# Patient Record
Sex: MALE | Race: WHITE | NOT HISPANIC OR LATINO | Employment: OTHER | URBAN - METROPOLITAN AREA
[De-identification: names, ages, dates, MRNs, and addresses within clinical notes are randomized per-mention and may not be internally consistent; named-entity substitution may affect disease eponyms.]

---

## 2017-01-03 ENCOUNTER — OUTSIDE FACILITY SERVICE (OUTPATIENT)
Dept: PAIN MEDICINE | Facility: CLINIC | Age: 77
End: 2017-01-03

## 2017-01-03 PROCEDURE — 62321 NJX INTERLAMINAR CRV/THRC: CPT | Performed by: ANESTHESIOLOGY

## 2017-01-24 ENCOUNTER — OFFICE VISIT (OUTPATIENT)
Dept: PAIN MEDICINE | Facility: CLINIC | Age: 77
End: 2017-01-24

## 2017-01-24 VITALS
RESPIRATION RATE: 16 BRPM | OXYGEN SATURATION: 95 % | SYSTOLIC BLOOD PRESSURE: 145 MMHG | BODY MASS INDEX: 31.83 KG/M2 | TEMPERATURE: 97.7 F | HEIGHT: 68 IN | WEIGHT: 210 LBS | DIASTOLIC BLOOD PRESSURE: 62 MMHG | HEART RATE: 50 BPM

## 2017-01-24 DIAGNOSIS — M51.36 DEGENERATION OF INTERVERTEBRAL DISC OF LUMBAR REGION: ICD-10-CM

## 2017-01-24 DIAGNOSIS — M51.24 THORACIC DISC HERNIATION: ICD-10-CM

## 2017-01-24 DIAGNOSIS — G89.29 OTHER CHRONIC PAIN: Primary | ICD-10-CM

## 2017-01-24 DIAGNOSIS — M96.1 POSTLAMINECTOMY SYNDROME OF LUMBAR REGION: ICD-10-CM

## 2017-01-24 DIAGNOSIS — Z79.899 ENCOUNTER FOR LONG-TERM CURRENT USE OF HIGH RISK MEDICATION: ICD-10-CM

## 2017-01-24 PROCEDURE — 99214 OFFICE O/P EST MOD 30 MIN: CPT | Performed by: NURSE PRACTITIONER

## 2017-01-24 RX ORDER — HYDROCODONE BITARTRATE AND ACETAMINOPHEN 10; 325 MG/1; MG/1
1 TABLET ORAL EVERY 4 HOURS PRN
Qty: 180 TABLET | Refills: 0 | Status: SHIPPED | OUTPATIENT
Start: 2017-01-24 | End: 2017-02-23 | Stop reason: SDUPTHER

## 2017-01-24 NOTE — PROGRESS NOTES
CHIEF COMPLAINT  Pt states received minimal relief following 1/3/17 T9-10 DOYLE #2. Pt had 2-3 months of significant relief after 1st TESI.    Subjective   Chevy Goodwin is a 76 y.o. male  who presents to the office for follow-up of procedure.  He completed a thoracic DOYLE   on  1-3-2017 performed by Dr. Brambila for management of mid back pain. Patient reports minimal relief from the procedure. Reported significant benefit after Thoracic DOYLE when completed in July/August of 2016, this was a series of two completed 2-3 weeks apart.      He continues with Hydrocodone 10/325 6/day. Reports moderate pain reduction. No adverse reactions. ADL's by self.     Back Pain   This is a chronic problem. The current episode started more than 1 year ago. The problem occurs constantly. The pain is present in the lumbar spine and thoracic spine. The quality of the pain is described as aching and burning. The pain radiates to the right foot and left foot. The pain is at a severity of 5/10. The pain is moderate. The pain is worse during the day. The symptoms are aggravated by bending and twisting (walking, any physical activity). Stiffness is present in the morning. Associated symptoms include numbness (bilateral lower legs). Pertinent negatives include no bladder incontinence, bowel incontinence, chest pain, dysuria, fever, headaches, tingling or weakness. Risk factors include lack of exercise. He has tried NSAIDs (Rest, Norco 10/325 6/day, Thoracic DOYLE) for the symptoms. The treatment provided moderate relief.      PEG Assessment   What number best describes your pain on average in the past week?6  What number best describes how, during the past week, pain has interfered with your enjoyment of life?6  What number best describes how, during the past week, pain has interfered with your general activity?  6    The following portions of the patient's history were reviewed and updated as appropriate: allergies, current medications, past  "family history, past medical history, past social history, past surgical history and problem list.    Review of Systems   Constitutional: Positive for activity change (decreased). Negative for appetite change and fever.   Respiratory: Positive for apnea. Negative for chest tightness and shortness of breath.    Cardiovascular: Negative for chest pain.   Gastrointestinal: Negative for bowel incontinence, constipation, diarrhea and nausea.   Genitourinary: Negative for bladder incontinence, difficulty urinating and dysuria.   Musculoskeletal: Positive for back pain.   Neurological: Positive for numbness (bilateral lower legs). Negative for dizziness, tingling, weakness, light-headedness and headaches.   Psychiatric/Behavioral: Positive for sleep disturbance. Negative for suicidal ideas.       Vitals:    01/24/17 1318   BP: 145/62   Pulse: 50   Resp: 16   Temp: 97.7 °F (36.5 °C)   SpO2: 95%   Weight: 210 lb (95.3 kg)   Height: 68\" (172.7 cm)   PainSc: 5  Comment: mid back pain ranges from 4-7/10   PainLoc: Back       Objective   Physical Exam   Constitutional: He is oriented to person, place, and time. He appears well-developed and well-nourished. He is cooperative.   HENT:   Head: Normocephalic and atraumatic.   Nose: Nose normal.   Eyes: Conjunctivae and lids are normal.   Neck: Trachea normal.   Cardiovascular: Normal rate, regular rhythm and normal heart sounds.    Pulmonary/Chest: Effort normal and breath sounds normal.   Musculoskeletal: Normal range of motion.        Thoracic back: He exhibits tenderness.        Lumbar back: He exhibits tenderness.   Neurological: He is alert and oriented to person, place, and time. Gait (ambulates with cane) abnormal.   Reflex Scores:       Patellar reflexes are 1+ on the right side and 1+ on the left side.  Skin: Skin is warm, dry and intact.   Psychiatric: He has a normal mood and affect. His speech is normal and behavior is normal. Judgment and thought content normal. " Cognition and memory are normal.   Nursing note and vitals reviewed.    Assessment/Plan   Chevy was seen today for back pain.    Diagnoses and all orders for this visit:    Other chronic pain    Postlaminectomy syndrome of lumbar region    Degeneration of intervertebral disc of lumbar region    Encounter for long-term current use of high risk medication    Thoracic disc herniation T9-T10  -     Case Request    Other orders  -     HYDROcodone-acetaminophen (NORCO)  MG per tablet; Take 1 tablet by mouth Every 4 (Four) Hours As Needed for severe pain (7-10).      --- Repeat T9-10 Thoracic DOYLE. No anticoagulants.   --- Refill hydrocodone.  Patient appears stable with current regimen. No adverse effects. Regarding continuation of opioids, there is no evidence of aberrant behavior or any red flags.  The patient continues with appropriate response to opioid therapy. ADL's remain intact by self.   --- The urine drug screen confirmation from 6-7-2016 has been reviewed and the result is appropriate based on patient history and MARIOLA report  --- Follow-up 1 month          MARIOLA REPORT  As part of the patient's treatment plan, I am prescribing controlled substances. The patient has been made aware of appropriate use of such medications, including potential risk of somnolence, limited ability to drive and/or work safely, and the potential for dependence or overdose. It has also bee made clear that these medications are for use by this patient only, without concomitant use of alcohol or other substances unless prescribed.     Patient has completed prescribing agreement detailing terms of continued prescribing of controlled substances, including monitoring MARIOLA reports, urine drug screening, and pill counts if necessary. The patient is aware that inappropriate use will results in cessation of prescribing such medications.    MARIOLA report has been reviewed and scanned into the patient's chart.    Date of last MARIOLA :  1- (last fill not reported as patient had filled at [facility] in [facility])    History and physical exam exhibit continued safe and appropriate use of controlled substances.     EMR Dragon/Transcription disclaimer:   Much of this encounter note is an electronic transcription/translation of spoken language to printed text. The electronic translation of spoken language may permit erroneous, or at times, nonsensical words or phrases to be inadvertently transcribed; Although I have reviewed the note for such errors, some may still exist.

## 2017-01-24 NOTE — MR AVS SNAPSHOT
Chevy Goodwin   1/24/2017 1:00 PM   Office Visit    Dept Phone:  [phone]   Encounter #:  [id number]    Provider:  DANY Vo   Department:  [facility] PAIN MANAGEMENT                Your Full Care Plan              Where to Get Your Medications      You can get these medications from any pharmacy     Bring a paper prescription for each of these medications     HYDROcodone-acetaminophen  MG per tablet            Your Updated Medication List          This list is accurate as of: 1/24/17  1:45 PM.  Always use your most recent med list.                * ALCORTIN A 1-2-1 % gel       * ALCORTIN A 1-2-1 % gel       allopurinol 300 MG tablet   Commonly known as:  ZYLOPRIM       amLODIPine 10 MG tablet   Commonly known as:  NORVASC       aspirin 81 MG tablet       azelastine 0.1 % nasal spray   Commonly known as:  ASTELIN       calcium carbonate-cholecalciferol 500-400 MG-UNIT tablet tablet       carvedilol 6.25 MG tablet   Commonly known as:  COREG       celecoxib 200 MG capsule   Commonly known as:  CeleBREX       clotrimazole 1 % external solution   Commonly known as:  LOTRIMIN       [facility] GLUCOSAMINE-CHONDROITIN tablet       desonide 0.05 % cream   Commonly known as:  DESOWEN       diflorasone 0.05 % cream   Commonly known as:  PSORCON       docusate sodium 100 MG capsule   Commonly known as:  COLACE       esomeprazole 40 MG capsule   Commonly known as:  nexIUM       hydrochlorothiazide 25 MG tablet   Commonly known as:  HYDRODIURIL       HYDROcodone-acetaminophen  MG per tablet   Commonly known as:  NORCO   Take 1 tablet by mouth Every 4 (Four) Hours As Needed for severe pain (7-10).       insulin glargine 100 UNIT/ML injection   Commonly known as:  LANTUS       isosorbide mononitrate 30 MG 24 hr tablet   Commonly known as:  IMDUR       losartan 100 MG tablet   Commonly known as:  COZAAR       melatonin 5 MG tablet tablet       METFORMIN HCL PO       mupirocin 2 % ointment   Commonly known as:  BACTROBAN       nitroglycerin 0.4 MG SL tablet   Commonly known as:  NITROSTAT       pravastatin 40 MG tablet   Commonly known as:  PRAVACHOL       tobramycin 0.3 % solution ophthalmic solution       vitamin B-12 100 MCG tablet   Commonly known as:  CYANOCOBALAMIN       Vitamin D3 2000 UNITS capsule       * Notice:  This list has 2 medication(s) that are the same as other medications prescribed for you. Read the directions carefully, and ask your doctor or other care provider to review them with you.            We Performed the Following     Case Request       You Were Diagnosed With        Codes Comments    Other chronic pain    -  Primary ICD-10-CM: G89.29  ICD-9-CM: 338.29     Postlaminectomy syndrome of lumbar region     ICD-10-CM: M96.1  ICD-9-CM: 722.83     Degeneration of intervertebral disc of lumbar region     ICD-10-CM: M51.36  ICD-9-CM: 722.52     Encounter for long-term current use of high risk medication     ICD-10-CM: Z79.899  ICD-9-CM: V58.69     Thoracic disc herniation     ICD-10-CM: M51.24  ICD-9-CM: 722.11       Instructions     None    Patient Instructions History      Upcoming Appointments     Visit Type Date Time Department    OFFICE VISIT 1/24/2017  1:00 PM [facility] PAIN [facility]    FOLLOW UP 3/14/2017  1:00 PM [facility] INFECT DISE[facility]    FOLLOW UP 5/18/2017 10:00 AM  [facility]EP [facility]     5/18/2017 10:00 AM [facility] SLEEP [facility]      [employer] Signup     Our records indicate that you have an active [employer] account.    You can view your After Visit Summary by going to [employer] and logging in with your [employer] username and password.  If you don't have a [employer] username and password but a parent or guardian has access to your record, the parent or guardian should login with their own [employer] username and password and access your record to view the After Visit Summary.    If you have questions, you can email  [email or web address] or call [phone] to talk to our MyChart staff.  Remember, [employer] is NOT to be used for urgent needs.  For medical emergencies, dial 911.               Other Info from Your Visit           Your Appointments     Mar 14, 2017  1:00 PM EDT   Follow Up with Remi Acevedo MD   [facility] (--)    [address] [address] [phone]           Arrive 15 minutes prior to appointment.            May 18, 2017 10:00 AM EDT   Follow Up with Mulugeta Odonnell MD, [facility]EP [facility] PROVIDER SCHEDULE   [facility])    [address] [place] [address] [phone]           Arrive 15 minutes prior to appointment.              Allergies     Dye Fdc Red [Red Dye]  Hives    Iodine  Hives    Morphine And Related  Mental Status Change    Oxycodone  GI Intolerance    Adhesive Tape  Rash      Reason for Visit     Back Pain           Vital Signs     Blood Pressure Pulse Temperature Respirations Height Weight    145/62 50 97.7 °F (36.5 °C) 16 68\" (172.7 cm) 210 lb (95.3 kg)    Oxygen Saturation Body Mass Index Smoking Status             95% 31.93 kg/m2 Current Every Day Smoker         Problems and Diagnoses Noted     Chronic pain    Degeneration of lumbar or lumbosacral intervertebral disc    Encounter for long-term current use of high risk medication    Postlaminectomy syndrome of lumbar region    Herniated thoracic intervertebral disc        "

## 2017-02-07 ENCOUNTER — OUTSIDE FACILITY SERVICE (OUTPATIENT)
Dept: PAIN MEDICINE | Facility: CLINIC | Age: 77
End: 2017-02-07

## 2017-02-07 PROCEDURE — 62321 NJX INTERLAMINAR CRV/THRC: CPT | Performed by: ANESTHESIOLOGY

## 2017-02-23 ENCOUNTER — OFFICE VISIT (OUTPATIENT)
Dept: PAIN MEDICINE | Facility: CLINIC | Age: 77
End: 2017-02-23

## 2017-02-23 VITALS
SYSTOLIC BLOOD PRESSURE: 160 MMHG | OXYGEN SATURATION: 96 % | DIASTOLIC BLOOD PRESSURE: 64 MMHG | TEMPERATURE: 98 F | BODY MASS INDEX: 32.1 KG/M2 | HEART RATE: 57 BPM | HEIGHT: 68 IN | RESPIRATION RATE: 16 BRPM | WEIGHT: 211.8 LBS

## 2017-02-23 DIAGNOSIS — M51.36 DEGENERATION OF INTERVERTEBRAL DISC OF LUMBAR REGION: ICD-10-CM

## 2017-02-23 DIAGNOSIS — M96.1 POSTLAMINECTOMY SYNDROME OF LUMBAR REGION: ICD-10-CM

## 2017-02-23 DIAGNOSIS — G89.29 OTHER CHRONIC PAIN: Primary | ICD-10-CM

## 2017-02-23 DIAGNOSIS — Z79.899 ENCOUNTER FOR LONG-TERM CURRENT USE OF HIGH RISK MEDICATION: ICD-10-CM

## 2017-02-23 DIAGNOSIS — M51.24 THORACIC DISC HERNIATION: ICD-10-CM

## 2017-02-23 PROCEDURE — 99213 OFFICE O/P EST LOW 20 MIN: CPT | Performed by: NURSE PRACTITIONER

## 2017-02-23 RX ORDER — DIPHENOXYLATE HYDROCHLORIDE AND ATROPINE SULFATE 2.5; .025 MG/1; MG/1
TABLET ORAL
Refills: 0 | COMMUNITY
Start: 2016-12-19 | End: 2018-12-27

## 2017-02-23 RX ORDER — HYDROCODONE BITARTRATE AND ACETAMINOPHEN 10; 325 MG/1; MG/1
1 TABLET ORAL EVERY 4 HOURS PRN
Qty: 180 TABLET | Refills: 0 | Status: SHIPPED | OUTPATIENT
Start: 2017-02-23 | End: 2017-03-22 | Stop reason: SDUPTHER

## 2017-02-23 RX ORDER — ONDANSETRON 4 MG/1
TABLET, ORALLY DISINTEGRATING ORAL
Refills: 0 | COMMUNITY
Start: 2016-12-19 | End: 2017-03-22

## 2017-02-23 RX ORDER — CICLOPIROX 1 G/100ML
SHAMPOO TOPICAL
Refills: 0 | COMMUNITY
Start: 2017-01-31 | End: 2018-12-27

## 2017-02-23 NOTE — PROGRESS NOTES
CHIEF COMPLAINT    Pt had Thoracic DOYLE on 2/07/17. He reports 30% relief for one week. Then it went back to what it was.     Subjective   Chevy Goodwin is a 76 y.o. male  who presents to the office for follow-up of procedure.  He completed a thoracic DOYLE   on  2-7-2017 performed by Dr. Brambila for management of mid back pain. Patient reports 30% relief from the procedure X 7 days.     He has completed 4 Thoracic DOYLE's since 9-.  The first two provided some significant benefit for 2-3 months, however unfortunately the most recent to injections provided no ongoing therapeutic benefit.      He has previously been considered for SCS therapy and was sent to Dr. Sheppard for evaluation.  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     He continues with use of hydrocodone 10/325 which he takes at least 6/day and sometimes admits to taking more.  He reports moderate benefit with this regimen.  Per previous notes from Dr. Brambila, no chronic opioid escalations.      He was evaluated by by Dr. Morgan most recently about 3 years ago, he reports that there was nothing surgical recommended at that time.  His pain has basically been unchanged since that time.      Back Pain   This is a chronic (history of lumbar fusion with Dr. Yuan at [facility], was done in 2005) problem. The current episode started more than 1 year ago. The problem occurs constantly. The pain is present in the lumbar spine and thoracic spine. The quality of the pain is described as aching and burning. The pain radiates to the right foot and left foot. The pain is at a severity of 5/10. The pain is moderate. The pain is worse during the day. The symptoms are aggravated by bending and twisting  "(walking, any physical activity). Stiffness is present in the morning. Associated symptoms include numbness (bilateral lower legs). Pertinent negatives include no bladder incontinence, bowel incontinence, chest pain, dysuria, fever, headaches, tingling or weakness. Risk factors include lack of exercise. He has tried NSAIDs (Rest, Norco 10/325 6/day, Thoracic DOYLE) for the symptoms. The treatment provided moderate relief.      PEG Assessment   What number best describes your pain on average in the past week?6  What number best describes how, during the past week, pain has interfered with your enjoyment of life?6  What number best describes how, during the past week, pain has interfered with your general activity?  7    The following portions of the patient's history were reviewed and updated as appropriate: allergies, current medications, past family history, past medical history, past social history, past surgical history and problem list.    Review of Systems   Constitutional: Positive for activity change (decreased). Negative for appetite change and fever.   Respiratory: Positive for apnea. Negative for chest tightness and shortness of breath.    Cardiovascular: Negative for chest pain.   Gastrointestinal: Negative for bowel incontinence, constipation, diarrhea and nausea.   Genitourinary: Negative for bladder incontinence, difficulty urinating and dysuria.   Musculoskeletal: Positive for back pain.   Neurological: Positive for numbness (bilateral lower legs). Negative for dizziness, tingling, weakness, light-headedness and headaches.   Psychiatric/Behavioral: Positive for sleep disturbance. Negative for agitation, confusion and suicidal ideas. The patient is not nervous/anxious.        Vitals:    02/23/17 1252   BP: 160/64   Pulse: 57   Resp: 16   Temp: 98 °F (36.7 °C)   SpO2: 96%   Weight: 211 lb 12.8 oz (96.1 kg)   Height: 68\" (172.7 cm)   PainSc:   5   PainLoc: Back       Objective   Physical Exam "   Constitutional: He is oriented to person, place, and time. He appears well-developed and well-nourished. He is cooperative.   HENT:   Head: Normocephalic and atraumatic.   Nose: Nose normal.   Eyes: Conjunctivae and lids are normal.   Neck: Trachea normal.   Cardiovascular: Normal rate and regular rhythm.    Pulmonary/Chest: Effort normal.   Musculoskeletal: Normal range of motion.        Thoracic back: He exhibits tenderness.        Lumbar back: He exhibits tenderness.   Neurological: He is alert and oriented to person, place, and time. Gait (ambulates with cane) abnormal.   Reflex Scores:       Patellar reflexes are 1+ on the right side and 1+ on the left side.  Skin: Skin is warm, dry and intact.   Psychiatric: He has a normal mood and affect. His speech is normal and behavior is normal. Judgment and thought content normal. Cognition and memory are normal.   Nursing note and vitals reviewed.      Assessment/Plan   Chevy was seen today for back pain.    Diagnoses and all orders for this visit:    Other chronic pain    Postlaminectomy syndrome of lumbar region    Degeneration of intervertebral disc of lumbar region    Thoracic disc herniation T9-T10    Encounter for long-term current use of high risk medication    Other orders  -     HYDROcodone-acetaminophen (NORCO)  MG per tablet; Take 1 tablet by mouth Every 4 (Four) Hours As Needed for severe pain (7-10).      --- Refill hydrocodone. Patient appears stable with current regimen. No adverse effects. Regarding continuation of opioids, there is no evidence of aberrant behavior or any red flags. The patient continues with appropriate response to opioid therapy. ADL's remain intact by self.   --- The urine drug screen confirmation from 6-7-2016 has been reviewed and the result is appropriate based on patient history and MARIOLA report  --- Follow-up 1 month          MARIOLA REPORT    As part of the patient's treatment plan, I am prescribing controlled  substances. The patient has been made aware of appropriate use of such medications, including potential risk of somnolence, limited ability to drive and/or work safely, and the potential for dependence or overdose. It has also bee made clear that these medications are for use by this patient only, without concomitant use of alcohol or other substances unless prescribed.     Patient has completed prescribing agreement detailing terms of continued prescribing of controlled substances, including monitoring MARIOLA reports, urine drug screening, and pill counts if necessary. The patient is aware that inappropriate use will results in cessation of prescribing such medications.    MARIOLA report has been reviewed and scanned into the patient's chart.    Date of last MARIOLA : 2- (He brought in bottle filled 1- which was filled at the [facility] and was not reported to MARIOLA)    History and physical exam exhibit continued safe and appropriate use of controlled substances.     EMR Dragon/Transcription disclaimer:   Much of this encounter note is an electronic transcription/translation of spoken language to printed text. The electronic translation of spoken language may permit erroneous, or at times, nonsensical words or phrases to be inadvertently transcribed; Although I have reviewed the note for such errors, some may still exist.

## 2017-03-22 ENCOUNTER — OFFICE VISIT (OUTPATIENT)
Dept: PAIN MEDICINE | Facility: CLINIC | Age: 77
End: 2017-03-22

## 2017-03-22 VITALS
TEMPERATURE: 98.7 F | HEART RATE: 63 BPM | SYSTOLIC BLOOD PRESSURE: 148 MMHG | HEIGHT: 68 IN | OXYGEN SATURATION: 96 % | RESPIRATION RATE: 18 BRPM | WEIGHT: 210 LBS | DIASTOLIC BLOOD PRESSURE: 64 MMHG | BODY MASS INDEX: 31.83 KG/M2

## 2017-03-22 DIAGNOSIS — Z79.891 LONG TERM (CURRENT) USE OF OPIATE ANALGESIC: ICD-10-CM

## 2017-03-22 DIAGNOSIS — M51.24 THORACIC DISC HERNIATION: ICD-10-CM

## 2017-03-22 DIAGNOSIS — M25.561 PAIN IN LATERAL PORTION OF RIGHT KNEE: ICD-10-CM

## 2017-03-22 DIAGNOSIS — G89.4 CHRONIC PAIN SYNDROME: Primary | ICD-10-CM

## 2017-03-22 DIAGNOSIS — M96.1 POSTLAMINECTOMY SYNDROME OF LUMBAR REGION: ICD-10-CM

## 2017-03-22 PROCEDURE — 99213 OFFICE O/P EST LOW 20 MIN: CPT | Performed by: ANESTHESIOLOGY

## 2017-03-22 RX ORDER — BACLOFEN 10 MG/1
TABLET ORAL
Refills: 0 | COMMUNITY
Start: 2017-03-02 | End: 2017-03-29

## 2017-03-22 RX ORDER — HYDROCODONE BITARTRATE AND ACETAMINOPHEN 10; 325 MG/1; MG/1
1 TABLET ORAL EVERY 4 HOURS PRN
Qty: 180 TABLET | Refills: 0 | Status: SHIPPED | OUTPATIENT
Start: 2017-03-22 | End: 2017-04-28 | Stop reason: SDUPTHER

## 2017-03-22 NOTE — PROGRESS NOTES
CHIEF COMPLAINT  Follow-up for back pain. Mr. Goodwin states that his back pain is unchanged from his last appt.    Subjective   Chevy Goodwin is a 76 y.o. male  who presents to the office for follow-up.He has a history of lumbar postlam syndrome, thoracic disc disease and herniation, and recently he has been flaring up some right knee pain..      Back Pain   This is a chronic (history of lumbar fusion with Dr. Yuan at [facility], was done in 2005) problem. The current episode started more than 1 year ago. The problem occurs constantly. The problem is unchanged. The pain is present in the lumbar spine and thoracic spine. The quality of the pain is described as aching and burning. The pain radiates to the right foot and left foot. The pain is moderate. The pain is worse during the day. The symptoms are aggravated by bending and twisting (walking, any physical activity). Stiffness is present in the morning. Associated symptoms include numbness (bilateral lower legs). Pertinent negatives include no bladder incontinence, bowel incontinence, chest pain, dysuria, fever, headaches, tingling or weakness. Risk factors include lack of exercise. He has tried NSAIDs (Rest, Norco 10/325 6/day, Thoracic DOYLE) for the symptoms. The treatment provided moderate relief.        PEG Assessment   What number best describes your pain on average in the past week?7  What number best describes how, during the past week, pain has interfered with your enjoyment of life?6  What number best describes how, during the past week, pain has interfered with your general activity?  6      The following portions of the patient's history were reviewed and updated as appropriate: allergies, current medications, past family history, past medical history, past social history, past surgical history and problem list.    Review of Systems   Constitutional: Positive for fatigue. Negative for fever.   HENT: Positive for hearing loss.    Eyes: Negative  "for visual disturbance.   Respiratory: Negative for cough, shortness of breath and wheezing.    Cardiovascular: Negative.  Negative for chest pain.   Gastrointestinal: Positive for constipation. Negative for bowel incontinence and diarrhea.   Genitourinary: Negative for bladder incontinence, difficulty urinating and dysuria.   Musculoskeletal: Positive for arthralgias (right knee ) and back pain.   Neurological: Positive for numbness (bilateral lower legs). Negative for tingling, weakness and headaches.   Psychiatric/Behavioral: Positive for sleep disturbance. Negative for suicidal ideas. The patient is not nervous/anxious.                    Vitals:    03/22/17 1023   BP: 148/64   Pulse: 63   Resp: 18   Temp: 98.7 °F (37.1 °C)   SpO2: 96%   Weight: 210 lb (95.3 kg)   Height: 68\" (172.7 cm)   PainSc:   6   PainLoc: Back         Objective   Physical Exam   Constitutional: He is oriented to person, place, and time. He appears well-developed and well-nourished. He is cooperative.   HENT:   Head: Normocephalic and atraumatic.   Right Ear: External ear normal.   Left Ear: External ear normal.   Nose: Nose normal.   Eyes: Conjunctivae, EOM and lids are normal. Pupils are equal, round, and reactive to light.   Neck: Trachea normal. Neck supple.   Pulmonary/Chest: Effort normal.   Neurological: He is alert and oriented to person, place, and time. Gait (ambulates with cane) abnormal.   Reflex Scores:       Patellar reflexes are 1+ on the right side and 1+ on the left side.  Skin: Skin is intact.   Psychiatric: He has a normal mood and affect. His speech is normal and behavior is normal. Cognition and memory are normal.   Nursing note and vitals reviewed.          Assessment/Plan   Chevy was seen today for back pain.    Diagnoses and all orders for this visit:    Chronic pain syndrome    Long term (current) use of opiate analgesic    Postlaminectomy syndrome of lumbar region    Thoracic disc herniation T9-T10    Pain in " lateral portion of right knee    Other orders  -     HYDROcodone-acetaminophen (NORCO)  MG per tablet; Take 1 tablet by mouth Every 4 (Four) Hours As Needed for Severe Pain (7-10).        --- Follow-up about 5-6 weeks (first week of May)  -- continue celebrex               MARIOLA REPORT    As part of the patient's treatment plan, I am prescribing controlled substances. The patient has been made aware of appropriate use of such medications, including potential risk of somnolence, limited ability to drive and/or work safely, and the potential for dependence or overdose. It has also bee made clear that these medications are for use by this patient only, without concomitant use of alcohol or other substances unless prescribed.     Patient has completed prescribing agreement detailing terms of continued prescribing of controlled substances, including monitoring MARIOLA reports, urine drug screening, and pill counts if necessary. The patient is aware that inappropriate use will results in cessation of prescribing such medications.    MARIOLA report has been reviewed and scanned into the patient's chart.    Date of last MARIOLA : 3-19-17    History and physical exam exhibit continued safe and appropriate use of controlled substances.      EMR Dragon/Transcription disclaimer:   Much of this encounter note is an electronic transcription/translation of spoken language to printed text. The electronic translation of spoken language may permit erroneous, or at times, nonsensical words or phrases to be inadvertently transcribed; Although I have reviewed the note for such errors, some may still exist.

## 2017-03-29 ENCOUNTER — OFFICE VISIT (OUTPATIENT)
Dept: INFECTIOUS DISEASES | Facility: CLINIC | Age: 77
End: 2017-03-29

## 2017-03-29 VITALS
HEIGHT: 68 IN | TEMPERATURE: 97.6 F | WEIGHT: 214 LBS | BODY MASS INDEX: 32.43 KG/M2 | DIASTOLIC BLOOD PRESSURE: 73 MMHG | SYSTOLIC BLOOD PRESSURE: 150 MMHG | HEART RATE: 48 BPM

## 2017-03-29 DIAGNOSIS — M00.022 STAPHYLOCOCCAL ARTHRITIS OF LEFT ELBOW (HCC): Primary | ICD-10-CM

## 2017-03-29 DIAGNOSIS — Z22.322 MRSA COLONIZATION: ICD-10-CM

## 2017-03-29 PROCEDURE — 99213 OFFICE O/P EST LOW 20 MIN: CPT | Performed by: INTERNAL MEDICINE

## 2017-03-29 NOTE — PROGRESS NOTES
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    I last saw him in November 2016 for left septic MRSA elbow arthritis.  He had recently completed a course of vancomycin and had history of prior MRSA septicemia, right elbow olecranon bursitis and left elbow bursitis.  Given multiple prior invasive MRSA infections, we went ahead and attempted MRSA decolonization.  He completed 2 rounds of this in November 2016 and January 2017.  Since that time, he has not had any problems with MRSA infection.  His left elbow continues to improve and is doing well.  He has not had any fever, chills, night sweats.  He denies any cutaneous abscesses or skin infections.  He is otherwise been feeling largely pretty well.      Review of Systems: Irregular heartbeat, lower from edema, back pain, psoriasis.  All other reviewed and negative except as per HPI    Blood pressure 150/73, pulse (!) 48, temperature 97.6 °F (36.4 °C), height 68\" (172.7 cm), weight 214 lb (97.1 kg).  GENERAL: Awake and alert, in no acute distress.   SKIN: Warm and dry without cutaneous eruptions .  Left elbow looks okay.  PSYCHIATRIC: Appropriate mood, affect, insight, and judgment.     Assessment and Plan  Staphylococcal arthritis of left elbow, results  MRSA colonization, status post decolonization.    Infectious issues appear quiescent.  Discussed with him signs and symptoms of recrudescent infection.  In the event of recurrent MRSA abscess, I discussed with him using warm compresses to try to bring his head.  I also wrote him a prescription for topical Bactroban which he may apply topically to help prevent infection from spreading.  I have not made a follow-up appointment but asked that he keep me informed of any new infectious issues.    "

## 2017-04-28 ENCOUNTER — OFFICE VISIT (OUTPATIENT)
Dept: PAIN MEDICINE | Facility: CLINIC | Age: 77
End: 2017-04-28

## 2017-04-28 VITALS
WEIGHT: 214.4 LBS | HEART RATE: 58 BPM | HEIGHT: 68 IN | BODY MASS INDEX: 32.49 KG/M2 | RESPIRATION RATE: 16 BRPM | OXYGEN SATURATION: 94 % | DIASTOLIC BLOOD PRESSURE: 75 MMHG | SYSTOLIC BLOOD PRESSURE: 162 MMHG | TEMPERATURE: 98.5 F

## 2017-04-28 DIAGNOSIS — M96.1 POSTLAMINECTOMY SYNDROME OF LUMBAR REGION: ICD-10-CM

## 2017-04-28 DIAGNOSIS — G89.4 CHRONIC PAIN SYNDROME: Primary | ICD-10-CM

## 2017-04-28 DIAGNOSIS — Z79.899 ENCOUNTER FOR LONG-TERM CURRENT USE OF HIGH RISK MEDICATION: ICD-10-CM

## 2017-04-28 DIAGNOSIS — M51.36 DEGENERATION OF INTERVERTEBRAL DISC OF LUMBAR REGION: ICD-10-CM

## 2017-04-28 LAB
POC AMPHETAMINES: NEGATIVE
POC BARBITURATES: NEGATIVE
POC BENZODIAZEPHINES: NEGATIVE
POC COCAINE: NEGATIVE
POC METHADONE: NEGATIVE
POC METHAMPHETAMINE SCREEN URINE: NEGATIVE
POC OPIATES: POSITIVE
POC OXYCODONE: NEGATIVE
POC PHENCYCLIDINE: NEGATIVE
POC PROPOXYPHENE: NEGATIVE
POC THC: NEGATIVE
POC TRICYCLIC ANTIDEPRESSANTS: NEGATIVE

## 2017-04-28 PROCEDURE — 80305 DRUG TEST PRSMV DIR OPT OBS: CPT | Performed by: NURSE PRACTITIONER

## 2017-04-28 PROCEDURE — 99213 OFFICE O/P EST LOW 20 MIN: CPT | Performed by: NURSE PRACTITIONER

## 2017-04-28 RX ORDER — HYDROCODONE BITARTRATE AND ACETAMINOPHEN 10; 325 MG/1; MG/1
1 TABLET ORAL EVERY 4 HOURS PRN
Qty: 180 TABLET | Refills: 0 | Status: SHIPPED | OUTPATIENT
Start: 2017-04-28 | End: 2017-06-02 | Stop reason: SDUPTHER

## 2017-04-28 NOTE — PROGRESS NOTES
CHIEF COMPLAINT    Since pt's last visit on 3-22-17, states back pain is unchanged. It is a little worse today, he states because of increased activity. States that his prostate cancer has returned after 10-11 years and he will be undergoing radiation therapy soon.     Subjective   Chevy Goodwin is a 76 y.o. male  who presents to the office for follow-up.He has a history of chronic back pain.  Pain unchanged.      He continues with Hydrocodone 10/325 6/day, Celebrex. He reports moderate benefit from this regimen. Denies adverse reactions.      Reports that his PSA jumped up, his prostate cancer has returned. He will begin radiation soon.      Back Pain   This is a chronic (history of lumbar fusion with Dr. Yuan at [facility], was done in 2005) problem. The current episode started more than 1 year ago. The problem occurs constantly. The problem is unchanged. The pain is present in the lumbar spine and thoracic spine. The quality of the pain is described as aching and burning. The pain radiates to the right foot and left foot. The pain is at a severity of 6/10. The pain is moderate. The pain is worse during the day. The symptoms are aggravated by bending and twisting (walking, any physical activity). Stiffness is present in the morning. Associated symptoms include numbness (bilateral lower legs). Pertinent negatives include no bladder incontinence, bowel incontinence, chest pain, dysuria, fever, headaches, tingling or weakness. Risk factors include lack of exercise. He has tried NSAIDs (Rest, Norco 10/325 6/day, Thoracic DOYLE) for the symptoms. The treatment provided moderate relief.      PEG Assessment   What number best describes your pain on average in the past week?6  What number best describes how, during the past week, pain has interfered with your enjoyment of life?6  What number best describes how, during the past week, pain has interfered with your general activity?  7    The following portions of the  "patient's history were reviewed and updated as appropriate: allergies, current medications, past family history, past medical history, past social history, past surgical history and problem list.    Review of Systems   Constitutional: Positive for activity change and fatigue. Negative for chills and fever.   HENT: Positive for hearing loss (pt wears hearing aids).    Eyes: Negative for visual disturbance.   Respiratory: Positive for apnea (pt states he has sleep apnea and wears a cpap). Negative for cough, shortness of breath and wheezing.    Cardiovascular: Negative.  Negative for chest pain.   Gastrointestinal: Positive for constipation (pt states he is taking OTC colace and controlling it). Negative for bowel incontinence and diarrhea.   Genitourinary: Negative for bladder incontinence, difficulty urinating and dysuria.   Musculoskeletal: Positive for arthralgias (right knee ) and back pain.   Neurological: Positive for numbness (bilateral lower legs). Negative for dizziness, tingling, weakness, light-headedness and headaches.   Psychiatric/Behavioral: Positive for sleep disturbance. Negative for agitation, confusion, hallucinations and suicidal ideas. The patient is not nervous/anxious.      Vitals:    04/28/17 1320   BP: 162/75   Pulse: 58   Resp: 16   Temp: 98.5 °F (36.9 °C)   SpO2: 94%   Weight: 214 lb 6.4 oz (97.3 kg)   Height: 68\" (172.7 cm)   PainSc:   7   PainLoc: Back     Objective   Physical Exam   Constitutional: He is oriented to person, place, and time. He appears well-developed and well-nourished. He is cooperative.   HENT:   Head: Normocephalic and atraumatic.   Right Ear: External ear normal.   Left Ear: External ear normal.   Nose: Nose normal.   Eyes: Conjunctivae and lids are normal. Pupils are equal, round, and reactive to light.   Neck: Trachea normal. Neck supple.   Cardiovascular: Normal rate.    Pulmonary/Chest: Effort normal. No respiratory distress.   Musculoskeletal:        Thoracic " back: He exhibits tenderness.        Lumbar back: He exhibits tenderness.   Neurological: He is alert and oriented to person, place, and time. Gait (ambulates with cane) abnormal.   Skin: Skin is intact.   Psychiatric: He has a normal mood and affect. His speech is normal and behavior is normal. Cognition and memory are normal.   Nursing note and vitals reviewed.      Assessment/Plan   Chevy was seen today for back pain.    Diagnoses and all orders for this visit:    Chronic pain syndrome  -     Urine Drug Screen Confirmation  -     POC Urine Drug Screen, Triage    Postlaminectomy syndrome of lumbar region  -     Urine Drug Screen Confirmation  -     POC Urine Drug Screen, Triage    Degeneration of intervertebral disc of lumbar region  -     Urine Drug Screen Confirmation  -     POC Urine Drug Screen, Triage    Encounter for long-term current use of high risk medication  -     Urine Drug Screen Confirmation  -     POC Urine Drug Screen, Triage    Other orders  -     HYDROcodone-acetaminophen (NORCO)  MG per tablet; Take 1 tablet by mouth Every 4 (Four) Hours As Needed for Severe Pain (7-10).      --- Refill Hydrocodone.  Patient appears stable with current regimen. No adverse effects. Regarding continuation of opioids, there is no evidence of aberrant behavior or any red flags.  The patient continues with appropriate response to opioid therapy. ADL's remain intact by self.   --- Routine UDS in office today as part of monitoring requirements for controlled substances.  The specimen was viewed and the immunoassay result reviewed and is +OPI.  This specimen will be sent to [facility] for confirmation.     --- Follow-up 1 month          MARIOLA REPORT  As part of the patient's treatment plan, I am prescribing controlled substances. The patient has been made aware of appropriate use of such medications, including potential risk of somnolence, limited ability to drive and/or work safely, and the potential  for dependence or overdose. It has also bee made clear that these medications are for use by this patient only, without concomitant use of alcohol or other substances unless prescribed.     Patient has completed prescribing agreement detailing terms of continued prescribing of controlled substances, including monitoring MARIOLA reports, urine drug screening, and pill counts if necessary. The patient is aware that inappropriate use will results in cessation of prescribing such medications.    MARIOLA report has been reviewed and scanned into the patient's chart.    Date of last MARIOLA : 4- ([facility], not reported to [facility] but bottle brought in today and checked by myself)    History and physical exam exhibit continued safe and appropriate use of controlled substances.    EMR Dragon/Transcription disclaimer:   Much of this encounter note is an electronic transcription/translation of spoken language to printed text. The electronic translation of spoken language may permit erroneous, or at times, nonsensical words or phrases to be inadvertently transcribed; Although I have reviewed the note for such errors, some may still exist.

## 2017-05-18 ENCOUNTER — HOSPITAL ENCOUNTER (OUTPATIENT)
Dept: SLEEP MEDICINE | Facility: HOSPITAL | Age: 77
Discharge: HOME | End: 2017-05-18
Attending: INTERNAL MEDICINE | Admitting: INTERNAL MEDICINE

## 2017-05-18 PROCEDURE — 99213 OFFICE O/P EST LOW 20 MIN: CPT | Performed by: INTERNAL MEDICINE

## 2017-05-18 PROCEDURE — G0463 HOSPITAL OUTPT CLINIC VISIT: HCPCS

## 2017-05-21 PROBLEM — G47.33 OSA TREATED WITH BIPAP: Status: ACTIVE | Noted: 2017-05-21

## 2017-06-02 ENCOUNTER — OFFICE VISIT (OUTPATIENT)
Dept: PAIN MEDICINE | Facility: CLINIC | Age: 77
End: 2017-06-02

## 2017-06-02 VITALS
SYSTOLIC BLOOD PRESSURE: 162 MMHG | HEIGHT: 68 IN | WEIGHT: 214.2 LBS | HEART RATE: 52 BPM | OXYGEN SATURATION: 94 % | DIASTOLIC BLOOD PRESSURE: 82 MMHG | RESPIRATION RATE: 18 BRPM | TEMPERATURE: 98.6 F | BODY MASS INDEX: 32.46 KG/M2

## 2017-06-02 DIAGNOSIS — M96.1 POSTLAMINECTOMY SYNDROME OF LUMBAR REGION: ICD-10-CM

## 2017-06-02 DIAGNOSIS — Z79.899 ENCOUNTER FOR LONG-TERM CURRENT USE OF HIGH RISK MEDICATION: ICD-10-CM

## 2017-06-02 DIAGNOSIS — G89.4 CHRONIC PAIN SYNDROME: Primary | ICD-10-CM

## 2017-06-02 DIAGNOSIS — M51.36 DEGENERATION OF INTERVERTEBRAL DISC OF LUMBAR REGION: ICD-10-CM

## 2017-06-02 DIAGNOSIS — M51.24 THORACIC DISC HERNIATION: ICD-10-CM

## 2017-06-02 PROCEDURE — 99213 OFFICE O/P EST LOW 20 MIN: CPT | Performed by: NURSE PRACTITIONER

## 2017-06-02 PROCEDURE — 96372 THER/PROPH/DIAG INJ SC/IM: CPT | Performed by: NURSE PRACTITIONER

## 2017-06-02 RX ORDER — KETOROLAC TROMETHAMINE 15 MG/ML
15 INJECTION, SOLUTION INTRAMUSCULAR; INTRAVENOUS ONCE
Status: COMPLETED | OUTPATIENT
Start: 2017-06-02 | End: 2017-06-02

## 2017-06-02 RX ORDER — HYDROCODONE BITARTRATE AND ACETAMINOPHEN 10; 325 MG/1; MG/1
1 TABLET ORAL EVERY 4 HOURS PRN
Qty: 180 TABLET | Refills: 0 | Status: SHIPPED | OUTPATIENT
Start: 2017-06-02 | End: 2017-06-28 | Stop reason: SDUPTHER

## 2017-06-02 RX ADMIN — KETOROLAC TROMETHAMINE 15 MG: 15 INJECTION, SOLUTION INTRAMUSCULAR; INTRAVENOUS at 09:58

## 2017-06-02 NOTE — PROGRESS NOTES
CHIEF COMPLAINT  Follow-up for back pain. Mr. Goodwin states that his back pain has increased in the last week.    Subjective   Chevy Goodwin is a 76 y.o. male  who presents to the office for follow-up.He has a history of chronic low back pain.  Back pain worse today, attributes it to mulching the yard and laying brick, getting better.      He continues with Hydrocodone 10/325 6/day, Celebrex. He reports moderate benefit from this regimen. Denies adverse reactions.     His prostate cancer has returned.  Awaiting PET scan before starting and treatment/radiation.  He also has history of non Hodgkin lymphoma, he follows with oncologist every 6 months.       Back Pain   This is a chronic (history of lumbar fusion with Dr. Yuan at [facility], was done in 2005) problem. The current episode started more than 1 year ago. The problem occurs constantly. The problem is unchanged. The pain is present in the lumbar spine and thoracic spine. The quality of the pain is described as aching and burning. The pain radiates to the right foot and left foot. The pain is at a severity of 8/10. The pain is moderate. The pain is worse during the day. The symptoms are aggravated by bending and twisting (walking, any physical activity). Stiffness is present in the morning. Associated symptoms include numbness. Pertinent negatives include no bladder incontinence, bowel incontinence, chest pain, dysuria, fever, headaches, tingling or weakness. Risk factors include lack of exercise. He has tried NSAIDs (Rest, Norco 10/325 6/day, Thoracic DOYLE) for the symptoms. The treatment provided moderate relief.      PEG Assessment   What number best describes your pain on average in the past week?7  What number best describes how, during the past week, pain has interfered with your enjoyment of life?8  What number best describes how, during the past week, pain has interfered with your general activity?  7    The following portions of the patient's  "history were reviewed and updated as appropriate: allergies, current medications, past family history, past medical history, past social history, past surgical history and problem list.    Review of Systems   Constitutional: Positive for fatigue. Negative for fever.   HENT: Negative for congestion.    Eyes: Negative for visual disturbance.   Respiratory: Negative for cough, shortness of breath and wheezing.    Cardiovascular: Negative.  Negative for chest pain.   Gastrointestinal: Negative for bowel incontinence, constipation and diarrhea.   Genitourinary: Negative for bladder incontinence, difficulty urinating and dysuria.   Musculoskeletal: Positive for back pain and joint swelling (bilateral ankles).   Neurological: Positive for numbness. Negative for tingling, weakness and headaches.   Psychiatric/Behavioral: Positive for sleep disturbance. Negative for suicidal ideas. The patient is not nervous/anxious.        Vitals:    06/02/17 0847   BP: 162/82   Pulse: 52   Resp: 18   Temp: 98.6 °F (37 °C)   SpO2: 94%   Weight: 214 lb 3.2 oz (97.2 kg)   Height: 68\" (172.7 cm)   PainSc:   8   PainLoc: Back     Objective   Physical Exam   Constitutional: He is oriented to person, place, and time. He appears well-developed and well-nourished. He is cooperative.   HENT:   Head: Normocephalic and atraumatic.   Right Ear: External ear normal.   Left Ear: External ear normal.   Nose: Nose normal.   Eyes: Conjunctivae and lids are normal. Pupils are equal, round, and reactive to light.   Neck: Trachea normal. Neck supple.   Cardiovascular: Normal rate.    Pulmonary/Chest: Effort normal. No respiratory distress.   Musculoskeletal:        Thoracic back: He exhibits tenderness and pain.        Lumbar back: He exhibits tenderness and pain.   Neurological: He is alert and oriented to person, place, and time. Gait (ambulates with cane) abnormal.   Skin: Skin is intact.   Psychiatric: He has a normal mood and affect. His speech is normal " and behavior is normal. Cognition and memory are normal.   Nursing note and vitals reviewed.    Assessment/Plan   Chevy was seen today for back pain.    Diagnoses and all orders for this visit:    Chronic pain syndrome  -     ketorolac (TORADOL) injection 15 mg; Inject 15 mg into the shoulder, thigh, or buttocks 1 (One) Time.    Postlaminectomy syndrome of lumbar region    Degeneration of intervertebral disc of lumbar region    Thoracic disc herniation T9-T10    Encounter for long-term current use of high risk medication    Other orders  -     HYDROcodone-acetaminophen (NORCO)  MG per tablet; Take 1 tablet by mouth Every 4 (Four) Hours As Needed for Severe Pain (7-10).      --- Refill Hydrocodone.  Patient appears stable with current regimen. No adverse effects. Regarding continuation of opioids, there is no evidence of aberrant behavior or any red flags.  The patient continues with appropriate response to opioid therapy. ADL's remain intact by self.   --- The urine drug screen confirmation from 4-28-17 has been reviewed and the result is appropriate based on patient history and MARIOLA report  --- Follow-up 1 month          MARIOLA REPORT  As part of the patient's treatment plan, I am prescribing controlled substances. The patient has been made aware of appropriate use of such medications, including potential risk of somnolence, limited ability to drive and/or work safely, and the potential for dependence or overdose. It has also bee made clear that these medications are for use by this patient only, without concomitant use of alcohol or other substances unless prescribed.     Patient has completed prescribing agreement detailing terms of continued prescribing of controlled substances, including monitoring MARIOLA reports, urine drug screening, and pill counts if necessary. The patient is aware that inappropriate use will results in cessation of prescribing such medications.    MARIOLA report has been reviewed and  scanned into the patient's chart.    Date of last MARIOLA : 6-1-2017    History and physical exam exhibit continued safe and appropriate use of controlled substances.    EMR Dragon/Transcription disclaimer:   Much of this encounter note is an electronic transcription/translation of spoken language to printed text. The electronic translation of spoken language may permit erroneous, or at times, nonsensical words or phrases to be inadvertently transcribed; Although I have reviewed the note for such errors, some may still exist.

## 2017-06-02 NOTE — PATIENT INSTRUCTIONS
Given TOradol IM today.  Hold Oral NSAID X 24 hours (discussed with patient, states understanding)

## 2017-06-28 ENCOUNTER — OFFICE VISIT (OUTPATIENT)
Dept: PAIN MEDICINE | Facility: CLINIC | Age: 77
End: 2017-06-28

## 2017-06-28 VITALS
TEMPERATURE: 98.4 F | OXYGEN SATURATION: 97 % | SYSTOLIC BLOOD PRESSURE: 162 MMHG | WEIGHT: 210 LBS | DIASTOLIC BLOOD PRESSURE: 68 MMHG | HEART RATE: 55 BPM | HEIGHT: 68 IN | BODY MASS INDEX: 31.83 KG/M2 | RESPIRATION RATE: 16 BRPM

## 2017-06-28 DIAGNOSIS — G03.9 ADHESIVE ARACHNOIDITIS: ICD-10-CM

## 2017-06-28 DIAGNOSIS — M96.1 POSTLAMINECTOMY SYNDROME OF LUMBAR REGION: ICD-10-CM

## 2017-06-28 DIAGNOSIS — M51.36 DEGENERATION OF INTERVERTEBRAL DISC OF LUMBAR REGION: ICD-10-CM

## 2017-06-28 DIAGNOSIS — M51.24 THORACIC DISC HERNIATION: ICD-10-CM

## 2017-06-28 DIAGNOSIS — G89.29 OTHER CHRONIC PAIN: Primary | ICD-10-CM

## 2017-06-28 DIAGNOSIS — Z79.899 ENCOUNTER FOR LONG-TERM CURRENT USE OF HIGH RISK MEDICATION: ICD-10-CM

## 2017-06-28 PROCEDURE — 99213 OFFICE O/P EST LOW 20 MIN: CPT | Performed by: ANESTHESIOLOGY

## 2017-06-28 RX ORDER — HYDROCODONE BITARTRATE AND ACETAMINOPHEN 10; 325 MG/1; MG/1
1 TABLET ORAL EVERY 4 HOURS PRN
Qty: 180 TABLET | Refills: 0 | Status: SHIPPED | OUTPATIENT
Start: 2017-06-28 | End: 2017-07-27 | Stop reason: SDUPTHER

## 2017-07-27 ENCOUNTER — OFFICE VISIT (OUTPATIENT)
Dept: PAIN MEDICINE | Facility: CLINIC | Age: 77
End: 2017-07-27

## 2017-07-27 VITALS
WEIGHT: 210 LBS | BODY MASS INDEX: 31.83 KG/M2 | DIASTOLIC BLOOD PRESSURE: 68 MMHG | HEART RATE: 52 BPM | TEMPERATURE: 98.2 F | RESPIRATION RATE: 16 BRPM | OXYGEN SATURATION: 96 % | SYSTOLIC BLOOD PRESSURE: 157 MMHG | HEIGHT: 68 IN

## 2017-07-27 DIAGNOSIS — M96.1 POSTLAMINECTOMY SYNDROME OF LUMBAR REGION: ICD-10-CM

## 2017-07-27 DIAGNOSIS — G89.29 OTHER CHRONIC PAIN: Primary | ICD-10-CM

## 2017-07-27 DIAGNOSIS — M51.36 DEGENERATION OF INTERVERTEBRAL DISC OF LUMBAR REGION: ICD-10-CM

## 2017-07-27 DIAGNOSIS — Z79.899 ENCOUNTER FOR LONG-TERM CURRENT USE OF HIGH RISK MEDICATION: ICD-10-CM

## 2017-07-27 PROCEDURE — 99213 OFFICE O/P EST LOW 20 MIN: CPT | Performed by: NURSE PRACTITIONER

## 2017-07-27 RX ORDER — HYDROCODONE BITARTRATE AND ACETAMINOPHEN 10; 325 MG/1; MG/1
1 TABLET ORAL EVERY 4 HOURS PRN
Qty: 180 TABLET | Refills: 0 | Status: SHIPPED | OUTPATIENT
Start: 2017-07-27 | End: 2017-08-25 | Stop reason: SDUPTHER

## 2017-07-27 NOTE — PROGRESS NOTES
CHIEF COMPLAINT  Since last visit on 6-28, pt states back pain has decreased a little. He has been going to PT and walking 1/2 mile every night. He states his prostate cancer is going in the right direction so he has decided not to get radiation yet.     Subjective   Chevy Goodwin is a 76 y.o. male  who presents to the office for follow-up.He has a history of chronic low back pain.    He continues with Hydrocodone 10/325 6/day, Celebrex. He reports moderate benefit from this regimen. Denies adverse reactions.     He is reporting improvement in back pain since starting PT last month as well as increasing his exercise.  He has started at [facility].  His goal is to walk a mile/day by December when he days a trip to [facility].      Back Pain   This is a chronic (history of lumbar fusion with Dr. Yuan at [facility], was done in 2005) problem. The current episode started more than 1 year ago. The problem occurs constantly. The problem has been gradually improving since onset. The pain is present in the lumbar spine and thoracic spine. The quality of the pain is described as aching and burning. The pain radiates to the right foot and left foot. The pain is at a severity of 5/10. The pain is moderate. The pain is worse during the day. The symptoms are aggravated by bending and twisting (walking, any physical activity). Stiffness is present in the morning. Associated symptoms include numbness (bilat. shins). Pertinent negatives include no bladder incontinence, bowel incontinence, chest pain, dysuria, fever, headaches, tingling or weakness. Risk factors include lack of exercise. He has tried NSAIDs (previous TESI.  Use of Norco) for the symptoms. The treatment provided moderate relief.     PEG Assessment   What number best describes your pain on average in the past week?6  What number best describes how, during the past week, pain has interfered with your enjoyment of life?6  What number best describes how, during the past  "week, pain has interfered with your general activity?  6    The following portions of the patient's history were reviewed and updated as appropriate: allergies, current medications, past family history, past medical history, past social history, past surgical history and problem list.    Review of Systems   Constitutional: Positive for fatigue. Negative for activity change, appetite change, chills and fever.   HENT: Negative for congestion.    Eyes: Negative for visual disturbance.   Respiratory: Negative for cough, shortness of breath and wheezing.    Cardiovascular: Negative.  Negative for chest pain.   Gastrointestinal: Negative for bowel incontinence, constipation, diarrhea and nausea.   Genitourinary: Negative for bladder incontinence, difficulty urinating and dysuria.   Musculoskeletal: Positive for back pain and joint swelling (bilateral ankles).   Neurological: Positive for numbness (bilat. shins). Negative for dizziness, tingling, weakness, light-headedness and headaches.   Psychiatric/Behavioral: Positive for sleep disturbance. Negative for agitation, confusion, hallucinations and suicidal ideas. The patient is not nervous/anxious.      Vitals:    07/27/17 1337   BP: 157/68   Pulse: 52   Resp: 16   Temp: 98.2 °F (36.8 °C)   SpO2: 96%   Weight: 210 lb (95.3 kg)   Height: 68\" (172.7 cm)   PainSc:   6   PainLoc: Back     Objective   Physical Exam   Constitutional: He is oriented to person, place, and time. He appears well-developed and well-nourished. He is cooperative.   HENT:   Head: Normocephalic and atraumatic.   Nose: Nose normal.   Eyes: Conjunctivae and lids are normal.   Neck: Trachea normal. Neck supple.   Cardiovascular: Normal rate.    Pulmonary/Chest: Effort normal. No respiratory distress.   Musculoskeletal:        Thoracic back: He exhibits tenderness and pain.        Lumbar back: He exhibits tenderness and pain.   Neurological: He is alert and oriented to person, place, and time. Gait " (ambulates with cane) abnormal.   Reflex Scores:       Patellar reflexes are 2+ on the right side and 2+ on the left side.  Skin: Skin is intact.   Psychiatric: He has a normal mood and affect. His speech is normal and behavior is normal. Cognition and memory are normal.   Nursing note and vitals reviewed.    Assessment/Plan   Chevy was seen today for back pain.    Diagnoses and all orders for this visit:    Other chronic pain    Postlaminectomy syndrome of lumbar region    Degeneration of intervertebral disc of lumbar region    Encounter for long-term current use of high risk medication    Other orders  -     HYDROcodone-acetaminophen (NORCO)  MG per tablet; Take 1 tablet by mouth Every 4 (Four) Hours As Needed for Severe Pain  (7-10).      --- Refill Hydrocodone.  Patient appears stable with current regimen. No adverse effects. Regarding continuation of opioids, there is no evidence of aberrant behavior or any red flags.  The patient continues with appropriate response to opioid therapy. ADL's remain intact by self.   --- The urine drug screen confirmation from 4-28-17 has been reviewed and the result is appropriate based on patient history and MARIOLA report  --- Follow-up 1 month        MARIOLA REPORT  As part of the patient's treatment plan, I am prescribing controlled substances. The patient has been made aware of appropriate use of such medications, including potential risk of somnolence, limited ability to drive and/or work safely, and the potential for dependence or overdose. It has also bee made clear that these medications are for use by this patient only, without concomitant use of alcohol or other substances unless prescribed.     Patient has completed prescribing agreement detailing terms of continued prescribing of controlled substances, including monitoring MARIOLA reports, urine drug screening, and pill counts if necessary. The patient is aware that inappropriate use will results in cessation of  prescribing such medications.    MARIOLA report has been reviewed and scanned into the patient's chart.    Date of last MARIOLA : 7/26/2017 (Last refill at [place], not [facility], every third prescription is filled here for cost purposes)    History and physical exam exhibit continued safe and appropriate use of controlled substances.    EMR Dragon/Transcription disclaimer:   Much of this encounter note is an electronic transcription/translation of spoken language to printed text. The electronic translation of spoken language may permit erroneous, or at times, nonsensical words or phrases to be inadvertently transcribed; Although I have reviewed the note for such errors, some may still exist.

## 2017-08-25 ENCOUNTER — OFFICE VISIT (OUTPATIENT)
Dept: PAIN MEDICINE | Facility: CLINIC | Age: 77
End: 2017-08-25

## 2017-08-25 VITALS
RESPIRATION RATE: 16 BRPM | BODY MASS INDEX: 31.67 KG/M2 | OXYGEN SATURATION: 96 % | WEIGHT: 209 LBS | HEART RATE: 50 BPM | SYSTOLIC BLOOD PRESSURE: 151 MMHG | HEIGHT: 68 IN | DIASTOLIC BLOOD PRESSURE: 58 MMHG | TEMPERATURE: 98.4 F

## 2017-08-25 DIAGNOSIS — M51.36 DEGENERATION OF INTERVERTEBRAL DISC OF LUMBAR REGION: ICD-10-CM

## 2017-08-25 DIAGNOSIS — G89.29 OTHER CHRONIC PAIN: Primary | ICD-10-CM

## 2017-08-25 DIAGNOSIS — Z79.899 ENCOUNTER FOR LONG-TERM CURRENT USE OF HIGH RISK MEDICATION: ICD-10-CM

## 2017-08-25 DIAGNOSIS — M96.1 POSTLAMINECTOMY SYNDROME OF LUMBAR REGION: ICD-10-CM

## 2017-08-25 PROCEDURE — 96372 THER/PROPH/DIAG INJ SC/IM: CPT | Performed by: NURSE PRACTITIONER

## 2017-08-25 PROCEDURE — 99213 OFFICE O/P EST LOW 20 MIN: CPT | Performed by: NURSE PRACTITIONER

## 2017-08-25 RX ORDER — HYDROCODONE BITARTRATE AND ACETAMINOPHEN 10; 325 MG/1; MG/1
1 TABLET ORAL EVERY 4 HOURS PRN
Qty: 180 TABLET | Refills: 0 | Status: SHIPPED | OUTPATIENT
Start: 2017-08-25 | End: 2017-09-29 | Stop reason: SDUPTHER

## 2017-08-25 NOTE — PROGRESS NOTES
CHIEF COMPLAINT  Pt states LBP has increased today due to working on lawn mower 8/24/17.  Otherwise, LBP extending to hips pain is basically unchanged.    Subjective   Chevy Goodwin is a 77 y.o. male  who presents to the office for follow-up.He has a history of chronic low back pain.  Overall pain unchanged and stable with current regimen, a little flared since his lawnmower broke yesterday and he had to lift and repair it.      He continues with Hydrocodone 10/325 6/day, Celebrex. He reports moderate benefit from this regimen. Denies adverse reactions. ADL's by self.      Back Pain   This is a chronic (history of lumbar fusion with Dr. Yuan at [facility], was done in 2005) problem. The current episode started more than 1 year ago. The problem occurs constantly. The problem has been waxing and waning since onset. The pain is present in the lumbar spine and thoracic spine. The quality of the pain is described as aching and burning. The pain radiates to the right foot and left foot. The pain is at a severity of 7/10. The pain is moderate. The pain is worse during the day. The symptoms are aggravated by bending and twisting (walking, any physical activity). Stiffness is present in the morning. Associated symptoms include numbness (bilat. shins). Pertinent negatives include no bladder incontinence, bowel incontinence, chest pain, dysuria, fever, headaches, tingling or weakness. Risk factors include lack of exercise. He has tried NSAIDs (previous TESI.  Use of Norco) for the symptoms. The treatment provided moderate relief.      PEG Assessment   What number best describes your pain on average in the past week?6  What number best describes how, during the past week, pain has interfered with your enjoyment of life?6  What number best describes how, during the past week, pain has interfered with your general activity?  6    The following portions of the patient's history were reviewed and updated as appropriate:  "allergies, current medications, past family history, past medical history, past social history, past surgical history and problem list.    Review of Systems   Constitutional: Positive for fatigue. Negative for activity change, appetite change (continues with PT), chills and fever.   HENT: Negative for congestion.    Eyes: Negative for visual disturbance.   Respiratory: Negative for cough, shortness of breath and wheezing.    Cardiovascular: Negative.  Negative for chest pain.   Gastrointestinal: Negative for bowel incontinence, constipation, diarrhea and nausea.   Genitourinary: Negative for bladder incontinence, difficulty urinating and dysuria.   Musculoskeletal: Positive for back pain and joint swelling (bilateral ankles).   Neurological: Positive for numbness (bilat. shins). Negative for dizziness, tingling, weakness, light-headedness and headaches.   Psychiatric/Behavioral: Positive for sleep disturbance. Negative for agitation, confusion, hallucinations and suicidal ideas. The patient is not nervous/anxious.        Vitals:    08/25/17 1330   BP: 151/58   Pulse: 50   Resp: 16   Temp: 98.4 °F (36.9 °C)   SpO2: 96%   Weight: 209 lb (94.8 kg)   Height: 68\" (172.7 cm)   PainSc: 7  Comment: LBP bilaterally ranges from 4-9/10   PainLoc: Back     Objective   Physical Exam   Constitutional: He is oriented to person, place, and time. He appears well-developed and well-nourished. He is cooperative.   HENT:   Head: Normocephalic and atraumatic.   Nose: Nose normal.   Eyes: Conjunctivae and lids are normal.   Neck: Trachea normal. Neck supple.   Cardiovascular: Normal rate.    Pulmonary/Chest: Effort normal. No respiratory distress.   Musculoskeletal:        Thoracic back: He exhibits tenderness and pain.        Lumbar back: He exhibits tenderness and pain.   Neurological: He is alert and oriented to person, place, and time. Gait (ambulates with cane) abnormal.   Reflex Scores:       Patellar reflexes are 2+ on the right " side and 2+ on the left side.  Skin: Skin is intact.   Psychiatric: He has a normal mood and affect. His speech is normal and behavior is normal. Cognition and memory are normal.   Nursing note and vitals reviewed.      Assessment/Plan   Chevy was seen today for back pain.    Diagnoses and all orders for this visit:    Other chronic pain  -     ketorolac (TORADOL) injection 15 mg; Inject 0.5 mL into the shoulder, thigh, or buttocks 1 (One) Time.    Postlaminectomy syndrome of lumbar region    Degeneration of intervertebral disc of lumbar region    Encounter for long-term current use of high risk medication    Other orders  -     HYDROcodone-acetaminophen (NORCO)  MG per tablet; Take 1 tablet by mouth Every 4 (Four) Hours As Needed for Severe Pain . DNF before 9/2/2017      --- Refill Hydrocodone.  Patient appears stable with current regimen. No adverse effects. Regarding continuation of opioids, there is no evidence of aberrant behavior or any red flags.  The patient continues with appropriate response to opioid therapy. ADL's remain intact by self.   --- The urine drug screen confirmation from 4-28-17 has been reviewed and the result is appropriate based on patient history and MARIOLA report  --- Follow-up 1 month        MARIOLA REPORT  As part of the patient's treatment plan, I am prescribing controlled substances. The patient has been made aware of appropriate use of such medications, including potential risk of somnolence, limited ability to drive and/or work safely, and the potential for dependence or overdose. It has also bee made clear that these medications are for use by this patient only, without concomitant use of alcohol or other substances unless prescribed.     Patient has completed prescribing agreement detailing terms of continued prescribing of controlled substances, including monitoring MARIOLA reports, urine drug screening, and pill counts if necessary. The patient is aware that inappropriate  use will results in cessation of prescribing such medications.    MARIOLA report has been reviewed and scanned into the patient's chart.    Date of last MARIOLA : 8/24/2017    History and physical exam exhibit continued safe and appropriate use of controlled substances.    EMR Dragon/Transcription disclaimer:   Much of this encounter note is an electronic transcription/translation of spoken language to printed text. The electronic translation of spoken language may permit erroneous, or at times, nonsensical words or phrases to be inadvertently transcribed; Although I have reviewed the note for such errors, some may still exist.

## 2017-09-29 ENCOUNTER — OFFICE VISIT (OUTPATIENT)
Dept: PAIN MEDICINE | Facility: CLINIC | Age: 77
End: 2017-09-29

## 2017-09-29 VITALS
OXYGEN SATURATION: 95 % | RESPIRATION RATE: 16 BRPM | BODY MASS INDEX: 31.83 KG/M2 | HEART RATE: 50 BPM | DIASTOLIC BLOOD PRESSURE: 62 MMHG | TEMPERATURE: 97.2 F | SYSTOLIC BLOOD PRESSURE: 153 MMHG | WEIGHT: 210 LBS | HEIGHT: 68 IN

## 2017-09-29 DIAGNOSIS — G89.29 OTHER CHRONIC PAIN: Primary | ICD-10-CM

## 2017-09-29 DIAGNOSIS — M54.5 LOW BACK PAIN, UNSPECIFIED BACK PAIN LATERALITY, UNSPECIFIED CHRONICITY, WITH SCIATICA PRESENCE UNSPECIFIED: ICD-10-CM

## 2017-09-29 DIAGNOSIS — Z79.899 ENCOUNTER FOR LONG-TERM CURRENT USE OF HIGH RISK MEDICATION: ICD-10-CM

## 2017-09-29 DIAGNOSIS — M96.1 POSTLAMINECTOMY SYNDROME OF LUMBAR REGION: ICD-10-CM

## 2017-09-29 PROCEDURE — 99213 OFFICE O/P EST LOW 20 MIN: CPT | Performed by: NURSE PRACTITIONER

## 2017-09-29 RX ORDER — HYDROCODONE BITARTRATE AND ACETAMINOPHEN 10; 325 MG/1; MG/1
1 TABLET ORAL EVERY 4 HOURS PRN
Qty: 180 TABLET | Refills: 0 | Status: SHIPPED | OUTPATIENT
Start: 2017-09-29 | End: 2017-10-31 | Stop reason: SDUPTHER

## 2017-09-29 NOTE — PROGRESS NOTES
CHIEF COMPLAINT  Pt states is walking .5 mile every other night -LBP has moderately increased since 8/25/17 office visit.  Pt is also going to [facility] 2x weekly    Subjective   Chevy Goodwin is a 77 y.o. male  who presents to the office for follow-up.He has a history of chronic low back pain.    Pain overall unchanged.  He continues with Hydrocodone 10/325 6/day, Celebrex. He reports moderate benefit from this regimen. Denies adverse reactions. ADL's by self.  He continues with a regular walking regimen and is also going to physical therapy. Just returned from vacation in [place].  His next tip is in December to [facility], he plans to continue with walking regimen so that he can walk longer distances for this trip.      Back Pain   This is a chronic (history of lumbar fusion with Dr. Yuan at [facility], was done in 2005) problem. The current episode started more than 1 year ago. The problem occurs constantly. The problem has been waxing and waning since onset. The pain is present in the lumbar spine and thoracic spine. The quality of the pain is described as aching and burning. The pain radiates to the right foot and left foot. The pain is at a severity of 6/10. The pain is moderate. The pain is worse during the day. The symptoms are aggravated by bending and twisting (walking, any physical activity). Stiffness is present in the morning. Associated symptoms include numbness (bilat. shins). Pertinent negatives include no bladder incontinence, bowel incontinence, chest pain, dysuria, fever, headaches, tingling or weakness. Risk factors include lack of exercise. He has tried NSAIDs (previous TESI.  Use of Norco) for the symptoms. The treatment provided moderate relief.      PEG Assessment   What number best describes your pain on average in the past week?6  What number best describes how, during the past week, pain has interfered with your enjoyment of life?7  What number best describes how, during the past week, pain  "has interfered with your general activity?  7    The following portions of the patient's history were reviewed and updated as appropriate: allergies, current medications, past family history, past medical history, past social history, past surgical history and problem list.    Review of Systems   Constitutional: Positive for activity change (walks,attends PT) and fatigue. Negative for appetite change (continues with PT), chills and fever.   HENT: Negative for congestion.    Eyes: Negative for visual disturbance.   Respiratory: Positive for apnea. Negative for cough, shortness of breath and wheezing.    Cardiovascular: Negative.  Negative for chest pain.   Gastrointestinal: Negative for bowel incontinence, constipation, diarrhea and nausea.   Genitourinary: Negative for bladder incontinence, difficulty urinating and dysuria.   Musculoskeletal: Positive for back pain and joint swelling (bilateral ankles).   Neurological: Positive for numbness (bilat. shins). Negative for dizziness, tingling, weakness, light-headedness and headaches.   Psychiatric/Behavioral: Positive for sleep disturbance. Negative for agitation, confusion, hallucinations and suicidal ideas. The patient is not nervous/anxious.      Vitals:    09/29/17 1259   BP: 153/62   Pulse: 50   Resp: 16   Temp: 97.2 °F (36.2 °C)   SpO2: 95%   Weight: 210 lb (95.3 kg)   Height: 68\" (172.7 cm)   PainSc: 6  Comment: LBP bilaterally ranges from 4-8/10   PainLoc: Back     Objective   Physical Exam   Constitutional: He is oriented to person, place, and time. He appears well-developed and well-nourished. He is cooperative.   HENT:   Head: Normocephalic and atraumatic.   Nose: Nose normal.   Eyes: Conjunctivae and lids are normal.   Neck: Trachea normal. Neck supple.   Cardiovascular: Normal rate.    Pulmonary/Chest: Effort normal. No respiratory distress.   Musculoskeletal:        Thoracic back: He exhibits tenderness and pain.        Lumbar back: He exhibits tenderness " and pain.   Neurological: He is alert and oriented to person, place, and time. Gait (ambulates with cane) abnormal.   Reflex Scores:       Patellar reflexes are 2+ on the right side and 2+ on the left side.  Skin: Skin is intact.   Psychiatric: He has a normal mood and affect. His speech is normal and behavior is normal. Cognition and memory are normal.   Nursing note and vitals reviewed.    Assessment/Plan   Chevy was seen today for back pain.    Diagnoses and all orders for this visit:    Other chronic pain    Postlaminectomy syndrome of lumbar region    Low back pain, unspecified back pain laterality, unspecified chronicity, with sciatica presence unspecified    Encounter for long-term current use of high risk medication    Other orders  -     HYDROcodone-acetaminophen (NORCO)  MG per tablet; Take 1 tablet by mouth Every 4 (Four) Hours As Needed for Severe Pain .      --- Refill Hydrocodone.  Patient appears stable with current regimen. No adverse effects. Regarding continuation of opioids, there is no evidence of aberrant behavior or any red flags.  The patient continues with appropriate response to opioid therapy. ADL's remain intact by self.   --- The urine drug screen confirmation from 4-28-17 has been reviewed and the result is appropriate based on patient history and MARIOLA report  --- Follow-up 1 month         MARIOLA REPORT  As part of the patient's treatment plan, I am prescribing controlled substances. The patient has been made aware of appropriate use of such medications, including potential risk of somnolence, limited ability to drive and/or work safely, and the potential for dependence or overdose. It has also bee made clear that these medications are for use by this patient only, without concomitant use of alcohol or other substances unless prescribed.     Patient has completed prescribing agreement detailing terms of continued prescribing of controlled substances, including monitoring MARIOLA  reports, urine drug screening, and pill counts if necessary. The patient is aware that inappropriate use will results in cessation of prescribing such medications.    MARIOLA report has been reviewed and scanned into the patient's chart.    Date of last MARIOLA : 9/28/2017    History and physical exam exhibit continued safe and appropriate use of controlled substances.    EMR Dragon/Transcription disclaimer:   Much of this encounter note is an electronic transcription/translation of spoken language to printed text. The electronic translation of spoken language may permit erroneous, or at times, nonsensical words or phrases to be inadvertently transcribed; Although I have reviewed the note for such errors, some may still exist.

## 2017-10-31 ENCOUNTER — OFFICE VISIT (OUTPATIENT)
Dept: PAIN MEDICINE | Facility: CLINIC | Age: 77
End: 2017-10-31

## 2017-10-31 VITALS
DIASTOLIC BLOOD PRESSURE: 61 MMHG | WEIGHT: 209.6 LBS | HEIGHT: 68 IN | BODY MASS INDEX: 31.77 KG/M2 | RESPIRATION RATE: 16 BRPM | OXYGEN SATURATION: 96 % | HEART RATE: 45 BPM | SYSTOLIC BLOOD PRESSURE: 117 MMHG | TEMPERATURE: 98.2 F

## 2017-10-31 DIAGNOSIS — Z79.899 ENCOUNTER FOR LONG-TERM CURRENT USE OF HIGH RISK MEDICATION: ICD-10-CM

## 2017-10-31 DIAGNOSIS — M96.1 POSTLAMINECTOMY SYNDROME OF LUMBAR REGION: ICD-10-CM

## 2017-10-31 DIAGNOSIS — M51.24 THORACIC DISC HERNIATION: ICD-10-CM

## 2017-10-31 DIAGNOSIS — M51.36 DEGENERATION OF INTERVERTEBRAL DISC OF LUMBAR REGION: ICD-10-CM

## 2017-10-31 DIAGNOSIS — G89.29 OTHER CHRONIC PAIN: Primary | ICD-10-CM

## 2017-10-31 PROCEDURE — 99213 OFFICE O/P EST LOW 20 MIN: CPT | Performed by: NURSE PRACTITIONER

## 2017-10-31 RX ORDER — HYDROCODONE BITARTRATE AND ACETAMINOPHEN 10; 325 MG/1; MG/1
1 TABLET ORAL EVERY 4 HOURS PRN
Qty: 180 TABLET | Refills: 0 | Status: SHIPPED | OUTPATIENT
Start: 2017-10-31 | End: 2017-11-30 | Stop reason: SDUPTHER

## 2017-10-31 RX ORDER — INSULIN GLARGINE 100 [IU]/ML
40 INJECTION, SOLUTION SUBCUTANEOUS EVERY MORNING
COMMUNITY
Start: 2017-10-02 | End: 2019-08-09 | Stop reason: HOSPADM

## 2017-10-31 RX ORDER — CETIRIZINE HYDROCHLORIDE 10 MG/1
10 TABLET ORAL DAILY
COMMUNITY
Start: 2017-10-02 | End: 2019-12-23

## 2017-10-31 RX ORDER — PANTOPRAZOLE SODIUM 40 MG/1
TABLET, DELAYED RELEASE ORAL
COMMUNITY
Start: 2017-10-06 | End: 2018-12-27

## 2017-10-31 NOTE — PROGRESS NOTES
CHIEF COMPLAINT  F/U back pain last visit 9-29-17. States pain is unchanged.     Subjective   Chevy Goodwin is a 77 y.o. male  who presents to the office for follow-up.He has a history of chronic back pain.    Overall pain stable with current regimen.  He continues with Hydrocodone 10/325 6/day, Celebrex. He reports moderate benefit from this regimen. Denies adverse reactions. ADL's by self.  He continues with a regular walking regimen and is also going to physical therapy.     Working on walking program to prepare him for trip to [place] this December.  He is up to almost a mile now.      Back Pain   This is a chronic (history of lumbar fusion with Dr. Yuan at [facility], was done in 2005) problem. The current episode started more than 1 year ago. The problem occurs constantly. The problem has been waxing and waning since onset. The pain is present in the lumbar spine and thoracic spine. The quality of the pain is described as aching and burning. The pain radiates to the right foot and left foot. The pain is at a severity of 6/10. The pain is moderate. The pain is worse during the day. The symptoms are aggravated by bending and twisting (walking, any physical activity). Stiffness is present in the morning. Associated symptoms include numbness (bilat. shins). Pertinent negatives include no bladder incontinence, bowel incontinence, chest pain, dysuria, fever, headaches, tingling or weakness. Risk factors include lack of exercise. He has tried NSAIDs (previous TESI.  Use of Norco) for the symptoms. The treatment provided moderate relief.      PEG Assessment   What number best describes your pain on average in the past week?7  What number best describes how, during the past week, pain has interfered with your enjoyment of life?6  What number best describes how, during the past week, pain has interfered with your general activity?  6    The following portions of the patient's history were reviewed and updated as  "appropriate: allergies, current medications, past family history, past medical history, past social history, past surgical history and problem list.    Review of Systems   Constitutional: Positive for activity change (walks,attends PT) and fatigue. Negative for appetite change (continues with PT), chills and fever.   HENT: Negative for congestion.    Eyes: Negative for visual disturbance.   Respiratory: Positive for apnea. Negative for cough, shortness of breath and wheezing.    Cardiovascular: Negative.  Negative for chest pain.   Gastrointestinal: Negative for bowel incontinence, constipation, diarrhea and nausea.   Genitourinary: Negative for bladder incontinence, difficulty urinating and dysuria.   Musculoskeletal: Positive for back pain and joint swelling (bilateral ankles).   Neurological: Positive for numbness (bilat. shins). Negative for dizziness, tingling, weakness, light-headedness and headaches.   Psychiatric/Behavioral: Positive for sleep disturbance. Negative for agitation, confusion, hallucinations and suicidal ideas. The patient is not nervous/anxious.        Vitals:    10/31/17 1300   BP: 117/61   Pulse: (!) 45   Resp: 16   Temp: 98.2 °F (36.8 °C)   SpO2: 96%   Weight: 209 lb 9.6 oz (95.1 kg)   Height: 68\" (172.7 cm)   PainSc:   6   PainLoc: Back     *He reports HR in the 40's is not unusual.  Denies any symptoms of dizziness, lightheadedness, chest pain.  BP looks great.      Objective   Physical Exam   Constitutional: He is oriented to person, place, and time. He appears well-developed and well-nourished. He is cooperative.   HENT:   Head: Normocephalic and atraumatic.   Nose: Nose normal.   Eyes: Conjunctivae and lids are normal.   Neck: Trachea normal. Neck supple.   Cardiovascular: Normal rate.    Pulmonary/Chest: Effort normal. No respiratory distress.   Musculoskeletal:        Thoracic back: He exhibits tenderness and pain.        Lumbar back: He exhibits tenderness and pain.   Neurological: " He is alert and oriented to person, place, and time. Gait (ambulates with cane) abnormal.   Reflex Scores:       Patellar reflexes are 2+ on the right side and 2+ on the left side.  Skin: Skin is intact.   Psychiatric: He has a normal mood and affect. His speech is normal and behavior is normal. Cognition and memory are normal.   Nursing note and vitals reviewed.    Assessment/Plan   Chevy was seen today for back pain.    Diagnoses and all orders for this visit:    Other chronic pain    Postlaminectomy syndrome of lumbar region    Degeneration of intervertebral disc of lumbar region    Thoracic disc herniation T9-T10    Encounter for long-term current use of high risk medication      --- Refill Hydrocodone.  Patient appears stable with current regimen. No adverse effects. Regarding continuation of opioids, there is no evidence of aberrant behavior or any red flags.  The patient continues with appropriate response to opioid therapy. ADL's remain intact by self.   --- The urine drug screen confirmation from 4-28-17 has been reviewed and the result is appropriate based on patient history and MARIOLA report  --- Follow-up 1 month          MARIOLA REPORT    As part of the patient's treatment plan, I am prescribing controlled substances. The patient has been made aware of appropriate use of such medications, including potential risk of somnolence, limited ability to drive and/or work safely, and the potential for dependence or overdose. It has also bee made clear that these medications are for use by this patient only, without concomitant use of alcohol or other substances unless prescribed.     Patient has completed prescribing agreement detailing terms of continued prescribing of controlled substances, including monitoring MARIOLA reports, urine drug screening, and pill counts if necessary. The patient is aware that inappropriate use will results in cessation of prescribing such medications.    MARIOLA report has been  reviewed and scanned into the patient's chart.    Date of last MARIOLA : 10/30/2017 (last refill at [facility], refills every third month at [facility])    History and physical exam exhibit continued safe and appropriate use of controlled substances.    EMR Dragon/Transcription disclaimer:   Much of this encounter note is an electronic transcription/translation of spoken language to printed text. The electronic translation of spoken language may permit erroneous, or at times, nonsensical words or phrases to be inadvertently transcribed; Although I have reviewed the note for such errors, some may still exist.

## 2017-11-15 ENCOUNTER — HOSPITAL ENCOUNTER (OUTPATIENT)
Facility: HOSPITAL | Age: 77
Setting detail: SURGERY ADMIT
End: 2017-11-15
Attending: ORTHOPAEDIC SURGERY | Admitting: ORTHOPAEDIC SURGERY

## 2017-11-30 ENCOUNTER — OFFICE VISIT (OUTPATIENT)
Dept: PAIN MEDICINE | Facility: CLINIC | Age: 77
End: 2017-11-30

## 2017-11-30 VITALS
BODY MASS INDEX: 31.43 KG/M2 | SYSTOLIC BLOOD PRESSURE: 168 MMHG | TEMPERATURE: 98.3 F | WEIGHT: 207.4 LBS | OXYGEN SATURATION: 97 % | RESPIRATION RATE: 16 BRPM | HEART RATE: 94 BPM | DIASTOLIC BLOOD PRESSURE: 80 MMHG | HEIGHT: 68 IN

## 2017-11-30 DIAGNOSIS — M51.36 DEGENERATION OF INTERVERTEBRAL DISC OF LUMBAR REGION: ICD-10-CM

## 2017-11-30 DIAGNOSIS — M51.24 THORACIC DISC HERNIATION: ICD-10-CM

## 2017-11-30 DIAGNOSIS — M54.5 LOW BACK PAIN, UNSPECIFIED BACK PAIN LATERALITY, UNSPECIFIED CHRONICITY, WITH SCIATICA PRESENCE UNSPECIFIED: ICD-10-CM

## 2017-11-30 DIAGNOSIS — M96.1 POSTLAMINECTOMY SYNDROME OF LUMBAR REGION: ICD-10-CM

## 2017-11-30 DIAGNOSIS — G89.29 OTHER CHRONIC PAIN: Primary | ICD-10-CM

## 2017-11-30 PROCEDURE — 99213 OFFICE O/P EST LOW 20 MIN: CPT | Performed by: NURSE PRACTITIONER

## 2017-11-30 RX ORDER — UREA 200 MG/G
GEL TOPICAL
Refills: 0 | COMMUNITY
Start: 2017-11-09 | End: 2018-12-27

## 2017-11-30 RX ORDER — HYDROCODONE BITARTRATE AND ACETAMINOPHEN 10; 325 MG/1; MG/1
1 TABLET ORAL EVERY 4 HOURS PRN
Qty: 180 TABLET | Refills: 0 | Status: SHIPPED | OUTPATIENT
Start: 2017-11-30 | End: 2018-01-08 | Stop reason: SDUPTHER

## 2017-11-30 NOTE — PROGRESS NOTES
"CHIEF COMPLAINT  F/U back pain. Pt states it has been worse. He is going to get his right shoulder replaced in January.    Subjective   Chevy Goodwin is a 77 y.o. male  who presents to the office for follow-up.He has a history of chronic back pain, which he reports as worse since last office visit. Also is having increased right shoulder pain. Is planning on having a right shoulder replacement (reverse) with Dr. Gross in mid January. Anticipates one night hospital stay. No plans for rehab hospital. He is noticing his neck pain is worse as well. Has a history of \"getting a disc removed in my neck.\"     Complains of pain in his mid and low back and right shoulder. Today his pain is 7/10VAS. Describes the pain as continuous and worse. His primary complaint remains his back pain. Has been going to PT. Continues with Hydrocodone 10/325 5-6/day, Celebrex 200 mg 2/day. Denies any side effects from the regimen. The regimen helps decrease his pain by 50%. ADL's by self.    Has had thoracic DOYLE in the past with moderate to significant relief.    Is going to [place] in late December.     Back Pain   This is a chronic (history of lumbar fusion with Dr. Yuan at [facility], was done in 2005) problem. The current episode started more than 1 year ago. The problem occurs constantly. The problem has been waxing and waning (reports as worse since last office visit) since onset. The pain is present in the lumbar spine and thoracic spine. The quality of the pain is described as aching and burning. The pain radiates to the right foot and left foot. The pain is at a severity of 7/10. The pain is moderate. The pain is worse during the day. The symptoms are aggravated by bending and twisting (walking, any physical activity). Stiffness is present in the morning. Pertinent negatives include no bladder incontinence, bowel incontinence, chest pain, dysuria, fever, headaches, numbness, tingling or weakness. Risk factors include lack of " "exercise. He has tried NSAIDs (previous TESI.  Use of Norco) for the symptoms. The treatment provided moderate relief.      PEG Assessment   What number best describes your pain on average in the past week?6  What number best describes how, during the past week, pain has interfered with your enjoyment of life?7  What number best describes how, during the past week, pain has interfered with your general activity?  7    The following portions of the patient's history were reviewed and updated as appropriate: allergies, current medications, past family history, past medical history, past social history, past surgical history and problem list.    Review of Systems   Constitutional: Positive for activity change (walks,attends PT twice a week) and fatigue. Negative for appetite change (continues with PT), chills and fever.   HENT: Negative for congestion.    Eyes: Negative for visual disturbance.   Respiratory: Positive for apnea. Negative for cough, shortness of breath and wheezing.    Cardiovascular: Negative.  Negative for chest pain.   Gastrointestinal: Negative for bowel incontinence, constipation, diarrhea and nausea.   Genitourinary: Negative for bladder incontinence, difficulty urinating and dysuria.   Musculoskeletal: Positive for back pain and joint swelling (bilateral ankles).   Neurological: Negative for dizziness, tingling, weakness, light-headedness, numbness and headaches.   Psychiatric/Behavioral: Positive for sleep disturbance. Negative for agitation, confusion, hallucinations and suicidal ideas. The patient is not nervous/anxious.        Vitals:    11/30/17 1507   BP: 168/80   Pulse: 94   Resp: 16   Temp: 98.3 °F (36.8 °C)   SpO2: 97%   Weight: 207 lb 6.4 oz (94.1 kg)   Height: 68\" (172.7 cm)   PainSc:   7   PainLoc: Back     Objective   Physical Exam   Constitutional: He is oriented to person, place, and time. He appears well-developed and well-nourished. He is cooperative.   HENT:   Head: Normocephalic " and atraumatic.   Nose: Nose normal.   Eyes: Conjunctivae and lids are normal.   Neck: Trachea normal. Neck supple.   Cardiovascular: Normal rate.    Pulmonary/Chest: Effort normal. No respiratory distress.   Musculoskeletal:        Thoracic back: He exhibits tenderness and pain.        Lumbar back: He exhibits tenderness and pain.   Guarding of right shoulder   Neurological: He is alert and oriented to person, place, and time. Gait (ambulates with cane) abnormal.   Reflex Scores:       Patellar reflexes are 2+ on the right side and 2+ on the left side.  Skin: Skin is intact.   Psychiatric: He has a normal mood and affect. His speech is normal and behavior is normal. Cognition and memory are normal.   Nursing note and vitals reviewed.      Assessment/Plan   Chevy was seen today for back pain.    Diagnoses and all orders for this visit:    Other chronic pain    Postlaminectomy syndrome of lumbar region    Degeneration of intervertebral disc of lumbar region    Low back pain, unspecified back pain laterality, unspecified chronicity, with sciatica presence unspecified    Thoracic disc herniation T9-T10  -     Case Request    Other orders  -     HYDROcodone-acetaminophen (NORCO)  MG per tablet; Take 1 tablet by mouth Every 4 (Four) Hours As Needed for Severe Pain .      --- The urine drug screen confirmation from 4-28-17 has been reviewed and the result is appropriate based on patient history and MARIOLA report  --- Refill Hydrocodone. Patient appears stable with current regimen. No adverse effects. Regarding continuation of opioids, there is no evidence of aberrant behavior or any red flags.  The patient continues with appropriate response to opioid therapy. ADL's remain intact by self.  he does have a significant history of chronic back pain and demonstrates moderate improvement with multimodal therapy including opioid therapy, which allows him to engage in ADLs and family activities. The continuation of opioid  therapy is therefore not contraindicated. We will however respect the March 2016 CDC Guidelines and will plan to avoid overexposure to opioids and avoid dose escalation.  --- Repeat thoracic DOYLE. No blood thinners. Reviewed the procedure at length with the patient.  Included in the review was expectations, complications, risk and benefits.The procedure was described in detail and the risks, benefits and alternatives were discussed with the patient (including but not limited to: bleeding, infection, nerve damage, worsening of pain, inability to perform injection, paralysis, seizures, and death) who agreed to proceed.  Discussed the potential for sedation if warranted/wanted.  Questions were answered and in a way the patient could understand.  Patient verbalized understanding and wishes to proceed.  This intervention will be ordered.  --- Follow-up 1 month or sooner if needed.       MARIOLA REPORT    As part of the patient's treatment plan, I am prescribing controlled substances. The patient has been made aware of appropriate use of such medications, including potential risk of somnolence, limited ability to drive and/or work safely, and the potential for dependence or overdose. It has also bee made clear that these medications are for use by this patient only, without concomitant use of alcohol or other substances unless prescribed.     Patient has completed prescribing agreement detailing terms of continued prescribing of controlled substances, including monitoring MARIOLA reports, urine drug screening, and pill counts if necessary. The patient is aware that inappropriate use will results in cessation of prescribing such medications.    MARIOLA report has been reviewed and scanned into the patient's chart.    Date of last MARIOLA : 11-28-17    History and physical exam exhibit continued safe and appropriate use of controlled substances.      EMR Dragon/Transcription disclaimer:   Much of this encounter note is an  electronic transcription/translation of spoken language to printed text. The electronic translation of spoken language may permit erroneous, or at times, nonsensical words or phrases to be inadvertently transcribed; Although I have reviewed the note for such errors, some may still exist.

## 2017-12-05 ENCOUNTER — OUTSIDE FACILITY SERVICE (OUTPATIENT)
Dept: PAIN MEDICINE | Facility: CLINIC | Age: 77
End: 2017-12-05

## 2017-12-05 ENCOUNTER — DOCUMENTATION (OUTPATIENT)
Dept: PAIN MEDICINE | Facility: CLINIC | Age: 77
End: 2017-12-05

## 2017-12-05 PROCEDURE — 62321 NJX INTERLAMINAR CRV/THRC: CPT | Performed by: ANESTHESIOLOGY

## 2017-12-05 NOTE — PROGRESS NOTES
Thoracic Epidural Steroid Injection  [facility]      PREOPERATIVE DIAGNOSIS:   Thoracic disc displacement at T9-10  POSTOPERATIVE DIAGNOSIS:  Same as preop diagnosis    PROCEDURE:   Thoracic Epidural Steroid Injection, Therapeutic Translaminar Injection, with epidurogram, at  T9-10 level    PRE-PROCEDURE DISCUSSION WITH PATIENT:    Risks and complications were discussed with the patient prior to starting the procedure and informed consent was obtained.  We discussed various topics including but not limited to bleeding, infection, injury, paralysis, nerve injury, dural puncture, coma, death, worsening of clinical picture, lack of pain relief, and postprocedural soreness.    SURGEON:  Julian Brambila MD    REASON FOR PROCEDURE:    Previous dx+ & tx+ after TESI.  Pain has recurred.       SEDATION:  Patient declined administration of moderate sedation    ANESTHETIC:  Marcaine 0.25%  STEROID:   Methylprednisolone (DEPO MEDROL) 80mg/ml    DESCRIPTON OF PROCEDURE:    After obtaining informed consent, I.V. was not started in the preop area.   The patient was taken to the operating room and placed in the prone position.  EKG, blood pressure, and pulse oximeter were monitored throughout, and sedation was provided as needed by the RN under my guidance. All pressure points were well padded.  The thoracic spine area was prepped with Chloraprep and draped in a sterile fashion.  Under fluoroscopic guidance, the above mentioned interlaminar space was identified. Skin and subcutaneous tissues were anesthetized with 1% lidocaine in the middle of the space. A 20-gauge Tuohy needle was introduced through the skin and advanced to this interlaminar space and into the epidural space under fluoroscopic guidance and verified with loss-of-resistance technique to air.  After confirming the position of the needle with the fluoroscope with all the views, and after aspiration was confirmed negative for blood and CSF, 1.5 mL  of Omnipaque was injected.  After seeing appropriate epidurogram with lateral and PA views, a total of 3 cc solution was injected, consisting of 1cc of local anesthetic as above, with normal saline and injectable steroid as above.       ESTIMATED BLOOD LOSS:  <5 mL  SPECIMENS:  None    COMPLICATIONS:     No complications were noted., There was no indication of vascular uptake on live injection of contrast dye. and There was no evidence of dural puncture.      TOLERANCE & DISCHARGE CONDITION:    The patient tolerated the procedure well.  The patient was transported to the recovery area without difficulties.  The patient was discharged to home under the care of family in stable and satisfactory condition.    PLAN OF CARE:  1. The patient was given our standard instruction sheet.  2. The patient will Repeat injection in 2-4 wks PRN  3. The patient will resume all medications as per the medication reconciliation sheet.

## 2018-01-08 ENCOUNTER — OFFICE VISIT (OUTPATIENT)
Dept: PAIN MEDICINE | Facility: CLINIC | Age: 78
End: 2018-01-08

## 2018-01-08 ENCOUNTER — APPOINTMENT (OUTPATIENT)
Dept: PREADMISSION TESTING | Facility: HOSPITAL | Age: 78
End: 2018-01-08

## 2018-01-08 VITALS
WEIGHT: 207 LBS | RESPIRATION RATE: 18 BRPM | DIASTOLIC BLOOD PRESSURE: 58 MMHG | BODY MASS INDEX: 31.37 KG/M2 | TEMPERATURE: 97.3 F | HEART RATE: 67 BPM | OXYGEN SATURATION: 92 % | SYSTOLIC BLOOD PRESSURE: 147 MMHG | HEIGHT: 68 IN

## 2018-01-08 DIAGNOSIS — M96.1 POSTLAMINECTOMY SYNDROME OF LUMBAR REGION: ICD-10-CM

## 2018-01-08 DIAGNOSIS — M46.1 SACROILIITIS (HCC): ICD-10-CM

## 2018-01-08 DIAGNOSIS — G89.29 OTHER CHRONIC PAIN: Primary | ICD-10-CM

## 2018-01-08 DIAGNOSIS — Z79.899 ENCOUNTER FOR LONG-TERM CURRENT USE OF HIGH RISK MEDICATION: ICD-10-CM

## 2018-01-08 DIAGNOSIS — M51.24 THORACIC DISC HERNIATION: ICD-10-CM

## 2018-01-08 DIAGNOSIS — M53.3 SI (SACROILIAC) JOINT DYSFUNCTION: ICD-10-CM

## 2018-01-08 DIAGNOSIS — M51.36 DEGENERATION OF INTERVERTEBRAL DISC OF LUMBAR REGION: ICD-10-CM

## 2018-01-08 PROCEDURE — 99214 OFFICE O/P EST MOD 30 MIN: CPT | Performed by: NURSE PRACTITIONER

## 2018-01-08 RX ORDER — HYDROCODONE BITARTRATE AND ACETAMINOPHEN 10; 325 MG/1; MG/1
1 TABLET ORAL EVERY 4 HOURS PRN
Qty: 180 TABLET | Refills: 0 | Status: SHIPPED | OUTPATIENT
Start: 2018-01-08 | End: 2018-02-06 | Stop reason: SDUPTHER

## 2018-01-08 NOTE — PROGRESS NOTES
CHIEF COMPLAINT  Back pain is unchanged since last office visit. He states that the Epidural did not help at all    Subjective   Chevy Goodwin is a 77 y.o. male  who presents to the office for follow-up of procedure.  He completed a Thoracic Epidural   on  12/5/17 performed by Dr. Brambila for management of back pain. Patient reports no relief from the procedure.     Was going to have right shoulder replacement but this is cancelled. Has a history of MRSA. Sees Dr. Gross next week.    Complains of pain in his mid and low back. Today his pain is 6/10VAS.  He notices pain is worst in his low back.  Pain increases with laying down. Continues with Hydrocodone 10/325 5-6/day, Celebrex 200 mg 2/day. Denies any side effects from the regimen. The regimen helps decrease his pain by 50%. ADL's by self.    Back Pain   This is a chronic (history of lumbar fusion with Dr. Yuan at [facility], was done in 2005) problem. The current episode started more than 1 year ago. The problem occurs constantly. The problem has been waxing and waning (reports as unchanged since last office visit) since onset. The pain is present in the lumbar spine and thoracic spine. The quality of the pain is described as aching and burning. The pain radiates to the right foot and left foot. The pain is at a severity of 6/10. The pain is moderate. The pain is worse during the day. The symptoms are aggravated by bending and twisting (walking, any physical activity). Stiffness is present in the morning. Pertinent negatives include no bladder incontinence, bowel incontinence, chest pain, dysuria, fever, headaches, numbness, tingling or weakness. Risk factors include lack of exercise. He has tried NSAIDs (previous TESI.  Use of Norco) for the symptoms. The treatment provided moderate relief.      PEG Assessment   What number best describes your pain on average in the past week?6  What number best describes how, during the past week, pain has interfered  "with your enjoyment of life?6  What number best describes how, during the past week, pain has interfered with your general activity?  6    The following portions of the patient's history were reviewed and updated as appropriate: allergies, current medications, past family history, past medical history, past social history, past surgical history and problem list.    Review of Systems   Constitutional: Negative for chills and fever.   Respiratory: Negative for shortness of breath.    Cardiovascular: Negative for chest pain.   Gastrointestinal: Negative for bowel incontinence, constipation, diarrhea, nausea and vomiting.   Genitourinary: Negative for bladder incontinence, difficulty urinating, dysuria and enuresis.   Musculoskeletal: Positive for back pain.   Neurological: Negative for dizziness, tingling, weakness, light-headedness, numbness and headaches.   Psychiatric/Behavioral: Negative for confusion, hallucinations, self-injury, sleep disturbance and suicidal ideas. The patient is not nervous/anxious.        Vitals:    01/08/18 1330   BP: 147/58   Pulse: 67   Resp: 18   Temp: 97.3 °F (36.3 °C)   SpO2: 92%   Weight: 93.9 kg (207 lb)   Height: 172.7 cm (68\")   PainSc:   6   PainLoc: Back     Objective   Physical Exam   Constitutional: He is oriented to person, place, and time. He appears well-developed and well-nourished. He is cooperative.   HENT:   Head: Normocephalic and atraumatic.   Nose: Nose normal.   Eyes: Conjunctivae and lids are normal.   Neck: Trachea normal. Neck supple.   Cardiovascular: Normal rate, regular rhythm and normal heart sounds.    Pulmonary/Chest: Effort normal and breath sounds normal. No respiratory distress.   Musculoskeletal:        Thoracic back: He exhibits tenderness and pain.        Lumbar back: He exhibits tenderness and pain.   Guarding of right shoulder    Moderate tenderness to palpation of bilateral SI joints.    Minimal lumbar facet tenderness    Unable to perform SERVANDO due " to shoulder pain    Negative SLR bilaterally   Neurological: He is alert and oriented to person, place, and time. Gait (ambulates with cane) abnormal.   Reflex Scores:       Patellar reflexes are 2+ on the right side and 2+ on the left side.  Skin: Skin is intact.   Psychiatric: He has a normal mood and affect. His speech is normal and behavior is normal. Cognition and memory are normal.   Nursing note and vitals reviewed.      Assessment/Plan   Chevy was seen today for back pain.    Diagnoses and all orders for this visit:    Other chronic pain    Postlaminectomy syndrome of lumbar region    Degeneration of intervertebral disc of lumbar region    Thoracic disc herniation T9-T10    Encounter for long-term current use of high risk medication    SI (sacroiliac) joint dysfunction  -     Case Request    Sacroiliitis  -     Case Request    Other orders  -     HYDROcodone-acetaminophen (NORCO)  MG per tablet; Take 1 tablet by mouth Every 4 (Four) Hours As Needed for Severe Pain .      --- The urine drug screen confirmation from 4-28-17 has been reviewed and the result is appropriate based on patient history and MARIOLA report  --- Refill Hydrocodone. Patient appears stable with current regimen. No adverse effects. Regarding continuation of opioids, there is no evidence of aberrant behavior or any red flags.  The patient continues with appropriate response to opioid therapy. ADL's remain intact by self.   --- bilateral Si joint injection. No blood thinners. Reviewed the procedure at length with the patient.  Included in the review was expectations, complications, risk and benefits.The procedure was described in detail and the risks, benefits and alternatives were discussed with the patient (including but not limited to: bleeding, infection, nerve damage, worsening of pain, inability to perform injection, paralysis, seizures, and death) who agreed to proceed.  Discussed the potential for sedation if warranted/wanted.   Questions were answered and in a way the patient could understand.  Patient verbalized understanding and wishes to proceed.  This intervention will be ordered.  --- Follow-up 1 month or sooner if needed       MARIOLA REPORT    As part of the patient's treatment plan, I am prescribing controlled substances. The patient has been made aware of appropriate use of such medications, including potential risk of somnolence, limited ability to drive and/or work safely, and the potential for dependence or overdose. It has also bee made clear that these medications are for use by this patient only, without concomitant use of alcohol or other substances unless prescribed.     Patient has completed prescribing agreement detailing terms of continued prescribing of controlled substances, including monitoring MARIOLA reports, urine drug screening, and pill counts if necessary. The patient is aware that inappropriate use will results in cessation of prescribing such medications.    MARIOLA report has been reviewed and scanned into the patient's chart.    Date of last MARIOLA : 1-5-18    History and physical exam exhibit continued safe and appropriate use of controlled substances.       EMR Dragon/Transcription disclaimer:   Much of this encounter note is an electronic transcription/translation of spoken language to printed text. The electronic translation of spoken language may permit erroneous, or at times, nonsensical words or phrases to be inadvertently transcribed; Although I have reviewed the note for such errors, some may still exist.

## 2018-01-16 ENCOUNTER — TELEPHONE (OUTPATIENT)
Dept: PAIN MEDICINE | Facility: CLINIC | Age: 78
End: 2018-01-16

## 2018-01-16 NOTE — TELEPHONE ENCOUNTER
Patient called and LM stating that he is scheduled for an injection on 1/18/18 but he states that he received a Cortizone shoulder injection this morning, will this cause any problems

## 2018-01-17 NOTE — TELEPHONE ENCOUNTER
Spoke to patients wife and he was busy but she will have him call back to reschedule his procedure

## 2018-02-01 ENCOUNTER — DOCUMENTATION (OUTPATIENT)
Dept: PAIN MEDICINE | Facility: CLINIC | Age: 78
End: 2018-02-01

## 2018-02-01 ENCOUNTER — OUTSIDE FACILITY SERVICE (OUTPATIENT)
Dept: PAIN MEDICINE | Facility: CLINIC | Age: 78
End: 2018-02-01

## 2018-02-01 PROCEDURE — 27096 INJECT SACROILIAC JOINT: CPT | Performed by: ANESTHESIOLOGY

## 2018-02-01 NOTE — PROGRESS NOTES
Bilateral Sacroiliac Joint Injection  [facility]      PREOPERATIVE DIAGNOSIS:   Sacroiliac joint dysfunction, bilateral    POSTOPERATIVE DIAGNOSIS:  Sacroiliac joint dysfunction, bilateral    PROCEDURE:  Sacroiliac Joint Injection, Bilaterally, with fluoroscopic guidance    PRE-PROCEDURE DISCUSSION WITH PATIENT:    Risks and complications were discussed with the patient prior to starting the procedure and informed consent was obtained.  We discussed various topics including but not limited to bleeding, infection, injury, postprocedural site soreness, painful flareup, worsening of clinical picture, paralysis, coma, and death.     SURGEON:  Julian Brambila MD    REASON FOR PROCEDURE:    Patient has pain consistent with SI pathology on history and physical exam. Positive sacroiliac provocation maneuvers noted.   Tender to palpation over the affected SI joint.    SEDATION:  Patient declined administration of moderate sedation    ANESTHETIC AGENT:  Marcaine 0.5%  STEROID AGENT:  80mg DepoMedrol    DESCRIPTON OF PROCEDURE:  After obtaining informed consent, IV access was not obtained in the preoperative area.  The patient was transported to the operative suite and placed in the prone position with a pillow under the pelvic area. EKG, blood pressure, and pulse oximeter were monitored. The lumbosacral area was prepped with Chloraprep and draped in a sterile fashion. Under fluoroscopic guidance the inferior most portion of the right SI joint was identified. The overlying skin and subcutaneous tissue was anesthetized with 1% lidocaine. A 22-gauge spinal needle was introduced from the inferior most portion of the joint into the RIGHT SI joint under fluoroscopic guidance in the AP dimension with slight oblique rotation to the contralateral side.  Aspiration was negative.  After confirming the position of the needle with fluoroscopy, 1 mL of Omnipaque was injected and after seeing appropriate spread into the  joint a total of 2mL of 0.5% Marcaine, with approximately 40 mg of DepoMedrol, was injected very slowly.  Needle was removed intact.  A similar procedure was performed on the LEFT side.   Vital signs remained stable.  The onset of analgesia was noted.      ESTIMATED BLOOD LOSS:  minimal  SPECIMENS:  None  COMPLICATIONS:  No complications were noted. and There was no indication of vascular uptake on live injection of contrast dye.    TOLERANCE & DISCHARGE CONDITION:    The patient tolerated the procedure well.  The patient was transported to the recovery area without difficulties.  The patient was discharged to home under the care of family in stable and satisfactory condition.    PLAN OF CARE:  1. The patient was given our standard instruction sheet and will resume all medications as per the medication reconciliation sheet.  2. The patient will Return to clinic 1 wk.  3. The patient is instructed to keep a pain log hourly for 8 hours after the procedure.

## 2018-02-06 ENCOUNTER — OFFICE VISIT (OUTPATIENT)
Dept: PAIN MEDICINE | Facility: CLINIC | Age: 78
End: 2018-02-06

## 2018-02-06 VITALS
WEIGHT: 211.4 LBS | SYSTOLIC BLOOD PRESSURE: 149 MMHG | BODY MASS INDEX: 32.04 KG/M2 | OXYGEN SATURATION: 96 % | DIASTOLIC BLOOD PRESSURE: 62 MMHG | HEIGHT: 68 IN | TEMPERATURE: 98.5 F | HEART RATE: 62 BPM | RESPIRATION RATE: 16 BRPM

## 2018-02-06 DIAGNOSIS — M51.36 DEGENERATION OF INTERVERTEBRAL DISC OF LUMBAR REGION: ICD-10-CM

## 2018-02-06 DIAGNOSIS — G89.29 OTHER CHRONIC PAIN: Primary | ICD-10-CM

## 2018-02-06 DIAGNOSIS — M96.1 POSTLAMINECTOMY SYNDROME OF LUMBAR REGION: ICD-10-CM

## 2018-02-06 DIAGNOSIS — M51.24 THORACIC DISC HERNIATION: ICD-10-CM

## 2018-02-06 DIAGNOSIS — Z79.899 ENCOUNTER FOR LONG-TERM CURRENT USE OF HIGH RISK MEDICATION: ICD-10-CM

## 2018-02-06 PROCEDURE — 99213 OFFICE O/P EST LOW 20 MIN: CPT | Performed by: NURSE PRACTITIONER

## 2018-02-06 RX ORDER — HYDROCODONE BITARTRATE AND ACETAMINOPHEN 10; 325 MG/1; MG/1
1 TABLET ORAL EVERY 4 HOURS PRN
Qty: 180 TABLET | Refills: 0 | Status: SHIPPED | OUTPATIENT
Start: 2018-02-06 | End: 2018-03-05 | Stop reason: SDUPTHER

## 2018-02-06 NOTE — PROGRESS NOTES
CHIEF COMPLAINT    F/U back pain. Pt states he did not have relief from the injection. Back pain is unchanged.     Subjective   Chevy Goodwin is a 77 y.o. male  who presents to the office for follow-up of procedure.  He completed a Bilateral SI joint injection   on  2-1-18 performed by Dr. Brambila for management of back pain. Patient reports 25% relief from the procedure for 8 hours. He completed a Thoracic Epidural   on  12/5/17 performed by Dr. Brambila for management of back pain. Patient reports no relief from the procedure.     Complains of pain in his back, mid and low. Today his pain is 6/10VAS. Describes the pain as continuous and unchanged. Worse during the day-time but can interfere with sleep. Pain increases with walking, standing, bending/lifting/twisting; pain decreases with reclining position, medication. Continues with Hydrocodone 10/325 5-6/day, Celebrex 200 mg 2/day. Denies any side effects from the regimen. The regimen helps decrease his pain by 75%. ADL's by self.    Back Pain   This is a chronic (history of lumbar fusion with Dr. Yuan at [facility], was done in 2005) problem. The current episode started more than 1 year ago. The problem occurs constantly. The problem has been waxing and waning (reports as unchanged since last office visit) since onset. The pain is present in the lumbar spine and thoracic spine. The quality of the pain is described as aching and burning. The pain radiates to the right foot and left foot. The pain is at a severity of 6/10. The pain is moderate. The pain is worse during the day. The symptoms are aggravated by bending and twisting (walking, any physical activity). Stiffness is present in the morning. Pertinent negatives include no bladder incontinence, bowel incontinence, chest pain, dysuria, fever, headaches, numbness, tingling or weakness. Risk factors include lack of exercise. He has tried NSAIDs (previous TESI.  Hydrocodone) for the symptoms. The treatment  "provided moderate relief.      PEG Assessment   What number best describes your pain on average in the past week?6  What number best describes how, during the past week, pain has interfered with your enjoyment of life?6  What number best describes how, during the past week, pain has interfered with your general activity?  6    The following portions of the patient's history were reviewed and updated as appropriate: allergies, current medications, past family history, past medical history, past social history, past surgical history and problem list.    Review of Systems   Constitutional: Negative for chills and fever.   Respiratory: Negative for shortness of breath.    Cardiovascular: Negative for chest pain.   Gastrointestinal: Negative for bowel incontinence, constipation, diarrhea, nausea and vomiting.   Genitourinary: Negative for bladder incontinence, difficulty urinating, dysuria and enuresis.   Musculoskeletal: Positive for back pain.   Neurological: Negative for dizziness, tingling, weakness, light-headedness, numbness and headaches.   Psychiatric/Behavioral: Negative for agitation, confusion, hallucinations, self-injury, sleep disturbance and suicidal ideas. The patient is not nervous/anxious.        Vitals:    02/06/18 0901   BP: 149/62   Pulse: 62   Resp: 16   Temp: 98.5 °F (36.9 °C)   SpO2: 96%   Weight: 95.9 kg (211 lb 6.4 oz)   Height: 172.7 cm (67.99\")   PainSc:   6   PainLoc: Back     Objective   Physical Exam   Constitutional: He is oriented to person, place, and time. He appears well-developed and well-nourished. He is cooperative.   HENT:   Head: Normocephalic and atraumatic.   Nose: Nose normal.   Eyes: Conjunctivae and lids are normal.   Neck: Trachea normal. Neck supple.   Cardiovascular: Normal rate.    Pulmonary/Chest: Effort normal.   Musculoskeletal:        Thoracic back: He exhibits tenderness and pain.        Lumbar back: He exhibits tenderness and pain.   Minimal lumbar facet tenderness   "   Neurological: He is alert and oriented to person, place, and time. Gait (ambulates with cane) abnormal.   Reflex Scores:       Patellar reflexes are 2+ on the right side and 2+ on the left side.  Skin: Skin is intact.   Psychiatric: He has a normal mood and affect. His speech is normal and behavior is normal. Cognition and memory are normal.   Nursing note and vitals reviewed.      Assessment/Plan   Chevy was seen today for back pain.    Diagnoses and all orders for this visit:    Other chronic pain    Postlaminectomy syndrome of lumbar region    Degeneration of intervertebral disc of lumbar region    Thoracic disc herniation T9-T10    Encounter for long-term current use of high risk medication    Other orders  -     HYDROcodone-acetaminophen (NORCO)  MG per tablet; Take 1 tablet by mouth Every 4 (Four) Hours As Needed for Severe Pain .      --- The urine drug screen confirmation from 4-28-17 has been reviewed and the result is appropriate based on patient history and MARIOLA report  --- Refill Hydrocodone. Patient appears stable with current regimen. No adverse effects. Regarding continuation of opioids, there is no evidence of aberrant behavior or any red flags.  The patient continues with appropriate response to opioid therapy. ADL's remain intact by self.   --- Follow-up 1 month or sooner if needed.       MARIOLA REPORT    As part of the patient's treatment plan, I am prescribing controlled substances. The patient has been made aware of appropriate use of such medications, including potential risk of somnolence, limited ability to drive and/or work safely, and the potential for dependence or overdose. It has also bee made clear that these medications are for use by this patient only, without concomitant use of alcohol or other substances unless prescribed.     Patient has completed prescribing agreement detailing terms of continued prescribing of controlled substances, including monitoring MARIOLA reports,  urine drug screening, and pill counts if necessary. The patient is aware that inappropriate use will results in cessation of prescribing such medications.    MARIOLA report has been reviewed and scanned into the patient's chart.    Date of last MARIOLA : 2-5-18    History and physical exam exhibit continued safe and appropriate use of controlled substances.       EMR Dragon/Transcription disclaimer:   Much of this encounter note is an electronic transcription/translation of spoken language to printed text. The electronic translation of spoken language may permit erroneous, or at times, nonsensical words or phrases to be inadvertently transcribed; Although I have reviewed the note for such errors, some may still exist.

## 2018-02-21 ENCOUNTER — OFFICE VISIT (OUTPATIENT)
Dept: SLEEP MEDICINE | Facility: HOSPITAL | Age: 78
End: 2018-02-21
Attending: INTERNAL MEDICINE

## 2018-02-21 DIAGNOSIS — G47.14 HYPERSOMNIA DUE TO MEDICAL CONDITION: ICD-10-CM

## 2018-02-21 DIAGNOSIS — G47.33 OSA TREATED WITH BIPAP: Primary | ICD-10-CM

## 2018-02-21 PROCEDURE — G0463 HOSPITAL OUTPT CLINIC VISIT: HCPCS

## 2018-02-21 PROCEDURE — 99213 OFFICE O/P EST LOW 20 MIN: CPT | Performed by: INTERNAL MEDICINE

## 2018-02-21 NOTE — PROGRESS NOTES
[facility] Note       Patient Care Team:  Anthony Gutierrez MD as PCP - Internal Medicine  DANY Vo as PCP - Claims Attributed  Bubba Hernandez MD as Consulting Physician (Cardiology)  Girma Olsen MD as Consulting Physician (Urology)  Nickolas Olsen MD as Consulting Physician (Dermatology)  Mulugeta Odonnell MD as Consulting Physician (Sleep Medicine)    Chief Complaint:  JULIA     Interval History:   The patient was last seen by me in May 2017.  He states he is stable and unchanged.  He goes to bed 11 PM awakens at 10 AM.  He will wake up once or twice for the bathroom.  Tyler Sleepiness Scale is abnormal at 16.    Review of Systems:  Recorded on the Sleep Questionnaire.  Unremarkable except for nasal congestion and CHANDLER.    Social History:  He will have 4 cups of coffee a day.  Social History     Social History   • Marital status:      Spouse name: N/A   • Number of children: N/A   • Years of education: N/A     Social History Main Topics   • Smoking status: Current Every Day Smoker     Years: 60.00     Types: Cigars   • Smokeless tobacco: Never Used      Comment: QUIT 1999 CIGARETTES/STILL SMOKES CIGARS   • Alcohol use Yes      Comment: VERY RARE   • Drug use: Not on file   • Sexual activity: Not on file     Other Topics Concern   • Not on file     Social History Narrative       Allergies:  Dye fdc red [red dye]; Iodine; Morphine and related; Oxycodone; and Adhesive tape     Medication Review:  His list was reviewed.      Vital Signs:  Height 68 inches and weight 205 and he is obese with a body mass index of 31-32.    Physical Exam:    Constitutional:  Well developed white male and appears in no apparent distress.  Awake & oriented times 3.  Normal mood with normal recent and remote memory and normal judgement.  Eyes:  Conjunctivae normal.  Oropharynx:  moist mucous membranes without exudate and a large tongue and class III-IV MP airway and posterior  pharyngeal region not well seen.      Results Review:  DME is [facility] and he uses a fullface mask..  Downloads are presently not available.     Addendum:  Downloads between August 31, 2017 and February 26, 2018 reviewed.  Compliances 98% and average usage 8 hours and 59 minutes and average AHI normal without significant leak.  The patient uses BiPAP 20/16.    Impression:   Obstructive sleep apnea previously adequately treated with BiPAP 20/16.  The patient has persistent complaints of hypersomnolence.  No downloads presently available.      Plan:  Good sleep hygiene measures should be maintained.  Weight loss would be beneficial in this patient who is obese by BMI.  The patient is benefiting from the treatment being provided.     The patient will continue with BiPAP.  Downloads will be reviewed.    The patient will call for any problems and will follow up in one year.      Mulugeta Odonnell MD  02/24/18  11:09 AM

## 2018-02-24 PROBLEM — G47.14 HYPERSOMNIA DUE TO MEDICAL CONDITION: Status: ACTIVE | Noted: 2018-02-24

## 2018-03-05 ENCOUNTER — OFFICE VISIT (OUTPATIENT)
Dept: PAIN MEDICINE | Facility: CLINIC | Age: 78
End: 2018-03-05

## 2018-03-05 VITALS
OXYGEN SATURATION: 93 % | SYSTOLIC BLOOD PRESSURE: 162 MMHG | RESPIRATION RATE: 16 BRPM | WEIGHT: 212 LBS | HEIGHT: 68 IN | HEART RATE: 61 BPM | BODY MASS INDEX: 32.13 KG/M2 | DIASTOLIC BLOOD PRESSURE: 71 MMHG | TEMPERATURE: 98.4 F

## 2018-03-05 DIAGNOSIS — M96.1 POSTLAMINECTOMY SYNDROME OF LUMBAR REGION: ICD-10-CM

## 2018-03-05 DIAGNOSIS — M51.24 THORACIC DISC HERNIATION: ICD-10-CM

## 2018-03-05 DIAGNOSIS — M51.36 DEGENERATION OF INTERVERTEBRAL DISC OF LUMBAR REGION: ICD-10-CM

## 2018-03-05 DIAGNOSIS — G89.29 OTHER CHRONIC PAIN: Primary | ICD-10-CM

## 2018-03-05 DIAGNOSIS — Z79.899 ENCOUNTER FOR LONG-TERM CURRENT USE OF HIGH RISK MEDICATION: ICD-10-CM

## 2018-03-05 PROCEDURE — 99213 OFFICE O/P EST LOW 20 MIN: CPT | Performed by: NURSE PRACTITIONER

## 2018-03-05 RX ORDER — HYDROCODONE BITARTRATE AND ACETAMINOPHEN 10; 325 MG/1; MG/1
1 TABLET ORAL EVERY 4 HOURS PRN
Qty: 180 TABLET | Refills: 0 | Status: SHIPPED | OUTPATIENT
Start: 2018-03-05 | End: 2018-04-11 | Stop reason: SDUPTHER

## 2018-03-05 NOTE — PROGRESS NOTES
CHIEF COMPLAINT  Overall Pt states LBP is tolerably controlled    Subjective   Chevy Goodwin is a 77 y.o. male  who presents to the office for follow-up.He has a history of chronic back pain. Reports his pain pattern as being unchanged since last office visit.    Complains of pain in his low back. Today his pain is 6/10VAS. Describes the pain as continuous and unchanged. Continues with Hydrocodone 10/325 5-6/day, Celebrex 200 mg 2/day. Denies any side effects from the regimen. He did have some constipation but it is controlled with OTC stool softener. The regimen helps decrease his pain by 75%. ADL's by self.     He also states his shoulder pain is worse, right worse than left. Has a history of torn rotator cuff. 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     He completed a Bilateral SI joint injection   on  2-1-18 performed by Dr. Brambila for management of back pain.  He completed a Thoracic Epidural   on  12/5/17 performed by Dr. Brambila for management of back pain.    Every third month, he gets the pain medication filled at his pharmacy in [place]. Generally goes to [facility].    Back Pain   This is a chronic (history of lumbar fusion with Dr. Yuan at [facility], was done in 2005) problem. The current episode started more than 1 year ago. The problem occurs constantly. The problem has been waxing and waning (reports as unchanged since last office visit) since onset. The pain is present in the lumbar spine and thoracic spine. The quality of the pain is described as aching and burning. The pain radiates to the right foot and left foot. The pain is at a severity of 6/10 (ranges from 4-8/10VAS). The pain is moderate. The pain is worse during the day. The symptoms are aggravated by bending and twisting (walking, any physical activity). Stiffness is present in the morning. Associated symptoms include numbness (bilat. lwer legs (shins)). Pertinent negatives include no bladder incontinence, bowel incontinence, chest pain,  "dysuria, fever, headaches, tingling or weakness. Risk factors include lack of exercise. He has tried NSAIDs (previous TESI.  Hydrocodone) for the symptoms. The treatment provided moderate relief.      PEG Assessment   What number best describes your pain on average in the past week?6  What number best describes how, during the past week, pain has interfered with your enjoyment of life?6  What number best describes how, during the past week, pain has interfered with your general activity?  6    The following portions of the patient's history were reviewed and updated as appropriate: allergies, current medications, past family history, past medical history, past social history, past surgical history and problem list.    Review of Systems   Constitutional: Negative for activity change, chills and fever.   Respiratory: Negative for shortness of breath.    Cardiovascular: Negative for chest pain.   Gastrointestinal: Negative for bowel incontinence, constipation, diarrhea, nausea and vomiting.   Genitourinary: Negative for bladder incontinence, difficulty urinating, dysuria and enuresis.   Musculoskeletal: Positive for back pain.   Neurological: Positive for numbness (bilat. lwer legs (shins)). Negative for dizziness, tingling, seizures, weakness, light-headedness and headaches.   Psychiatric/Behavioral: Positive for sleep disturbance. Negative for agitation, confusion, hallucinations, self-injury and suicidal ideas. The patient is not nervous/anxious.        Vitals:    03/05/18 1419   BP: 162/71   Pulse: 61   Resp: 16   Temp: 98.4 °F (36.9 °C)   SpO2: 93%   Weight: 96.2 kg (212 lb)   Height: 172.7 cm (67.99\")   PainSc: 6  Comment: LBP bilaterally ranges from 4-8/10   PainLoc: Back     Objective   Physical Exam   Constitutional: He is oriented to person, place, and time. He appears well-developed and well-nourished. He is cooperative.   HENT:   Head: Normocephalic and atraumatic.   Nose: Nose normal.   Eyes: Conjunctivae " and lids are normal.   Neck: Trachea normal. Neck supple.   Cardiovascular: Normal rate.    Pulmonary/Chest: Effort normal.   Musculoskeletal:        Right shoulder: He exhibits tenderness.        Left shoulder: He exhibits tenderness.        Thoracic back: He exhibits tenderness and pain.        Lumbar back: He exhibits tenderness and pain.   Minimal lumbar facet tenderness     Neurological: He is alert and oriented to person, place, and time. Gait (ambulates with cane) abnormal.   Reflex Scores:       Patellar reflexes are 2+ on the right side and 2+ on the left side.  Skin: Skin is intact.   Psychiatric: He has a normal mood and affect. His speech is normal and behavior is normal. Cognition and memory are normal.   Nursing note and vitals reviewed.      Assessment/Plan   Chevy was seen today for back pain.    Diagnoses and all orders for this visit:    Other chronic pain    Postlaminectomy syndrome of lumbar region    Degeneration of intervertebral disc of lumbar region    Thoracic disc herniation T9-T10    Encounter for long-term current use of high risk medication    Other orders  -     HYDROcodone-acetaminophen (NORCO)  MG per tablet; Take 1 tablet by mouth Every 4 (Four) Hours As Needed for Severe Pain  (DNF until 3-12-18).      --- The urine drug screen confirmation from 4-28-17 has been reviewed and the result is appropriate based on patient history and MARIOLA report  --- Refill Hydrocodone. Patient appears stable with current regimen. No adverse effects. Regarding continuation of opioids, there is no evidence of aberrant behavior or any red flags.  The patient continues with appropriate response to opioid therapy. ADL's remain intact by self.   --- UDS at next office visit.  --- Follow-up 5 weeks or sooner if needed.       MAIROLA REPORT    As part of the patient's treatment plan, I am prescribing controlled substances. The patient has been made aware of appropriate use of such medications, including  potential risk of somnolence, limited ability to drive and/or work safely, and the potential for dependence or overdose. It has also bee made clear that these medications are for use by this patient only, without concomitant use of alcohol or other substances unless prescribed.     Patient has completed prescribing agreement detailing terms of continued prescribing of controlled substances, including monitoring MARIOLA reports, urine drug screening, and pill counts if necessary. The patient is aware that inappropriate use will results in cessation of prescribing such medications.    MARIOLA report has been reviewed and scanned into the patient's chart.    Date of last MARIOLA : 3-1-18    History and physical exam exhibit continued safe and appropriate use of controlled substances.      EMR Dragon/Transcription disclaimer:   Much of this encounter note is an electronic transcription/translation of spoken language to printed text. The electronic translation of spoken language may permit erroneous, or at times, nonsensical words or phrases to be inadvertently transcribed; Although I have reviewed the note for such errors, some may still exist.

## 2018-04-11 ENCOUNTER — OFFICE VISIT (OUTPATIENT)
Dept: PAIN MEDICINE | Facility: CLINIC | Age: 78
End: 2018-04-11

## 2018-04-11 VITALS
BODY MASS INDEX: 33.34 KG/M2 | SYSTOLIC BLOOD PRESSURE: 130 MMHG | RESPIRATION RATE: 15 BRPM | WEIGHT: 220 LBS | DIASTOLIC BLOOD PRESSURE: 56 MMHG | HEIGHT: 68 IN | OXYGEN SATURATION: 92 % | HEART RATE: 45 BPM | TEMPERATURE: 98.2 F

## 2018-04-11 DIAGNOSIS — M96.1 POSTLAMINECTOMY SYNDROME OF LUMBAR REGION: ICD-10-CM

## 2018-04-11 DIAGNOSIS — R00.1 BRADYCARDIA: ICD-10-CM

## 2018-04-11 DIAGNOSIS — Z79.899 ENCOUNTER FOR LONG-TERM CURRENT USE OF HIGH RISK MEDICATION: ICD-10-CM

## 2018-04-11 DIAGNOSIS — R06.02 SHORTNESS OF BREATH: ICD-10-CM

## 2018-04-11 DIAGNOSIS — R42 DIZZINESS: ICD-10-CM

## 2018-04-11 DIAGNOSIS — G89.29 OTHER CHRONIC PAIN: Primary | ICD-10-CM

## 2018-04-11 LAB
POC AMPHETAMINES: NEGATIVE
POC BARBITURATES: NEGATIVE
POC BENZODIAZEPHINES: NEGATIVE
POC COCAINE: NEGATIVE
POC METHADONE: NEGATIVE
POC METHAMPHETAMINE SCREEN URINE: NEGATIVE
POC OPIATES: POSITIVE
POC OXYCODONE: POSITIVE
POC PHENCYCLIDINE: NEGATIVE
POC PROPOXYPHENE: NEGATIVE
POC THC: NEGATIVE
POC TRICYCLIC ANTIDEPRESSANTS: NEGATIVE

## 2018-04-11 PROCEDURE — 80305 DRUG TEST PRSMV DIR OPT OBS: CPT | Performed by: NURSE PRACTITIONER

## 2018-04-11 PROCEDURE — 99214 OFFICE O/P EST MOD 30 MIN: CPT | Performed by: NURSE PRACTITIONER

## 2018-04-11 RX ORDER — HYDROCODONE BITARTRATE AND ACETAMINOPHEN 10; 325 MG/1; MG/1
1 TABLET ORAL EVERY 4 HOURS PRN
Qty: 180 TABLET | Refills: 0 | Status: SHIPPED | OUTPATIENT
Start: 2018-04-11 | End: 2018-05-23 | Stop reason: SDUPTHER

## 2018-04-11 NOTE — PROGRESS NOTES
"CHIEF COMPLAINT    F/U back pain. States unchanged since last visit. States he is going to be taking a new medication that starts with a \"b\" since his heart rate is getting too low.     Subjective   Chevy Goodwin is a 77 y.o. male  who presents to the office for follow-up.He has a history of chronic back pain.    Complains of pain in his low back. Today his pain is 5/10VAS. Describes the pain as continuous and unchanged. Continues with Hydrocodone 10/325 5-6/day, Celebrex 200 mg 2/day. Denies any side effects from the regimen. He did have some constipation but it is controlled with OTC stool softener. The regimen helps decrease his pain by 75%. ADL's by self.    The patient presented to [facility] emergency department on 3/27/2018 with chest pain and arm numbness.  Acute neurological/cardiovascular incidents were ruled out.  He was found to have Escherichia coli bacteremia with the source unknown.  Since that time he has been receiving IV Invanz infusions as an outpatient.  Patient saw Dr. Hernandez with [facility] yesterday in the office and was reporting fatigue and an unexplained 15 pound weight gain.  Was found to have significant amount of peripheral edema.  Lab work was completed and revealed an elevated BNP as well as elevated creatinine and serum potassium level.  The patient was encouraged to go to the emergency department for management of the elevated potassium and CHF however the patient declined.  He contacted his internal medicine MD who will help to manage this issue as an outpatient.  He called in Bumex and lowered one of his blood pressure medications (he is not sure which one), patient has not picked up or started the medication changes yet.  If his symptoms worsen he agreed to present to the emergency department.    He is bradycardic today. Reports that it has been going on for two weeks.  Has not contacted his cardiologist.  He is dizzy and has been feeling this way for about two weeks " as well.  He reports that his heart rate was in the 30's this AM.      Back Pain   This is a chronic (history of lumbar fusion with Dr. Yuan at [facility], was done in 2005) problem. The current episode started more than 1 year ago. The problem occurs constantly. The problem has been waxing and waning (reports as unchanged since last office visit) since onset. The pain is present in the lumbar spine and thoracic spine. The quality of the pain is described as aching and burning. The pain radiates to the right foot and left foot. The pain is at a severity of 5/10. The pain is moderate. The pain is worse during the day. The symptoms are aggravated by bending and twisting (walking, any physical activity). Stiffness is present in the morning. Associated symptoms include numbness (bilat. lwer legs (shins)). Pertinent negatives include no bladder incontinence, bowel incontinence, chest pain, dysuria, fever, headaches, tingling or weakness. Risk factors include lack of exercise. He has tried NSAIDs (Hydrocodone) for the symptoms. The treatment provided moderate relief.      PEG Assessment   What number best describes your pain on average in the past week?5  What number best describes how, during the past week, pain has interfered with your enjoyment of life?6  What number best describes how, during the past week, pain has interfered with your general activity?  6    The following portions of the patient's history were reviewed and updated as appropriate: allergies, current medications, past family history, past medical history, past social history, past surgical history and problem list.    Review of Systems   Constitutional: Negative for activity change, chills and fever.   Respiratory: Negative for shortness of breath.    Cardiovascular: Negative for chest pain.   Gastrointestinal: Negative for bowel incontinence, constipation, diarrhea, nausea and vomiting.   Genitourinary: Negative for bladder incontinence,  "difficulty urinating, dysuria and enuresis.   Musculoskeletal: Positive for back pain.   Neurological: Positive for numbness (bilat. lwer legs (shins)). Negative for dizziness, tingling, seizures, weakness, light-headedness and headaches.   Psychiatric/Behavioral: Positive for sleep disturbance. Negative for agitation, confusion, hallucinations, self-injury and suicidal ideas. The patient is not nervous/anxious.      Vitals:    04/11/18 1322   BP: 130/56   Pulse: (!) 45   Resp: 15   Temp: 98.2 °F (36.8 °C)   SpO2: 92%   Weight: 99.8 kg (220 lb)   Height: 172.7 cm (67.99\")   PainSc:   5   PainLoc: Back     Objective   Physical Exam   Constitutional: He is oriented to person, place, and time. He appears well-developed and well-nourished. He appears distressed.   HENT:   Head: Normocephalic and atraumatic.   Eyes: EOM are normal. Pupils are equal, round, and reactive to light.   Cardiovascular: An irregular rhythm present. Bradycardia present.    Pulmonary/Chest: Effort normal. No respiratory distress.   Musculoskeletal:        Thoracic back: He exhibits tenderness.        Lumbar back: He exhibits tenderness.   Neurological: He is oriented to person, place, and time.   Skin: Skin is warm and dry.   Psychiatric: He has a normal mood and affect. His behavior is normal.   Nursing note and vitals reviewed.    Assessment/Plan   Chevy was seen today for back pain.    Diagnoses and all orders for this visit:    Other chronic pain  -     POC Urine Drug Screen, Triage  -     Urine Drug Screen Confirmation - Urine, Urine, Clean Catch; Future    Postlaminectomy syndrome of lumbar region    Encounter for long-term current use of high risk medication    Bradycardia    Dizziness    Shortness of breath    Other orders  -     HYDROcodone-acetaminophen (NORCO)  MG per tablet; Take 1 tablet by mouth Every 4 (Four) Hours As Needed for Severe Pain .      --- Refill hydrocodone. Patient appears stable with current regimen. No adverse " effects. Regarding continuation of opioids, there is no evidence of aberrant behavior or any red flags.  The patient continues with appropriate response to opioid therapy. ADL's remain intact by self.   --- Routine UDS in office today as part of monitoring requirements for controlled substances.  The specimen was viewed and the immunoassay result reviewed and is OPI, OXY.  This specimen [facility] confirmation.     --- Follow-up 1 month         Patient was reporting feeling dizzy, has felt this way all day. He has been bradycardic.  Initially he was refusing to go to the ED despite his symptoms and my recommendation as well as the recommendation of his infectious disease MD.  As he was walking out of the exam room to leave he became very dizzy and almost collapsed, he was helped to a chair with my assistance as well as the assistance of other members of the office staff. He was complaining of SOA and chest heaviness and reported feeling cold and clammy. 911 was immediately called. Vitals were taken (/50, HR 50, 02 95%). He was placed on 2L 02 via NC.  I remained in the room with the patient until EMS arrived (along with RN, another APRN, and two MA's).  Vitals were monitored until EMS arrived, he remained bradycardic in the mid 40's- low 50's, BP from 100-120's/50's, 02 saturation remained at 95% on 2L.  He was helped to the EMS stretcher and report was given to the EMS.  His Brother in law was present and left with him and the EMS.  His wife was also notified of the event, requested that he be sent to [facility]  As part of the patient's treatment plan, I am prescribing controlled substances. The patient has been made aware of appropriate use of such medications, including potential risk of somnolence, limited ability to drive and/or work safely, and the potential for dependence or overdose. It has also bee made clear that these medications are for  use by this patient only, without concomitant use of alcohol or other substances unless prescribed.     Patient has completed prescribing agreement detailing terms of continued prescribing of controlled substances, including monitoring MARIOLA reports, urine drug screening, and pill counts if necessary. The patient is aware that inappropriate use will results in cessation of prescribing such medications.    MARIOLA report has been reviewed and scanned into the patient's chart.    Date of last MARIOLA : 4/10/2018    History and physical exam exhibit continued safe and appropriate use of controlled substances.    EMR Dragon/Transcription disclaimer:   Much of this encounter note is an electronic transcription/translation of spoken language to printed text. The electronic translation of spoken language may permit erroneous, or at times, nonsensical words or phrases to be inadvertently transcribed; Although I have reviewed the note for such errors, some may still exist.

## 2018-04-16 ENCOUNTER — RESULTS ENCOUNTER (OUTPATIENT)
Dept: PAIN MEDICINE | Facility: CLINIC | Age: 78
End: 2018-04-16

## 2018-04-16 DIAGNOSIS — G89.29 OTHER CHRONIC PAIN: ICD-10-CM

## 2018-05-23 ENCOUNTER — OFFICE VISIT (OUTPATIENT)
Dept: PAIN MEDICINE | Facility: CLINIC | Age: 78
End: 2018-05-23

## 2018-05-23 VITALS
SYSTOLIC BLOOD PRESSURE: 148 MMHG | RESPIRATION RATE: 16 BRPM | WEIGHT: 212.6 LBS | HEIGHT: 68 IN | OXYGEN SATURATION: 95 % | DIASTOLIC BLOOD PRESSURE: 58 MMHG | BODY MASS INDEX: 32.22 KG/M2 | TEMPERATURE: 98.5 F | HEART RATE: 50 BPM

## 2018-05-23 DIAGNOSIS — G89.29 OTHER CHRONIC PAIN: Primary | ICD-10-CM

## 2018-05-23 DIAGNOSIS — M51.36 DEGENERATION OF INTERVERTEBRAL DISC OF LUMBAR REGION: ICD-10-CM

## 2018-05-23 DIAGNOSIS — M96.1 POSTLAMINECTOMY SYNDROME OF LUMBAR REGION: ICD-10-CM

## 2018-05-23 DIAGNOSIS — Z79.899 ENCOUNTER FOR LONG-TERM CURRENT USE OF HIGH RISK MEDICATION: ICD-10-CM

## 2018-05-23 PROCEDURE — 99213 OFFICE O/P EST LOW 20 MIN: CPT | Performed by: NURSE PRACTITIONER

## 2018-05-23 RX ORDER — BUMETANIDE 1 MG/1
1 TABLET ORAL
COMMUNITY
Start: 2018-04-14 | End: 2019-08-09 | Stop reason: HOSPADM

## 2018-05-23 RX ORDER — HYDROCODONE BITARTRATE AND ACETAMINOPHEN 10; 325 MG/1; MG/1
1 TABLET ORAL EVERY 4 HOURS PRN
Qty: 180 TABLET | Refills: 0 | Status: SHIPPED | OUTPATIENT
Start: 2018-05-23 | End: 2018-06-14 | Stop reason: SDUPTHER

## 2018-05-23 NOTE — PROGRESS NOTES
CHIEF COMPLAINT  F/U back pain. Pain unchanged since last visit, but states it is still bad.     Subjective   Chevy Goodwin is a 77 y.o. male  who presents to the office for follow-up.He has a history of chronic back pain.    Complains of pain in his low back. Today his pain is 6/10VAS. Describes the pain as continuous and unchanged. Continues with Hydrocodone 10/325 5-6/day, Celebrex 200 mg 2/day. Denies any side effects from the regimen. Some constipation but it is controlled with OTC stool softener. The regimen helps decrease his pain by 75%. ADL's by self.    Might need a pacemaker, trying medication first.  Cardiologist is at [facility].      Done with IV antibiotics for Escherichia coli bacteremia.      Back Pain   This is a chronic (history of lumbar fusion with Dr. Yuan at [facility], was done in 2005) problem. The current episode started more than 1 year ago. The problem occurs constantly. The problem has been waxing and waning (reports as unchanged since last office visit) since onset. The pain is present in the lumbar spine and thoracic spine. The quality of the pain is described as aching and burning. The pain radiates to the right foot and left foot. The pain is at a severity of 6/10. The pain is moderate. The pain is worse during the day. The symptoms are aggravated by bending and twisting (walking, physical activity). Stiffness is present in the morning. Associated symptoms include numbness (bilat. lwer legs (shins)). Pertinent negatives include no bladder incontinence, bowel incontinence, chest pain, dysuria, fever, headaches, tingling or weakness. Risk factors include lack of exercise. He has tried NSAIDs (Hydrocodone) for the symptoms. The treatment provided moderate relief.      PEG Assessment   What number best describes your pain on average in the past week?6  What number best describes how, during the past week, pain has interfered with your enjoyment of life?6  What number best describes  "how, during the past week, pain has interfered with your general activity?  6    The following portions of the patient's history were reviewed and updated as appropriate: allergies, current medications, past family history, past medical history, past social history, past surgical history and problem list.    Review of Systems   Constitutional: Negative for activity change, chills and fever.   Respiratory: Negative for shortness of breath.    Cardiovascular: Negative for chest pain.   Gastrointestinal: Negative for bowel incontinence, constipation, diarrhea, nausea and vomiting.   Genitourinary: Negative for bladder incontinence, difficulty urinating, dysuria and enuresis.   Musculoskeletal: Positive for back pain.   Neurological: Positive for dizziness (occasional), light-headedness (occ) and numbness (bilat. lwer legs (shins)). Negative for tingling, seizures, weakness and headaches.   Psychiatric/Behavioral: Positive for sleep disturbance. Negative for agitation, confusion, hallucinations, self-injury and suicidal ideas. The patient is not nervous/anxious.      Vitals:    05/23/18 1431   BP: 148/58   Pulse: 50   Resp: 16   Temp: 98.5 °F (36.9 °C)   SpO2: 95%   Weight: 96.4 kg (212 lb 9.6 oz)   Height: 172.7 cm (67.99\")   PainSc:   6   PainLoc: Back     Objective   Physical Exam   Constitutional: He is oriented to person, place, and time. He appears well-developed and well-nourished.   HENT:   Head: Normocephalic and atraumatic.   Eyes: EOM are normal. Pupils are equal, round, and reactive to light.   Cardiovascular: Regular rhythm.  Bradycardia present.    Pulmonary/Chest: Effort normal. No respiratory distress.   Musculoskeletal:        Thoracic back: He exhibits tenderness.        Lumbar back: He exhibits tenderness.   Neurological: He is oriented to person, place, and time. Gait (ambulating with cane) abnormal.   Reflex Scores:       Patellar reflexes are 0 on the right side and 0 on the left side.  Skin: Skin " is warm and dry.   Psychiatric: He has a normal mood and affect. His behavior is normal.   Nursing note and vitals reviewed.    Assessment/Plan   Chevy was seen today for back pain.    Diagnoses and all orders for this visit:    Other chronic pain    Postlaminectomy syndrome of lumbar region    Degeneration of intervertebral disc of lumbar region    Encounter for long-term current use of high risk medication    Other orders  -     HYDROcodone-acetaminophen (NORCO)  MG per tablet; Take 1 tablet by mouth Every 4 (Four) Hours As Needed for Severe Pain .      --- Refill Hydrocodone. Patient appears stable with current regimen. No adverse effects. Regarding continuation of opioids, there is no evidence of aberrant behavior or any red flags.  The patient continues with appropriate response to opioid therapy. ADL's remain intact by self.   --- The urine drug screen confirmation from 4/11/2018 has been reviewed and the result is appropriate based on patient history and MARIOLA report  --- Follow-up 1 month           MARIOLA REPORT  As part of the patient's treatment plan, I am prescribing controlled substances. The patient has been made aware of appropriate use of such medications, including potential risk of somnolence, limited ability to drive and/or work safely, and the potential for dependence or overdose. It has also bee made clear that these medications are for use by this patient only, without concomitant use of alcohol or other substances unless prescribed.     Patient has completed prescribing agreement detailing terms of continued prescribing of controlled substances, including monitoring MARIOLA reports, urine drug screening, and pill counts if necessary. The patient is aware that inappropriate use will results in cessation of prescribing such medications.    MARIOLA report has been reviewed and scanned into the patient's chart.    As the clinician, I personally reviewed the MARIOLA from 5/22/2018 while the  patient was in the office today.  (Medication filled in [facility] last month)    History and physical exam exhibit continued safe and appropriate use of controlled substances.    EMR Dragon/Transcription disclaimer:   Much of this encounter note is an electronic transcription/translation of spoken language to printed text. The electronic translation of spoken language may permit erroneous, or at times, nonsensical words or phrases to be inadvertently transcribed; Although I have reviewed the note for such errors, some may still exist.

## 2018-06-14 ENCOUNTER — OFFICE VISIT (OUTPATIENT)
Dept: PAIN MEDICINE | Facility: CLINIC | Age: 78
End: 2018-06-14

## 2018-06-14 VITALS
WEIGHT: 210 LBS | SYSTOLIC BLOOD PRESSURE: 155 MMHG | DIASTOLIC BLOOD PRESSURE: 59 MMHG | BODY MASS INDEX: 31.83 KG/M2 | RESPIRATION RATE: 16 BRPM | TEMPERATURE: 98 F | HEART RATE: 54 BPM | HEIGHT: 68 IN | OXYGEN SATURATION: 95 %

## 2018-06-14 DIAGNOSIS — G89.29 OTHER CHRONIC PAIN: Primary | ICD-10-CM

## 2018-06-14 DIAGNOSIS — M96.1 POSTLAMINECTOMY SYNDROME OF LUMBAR REGION: ICD-10-CM

## 2018-06-14 DIAGNOSIS — M51.36 DEGENERATION OF INTERVERTEBRAL DISC OF LUMBAR REGION: ICD-10-CM

## 2018-06-14 PROCEDURE — 99213 OFFICE O/P EST LOW 20 MIN: CPT | Performed by: NURSE PRACTITIONER

## 2018-06-14 RX ORDER — HYDROCODONE BITARTRATE AND ACETAMINOPHEN 10; 325 MG/1; MG/1
1 TABLET ORAL EVERY 4 HOURS PRN
Qty: 180 TABLET | Refills: 0 | Status: SHIPPED | OUTPATIENT
Start: 2018-06-14 | End: 2018-09-11 | Stop reason: SDUPTHER

## 2018-06-14 RX ORDER — HYDRALAZINE HYDROCHLORIDE 50 MG/1
TABLET, FILM COATED ORAL
Refills: 1 | COMMUNITY
Start: 2018-06-11 | End: 2018-12-27

## 2018-06-14 RX ORDER — CHOLECALCIFEROL (VITAMIN D3) 125 MCG
2000 TABLET ORAL
COMMUNITY
End: 2020-05-22

## 2018-06-14 NOTE — PROGRESS NOTES
CHIEF COMPLAINT  F/U back pain. Pt states no changes since last visit.     Subjective   Chevy Goodwin is a 77 y.o. male  who presents to the office for follow-up.He has a history of chronic back pain with a history of lumbar surgery. REports his pain is unchanged since last office visit.    Complains of pain in his back. Today his pain is 6/10VAS. Describes the pain as continuous and worse. Continues with Hydrocodone 10/325 5-6/day. Denies any side effects from this. The regimen helps decrease his pain by 50%. ADL's by self.    Was recently started on Bumex and hydralazine. Is feeling weak since starting this.  Reports his potassium is normal now. May still need a pacemaker but no plans for it at this time. Trying to control with medication.  Did ask about CBD oil.   Back Pain   This is a chronic (history of lumbar fusion with Dr. Yuan at [facility], was done in 2005) problem. The current episode started more than 1 year ago. The problem occurs constantly. The problem has been waxing and waning (reports as unchanged since last office visit) since onset. The pain is present in the lumbar spine and thoracic spine. The quality of the pain is described as aching and burning. The pain radiates to the right foot and left foot. The pain is at a severity of 6/10. The pain is moderate. The pain is worse during the day. The symptoms are aggravated by bending and twisting (walking, any physical activity). Stiffness is present in the morning. Associated symptoms include numbness (bilat. lwer legs (shins)). Pertinent negatives include no bladder incontinence, bowel incontinence, chest pain, dysuria, fever, headaches, tingling or weakness. Risk factors include lack of exercise. He has tried NSAIDs (previous TESI.  Hydrocodone) for the symptoms. The treatment provided moderate relief.      PEG Assessment   What number best describes your pain on average in the past week?6  What number best describes how, during the past  "week, pain has interfered with your enjoyment of life?6  What number best describes how, during the past week, pain has interfered with your general activity?  6    The following portions of the patient's history were reviewed and updated as appropriate: allergies, current medications, past family history, past medical history, past social history, past surgical history and problem list.    Review of Systems   Constitutional: Negative for activity change, chills and fever.   Respiratory: Negative for shortness of breath.    Cardiovascular: Negative for chest pain.   Gastrointestinal: Positive for nausea (pt states having an upset stomach at times). Negative for bowel incontinence, constipation, diarrhea and vomiting.   Genitourinary: Negative for bladder incontinence, difficulty urinating, dysuria and enuresis.   Musculoskeletal: Positive for back pain and myalgias (pt states he is getting a lot of leg cramps).   Neurological: Positive for dizziness (occasional), light-headedness (occ) and numbness (bilat. lwer legs (shins)). Negative for tingling, seizures, weakness and headaches.   Psychiatric/Behavioral: Positive for sleep disturbance. Negative for agitation, confusion, hallucinations, self-injury and suicidal ideas. The patient is not nervous/anxious.        Vitals:    06/14/18 1354   BP: 155/59   Pulse: 54   Resp: 16   Temp: 98 °F (36.7 °C)   SpO2: 95%   Weight: 95.3 kg (210 lb)   Height: 172.7 cm (67.99\")   PainSc:   6   PainLoc: Back     Objective   Physical Exam   Constitutional: He is oriented to person, place, and time. He appears well-developed and well-nourished. He is cooperative.   HENT:   Head: Normocephalic and atraumatic.   Nose: Nose normal.   Eyes: Conjunctivae and lids are normal.   Neck: Trachea normal. Neck supple.   Cardiovascular: Normal rate.    Pulmonary/Chest: Effort normal.   Musculoskeletal:        Thoracic back: He exhibits tenderness and pain.        Lumbar back: He exhibits tenderness " and pain.   Minimal lumbar facet tenderness     Neurological: He is alert and oriented to person, place, and time. Gait (ambulates with cane) abnormal.   Reflex Scores:       Patellar reflexes are 0 on the right side and 0 on the left side.  Skin: Skin is intact.   Psychiatric: He has a normal mood and affect. His speech is normal and behavior is normal. Cognition and memory are normal.   Nursing note and vitals reviewed.      Assessment/Plan   Chevy was seen today for back pain.    Diagnoses and all orders for this visit:    Other chronic pain    Postlaminectomy syndrome of lumbar region    Degeneration of intervertebral disc of lumbar region    Other orders  -     HYDROcodone-acetaminophen (NORCO)  MG per tablet; Take 1 tablet by mouth Every 4 (Four) Hours As Needed for Severe Pain  (DNF until 6-21-18).      --- The urine drug screen confirmation from 4-11-18 has been reviewed and the result is APPROPRIATE based on patient history and MARIOLA report  --- Refill Hydrocodone DNF until 6-21-18. Patient appears stable with current regimen. No adverse effects. Regarding continuation of opioids, there is no evidence of aberrant behavior or any red flags.  The patient continues with appropriate response to opioid therapy. ADL's remain intact by self.   --- Follow-up 1 month or sooner if needed.       MARIOLA REPORT    As part of the patient's treatment plan, I am prescribing controlled substances. The patient has been made aware of appropriate use of such medications, including potential risk of somnolence, limited ability to drive and/or work safely, and the potential for dependence or overdose. It has also bee made clear that these medications are for use by this patient only, without concomitant use of alcohol or other substances unless prescribed.     Patient has completed prescribing agreement detailing terms of continued prescribing of controlled substances, including monitoring MARIOLA reports, urine drug  screening, and pill counts if necessary. The patient is aware that inappropriate use will results in cessation of prescribing such medications.    MARIOLA report has been reviewed and scanned into the patient's chart.    As the clinician, I personally reviewed the MARIOLA from 6-13-18 while the patient was in the office today.    History and physical exam exhibit continued safe and appropriate use of controlled substances.      EMR Dragon/Transcription disclaimer:   Much of this encounter note is an electronic transcription/translation of spoken language to printed text. The electronic translation of spoken language may permit erroneous, or at times, nonsensical words or phrases to be inadvertently transcribed; Although I have reviewed the note for such errors, some may still exist.

## 2018-09-11 ENCOUNTER — OFFICE VISIT (OUTPATIENT)
Dept: PAIN MEDICINE | Facility: CLINIC | Age: 78
End: 2018-09-11

## 2018-09-11 VITALS
DIASTOLIC BLOOD PRESSURE: 65 MMHG | BODY MASS INDEX: 31.83 KG/M2 | HEART RATE: 56 BPM | WEIGHT: 210 LBS | OXYGEN SATURATION: 94 % | RESPIRATION RATE: 18 BRPM | HEIGHT: 68 IN | TEMPERATURE: 98.4 F | SYSTOLIC BLOOD PRESSURE: 134 MMHG

## 2018-09-11 DIAGNOSIS — M51.36 DEGENERATION OF INTERVERTEBRAL DISC OF LUMBAR REGION: ICD-10-CM

## 2018-09-11 DIAGNOSIS — Z79.899 ENCOUNTER FOR LONG-TERM CURRENT USE OF HIGH RISK MEDICATION: ICD-10-CM

## 2018-09-11 DIAGNOSIS — M96.1 POSTLAMINECTOMY SYNDROME OF LUMBAR REGION: ICD-10-CM

## 2018-09-11 DIAGNOSIS — G89.29 OTHER CHRONIC PAIN: Primary | ICD-10-CM

## 2018-09-11 PROCEDURE — 99213 OFFICE O/P EST LOW 20 MIN: CPT | Performed by: NURSE PRACTITIONER

## 2018-09-11 RX ORDER — HYDROCODONE BITARTRATE AND ACETAMINOPHEN 10; 325 MG/1; MG/1
1 TABLET ORAL EVERY 4 HOURS PRN
Qty: 180 TABLET | Refills: 0 | Status: SHIPPED | OUTPATIENT
Start: 2018-09-11 | End: 2018-10-09 | Stop reason: SDUPTHER

## 2018-09-11 NOTE — PROGRESS NOTES
"CHIEF COMPLAINT  Back and right shoulder pain has increased since last visit.    Subjective   Chevy Goodwin is a 78 y.o. male  who presents to the office for follow-up.He has a history of chronic back pain. Reports this has increased since last office visit. Also reports increased right shoulder pain since last office visit.    Complains of pain in his low back and right shoulder. Today his pain is 6/10VAS. Describes the pain as continuous and worse. Continues with Hydrocodone 10/325 5-6/day. Denies any side effects from this. The regimen helps decrease his pain by 50%. ADL's by self.    Also complains of right shoulder pain. States he has a \"torn labrum and something else.\" Cannot remember name of orthopedist, with [facility]. Had CT scan. \"he wanted to do a shoulder replacement.\" His PCP refused that due to co-morbidities.  His PCP is Dr. Gutierrez. His pain in his shoulder is becoming worse and interfering with his life and sleep.    Reports he last got his prescription filled approximately 5 weeks ago at [facility].     Back Pain   This is a chronic (history of lumbar fusion with Dr. Yuan at [facility], was done in 2005) problem. The current episode started more than 1 year ago. The problem occurs constantly. The problem has been waxing and waning (worse since last office visit) since onset. The pain is present in the lumbar spine and thoracic spine. The quality of the pain is described as aching and burning. The pain radiates to the right foot and left foot. The pain is at a severity of 6/10. The pain is moderate. The pain is worse during the day. The symptoms are aggravated by bending and twisting (walking, any physical activity). Stiffness is present in the morning. Associated symptoms include numbness. Pertinent negatives include no bladder incontinence, bowel incontinence, chest pain, dysuria, fever, headaches, tingling or weakness. Risk factors include lack of exercise. He has tried " "NSAIDs (previous TESI.  Hydrocodone) for the symptoms. The treatment provided moderate relief.      PEG Assessment   What number best describes your pain on average in the past week?6  What number best describes how, during the past week, pain has interfered with your enjoyment of life?6  What number best describes how, during the past week, pain has interfered with your general activity?  6    The following portions of the patient's history were reviewed and updated as appropriate: allergies, current medications, past family history, past medical history, past social history, past surgical history and problem list.    Review of Systems   Constitutional: Negative for chills and fever.   Respiratory: Negative for shortness of breath.    Cardiovascular: Negative for chest pain.   Gastrointestinal: Negative for bowel incontinence, constipation, diarrhea, nausea and vomiting.   Genitourinary: Negative for bladder incontinence, difficulty urinating, dysuria and enuresis.   Musculoskeletal: Positive for back pain.   Neurological: Positive for numbness. Negative for dizziness, tingling, weakness, light-headedness and headaches.   Psychiatric/Behavioral: Positive for sleep disturbance. Negative for confusion, hallucinations, self-injury and suicidal ideas. The patient is not nervous/anxious.        Vitals:    09/11/18 1332   BP: 134/65   Pulse: 56   Resp: 18   Temp: 98.4 °F (36.9 °C)   SpO2: 94%   Weight: 95.3 kg (210 lb)   Height: 172.7 cm (67.99\")   PainSc:   6   PainLoc: Back     Objective   Physical Exam   Constitutional: He is oriented to person, place, and time. He appears well-developed and well-nourished. He is cooperative.   HENT:   Head: Normocephalic and atraumatic.   Nose: Nose normal.   Eyes: Conjunctivae and lids are normal.   Neck: Trachea normal. Neck supple.   Cardiovascular: Normal rate.    Pulmonary/Chest: Effort normal.   Musculoskeletal:        Thoracic back: He exhibits tenderness and pain.        " Lumbar back: He exhibits tenderness and pain.   Minimal lumbar facet tenderness     Neurological: He is alert and oriented to person, place, and time. Gait (ambulates with cane) abnormal.   Reflex Scores:       Patellar reflexes are 0 on the right side and 0 on the left side.  Skin: Skin is intact.   Psychiatric: He has a normal mood and affect. His speech is normal and behavior is normal. Cognition and memory are normal.   Nursing note and vitals reviewed.      Assessment/Plan   Chevy was seen today for back pain.    Diagnoses and all orders for this visit:    Other chronic pain    Postlaminectomy syndrome of lumbar region    Degeneration of intervertebral disc of lumbar region    Encounter for long-term current use of high risk medication      --- The urine drug screen confirmation from 4-11-18 has been reviewed and the result is APPROPRIATE based on patient history and MARIOLA report  --- Refill Hydrocodone. Patient appears stable with current regimen. No adverse effects. Regarding continuation of opioids, there is no evidence of aberrant behavior or any red flags.  The patient continues with appropriate response to opioid therapy. ADL's remain intact by self.   --- Declines repeat TESI  --- Follow-up 1 month or sooner if needed.       MARIOLA REPORT    As part of the patient's treatment plan, I am prescribing controlled substances. The patient has been made aware of appropriate use of such medications, including potential risk of somnolence, limited ability to drive and/or work safely, and the potential for dependence or overdose. It has also bee made clear that these medications are for use by this patient only, without concomitant use of alcohol or other substances unless prescribed.     Patient has completed prescribing agreement detailing terms of continued prescribing of controlled substances, including monitoring MARIOLA reports, urine drug screening, and pill counts if necessary. The patient is aware that  inappropriate use will results in cessation of prescribing such medications.    MARIOLA report has been reviewed and scanned into the patient's chart.    As the clinician, I personally reviewed the MARIOLA from 9-10-18 while the patient was in the office today.    History and physical exam exhibit continued safe and appropriate use of controlled substances.      EMR Dragon/Transcription disclaimer:   Much of this encounter note is an electronic transcription/translation of spoken language to printed text. The electronic translation of spoken language may permit erroneous, or at times, nonsensical words or phrases to be inadvertently transcribed; Although I have reviewed the note for such errors, some may still exist.

## 2018-10-09 ENCOUNTER — OFFICE VISIT (OUTPATIENT)
Dept: PAIN MEDICINE | Facility: CLINIC | Age: 78
End: 2018-10-09

## 2018-10-09 VITALS
HEART RATE: 61 BPM | RESPIRATION RATE: 15 BRPM | BODY MASS INDEX: 31.67 KG/M2 | HEIGHT: 68 IN | SYSTOLIC BLOOD PRESSURE: 131 MMHG | WEIGHT: 209 LBS | TEMPERATURE: 98.6 F | OXYGEN SATURATION: 96 % | DIASTOLIC BLOOD PRESSURE: 63 MMHG

## 2018-10-09 DIAGNOSIS — M51.24 THORACIC DISC HERNIATION: ICD-10-CM

## 2018-10-09 DIAGNOSIS — M51.36 DEGENERATION OF INTERVERTEBRAL DISC OF LUMBAR REGION: ICD-10-CM

## 2018-10-09 DIAGNOSIS — M96.1 POSTLAMINECTOMY SYNDROME OF LUMBAR REGION: ICD-10-CM

## 2018-10-09 DIAGNOSIS — Z79.891 LONG TERM (CURRENT) USE OF OPIATE ANALGESIC: ICD-10-CM

## 2018-10-09 DIAGNOSIS — G89.29 OTHER CHRONIC PAIN: Primary | ICD-10-CM

## 2018-10-09 PROCEDURE — 99214 OFFICE O/P EST MOD 30 MIN: CPT | Performed by: NURSE PRACTITIONER

## 2018-10-09 RX ORDER — HYDROCODONE BITARTRATE AND ACETAMINOPHEN 10; 325 MG/1; MG/1
1 TABLET ORAL EVERY 4 HOURS PRN
Qty: 180 TABLET | Refills: 0 | Status: SHIPPED | OUTPATIENT
Start: 2018-10-09 | End: 2018-11-14 | Stop reason: SDUPTHER

## 2018-10-09 NOTE — PROGRESS NOTES
CHIEF COMPLAINT  F/U back and right shoulder pain. States shoulder pain has worsened.     Subjective   Chevy Goodwin is a 78 y.o. male  who presents to the office for follow-up.He has a history of chronic back pain, as well as right shoulder pain. Wants to have right shoulder replaced. Waiting on surgical clearance. Is under the care of Dr. Gross. His PCP is Dr. Hurley.    Complains of pain in his low back and right shoulder. Today his pain is 7/10VAS(shoulder). Describes his pain as continuous. His back pain is unchanged and his shoulder pain is worse. Pain can interfere with his sleep. Continues with Hydrocodone 10/325 5-6/day. Denies any side effects from this. Was previously having constipation, but tried OTC stool softener with positive results. The regimen helps decrease his pain by 40-50%. ADL's by self.     Goes to [facility] every third month to get medication. His last prescription was filled at [facility] on 7-12-18.  Back Pain   This is a chronic (history of lumbar fusion with Dr. Yuan at [facility], was done in 2005) problem. The current episode started more than 1 year ago. The problem occurs constantly. The problem has been waxing and waning (unchanged since last office visit) since onset. The pain is present in the lumbar spine and thoracic spine. The quality of the pain is described as aching and burning. The pain radiates to the right foot and left foot. The pain is moderate. The pain is worse during the day. The symptoms are aggravated by bending and twisting (walking, any physical activity). Stiffness is present in the morning. Associated symptoms include numbness. Pertinent negatives include no bladder incontinence, bowel incontinence, chest pain, dysuria, fever, headaches, tingling or weakness. Risk factors include lack of exercise. He has tried NSAIDs (previous TESI.  Hydrocodone) for the symptoms. The treatment provided moderate relief.   Pain   This is a chronic problem. The  "current episode started more than 1 year ago. The problem occurs constantly. The problem has been gradually worsening (RIGHT SHOULDer--- CONTINUES TO WORSEN.). Associated symptoms include arthralgias and numbness. Pertinent negatives include no chest pain, chills, fever, headaches, nausea, vomiting or weakness.      PEG Assessment   What number best describes your pain on average in the past week?6-7  What number best describes how, during the past week, pain has interfered with your enjoyment of life?6  What number best describes how, during the past week, pain has interfered with your general activity?  6    The following portions of the patient's history were reviewed and updated as appropriate: allergies, current medications, past family history, past medical history, past social history, past surgical history and problem list.    Review of Systems   Constitutional: Negative for chills and fever.   Respiratory: Negative for shortness of breath.    Cardiovascular: Negative for chest pain.   Gastrointestinal: Negative for bowel incontinence, constipation, diarrhea, nausea and vomiting.   Genitourinary: Negative for bladder incontinence, difficulty urinating, dysuria and enuresis.   Musculoskeletal: Positive for arthralgias and back pain.   Neurological: Positive for numbness. Negative for dizziness, tingling, weakness, light-headedness and headaches.   Psychiatric/Behavioral: Positive for sleep disturbance. Negative for confusion, hallucinations, self-injury and suicidal ideas. The patient is not nervous/anxious.        Vitals:    10/09/18 1359 10/09/18 1401 10/09/18 1405   BP: 152/64  131/63   Pulse: 61     Resp: 15     Temp: 98.6 °F (37 °C)     SpO2: 96%     Weight: 94.8 kg (209 lb)     Height: 172.7 cm (67.99\")     PainSc:   7   7    PainLoc: Back Shoulder      Objective   Physical Exam   Constitutional: He is oriented to person, place, and time. He appears well-developed and well-nourished. He is cooperative. "   HENT:   Head: Normocephalic and atraumatic.   Nose: Nose normal.   Eyes: Conjunctivae and lids are normal.   Neck: Trachea normal. Neck supple.   Cardiovascular: Normal rate.    Pulmonary/Chest: Effort normal.   Musculoskeletal:        Thoracic back: He exhibits tenderness and pain.        Lumbar back: He exhibits tenderness and pain.   Minimal lumbar facet tenderness     Neurological: He is alert and oriented to person, place, and time. Gait (ambulates with cane) abnormal.   Reflex Scores:       Patellar reflexes are 0 on the right side and 0 on the left side.  Skin: Skin is intact.   Psychiatric: He has a normal mood and affect. His speech is normal and behavior is normal. Cognition and memory are normal.   Nursing note and vitals reviewed.      Assessment/Plan   Chevy was seen today for back pain and shoulder pain.    Diagnoses and all orders for this visit:    Other chronic pain    Degeneration of intervertebral disc of lumbar region    Thoracic disc herniation T9-T10    Postlaminectomy syndrome of lumbar region    Long term (current) use of opiate analgesic    Other orders  -     HYDROcodone-acetaminophen (NORCO)  MG per tablet; Take 1 tablet by mouth Every 4 (Four) Hours As Needed for Severe Pain .      --- The urine drug screen confirmation from 4-11-18 has been reviewed and the result is APPROPRIATE based on patient history and MARIOLA report  --- Refill Hydrocodone. Patient appears stable with current regimen. No adverse effects. Regarding continuation of opioids, there is no evidence of aberrant behavior or any red flags.  The patient continues with appropriate response to opioid therapy. ADL's remain intact by self.  Anticipate this refill from [place]  --- Continue with other specialists as scheduled.  Can receive post-operative pain medication from surgeon. Make sure not to take with medication from this clinic.   --- Follow-up 1 month or sooner if needed.       MARIOLA REPORT    As part of the  patient's treatment plan, I am prescribing controlled substances. The patient has been made aware of appropriate use of such medications, including potential risk of somnolence, limited ability to drive and/or work safely, and the potential for dependence or overdose. It has also bee made clear that these medications are for use by this patient only, without concomitant use of alcohol or other substances unless prescribed.     Patient has completed prescribing agreement detailing terms of continued prescribing of controlled substances, including monitoring MARIOLA reports, urine drug screening, and pill counts if necessary. The patient is aware that inappropriate use will results in cessation of prescribing such medications.    MARIOLA report has been reviewed and scanned into the patient's chart.    As the clinician, I personally reviewed the MARILOA from 10-8-18 while the patient was in the office today.    History and physical exam exhibit continued safe and appropriate use of controlled substances.      EMR Dragon/Transcription disclaimer:   Much of this encounter note is an electronic transcription/translation of spoken language to printed text. The electronic translation of spoken language may permit erroneous, or at times, nonsensical words or phrases to be inadvertently transcribed; Although I have reviewed the note for such errors, some may still exist.

## 2018-11-14 ENCOUNTER — OFFICE VISIT (OUTPATIENT)
Dept: PAIN MEDICINE | Facility: CLINIC | Age: 78
End: 2018-11-14

## 2018-11-14 VITALS
WEIGHT: 211 LBS | RESPIRATION RATE: 18 BRPM | HEART RATE: 80 BPM | DIASTOLIC BLOOD PRESSURE: 72 MMHG | BODY MASS INDEX: 31.98 KG/M2 | TEMPERATURE: 97.1 F | SYSTOLIC BLOOD PRESSURE: 169 MMHG | HEIGHT: 68 IN | OXYGEN SATURATION: 95 %

## 2018-11-14 DIAGNOSIS — M25.511 CHRONIC RIGHT SHOULDER PAIN: ICD-10-CM

## 2018-11-14 DIAGNOSIS — G89.29 OTHER CHRONIC PAIN: Primary | ICD-10-CM

## 2018-11-14 DIAGNOSIS — M51.24 THORACIC DISC HERNIATION: ICD-10-CM

## 2018-11-14 DIAGNOSIS — G89.29 CHRONIC RIGHT SHOULDER PAIN: ICD-10-CM

## 2018-11-14 DIAGNOSIS — M96.1 POSTLAMINECTOMY SYNDROME OF LUMBAR REGION: ICD-10-CM

## 2018-11-14 DIAGNOSIS — Z79.899 ENCOUNTER FOR LONG-TERM CURRENT USE OF HIGH RISK MEDICATION: ICD-10-CM

## 2018-11-14 PROCEDURE — 99213 OFFICE O/P EST LOW 20 MIN: CPT | Performed by: NURSE PRACTITIONER

## 2018-11-14 RX ORDER — HYDROCODONE BITARTRATE AND ACETAMINOPHEN 10; 325 MG/1; MG/1
1 TABLET ORAL EVERY 4 HOURS PRN
Qty: 180 TABLET | Refills: 0 | Status: SHIPPED | OUTPATIENT
Start: 2018-11-14 | End: 2018-12-13 | Stop reason: SDUPTHER

## 2018-11-14 NOTE — PROGRESS NOTES
CHIEF COMPLAINT  F/U back and right shoulder pain. States shoulder pain has worsened.     Subjective   Chevy Goodwin is a 78 y.o. male  who presents to the office for follow-up.He has a history of chronic back pain, which is unchanged. Also has chronic right shoulder pain, but this is worse since last office visit.    Complains of pain in his low back and right shoulder. Today his pain is 7/10VAS. Describes the pain as continuous and worsening(shoulder). Continues with Hydrocodone 10/325 5-6/day. Denies any side effects from the regimen. Takes OTC stool softener with positive results, which helps with previous constipation. The regimen helps decrease his pain by 50%. ADL's by self.    Is scheduled for right shoulder arthroplasty with Dr. Gross on 1-10-19. Is awaiting final cardiac clearance.  His PCP is Dr. Vince Gutierrez.  Last month, he went to [facility]. This month, he will be going to his old [facility] which is now [facility] on [facility].    Goes to [facility] every third month to get medication. His last prescription was filled at [facility] on 7-12-18.  Back Pain   This is a chronic (history of lumbar fusion with Dr. Yuan at [facility], was done in 2005) problem. The current episode started more than 1 year ago. The problem occurs constantly. The problem has been waxing and waning (unchanged since last office visit) since onset. The pain is present in the lumbar spine and thoracic spine. The quality of the pain is described as aching and burning. The pain radiates to the right foot and left foot. The pain is at a severity of 7/10. The pain is moderate. The pain is worse during the day. The symptoms are aggravated by bending and twisting (walking, any physical activity). Stiffness is present in the morning. Associated symptoms include weakness. Pertinent negatives include no bladder incontinence, bowel incontinence, chest pain, dysuria, fever, headaches, numbness or tingling. Risk factors  "include lack of exercise. He has tried NSAIDs (previous TESI.  Hydrocodone) for the symptoms. The treatment provided moderate relief.   Pain   This is a chronic problem. The current episode started more than 1 year ago. The problem occurs constantly. The problem has been gradually worsening (RIGHT SHOULDER--- CONTINUES TO WORSEN.). Associated symptoms include arthralgias and weakness. Pertinent negatives include no chest pain, chills, fever, headaches, nausea, numbness or vomiting.      PEG Assessment   What number best describes your pain on average in the past week?6  What number best describes how, during the past week, pain has interfered with your enjoyment of life?7  What number best describes how, during the past week, pain has interfered with your general activity?  6    The following portions of the patient's history were reviewed and updated as appropriate: allergies, current medications, past family history, past medical history, past social history, past surgical history and problem list.    Review of Systems   Constitutional: Negative for chills and fever.   Respiratory: Negative for shortness of breath.    Cardiovascular: Negative for chest pain.   Gastrointestinal: Negative for bowel incontinence, constipation, diarrhea, nausea and vomiting.   Genitourinary: Negative for bladder incontinence, difficulty urinating, dysuria and enuresis.   Musculoskeletal: Positive for arthralgias and back pain.   Neurological: Positive for weakness. Negative for dizziness, tingling, light-headedness, numbness and headaches.   Psychiatric/Behavioral: Positive for sleep disturbance. Negative for confusion, hallucinations, self-injury and suicidal ideas. The patient is not nervous/anxious.        Vitals:    11/14/18 1406   BP: 169/72   BP Location: Left arm   Patient Position: Sitting   Cuff Size: Adult   Pulse: 80   Resp: 18   Temp: 97.1 °F (36.2 °C)   SpO2: 95%   Weight: 95.7 kg (211 lb)   Height: 172.7 cm (67.99\") "   PainSc:   7   PainLoc: Back     Objective   Physical Exam   Constitutional: He is oriented to person, place, and time. He appears well-developed and well-nourished. He is cooperative.   HENT:   Head: Normocephalic and atraumatic.   Nose: Nose normal.   Eyes: Conjunctivae and lids are normal.   Neck: Trachea normal. Neck supple.   Cardiovascular: Normal rate.   Pulmonary/Chest: Effort normal.   Musculoskeletal:        Left shoulder: He exhibits decreased range of motion and tenderness.        Thoracic back: He exhibits tenderness and pain.        Lumbar back: He exhibits tenderness and pain.   Guarding of RUE  Minimal lumbar facet tenderness     Neurological: He is alert and oriented to person, place, and time. Gait (ambulates with cane) abnormal.   Reflex Scores:       Patellar reflexes are 0 on the right side and 0 on the left side.  Skin: Skin is intact.   Psychiatric: He has a normal mood and affect. His speech is normal and behavior is normal. Cognition and memory are normal.   Nursing note and vitals reviewed.      Assessment/Plan   Chevy was seen today for back pain and shoulder pain.    Diagnoses and all orders for this visit:    Other chronic pain    Thoracic disc herniation T9-T10    Postlaminectomy syndrome of lumbar region    Encounter for long-term current use of high risk medication    Chronic right shoulder pain    Other orders  -     HYDROcodone-acetaminophen (NORCO)  MG per tablet; Take 1 tablet by mouth Every 4 (Four) Hours As Needed for Severe Pain .      --- The urine drug screen confirmation from 4-11-18 has been reviewed and the result is APPROPRIATE based on patient history and MARIOLA report  --- Refill hydrocodone. Patient appears stable with current regimen. No adverse effects. Regarding continuation of opioids, there is no evidence of aberrant behavior or any red flags.  The patient continues with appropriate response to opioid therapy. ADL's remain intact by self.   --- Follow-up 1  month or sooner if needed.     MARIOLA REPORT    As part of the patient's treatment plan, I am prescribing controlled substances. The patient has been made aware of appropriate use of such medications, including potential risk of somnolence, limited ability to drive and/or work safely, and the potential for dependence or overdose. It has also bee made clear that these medications are for use by this patient only, without concomitant use of alcohol or other substances unless prescribed.     Patient has completed prescribing agreement detailing terms of continued prescribing of controlled substances, including monitoring MARIOLA reports, urine drug screening, and pill counts if necessary. The patient is aware that inappropriate use will results in cessation of prescribing such medications.    MARIOLA report has been reviewed and scanned into the patient's chart.    As the clinician, I personally reviewed the MARIOLA from 11-13-18 while the patient was in the office today.    History and physical exam exhibit continued safe and appropriate use of controlled substances.      EMR Dragon/Transcription disclaimer:   Much of this encounter note is an electronic transcription/translation of spoken language to printed text. The electronic translation of spoken language may permit erroneous, or at times, nonsensical words or phrases to be inadvertently transcribed; Although I have reviewed the note for such errors, some may still exist.

## 2018-12-13 ENCOUNTER — OFFICE VISIT (OUTPATIENT)
Dept: PAIN MEDICINE | Facility: CLINIC | Age: 78
End: 2018-12-13

## 2018-12-13 VITALS
HEART RATE: 50 BPM | SYSTOLIC BLOOD PRESSURE: 164 MMHG | TEMPERATURE: 98.8 F | RESPIRATION RATE: 15 BRPM | BODY MASS INDEX: 31.83 KG/M2 | OXYGEN SATURATION: 96 % | DIASTOLIC BLOOD PRESSURE: 74 MMHG | HEIGHT: 68 IN | WEIGHT: 210 LBS

## 2018-12-13 DIAGNOSIS — M51.24 THORACIC DISC HERNIATION: ICD-10-CM

## 2018-12-13 DIAGNOSIS — G89.29 OTHER CHRONIC PAIN: Primary | ICD-10-CM

## 2018-12-13 DIAGNOSIS — M96.1 POSTLAMINECTOMY SYNDROME OF LUMBAR REGION: ICD-10-CM

## 2018-12-13 DIAGNOSIS — Z79.899 ENCOUNTER FOR LONG-TERM CURRENT USE OF HIGH RISK MEDICATION: ICD-10-CM

## 2018-12-13 DIAGNOSIS — M51.36 DEGENERATION OF INTERVERTEBRAL DISC OF LUMBAR REGION: ICD-10-CM

## 2018-12-13 PROCEDURE — 99214 OFFICE O/P EST MOD 30 MIN: CPT | Performed by: NURSE PRACTITIONER

## 2018-12-13 RX ORDER — HYDROCODONE BITARTRATE AND ACETAMINOPHEN 10; 325 MG/1; MG/1
1 TABLET ORAL EVERY 4 HOURS PRN
Qty: 180 TABLET | Refills: 0 | Status: SHIPPED | OUTPATIENT
Start: 2018-12-13 | End: 2019-01-31 | Stop reason: SDUPTHER

## 2018-12-13 NOTE — PROGRESS NOTES
CHIEF COMPLAINT  F/U back and right shoulder pain. States he is having shoulder surgery on the tenth of January.    Subjective   Chevy Goodwin is a 78 y.o. male  who presents to the office for follow-up.He has a history of chronic back and shoulder pain. Reports the pattern is unchanged since last office visit.    Complains of pain in his low back and right shoulder. Today his pain is 6/10VAS. Describes his pain as continuous aching and throbbing. Pain can interfere with sleep. Continues with Hydrocodone 10/325 5-6/day. Denies any side effects from the regimen. Takes OTC stool softener with positive results, which helps with previous constipation. The regimen helps decrease his pain by 50%. ADL's by self.    PCP is Dr. Gutierrez.    Back Pain   This is a chronic (history of lumbar fusion with Dr. Yuan at [facility], was done in 2005) problem. The current episode started more than 1 year ago. The problem occurs constantly. The problem has been waxing and waning (unchanged since last office visit) since onset. The pain is present in the lumbar spine and thoracic spine. The quality of the pain is described as aching and burning. The pain radiates to the right foot and left foot. The pain is at a severity of 6/10. The pain is moderate. The pain is worse during the day. The symptoms are aggravated by bending and twisting (walking, any physical activity). Stiffness is present in the morning. Associated symptoms include weakness. Pertinent negatives include no bladder incontinence, bowel incontinence, chest pain, dysuria, fever, headaches, numbness or tingling. Risk factors include lack of exercise. He has tried NSAIDs (previous TESI.  Hydrocodone) for the symptoms. The treatment provided moderate relief.   Pain   This is a chronic problem. The current episode started more than 1 year ago. The problem occurs constantly. The problem has been gradually worsening (RIGHT SHOULDER--- CONTINUES TO WORSEN.-- shceduled for  "replacement 1-10-19). Associated symptoms include arthralgias and weakness. Pertinent negatives include no chest pain, chills, fever, headaches, nausea, numbness or vomiting.      PEG Assessment   What number best describes your pain on average in the past week?7  What number best describes how, during the past week, pain has interfered with your enjoyment of life?7  What number best describes how, during the past week, pain has interfered with your general activity?  7    The following portions of the patient's history were reviewed and updated as appropriate: allergies, current medications, past family history, past medical history, past social history, past surgical history and problem list.    Review of Systems   Constitutional: Negative for chills and fever.   Respiratory: Negative for shortness of breath.    Cardiovascular: Negative for chest pain.   Gastrointestinal: Negative for bowel incontinence, constipation, diarrhea, nausea and vomiting.   Genitourinary: Negative for bladder incontinence, difficulty urinating, dysuria and enuresis.   Musculoskeletal: Positive for arthralgias and back pain.   Neurological: Positive for weakness. Negative for dizziness, tingling, light-headedness, numbness and headaches.   Psychiatric/Behavioral: Negative for confusion, hallucinations, self-injury, sleep disturbance and suicidal ideas. The patient is not nervous/anxious.        Vitals:    12/13/18 1409 12/13/18 1410   BP: 164/74    Pulse: 50    Resp: 15    Temp: 98.8 °F (37.1 °C)    SpO2: 96%    Weight: 95.3 kg (210 lb)    Height: 172.7 cm (67.99\")    PainSc:   7   6   PainLoc: Shoulder Back     Objective   Physical Exam   Constitutional: He is oriented to person, place, and time. He appears well-developed and well-nourished. He is cooperative.   HENT:   Head: Normocephalic and atraumatic.   Nose: Nose normal.   Eyes: Conjunctivae and lids are normal.   Neck: Trachea normal. Neck supple.   Cardiovascular: Normal rate. "   Pulmonary/Chest: Effort normal.   Musculoskeletal:        Left shoulder: He exhibits decreased range of motion and tenderness.        Thoracic back: He exhibits tenderness and pain.        Lumbar back: He exhibits tenderness and pain.   Guarding of RUE  Minimal lumbar facet tenderness     Neurological: He is alert and oriented to person, place, and time. Gait (ambulates with cane) abnormal.   Reflex Scores:       Patellar reflexes are 0 on the right side and 0 on the left side.  Skin: Skin is intact.   Psychiatric: He has a normal mood and affect. His speech is normal and behavior is normal. Cognition and memory are normal.   Nursing note and vitals reviewed.      Assessment/Plan   Chevy was seen today for back pain and shoulder pain.    Diagnoses and all orders for this visit:    Other chronic pain  -     Urine Drug Screen Confirmation - Urine, Clean Catch; Future    Postlaminectomy syndrome of lumbar region  -     Urine Drug Screen Confirmation - Urine, Clean Catch; Future    Thoracic disc herniation T9-T10  -     Urine Drug Screen Confirmation - Urine, Clean Catch; Future    Degeneration of intervertebral disc of lumbar region  -     Urine Drug Screen Confirmation - Urine, Clean Catch; Future    Encounter for long-term current use of high risk medication  -     Urine Drug Screen Confirmation - Urine, Clean Catch; Future    Other orders  -     HYDROcodone-acetaminophen (NORCO)  MG per tablet; Take 1 tablet by mouth Every 4 (Four) Hours As Needed for Severe Pain .      --- Routine UDS in office today as part of monitoring requirements for controlled substances. Unable to perform POC.  This specimen will be sent to [facility] for confirmation.     --- Patient will be going to pharmacy in [place] which he does every 3rd month.  --- Refill Hydrocodone. Patient appears stable with current regimen. No adverse effects. Regarding continuation of opioids, there is no evidence of aberrant behavior or any  red flags.  The patient continues with appropriate response to opioid therapy. ADL's remain intact by self.   --- can receive post-operative pain medication from surgeon.  --- Follow-up 1 month or sooner if needed.     MARIOLA REPORT    As part of the patient's treatment plan, I am prescribing controlled substances. The patient has been made aware of appropriate use of such medications, including potential risk of somnolence, limited ability to drive and/or work safely, and the potential for dependence or overdose. It has also bee made clear that these medications are for use by this patient only, without concomitant use of alcohol or other substances unless prescribed.     Patient has completed prescribing agreement detailing terms of continued prescribing of controlled substances, including monitoring MARIOLA reports, urine drug screening, and pill counts if necessary. The patient is aware that inappropriate use will results in cessation of prescribing such medications.    MARIOLA report has been reviewed and scanned into the patient's chart.    As the clinician, I personally reviewed the MARIOLA from 12-12-18 while the patient was in the office today.    History and physical exam exhibit continued safe and appropriate use of controlled substances.      EMR Dragon/Transcription disclaimer:   Much of this encounter note is an electronic transcription/translation of spoken language to printed text. The electronic translation of spoken language may permit erroneous, or at times, nonsensical words or phrases to be inadvertently transcribed; Although I have reviewed the note for such errors, some may still exist.

## 2018-12-18 ENCOUNTER — RESULTS ENCOUNTER (OUTPATIENT)
Dept: PAIN MEDICINE | Facility: CLINIC | Age: 78
End: 2018-12-18

## 2018-12-18 DIAGNOSIS — M96.1 POSTLAMINECTOMY SYNDROME OF LUMBAR REGION: ICD-10-CM

## 2018-12-18 DIAGNOSIS — M51.24 THORACIC DISC HERNIATION: ICD-10-CM

## 2018-12-18 DIAGNOSIS — M51.36 DEGENERATION OF INTERVERTEBRAL DISC OF LUMBAR REGION: ICD-10-CM

## 2018-12-18 DIAGNOSIS — G89.29 OTHER CHRONIC PAIN: ICD-10-CM

## 2018-12-18 DIAGNOSIS — Z79.899 ENCOUNTER FOR LONG-TERM CURRENT USE OF HIGH RISK MEDICATION: ICD-10-CM

## 2018-12-27 ENCOUNTER — HOSPITAL ENCOUNTER (OUTPATIENT)
Dept: GENERAL RADIOLOGY | Facility: HOSPITAL | Age: 78
Discharge: HOME | End: 2018-12-27
Admitting: ORTHOPAEDIC SURGERY

## 2018-12-27 ENCOUNTER — APPOINTMENT (OUTPATIENT)
Dept: PREADMISSION TESTING | Facility: HOSPITAL | Age: 78
End: 2018-12-27

## 2018-12-27 LAB
ABO GROUP BLD: NORMAL
ANION GAP SERPL CALCULATED.3IONS-SCNC: 13.2 MMOL/L
BACTERIA UR QL AUTO: ABNORMAL /HPF
BASOPHILS # BLD AUTO: 0.03 10*3/MM3 (ref 0–0.2)
BASOPHILS NFR BLD AUTO: 0.4 % (ref 0–1.5)
BILIRUB UR QL STRIP: NEGATIVE
BLD GP AB SCN SERPL QL: NEGATIVE
BUN BLD-MCNC: 40 MG/DL (ref 8–23)
BUN/CREAT SERPL: 25.5 (ref 7–25)
CALCIUM SPEC-SCNC: 9.5 MG/DL (ref 8.6–10.5)
CHLORIDE SERPL-SCNC: 104 MMOL/L (ref 98–107)
CLARITY UR: CLEAR
CO2 SERPL-SCNC: 21.8 MMOL/L (ref 22–29)
COLOR UR: YELLOW
CREAT BLD-MCNC: 1.57 MG/DL (ref 0.76–1.27)
DEPRECATED RDW RBC AUTO: 49.5 FL (ref 37–54)
EOSINOPHIL # BLD AUTO: 0.47 10*3/MM3 (ref 0–0.7)
EOSINOPHIL NFR BLD AUTO: 6.8 % (ref 0.3–6.2)
ERYTHROCYTE [DISTWIDTH] IN BLOOD BY AUTOMATED COUNT: 13.9 % (ref 11.5–14.5)
GFR SERPL CREATININE-BSD FRML MDRD: 43 ML/MIN/1.73
GLUCOSE BLD-MCNC: 252 MG/DL (ref 65–99)
GLUCOSE UR STRIP-MCNC: NEGATIVE MG/DL
HCT VFR BLD AUTO: 32.4 % (ref 40.4–52.2)
HGB BLD-MCNC: 10.8 G/DL (ref 13.7–17.6)
HGB UR QL STRIP.AUTO: NEGATIVE
HYALINE CASTS UR QL AUTO: ABNORMAL /LPF
IMM GRANULOCYTES # BLD AUTO: 0.03 10*3/MM3 (ref 0–0.03)
IMM GRANULOCYTES NFR BLD AUTO: 0.4 % (ref 0–0.5)
KETONES UR QL STRIP: ABNORMAL
LEUKOCYTE ESTERASE UR QL STRIP.AUTO: NEGATIVE
LYMPHOCYTES # BLD AUTO: 2.32 10*3/MM3 (ref 0.9–4.8)
LYMPHOCYTES NFR BLD AUTO: 33.7 % (ref 19.6–45.3)
MCH RBC QN AUTO: 32.7 PG (ref 27–32.7)
MCHC RBC AUTO-ENTMCNC: 33.3 G/DL (ref 32.6–36.4)
MCV RBC AUTO: 98.2 FL (ref 79.8–96.2)
MONOCYTES # BLD AUTO: 0.55 10*3/MM3 (ref 0.2–1.2)
MONOCYTES NFR BLD AUTO: 8 % (ref 5–12)
NEUTROPHILS # BLD AUTO: 3.51 10*3/MM3 (ref 1.9–8.1)
NEUTROPHILS NFR BLD AUTO: 51.1 % (ref 42.7–76)
NITRITE UR QL STRIP: NEGATIVE
PH UR STRIP.AUTO: <=5 [PH] (ref 5–8)
PLATELET # BLD AUTO: 196 10*3/MM3 (ref 140–500)
PMV BLD AUTO: 10.4 FL (ref 6–12)
POTASSIUM BLD-SCNC: 4.8 MMOL/L (ref 3.5–5.2)
PROT UR QL STRIP: ABNORMAL
RBC # BLD AUTO: 3.3 10*6/MM3 (ref 4.6–6)
RBC # UR: ABNORMAL /HPF
REF LAB TEST METHOD: ABNORMAL
RH BLD: POSITIVE
SODIUM BLD-SCNC: 139 MMOL/L (ref 136–145)
SP GR UR STRIP: 1.02 (ref 1–1.03)
SQUAMOUS #/AREA URNS HPF: ABNORMAL /HPF
T&S EXPIRATION DATE: NORMAL
UROBILINOGEN UR QL STRIP: ABNORMAL
WBC NRBC COR # BLD: 6.88 10*3/MM3 (ref 4.5–10.7)
WBC UR QL AUTO: ABNORMAL /HPF

## 2018-12-27 PROCEDURE — 86901 BLOOD TYPING SEROLOGIC RH(D): CPT | Performed by: ORTHOPAEDIC SURGERY

## 2018-12-27 PROCEDURE — 73030 X-RAY EXAM OF SHOULDER: CPT

## 2018-12-27 PROCEDURE — 36415 COLL VENOUS BLD VENIPUNCTURE: CPT

## 2018-12-27 PROCEDURE — 81001 URINALYSIS AUTO W/SCOPE: CPT | Performed by: ORTHOPAEDIC SURGERY

## 2018-12-27 PROCEDURE — 85025 COMPLETE CBC W/AUTO DIFF WBC: CPT | Performed by: ORTHOPAEDIC SURGERY

## 2018-12-27 PROCEDURE — 80048 BASIC METABOLIC PNL TOTAL CA: CPT | Performed by: ORTHOPAEDIC SURGERY

## 2018-12-27 PROCEDURE — 86900 BLOOD TYPING SEROLOGIC ABO: CPT | Performed by: ORTHOPAEDIC SURGERY

## 2018-12-27 PROCEDURE — 86850 RBC ANTIBODY SCREEN: CPT | Performed by: ORTHOPAEDIC SURGERY

## 2018-12-27 RX ORDER — CARVEDILOL 6.25 MG/1
6.25 TABLET ORAL 2 TIMES DAILY WITH MEALS
COMMUNITY
End: 2019-08-09 | Stop reason: HOSPADM

## 2018-12-27 RX ORDER — CHLORHEXIDINE GLUCONATE 500 MG/1
CLOTH TOPICAL
Status: ON HOLD | COMMUNITY
End: 2019-01-10

## 2018-12-27 RX ORDER — LOSARTAN POTASSIUM 100 MG/1
100 TABLET ORAL DAILY
COMMUNITY
End: 2021-01-01

## 2018-12-27 RX ORDER — VITAMIN E 268 MG
400 CAPSULE ORAL DAILY
Status: ON HOLD | COMMUNITY
End: 2019-01-10

## 2018-12-27 NOTE — DISCHARGE INSTRUCTIONS
Take the following medications the morning of surgery with a small sip of water:    TIME OF ARRIVAL WILL BE CALLED TO YOU THE DAY BEFORE SURGERY    TIFF MARSHALL    General Instructions:  • Do not eat solid food after midnight the night before surgery.  • You may drink clear liquids day of surgery but must stop at least one hour before your hospital arrival time.  • It is beneficial for you to have a clear drink that contains carbohydrates the day of surgery.  We suggest a 12 to 20 ounce bottle of Gatorade or Powerade for non-diabetic patients or a 12 to 20 ounce bottle of G2 or Powerade Zero for diabetic patients. (Pediatric patients, are not advised to drink a 12 to 20 ounce carbohydrate drink)    Clear liquids are liquids you can see through.  Nothing red in color.     Plain water                               Sports drinks  Sodas                                   Gelatin (Jell-O)  Fruit juices without pulp such as white grape juice and apple juice  Popsicles that contain no fruit or yogurt  Tea or coffee (no cream or milk added)  Gatorade / Powerade  G2 / Powerade Zero    • Infants may have breast milk up to four hours before surgery.  • Infants drinking formula may drink formula up to six hours before surgery.   • Patients who avoid smoking, chewing tobacco and alcohol for 4 weeks prior to surgery have a reduced risk of post-operative complications.  Quit smoking as many days before surgery as you can.  • Do not smoke, use chewing tobacco or drink alcohol the day of surgery.   • If applicable bring your C-PAP/ BI-PAP machine.  • Bring any papers given to you in the doctor’s office.  • Wear clean comfortable clothes and socks.  • Do not wear contact lenses or make-up.  Bring a case for your glasses.   • Bring crutches or walker if applicable.  • Remove all piercings.  Leave jewelry and any other valuables at home.  • Hair extensions with metal clips must be removed prior to surgery.  • The  Pre-Admission Testing nurse will instruct you to bring medications if unable to obtain an accurate list in Pre-Admission Testing.        If you were given a blood bank ID arm band remember to bring it with you the day of surgery.    Preventing a Surgical Site Infection:  • For 2 to 3 days before surgery, avoid shaving with a razor because the razor can irritate skin and make it easier to develop an infection.    • Any areas of open skin can increase the risk of a post-operative wound infection by allowing bacteria to enter and travel throughout the body.  Notify your surgeon if you have any skin wounds / rashes even if it is not near the expected surgical site.  The area will need assessed to determine if surgery should be delayed until it is healed.  • The night prior to surgery sleep in a clean bed with clean clothing.  Do not allow pets to sleep with you.  • Shower on the morning of surgery using a fresh bar of anti-bacterial soap (such as Dial) and clean washcloth.  Dry with a clean towel and dress in clean clothing.  • Ask your surgeon if you will be receiving antibiotics prior to surgery.  • Make sure you, your family, and all healthcare providers clean their hands with soap and water or an alcohol based hand  before caring for you or your wound.    Day of surgery:  Upon arrival, a Pre-op nurse and Anesthesiologist will review your health history, obtain vital signs, and answer questions you may have.  The only belongings needed at this time will be your home medications and if applicable your C-PAP/BI-PAP machine.  If you are staying overnight your family can leave the rest of your belongings in the car and bring them to your room later.  A Pre-op nurse will start an IV and you may receive medication in preparation for surgery, including something to help you relax.  Your family will be able to see you in the Pre-op area.  While you are in surgery your family should notify the waiting room   if they leave the waiting room area and provide a contact phone number.    Please be aware that surgery does come with discomfort.  We want to make every effort to control your discomfort so please discuss any uncontrolled symptoms with your nurse.   Your doctor will most likely have prescribed pain medications.      If you are going home after surgery you will receive individualized written care instructions before being discharged.  A responsible adult must drive you to and from the hospital on the day of your surgery and stay with you for 24 hours.    If you are staying overnight following surgery, you will be transported to your hospital room following the recovery period.  [facility] has all private rooms.    You have received a list of surgical assistants for your reference.  If you have any questions please call Pre-Admission Testing at [phone].  Deductibles and co-payments are collected on the day of service. Please be prepared to pay the required co-pay, deductible or deposit on the day of service as defined by your plan.    2% CHLORAHEXIDINE GLUCONATE* CLOTH  Preparing or “prepping” skin before surgery can reduce the risk of infection at the surgical site. To make the process easier, [facility] has chosen disposable cloths moistened with a rinse-free, 2% Chlorhexidine Gluconate (CHG) antiseptic solution. The steps below outline the prepping process and should be carefully followed.        Use the prep cloth on the area that is circled in the diagram             Directions Night before Surgery  1) Shower using a fresh bar of anti-bacterial soap (such as Dial) and clean washcloth.  Use a clean towel to completely dry your skin.  2) Do not use any lotions, oils or creams on your skin.  3) Open the package and remove 1 cloth, wipe your skin for 30 seconds in a circular motion.  Allow to dry for 3 minutes.  4) Repeat #3 with second cloth.  5) Do not touch your eyes,  ears, or mouth with the prep cloth.  6) Allow the wet prep solution to air dry.  7) Discard the prep cloth and wash your hands with soap and water.   8) Dress in clean bed clothes and sleep on fresh clean bed sheets.   9) You may experience some temporary itching after the prep.    Directions Day of Surgery  1) Repeat steps 1,2,3,4,5,6,7, and 9.   2) Dress in clean clothes before coming to the hospital.    BACTROBAN NASAL OINTMENT  There are many germs normally in your nose. Bactroban is an ointment that will help reduce these germs. Please follow these instructions for Bactroban use:      ____The day before surgery in the morning  Date________    ____The day before surgery in the evening              Date________    ____The day of surgery in the morning    Date________    **Squirt ½ package of Bactroban Ointment onto a cotton applicator and apply to inside of 1st nostril.  Squirt the remaining Bactroban and apply to the inside of the other nostril.    PERIDEX- ORAL:  Use only if your surgeon has ordered  Use the night before and morning of surgery - Swish, gargle, and spit - do not swallow.

## 2018-12-28 VITALS
SYSTOLIC BLOOD PRESSURE: 143 MMHG | TEMPERATURE: 97.8 F | HEIGHT: 68 IN | WEIGHT: 208 LBS | RESPIRATION RATE: 18 BRPM | OXYGEN SATURATION: 98 % | HEART RATE: 61 BPM | DIASTOLIC BLOOD PRESSURE: 58 MMHG | BODY MASS INDEX: 31.52 KG/M2

## 2019-01-08 NOTE — H&P
Provider: TRAVIS FRY MD  HPI  Chief complaint right shoulder pain and weakness.  78-year-old  male with full-thickness rotator cuff tear with secondary arthropathy has been treated chronically quite some time for this and his symptoms continue to be significantly problematic and now wake him up regularly.  He needs pain medicine for both the shoulder and back.  He wants to look in the definitive care of the shoulder which he had canceled in the past.  Review of Systems:  Positive for: Decreased Motion, Chest Pain, Joint Pain and Skin Problems.    Patient denies: Abdominal Pain, Bleeding, Convulsions/Seizure, Depression, Difficulty Swallowing, Easy Bruisability, Emotional Disturbances, Eyes or Vision Problems, Fecal Incontinence, Fever/Chills, Headaches, Increased Thirst, Increased Hunger, Insomnia, Nausea/Vomiting, Night Sweats, Poor Balance, Persistent Cough, Rash, Shortness of Breath, Shortness of Breath While Lying down, Urinary Retention and Weakness.  Allergies:  * ivp dye (critical)  * adhesive tape (critical)  * oxycodine (critical)  * metal or dye (critical)  Medications:  * tobramycin   * vitamin b12, calcium + d3, d3, e   * vitamin   zyrtec allergy tablet (cetirizine hcl tabs)   tobramycin solution (tobramycin soln)   norvasc tablet (amlodipine besylate tabs)   nitroglycerin solution (nitroglycerin soln)   nexium packet (esomeprazole magnesium pack)   mupirocin ointment (mupirocin oint)   locoid solution (hydrocortisone butyrate soln)   lantus solution (insulin glargine soln)   isosorbide dinitrate tablet (isosorbide dinitrate tabs)   hydrocodone-ibuprofen tablet (hydrocodone-ibuprofen tabs)   hydrochlorothiazide tablet (hydrochlorothiazide tabs)   glucosamine hcl tablet (glucosamine hcl tabs)   fortesta gel (testosterone gel)   cozaar tablet (losartan potassium tabs)   coreg tablet (carvedilol tabs)   centrum silver oral tablet (multiple vitamins-minerals)   celebrex capsule (celecoxib caps)    bumetanide tablet (bumetanide tabs)   azelastine hcl solution (azelastine hcl soln)   aspirin 81 tablet chewable (aspirin chew)   allopurinol tablet (allopurinol tabs)   Patient History of:  CANCER - SKIN  CHEST PAIN  CANCER - BONE  GOUT  GI DISORDER  KIDNEY STONES  MRSA  SLEEP APNEA/CPAP/BIPAP  PROSTATE  HIGH CHOLESTEROL  BLOOD CLOTS/EMBOLISM - NEGATIVE  HYPERTENSION  DIABETES - TYPE 2  CANCER - PROSTATE  Surgical History:  Heart Stent-[CPT-88327]   bilateral Shoulder-   right Carpal Tunnel Release-[CPT-87972]   left Hand-   left Elbow-   PROSTATE RADIATION-   Gallbladder/Cholecystectomy-[CPT-85981]   Face Lift-[CPT-12773]   Colon Resection-[CPT-54111]   Spine-   Known Family History of:  cancer-sibling  Social History:  Social history taken on 10/29/2018 states WAYNE BROWN is a  78 year old male.  He currently uses tobacco products.  CIGARS 20 A MONTH  He has not fallen in the last 12 months.      Past medical, social, family histories and ROS reviewed today with the patient and changes documented in the chart (10/29/2018).  Referring Dr. BROOK ANGEL MD  PCP Dr. STANLEY ALDANA, [facility]    Physical Exam  Height:  65 in.    Weight:  210 lbs.     BMI:  35.07  Pulse:  621  Blood pressure:  122 / 62 mm Hg  Mental/HEENT/Cardio/Skin  The patient's general appearance is well nourished, well hydrated, no acute distress.  Orientation is alert and oriented x 3.  The patient's mood is normal.  Pulmonary exam shows normal air exchange, no labored breathing, or shortness of breath.  A skin exam shows normal temperature and color in the area of examination.  A lymphatic exam reveals no adenopathy in the area of examination.      Right Shoulder      Recheck  Right shoulder painful passive range of motion.  Decreased strength with empty can external rotation.    Imaging/Diagnostic Studies  Previous x-rays right shoulder from April 2018 shows stenosis and sclerosis in the greater tuberosity and decreased  acromiohumeral distance.  MRI from October 2017 shows full-thickness re-tear rotator cuff with retraction.  Impression  Right chronic full thickness rotator cuff tear  Right rotator cuff arthropathy    Plan  We talked today for less than 3 minutes about a smoking cessation program.  This discussion included the risk and increase complications that occur from continuation of smoking.  We discussed natural history and findings.  I don't think revision repair will be successful.  I recommend right reverse total shoulder arthroplasty.    We discussed the benefits of surgical intervention, as well as alternative treatments.  Potential surgical risks and complications include but are not limited to DVT, infection, neurovascular injury, fracture, implant wear, failure, possible need for revision surgery, loss of motion, dislocation, limb length changes.  Sufficient opportunity was given to discuss the condition and treatment plan with the doctor, and all questions were answered for the patient.  Nonsurgical measures such as injections, medications, or physical therapy may not offer significant relief to this patient.

## 2019-01-10 ENCOUNTER — ANESTHESIA (OUTPATIENT)
Dept: PERIOP | Facility: HOSPITAL | Age: 79
End: 2019-01-10

## 2019-01-10 ENCOUNTER — HOSPITAL ENCOUNTER (INPATIENT)
Facility: HOSPITAL | Age: 79
LOS: 1 days | Discharge: HOME | End: 2019-01-11
Attending: ORTHOPAEDIC SURGERY | Admitting: ORTHOPAEDIC SURGERY

## 2019-01-10 ENCOUNTER — APPOINTMENT (OUTPATIENT)
Dept: GENERAL RADIOLOGY | Facility: HOSPITAL | Age: 79
End: 2019-01-10
Attending: ORTHOPAEDIC SURGERY

## 2019-01-10 ENCOUNTER — ANESTHESIA EVENT (OUTPATIENT)
Dept: PERIOP | Facility: HOSPITAL | Age: 79
End: 2019-01-10

## 2019-01-10 PROBLEM — Z96.611 STATUS POST REVERSE TOTAL ARTHROPLASTY OF RIGHT SHOULDER: Status: ACTIVE | Noted: 2019-01-10

## 2019-01-10 LAB
GLUCOSE BLDC GLUCOMTR-MCNC: 148 MG/DL (ref 70–130)
GLUCOSE BLDC GLUCOMTR-MCNC: 160 MG/DL (ref 70–130)
GLUCOSE BLDC GLUCOMTR-MCNC: 191 MG/DL (ref 70–130)
GLUCOSE BLDC GLUCOMTR-MCNC: 91 MG/DL (ref 70–130)

## 2019-01-10 PROCEDURE — 25010000002 FENTANYL CITRATE (PF) 100 MCG/2ML SOLUTION: Performed by: ANESTHESIOLOGY

## 2019-01-10 PROCEDURE — 25010000002 VANCOMYCIN 10 G RECONSTITUTED SOLUTION: Performed by: ORTHOPAEDIC SURGERY

## 2019-01-10 PROCEDURE — 25010000002 MIDAZOLAM PER 1 MG: Performed by: ANESTHESIOLOGY

## 2019-01-10 PROCEDURE — 63710000001 INSULIN LISPRO (HUMAN) PER 5 UNITS: Performed by: ORTHOPAEDIC SURGERY

## 2019-01-10 PROCEDURE — 73020 X-RAY EXAM OF SHOULDER: CPT

## 2019-01-10 PROCEDURE — 25010000002 PROPOFOL 10 MG/ML EMULSION: Performed by: NURSE ANESTHETIST, CERTIFIED REGISTERED

## 2019-01-10 PROCEDURE — 25010000002 ONDANSETRON PER 1 MG: Performed by: NURSE ANESTHETIST, CERTIFIED REGISTERED

## 2019-01-10 PROCEDURE — 25010000002 ROPIVACAINE PER 1 MG: Performed by: ANESTHESIOLOGY

## 2019-01-10 PROCEDURE — C1776 JOINT DEVICE (IMPLANTABLE): HCPCS | Performed by: ORTHOPAEDIC SURGERY

## 2019-01-10 PROCEDURE — 82962 GLUCOSE BLOOD TEST: CPT

## 2019-01-10 PROCEDURE — 25010000002 KETOROLAC TROMETHAMINE PER 15 MG: Performed by: ORTHOPAEDIC SURGERY

## 2019-01-10 PROCEDURE — 0RRJ00Z REPLACEMENT OF RIGHT SHOULDER JOINT WITH REVERSE BALL AND SOCKET SYNTHETIC SUBSTITUTE, OPEN APPROACH: ICD-10-PCS | Performed by: ORTHOPAEDIC SURGERY

## 2019-01-10 PROCEDURE — 25010000002 HYDROMORPHONE PER 4 MG: Performed by: ORTHOPAEDIC SURGERY

## 2019-01-10 DEVICE — TRY HUM EQUINOXE ADPT REV SHLDR PLS0: Type: IMPLANTABLE DEVICE | Status: FUNCTIONAL

## 2019-01-10 DEVICE — LINER HUM EQUINOXE REVSHLDR 38PLS2.5: Type: IMPLANTABLE DEVICE | Status: FUNCTIONAL

## 2019-01-10 DEVICE — SCRW COMPR EQUINOXE LK 4.5X18MM: Type: IMPLANTABLE DEVICE | Status: FUNCTIONAL

## 2019-01-10 DEVICE — GLENOSPHERE SHLDR/REV EQUINOXE 38MM: Type: IMPLANTABLE DEVICE | Status: FUNCTIONAL

## 2019-01-10 DEVICE — SCRW LK EQUINOXE GLENOSPHERE REV/SHLDR: Type: IMPLANTABLE DEVICE | Status: FUNCTIONAL

## 2019-01-10 DEVICE — TOTL SHLDR AUG PLT ARTHROPLASTY UPCHRG: Type: IMPLANTABLE DEVICE | Status: FUNCTIONAL

## 2019-01-10 DEVICE — SCRW COMPR EQUINOXE LK 4.5X34MM: Type: IMPLANTABLE DEVICE | Status: FUNCTIONAL

## 2019-01-10 DEVICE — SCRW EQUINOXE TORQ DEFINE REV SHLDR KT: Type: IMPLANTABLE DEVICE | Status: FUNCTIONAL

## 2019-01-10 DEVICE — STEM HUM PRI EQUINOXE PRESSFIT 10MM SHT: Type: IMPLANTABLE DEVICE | Status: FUNCTIONAL

## 2019-01-10 DEVICE — PLT/JOINT GLEN EQUINOXE STD/POST: Type: IMPLANTABLE DEVICE | Status: FUNCTIONAL

## 2019-01-10 DEVICE — SCRW COMPR EQUINOXE LK 4.5X22MM: Type: IMPLANTABLE DEVICE | Status: FUNCTIONAL

## 2019-01-10 DEVICE — IMPLANTABLE DEVICE: Type: IMPLANTABLE DEVICE | Status: FUNCTIONAL

## 2019-01-10 RX ORDER — HYDROMORPHONE HYDROCHLORIDE 1 MG/ML
0.5 INJECTION, SOLUTION INTRAMUSCULAR; INTRAVENOUS; SUBCUTANEOUS
Status: DISCONTINUED | OUTPATIENT
Start: 2019-01-10 | End: 2019-01-11 | Stop reason: HOSPADM

## 2019-01-10 RX ORDER — SODIUM CHLORIDE 0.9 % (FLUSH) 0.9 %
3-10 SYRINGE (ML) INJECTION AS NEEDED
Status: DISCONTINUED | OUTPATIENT
Start: 2019-01-10 | End: 2019-01-10 | Stop reason: HOSPADM

## 2019-01-10 RX ORDER — NICOTINE POLACRILEX 4 MG
15 LOZENGE BUCCAL
Status: DISCONTINUED | OUTPATIENT
Start: 2019-01-10 | End: 2019-01-11 | Stop reason: HOSPADM

## 2019-01-10 RX ORDER — ROCURONIUM BROMIDE 10 MG/ML
INJECTION, SOLUTION INTRAVENOUS AS NEEDED
Status: DISCONTINUED | OUTPATIENT
Start: 2019-01-10 | End: 2019-01-10 | Stop reason: SURG

## 2019-01-10 RX ORDER — MIDAZOLAM HYDROCHLORIDE 1 MG/ML
2 INJECTION INTRAMUSCULAR; INTRAVENOUS
Status: DISCONTINUED | OUTPATIENT
Start: 2019-01-10 | End: 2019-01-10 | Stop reason: HOSPADM

## 2019-01-10 RX ORDER — BISACODYL 10 MG
10 SUPPOSITORY, RECTAL RECTAL DAILY PRN
Status: DISCONTINUED | OUTPATIENT
Start: 2019-01-10 | End: 2019-01-11 | Stop reason: HOSPADM

## 2019-01-10 RX ORDER — MAGNESIUM HYDROXIDE 1200 MG/15ML
LIQUID ORAL AS NEEDED
Status: DISCONTINUED | OUTPATIENT
Start: 2019-01-10 | End: 2019-01-10 | Stop reason: HOSPADM

## 2019-01-10 RX ORDER — BUMETANIDE 1 MG/1
1 TABLET ORAL
Status: DISCONTINUED | OUTPATIENT
Start: 2019-01-10 | End: 2019-01-11 | Stop reason: HOSPADM

## 2019-01-10 RX ORDER — ONDANSETRON 2 MG/ML
4 INJECTION INTRAMUSCULAR; INTRAVENOUS ONCE AS NEEDED
Status: DISCONTINUED | OUTPATIENT
Start: 2019-01-10 | End: 2019-01-10 | Stop reason: HOSPADM

## 2019-01-10 RX ORDER — NALOXONE HCL 0.4 MG/ML
0.4 VIAL (ML) INJECTION
Status: DISCONTINUED | OUTPATIENT
Start: 2019-01-10 | End: 2019-01-11 | Stop reason: HOSPADM

## 2019-01-10 RX ORDER — LOSARTAN POTASSIUM 100 MG/1
100 TABLET ORAL DAILY
Status: DISCONTINUED | OUTPATIENT
Start: 2019-01-10 | End: 2019-01-11 | Stop reason: HOSPADM

## 2019-01-10 RX ORDER — FLUMAZENIL 0.1 MG/ML
0.2 INJECTION INTRAVENOUS AS NEEDED
Status: DISCONTINUED | OUTPATIENT
Start: 2019-01-10 | End: 2019-01-10 | Stop reason: HOSPADM

## 2019-01-10 RX ORDER — SODIUM CHLORIDE, SODIUM LACTATE, POTASSIUM CHLORIDE, CALCIUM CHLORIDE 600; 310; 30; 20 MG/100ML; MG/100ML; MG/100ML; MG/100ML
9 INJECTION, SOLUTION INTRAVENOUS CONTINUOUS PRN
Status: DISCONTINUED | OUTPATIENT
Start: 2019-01-10 | End: 2019-01-10 | Stop reason: HOSPADM

## 2019-01-10 RX ORDER — PANTOPRAZOLE SODIUM 40 MG/1
40 TABLET, DELAYED RELEASE ORAL EVERY MORNING
Status: DISCONTINUED | OUTPATIENT
Start: 2019-01-11 | End: 2019-01-11 | Stop reason: HOSPADM

## 2019-01-10 RX ORDER — FENTANYL CITRATE 50 UG/ML
25 INJECTION, SOLUTION INTRAMUSCULAR; INTRAVENOUS
Status: DISCONTINUED | OUTPATIENT
Start: 2019-01-10 | End: 2019-01-10 | Stop reason: HOSPADM

## 2019-01-10 RX ORDER — EPHEDRINE SULFATE 50 MG/ML
INJECTION, SOLUTION INTRAVENOUS AS NEEDED
Status: DISCONTINUED | OUTPATIENT
Start: 2019-01-10 | End: 2019-01-10 | Stop reason: SURG

## 2019-01-10 RX ORDER — FENTANYL CITRATE 50 UG/ML
50 INJECTION, SOLUTION INTRAMUSCULAR; INTRAVENOUS
Status: DISCONTINUED | OUTPATIENT
Start: 2019-01-10 | End: 2019-01-10 | Stop reason: HOSPADM

## 2019-01-10 RX ORDER — HYDROMORPHONE HYDROCHLORIDE 1 MG/ML
0.5 INJECTION, SOLUTION INTRAMUSCULAR; INTRAVENOUS; SUBCUTANEOUS
Status: DISCONTINUED | OUTPATIENT
Start: 2019-01-10 | End: 2019-01-10 | Stop reason: HOSPADM

## 2019-01-10 RX ORDER — ROPIVACAINE HYDROCHLORIDE 5 MG/ML
INJECTION, SOLUTION EPIDURAL; INFILTRATION; PERINEURAL
Status: COMPLETED | OUTPATIENT
Start: 2019-01-10 | End: 2019-01-10

## 2019-01-10 RX ORDER — HYDRALAZINE HYDROCHLORIDE 20 MG/ML
5 INJECTION INTRAMUSCULAR; INTRAVENOUS
Status: DISCONTINUED | OUTPATIENT
Start: 2019-01-10 | End: 2019-01-10 | Stop reason: HOSPADM

## 2019-01-10 RX ORDER — SODIUM CHLORIDE 0.9 % (FLUSH) 0.9 %
3 SYRINGE (ML) INJECTION EVERY 12 HOURS SCHEDULED
Status: DISCONTINUED | OUTPATIENT
Start: 2019-01-10 | End: 2019-01-10 | Stop reason: HOSPADM

## 2019-01-10 RX ORDER — ISOSORBIDE MONONITRATE 30 MG/1
30 TABLET, EXTENDED RELEASE ORAL EVERY EVENING
Status: DISCONTINUED | OUTPATIENT
Start: 2019-01-10 | End: 2019-01-11 | Stop reason: HOSPADM

## 2019-01-10 RX ORDER — HYDROMORPHONE HYDROCHLORIDE 1 MG/ML
0.25 INJECTION, SOLUTION INTRAMUSCULAR; INTRAVENOUS; SUBCUTANEOUS
Status: DISCONTINUED | OUTPATIENT
Start: 2019-01-10 | End: 2019-01-10 | Stop reason: HOSPADM

## 2019-01-10 RX ORDER — ONDANSETRON 4 MG/1
4 TABLET, ORALLY DISINTEGRATING ORAL EVERY 6 HOURS PRN
Status: DISCONTINUED | OUTPATIENT
Start: 2019-01-10 | End: 2019-01-11 | Stop reason: HOSPADM

## 2019-01-10 RX ORDER — DEXTROSE MONOHYDRATE 25 G/50ML
25 INJECTION, SOLUTION INTRAVENOUS
Status: DISCONTINUED | OUTPATIENT
Start: 2019-01-10 | End: 2019-01-11 | Stop reason: HOSPADM

## 2019-01-10 RX ORDER — AMLODIPINE BESYLATE 10 MG/1
10 TABLET ORAL EVERY EVENING
Status: DISCONTINUED | OUTPATIENT
Start: 2019-01-10 | End: 2019-01-11 | Stop reason: HOSPADM

## 2019-01-10 RX ORDER — LABETALOL HYDROCHLORIDE 5 MG/ML
5 INJECTION, SOLUTION INTRAVENOUS
Status: DISCONTINUED | OUTPATIENT
Start: 2019-01-10 | End: 2019-01-10 | Stop reason: HOSPADM

## 2019-01-10 RX ORDER — OXYCODONE AND ACETAMINOPHEN 7.5; 325 MG/1; MG/1
1 TABLET ORAL EVERY 4 HOURS PRN
Status: DISCONTINUED | OUTPATIENT
Start: 2019-01-10 | End: 2019-01-11 | Stop reason: HOSPADM

## 2019-01-10 RX ORDER — EPHEDRINE SULFATE 50 MG/ML
5 INJECTION, SOLUTION INTRAVENOUS ONCE AS NEEDED
Status: DISCONTINUED | OUTPATIENT
Start: 2019-01-10 | End: 2019-01-10 | Stop reason: HOSPADM

## 2019-01-10 RX ORDER — ONDANSETRON 2 MG/ML
INJECTION INTRAMUSCULAR; INTRAVENOUS AS NEEDED
Status: DISCONTINUED | OUTPATIENT
Start: 2019-01-10 | End: 2019-01-10 | Stop reason: SURG

## 2019-01-10 RX ORDER — MORPHINE SULFATE 2 MG/ML
4 INJECTION, SOLUTION INTRAMUSCULAR; INTRAVENOUS
Status: DISCONTINUED | OUTPATIENT
Start: 2019-01-10 | End: 2019-01-10

## 2019-01-10 RX ORDER — NALOXONE HCL 0.4 MG/ML
0.4 VIAL (ML) INJECTION AS NEEDED
Status: DISCONTINUED | OUTPATIENT
Start: 2019-01-10 | End: 2019-01-10 | Stop reason: HOSPADM

## 2019-01-10 RX ORDER — SODIUM CHLORIDE 450 MG/100ML
75 INJECTION, SOLUTION INTRAVENOUS CONTINUOUS
Status: DISCONTINUED | OUTPATIENT
Start: 2019-01-10 | End: 2019-01-11 | Stop reason: HOSPADM

## 2019-01-10 RX ORDER — PROMETHAZINE HYDROCHLORIDE 25 MG/1
25 SUPPOSITORY RECTAL ONCE AS NEEDED
Status: DISCONTINUED | OUTPATIENT
Start: 2019-01-10 | End: 2019-01-10 | Stop reason: HOSPADM

## 2019-01-10 RX ORDER — PROMETHAZINE HYDROCHLORIDE 25 MG/1
25 TABLET ORAL ONCE AS NEEDED
Status: DISCONTINUED | OUTPATIENT
Start: 2019-01-10 | End: 2019-01-10 | Stop reason: HOSPADM

## 2019-01-10 RX ORDER — CARVEDILOL 6.25 MG/1
6.25 TABLET ORAL 2 TIMES DAILY WITH MEALS
Status: DISCONTINUED | OUTPATIENT
Start: 2019-01-10 | End: 2019-01-11 | Stop reason: HOSPADM

## 2019-01-10 RX ORDER — MIDAZOLAM HYDROCHLORIDE 1 MG/ML
1 INJECTION INTRAMUSCULAR; INTRAVENOUS
Status: DISCONTINUED | OUTPATIENT
Start: 2019-01-10 | End: 2019-01-10 | Stop reason: HOSPADM

## 2019-01-10 RX ORDER — HYDROCHLOROTHIAZIDE 25 MG/1
25 TABLET ORAL 2 TIMES WEEKLY
Status: DISCONTINUED | OUTPATIENT
Start: 2019-01-10 | End: 2019-01-11 | Stop reason: HOSPADM

## 2019-01-10 RX ORDER — ONDANSETRON 4 MG/1
4 TABLET, FILM COATED ORAL EVERY 6 HOURS PRN
Status: DISCONTINUED | OUTPATIENT
Start: 2019-01-10 | End: 2019-01-11 | Stop reason: HOSPADM

## 2019-01-10 RX ORDER — ALLOPURINOL 300 MG/1
300 TABLET ORAL EVERY EVENING
Status: DISCONTINUED | OUTPATIENT
Start: 2019-01-10 | End: 2019-01-11 | Stop reason: HOSPADM

## 2019-01-10 RX ORDER — KETOROLAC TROMETHAMINE 30 MG/ML
30 INJECTION, SOLUTION INTRAMUSCULAR; INTRAVENOUS ONCE
Status: COMPLETED | OUTPATIENT
Start: 2019-01-10 | End: 2019-01-10

## 2019-01-10 RX ORDER — LIDOCAINE HYDROCHLORIDE 20 MG/ML
INJECTION, SOLUTION INFILTRATION; PERINEURAL AS NEEDED
Status: DISCONTINUED | OUTPATIENT
Start: 2019-01-10 | End: 2019-01-10 | Stop reason: SURG

## 2019-01-10 RX ORDER — DIPHENHYDRAMINE HYDROCHLORIDE 50 MG/ML
6.25 INJECTION INTRAMUSCULAR; INTRAVENOUS
Status: DISCONTINUED | OUTPATIENT
Start: 2019-01-10 | End: 2019-01-10 | Stop reason: HOSPADM

## 2019-01-10 RX ORDER — SENNA AND DOCUSATE SODIUM 50; 8.6 MG/1; MG/1
2 TABLET, FILM COATED ORAL NIGHTLY
Status: DISCONTINUED | OUTPATIENT
Start: 2019-01-10 | End: 2019-01-11 | Stop reason: HOSPADM

## 2019-01-10 RX ORDER — ONDANSETRON 2 MG/ML
4 INJECTION INTRAMUSCULAR; INTRAVENOUS EVERY 6 HOURS PRN
Status: DISCONTINUED | OUTPATIENT
Start: 2019-01-10 | End: 2019-01-11 | Stop reason: HOSPADM

## 2019-01-10 RX ORDER — NITROGLYCERIN 0.4 MG/1
0.4 TABLET SUBLINGUAL
Status: DISCONTINUED | OUTPATIENT
Start: 2019-01-10 | End: 2019-01-11 | Stop reason: HOSPADM

## 2019-01-10 RX ORDER — OXYCODONE AND ACETAMINOPHEN 7.5; 325 MG/1; MG/1
2 TABLET ORAL EVERY 4 HOURS PRN
Status: DISCONTINUED | OUTPATIENT
Start: 2019-01-10 | End: 2019-01-11 | Stop reason: HOSPADM

## 2019-01-10 RX ORDER — DIAZEPAM 5 MG/1
5 TABLET ORAL EVERY 6 HOURS PRN
Status: DISCONTINUED | OUTPATIENT
Start: 2019-01-10 | End: 2019-01-11 | Stop reason: HOSPADM

## 2019-01-10 RX ORDER — MORPHINE SULFATE 2 MG/ML
6 INJECTION, SOLUTION INTRAMUSCULAR; INTRAVENOUS
Status: DISCONTINUED | OUTPATIENT
Start: 2019-01-10 | End: 2019-01-10

## 2019-01-10 RX ORDER — FAMOTIDINE 10 MG/ML
20 INJECTION, SOLUTION INTRAVENOUS ONCE
Status: COMPLETED | OUTPATIENT
Start: 2019-01-10 | End: 2019-01-10

## 2019-01-10 RX ORDER — PROMETHAZINE HYDROCHLORIDE 25 MG/ML
12.5 INJECTION, SOLUTION INTRAMUSCULAR; INTRAVENOUS ONCE AS NEEDED
Status: DISCONTINUED | OUTPATIENT
Start: 2019-01-10 | End: 2019-01-10 | Stop reason: HOSPADM

## 2019-01-10 RX ORDER — HYDROCODONE BITARTRATE AND ACETAMINOPHEN 5; 325 MG/1; MG/1
1 TABLET ORAL ONCE AS NEEDED
Status: DISCONTINUED | OUTPATIENT
Start: 2019-01-10 | End: 2019-01-10 | Stop reason: HOSPADM

## 2019-01-10 RX ORDER — PROPOFOL 10 MG/ML
VIAL (ML) INTRAVENOUS AS NEEDED
Status: DISCONTINUED | OUTPATIENT
Start: 2019-01-10 | End: 2019-01-10 | Stop reason: SURG

## 2019-01-10 RX ORDER — PROMETHAZINE HYDROCHLORIDE 25 MG/ML
6.25 INJECTION, SOLUTION INTRAMUSCULAR; INTRAVENOUS ONCE AS NEEDED
Status: DISCONTINUED | OUTPATIENT
Start: 2019-01-10 | End: 2019-01-10 | Stop reason: HOSPADM

## 2019-01-10 RX ADMIN — DIAZEPAM 5 MG: 5 TABLET ORAL at 20:41

## 2019-01-10 RX ADMIN — ROPIVACAINE HYDROCHLORIDE 30 ML: 5 INJECTION, SOLUTION EPIDURAL; INFILTRATION; PERINEURAL at 11:31

## 2019-01-10 RX ADMIN — EPHEDRINE SULFATE 10 MG: 50 INJECTION INTRAMUSCULAR; INTRAVENOUS; SUBCUTANEOUS at 13:03

## 2019-01-10 RX ADMIN — EPHEDRINE SULFATE 5 MG: 50 INJECTION INTRAMUSCULAR; INTRAVENOUS; SUBCUTANEOUS at 13:55

## 2019-01-10 RX ADMIN — FENTANYL CITRATE 50 MCG: 50 INJECTION, SOLUTION INTRAMUSCULAR; INTRAVENOUS at 11:27

## 2019-01-10 RX ADMIN — OXYCODONE HYDROCHLORIDE AND ACETAMINOPHEN 1 TABLET: 7.5; 325 TABLET ORAL at 19:10

## 2019-01-10 RX ADMIN — LIDOCAINE HYDROCHLORIDE 100 MG: 20 INJECTION, SOLUTION INFILTRATION; PERINEURAL at 12:50

## 2019-01-10 RX ADMIN — ISOSORBIDE MONONITRATE 30 MG: 30 TABLET ORAL at 19:10

## 2019-01-10 RX ADMIN — PROPOFOL 150 MG: 10 INJECTION, EMULSION INTRAVENOUS at 12:50

## 2019-01-10 RX ADMIN — ONDANSETRON 4 MG: 2 INJECTION INTRAMUSCULAR; INTRAVENOUS at 14:21

## 2019-01-10 RX ADMIN — ROCURONIUM BROMIDE 50 MG: 10 INJECTION, SOLUTION INTRAVENOUS at 12:50

## 2019-01-10 RX ADMIN — KETOROLAC TROMETHAMINE 30 MG: 30 INJECTION, SOLUTION INTRAMUSCULAR at 22:28

## 2019-01-10 RX ADMIN — EPHEDRINE SULFATE 5 MG: 50 INJECTION INTRAMUSCULAR; INTRAVENOUS; SUBCUTANEOUS at 13:33

## 2019-01-10 RX ADMIN — OXYCODONE HYDROCHLORIDE AND ACETAMINOPHEN 2 TABLET: 7.5; 325 TABLET ORAL at 23:17

## 2019-01-10 RX ADMIN — VANCOMYCIN HYDROCHLORIDE 1500 MG: 10 INJECTION, POWDER, LYOPHILIZED, FOR SOLUTION INTRAVENOUS at 12:30

## 2019-01-10 RX ADMIN — SUGAMMADEX 200 MG: 100 INJECTION, SOLUTION INTRAVENOUS at 14:21

## 2019-01-10 RX ADMIN — FAMOTIDINE 20 MG: 10 INJECTION, SOLUTION INTRAVENOUS at 11:43

## 2019-01-10 RX ADMIN — VANCOMYCIN HYDROCHLORIDE 1500 MG: 10 INJECTION, POWDER, LYOPHILIZED, FOR SOLUTION INTRAVENOUS at 23:46

## 2019-01-10 RX ADMIN — AMLODIPINE BESYLATE 10 MG: 10 TABLET ORAL at 19:10

## 2019-01-10 RX ADMIN — SENNOSIDES AND DOCUSATE SODIUM 2 TABLET: 8.6; 5 TABLET ORAL at 20:41

## 2019-01-10 RX ADMIN — SODIUM CHLORIDE, POTASSIUM CHLORIDE, SODIUM LACTATE AND CALCIUM CHLORIDE: 600; 310; 30; 20 INJECTION, SOLUTION INTRAVENOUS at 14:00

## 2019-01-10 RX ADMIN — MIDAZOLAM HYDROCHLORIDE 1 MG: 2 INJECTION, SOLUTION INTRAMUSCULAR; INTRAVENOUS at 11:33

## 2019-01-10 RX ADMIN — LOSARTAN POTASSIUM 100 MG: 100 TABLET, FILM COATED ORAL at 19:10

## 2019-01-10 RX ADMIN — CARVEDILOL 6.25 MG: 6.25 TABLET, FILM COATED ORAL at 19:10

## 2019-01-10 RX ADMIN — HYDROMORPHONE HYDROCHLORIDE 0.5 MG: 1 INJECTION, SOLUTION INTRAMUSCULAR; INTRAVENOUS; SUBCUTANEOUS at 22:28

## 2019-01-10 RX ADMIN — BUMETANIDE 1 MG: 1 TABLET ORAL at 19:10

## 2019-01-10 RX ADMIN — SODIUM CHLORIDE, POTASSIUM CHLORIDE, SODIUM LACTATE AND CALCIUM CHLORIDE 9 ML/HR: 600; 310; 30; 20 INJECTION, SOLUTION INTRAVENOUS at 10:30

## 2019-01-10 RX ADMIN — ALLOPURINOL 300 MG: 300 TABLET ORAL at 19:10

## 2019-01-10 RX ADMIN — INSULIN LISPRO 2 UNITS: 100 INJECTION, SOLUTION INTRAVENOUS; SUBCUTANEOUS at 21:43

## 2019-01-10 NOTE — DISCHARGE INSTRUCTIONS
Dr. Gross  [address] [facility] [address]  [phone]    TOTAL & REVERSE TOTAL SHOULDER REPLACEMENT  HOSPITAL DISCHARGE INSTRUCTIONS     GENERAL INFORMATION: With improved surgical techniques for total shoulder and reverse total shoulder replacement and the Hinduism of ultrasound guided long acting nerve blocks, the hospital stay for patients has decreased significantly. Patients generally go home the following morning after consultation with a physical therapist.  SPECIFIC POST-OP INSTRUCTIONS:  * FOLLOW UP APPOINTMENT: You will need to call our office [phone] and make an appointment to follow up 10-14 days after your date of surgery. We can see you back sooner if there are any problems or concerns.   * BLOOD THINNERS: All patients will be on some type of blood thinner post-op to prevent blood clot. In the hospital, we often use a blood thinning shot the first day post-op. Most patients who are not already on a blood thinner are discharged on over-the-counter aspirin 325mg daily. If you were taking a blood thinner prior to surgery, we will have you resume this on the first day post-op.   * STAPLES: Staples are taken our 10-14 days post-op. This will be done during your first post-op appointment in our office.   * ICE: Ice helps to decrease both pain and swelling. Ice should be applied to the shoulder 20-25 minutes each hour while awake.   * DRESSING CHANGES: We ask that you keep the incision clean and dry. Please use water proof bandaids to cover incision while showering. These can be purchased at your local pharmacy. They may need to be overlapped to cover entire length of incision. These can be changed daily or as needed. Do not use any ointment on the incision.   * SHOWERING: The wound edges typically come together and seal by 3-5 days post-op if there is no drainage. Again, please use waterproof bandaids to cover incision while showering. DO NOT SUBMERSE SHOULDER IN POOL,HOT TUB OR BATH UNTIL AT LEAST 3-4  WEEKS POST-OP (EVEN WITH WATERPROOF BANDAIDS).  * PAIN  MEDICINE: You will be given a prescription for oral pain medication prior to discharge from the hospital. Please let us know if you have a sensitivity to certain pain medications prior to discharge. Additional pain medication prescriptions can be written, but must be picked up at either our [place] or [place] office locations. They can NOT be called into your pharmacy.   * ORAL ANTI-INFLAMMATORIES:   You will be asked to discontinue ALL oral anti-inflammatories prior to surgery. You can resume these the first day post-op.These can be combined with the oral pain medications safely. DO NOT TAKE ADDITIONAL TYLENOL WITH THE NARCOTIC PAIN MEDICATION (it already has Tylenol in it). If you were not taking an anti-inflammatory pre-op, you can start one on the first day post-op. It will help decrease pain and swelling. Typical medications and dosages are as follows:   Advil/Motrin/Ibuprofen 200mg, 4 pills every 8 hours   Aleve/Naproxen Sodium 220mg, 2 pills every 12 hours  * SLING: We recommend you wear the sling at all times, other than when showering or doing physical therapy exercises.    * WEIGHT BEARING: We ask that you be non-weight bearing on the operative shoulder. Do not use the operative arm to lift/push/pull greater than 5lbs.   * PHYSICAL THERAPY: A physical therapist will see you in the hospital your first day post-op. They will teach you some very gentle range of motion exercises to do at home for the first 10-14 days. After we see you in the office during your first post-op visit, we will give you a prescription to start formal physical therapy. This can be done downstairs at [facility] or at a location of your choice. Physical therapy is typically 2-3 times a week for 4-6 weeks, depending on the individual and their progress.   * DRIVING A CAR: Medically/legally we can not recommend you drive a car while in the sling or while on pain medication (roughly  "10-14 days).   * RETURNING TO DAILY AND RECREATIONAL ACTIVITIES: For the most part patients can progress to daily activities as tolerated (keeping in mind the restrictions listed above). Initially, we do not want you to \"overdo\" it in an attempt to minimize post-op pain and swelling. Once the swelling is controlled, you can progress with activities as tolerated. Pain and swelling should be your guide to increasing your activity level.   * RETURN TO WORK: The return to work date depends on many factors and is very dependent on the individual. You would most likely be able to return to a sedentary job after your first post-op visit (10-14 days after surgery). A more physical job would obviously require a longer recovery time before return to work.  "

## 2019-01-10 NOTE — ANESTHESIA POSTPROCEDURE EVALUATION
Patient: Chevy Goodwin    Procedure Summary     Date:  01/10/19 Room / Location:   [facility]  [facility]    Anesthesia Start:  1248 Anesthesia Stop:  1440    Procedure:  RT TOTAL SHOULDER REVERSE ARTHROPLASTY (Right Shoulder) Diagnosis:      Surgeon:  Kirk Gross MD Provider:  Miguel Scales MD    Anesthesia Type:  general with block ASA Status:  3          Anesthesia Type: general with block  Last vitals  BP   147/81 (01/10/19 1545)   Temp   36.1 °C (97 °F) (01/10/19 1545)   Pulse   60 (01/10/19 1545)   Resp   16 (01/10/19 1545)     SpO2   96 % (01/10/19 1545)     Post Anesthesia Care and Evaluation    Patient location during evaluation: bedside  Patient participation: complete - patient participated  Level of consciousness: awake and alert  Pain management: adequate  Airway patency: patent  Anesthetic complications: No anesthetic complications    Cardiovascular status: acceptable  Respiratory status: acceptable  Hydration status: acceptable    Comments: /81   Pulse 60   Temp 36.1 °C (97 °F) (Temporal)   Resp 16   SpO2 96%

## 2019-01-10 NOTE — ANESTHESIA PROCEDURE NOTES
ANESTHESIA INTUBATION  Urgency: elective    Airway not difficult    General Information and Staff    Patient location during procedure: OR  Anesthesiologist: Miguel Scales MD  CRNA: Brielle Christensen CRNA    Indications and Patient Condition  Indications for airway management: airway protection    Preoxygenated: yes  MILS not maintained throughout  Mask difficulty assessment: 1 - vent by mask    Final Airway Details  Final airway type: endotracheal airway      Successful airway: ETT  Cuffed: yes   Successful intubation technique: direct laryngoscopy  Facilitating devices/methods: intubating stylet  Endotracheal tube insertion site: oral  Blade: Ponce  Blade size: 2  ETT size (mm): 7.5  Cormack-Lehane Classification: grade I - full view of glottis  Placement verified by: chest auscultation and capnometry   Cuff volume (mL): 10  Measured from: lips  Number of attempts at approach: 1    Additional Comments  Ett placed easily, appears atraumatic, dentition intact

## 2019-01-10 NOTE — OP NOTE
TOTAL SHOULDER REVERSE ARTHROPLASTY  Procedure Note    Chevy Goodwin  1/10/2019    Pre-op Diagnosis:    Right Rotator cuff arthropathy    Post-op Diagnosis   Right Rotator cuff arthropathy    Procedure:   Right Reverse total shoulder arthroplasty    Surgeon: Kirk Gross M.D.    Surgical assistant: Jordyn Elmore CSA      Anesthesia: General with Block  Anesthesiologist: Miguel Scales MD  CRNA: Brielle Christensen CRNA    Staff:   Zulayulator: Ruben Kinney RN  Scrub Person: Selena Ann  Assistant: Jordyn Elmore    Indication for Asst.  A surgical assistant, Jordyn Elmore CSA , was utilized as a medical necessity and was present through the entire procedure allowing safe completion of the procedure as well as reducing overall operative time and morbidity for the patient.    IV antibiotic: Vancomycin    Estimated Blood Loss: 250 mL    Specimens: * No orders in the log *    Complications: None    Implants: [employer] reverse total shoulder system     Implant specifics: 10 mm preserve stem, +0 humeral tray, +2.5 humeral liner 38 mm, 38 mm baseplate with 4 compression screws and locking caps, 38 mm glenosphere      Procedure: The patient was taken to the operative suite.  After adequate anesthesia was established the upper extremity was prepped and draped in the usual fashion.  Ioban was utilized covering the skin over the shoulder and axilla.  An anterior incision was made starting lateral to the coracoid towards the axillary crease through skin and subcutaneous tissues.  The deltopectoral interval was entered.  The cephalic vein was preserved.  The conjoined tendon was reflected medially.  The subscapularis was divided and tagged.  Arthropathic changes were noted in the glenohumeral joint.  The rotator cuff was in poor condition with tearing noted.  The capsule was released off the humeral neck and the posterior soft tissue attachments were protected. An external cutting guide was utilized in the  head was cut at a 20° retroverted angle.  Excess osteophytes were excised.  Sutures from prior rotator cuff repair that were exposed were removed including one anchor.  This was in the superior lateral aspect of the humerus.  Appropriate reaming and broaching was carried out.  The appropriate size stem was chosen.  The proximal humerus was irrigated with antibiotic solution and the stem was press-fit into appropriate position.  I then visualized the glenoid and retractors were placed.  The capsule was reflected off the deep surface of the subscapularis.  The capsule was also released off the humeral neck and off the inferior glenoid protecting the axillary nerve throughout.  This allowed for visualization of the glenoid.  A central guidepin was drilled and reaming was carried out attempting to preserve as much bone as possible.  The guide was replaced and a central cage hole was drilled.  The baseplate was then compressed onto the glenoid and held with compression screws and locking caps.  Good firm fixation was noted.  An appropriate size glenosphere was chosen and fixed to the baseplate with a compression screw.  I then trialed the humeral tray and polyethylene.  I liked the length of the +2.5 as well as the stability involving the polyethylene.  I was unable to reduce a constrained +2.5.  Once satisfied with range of motion and stability the tray chosen was placed and held with a torque limiting screw.  The polyethylene was then inserted and compressed and the shoulder was reduced.  Subdeltoid scar tissue was excised area  Good range of motion was noted.  Good stability was noted.  Vigorous irrigation and suctioning was performed. The deltopectoral interval was closed with 0 Vicryl.  The subcutaneous tissues were closed with 2-0 Vicryl.  The skin was closed with staples the patient had a sterile compression dressing applied and was awoken and taken to the postanesthetic recovery unit in good condition.    Krik  KATHARINA Gross MD     Date: 1/10/2019  Time: 2:40 PM

## 2019-01-10 NOTE — ANESTHESIA PREPROCEDURE EVALUATION
Anesthesia Evaluation     history of anesthetic complications: prolonged sedation               Airway   Mallampati: II  Neck ROM: full  no difficulty expected  Dental - normal exam     Pulmonary - normal exam   (+) a smoker Current Abstained day of surgery, sleep apnea on CPAP,   (-) COPD, asthma    PE comment: nonlabored  Cardiovascular - normal exam    Rhythm: regular  Rate: normal    (+) hypertension well controlled, past MI (last in 2005, total of 7 episodes of MI or ACS of some severity) , CAD, hyperlipidemia,   (-) valvular problems/murmurs, dysrhythmias, angina      Neuro/Psych- negative ROS  (-) seizures, TIA, CVA  GI/Hepatic/Renal/Endo    (+)  GERD well controlled,  renal disease stones, diabetes mellitus type 2 well controlled using insulin,   (-) liver disease, hypothyroidism, hyperthyroidism    Musculoskeletal     (+) arthralgias, back pain,   Abdominal    Substance History      OB/GYN          Other   (+) arthritis   history of cancer (Non-Hodgkin's Lymphoma; Prostate Cancer; & Skin cancer)      Other Comment: Waldenstrom Macroglobulinemia                  Anesthesia Plan    ASA 3     general with block   (ISB for post-op pain)  intravenous induction   Anesthetic plan, all risks, benefits, and alternatives have been provided, discussed and informed consent has been obtained with: patient.

## 2019-01-10 NOTE — PLAN OF CARE
Problem: Patient Care Overview  Goal: Plan of Care Review  Outcome: Ongoing (interventions implemented as appropriate)   01/10/19 2703   Coping/Psychosocial   Plan of Care Reviewed With patient   Plan of Care Review   Progress improving   OTHER   Outcome Summary S/P RRTS. VSS. Pt denies pain, r/t block. Bulky dressing c/d/i. Voiding per urinal. Still needs to get up to chair. Discussed BP med and monitoring r/t HTN. Plans to d/c home with spouse.        Problem: Fall Risk (Adult)  Goal: Identify Related Risk Factors and Signs and Symptoms  Outcome: Outcome(s) achieved Date Met: 01/10/19    Goal: Absence of Fall  Outcome: Ongoing (interventions implemented as appropriate)      Problem: Shoulder Arthroplasty (Adult)  Goal: Signs and Symptoms of Listed Potential Problems Will be Absent, Minimized or Managed (Shoulder Arthroplasty)  Outcome: Ongoing (interventions implemented as appropriate)    Goal: Anesthesia/Sedation Recovery  Outcome: Ongoing (interventions implemented as appropriate)

## 2019-01-11 VITALS
DIASTOLIC BLOOD PRESSURE: 45 MMHG | RESPIRATION RATE: 16 BRPM | TEMPERATURE: 98.7 F | OXYGEN SATURATION: 91 % | WEIGHT: 207.89 LBS | HEART RATE: 68 BPM | BODY MASS INDEX: 31.51 KG/M2 | HEIGHT: 68 IN | SYSTOLIC BLOOD PRESSURE: 99 MMHG

## 2019-01-11 LAB
ANION GAP SERPL CALCULATED.3IONS-SCNC: 12.4 MMOL/L
BUN BLD-MCNC: 29 MG/DL (ref 8–23)
BUN/CREAT SERPL: 22.3 (ref 7–25)
CALCIUM SPEC-SCNC: 9 MG/DL (ref 8.6–10.5)
CHLORIDE SERPL-SCNC: 105 MMOL/L (ref 98–107)
CO2 SERPL-SCNC: 23.6 MMOL/L (ref 22–29)
CREAT BLD-MCNC: 1.3 MG/DL (ref 0.76–1.27)
GFR SERPL CREATININE-BSD FRML MDRD: 54 ML/MIN/1.73
GLUCOSE BLD-MCNC: 122 MG/DL (ref 65–99)
GLUCOSE BLDC GLUCOMTR-MCNC: 134 MG/DL (ref 70–130)
GLUCOSE BLDC GLUCOMTR-MCNC: 201 MG/DL (ref 70–130)
HCT VFR BLD AUTO: 30.6 % (ref 40.4–52.2)
HGB BLD-MCNC: 9.8 G/DL (ref 13.7–17.6)
POTASSIUM BLD-SCNC: 4.4 MMOL/L (ref 3.5–5.2)
SODIUM BLD-SCNC: 141 MMOL/L (ref 136–145)

## 2019-01-11 PROCEDURE — 82962 GLUCOSE BLOOD TEST: CPT

## 2019-01-11 PROCEDURE — 80048 BASIC METABOLIC PNL TOTAL CA: CPT | Performed by: ORTHOPAEDIC SURGERY

## 2019-01-11 PROCEDURE — 85014 HEMATOCRIT: CPT | Performed by: ORTHOPAEDIC SURGERY

## 2019-01-11 PROCEDURE — 25010000002 ENOXAPARIN PER 10 MG: Performed by: ORTHOPAEDIC SURGERY

## 2019-01-11 PROCEDURE — 36415 COLL VENOUS BLD VENIPUNCTURE: CPT | Performed by: ORTHOPAEDIC SURGERY

## 2019-01-11 PROCEDURE — 25010000002 VANCOMYCIN 10 G RECONSTITUTED SOLUTION: Performed by: ORTHOPAEDIC SURGERY

## 2019-01-11 PROCEDURE — 85018 HEMOGLOBIN: CPT | Performed by: ORTHOPAEDIC SURGERY

## 2019-01-11 RX ORDER — OXYCODONE AND ACETAMINOPHEN 7.5; 325 MG/1; MG/1
1 TABLET ORAL EVERY 4 HOURS PRN
Qty: 40 TABLET | Refills: 0 | Status: SHIPPED | OUTPATIENT
Start: 2019-01-11 | End: 2019-01-31

## 2019-01-11 RX ADMIN — OXYCODONE HYDROCHLORIDE AND ACETAMINOPHEN 2 TABLET: 7.5; 325 TABLET ORAL at 06:44

## 2019-01-11 RX ADMIN — INSULIN LISPRO 0 UNITS: 100 INJECTION, SOLUTION INTRAVENOUS; SUBCUTANEOUS at 12:04

## 2019-01-11 RX ADMIN — OXYCODONE HYDROCHLORIDE AND ACETAMINOPHEN 2 TABLET: 7.5; 325 TABLET ORAL at 02:48

## 2019-01-11 RX ADMIN — PANTOPRAZOLE SODIUM 40 MG: 40 TABLET, DELAYED RELEASE ORAL at 06:44

## 2019-01-11 RX ADMIN — ENOXAPARIN SODIUM 30 MG: 30 INJECTION SUBCUTANEOUS at 08:25

## 2019-01-11 RX ADMIN — OXYCODONE HYDROCHLORIDE AND ACETAMINOPHEN 2 TABLET: 7.5; 325 TABLET ORAL at 10:40

## 2019-01-11 RX ADMIN — CARVEDILOL 6.25 MG: 6.25 TABLET, FILM COATED ORAL at 08:25

## 2019-01-11 NOTE — DISCHARGE SUMMARY
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)   • Hypertension   • Acute post-operative pain   • Long term (current) use of opiate analgesic   • Encounter for long-term current use of high risk medication   • Pain in lateral portion of right knee   • JULIA treated with BiPAP 20/16   • Hypersomnia due to medical condition   • Chronic right shoulder pain   • Status post reverse total arthroplasty of right shoulder     [unfilled]    Allergies:   Allergies   Allergen Reactions   • Contrast Dye Hives   • Dye Fdc Red [Red Dye] Hives   • Iodine Hives   • Oxycodone Nausea And Vomiting   • Adhesive Tape Rash       Current Medications:     Discharge Medications      New Medications      Instructions Start Date   oxyCODONE-acetaminophen 7.5-325 MG per tablet  Commonly known as:  PERCOCET   1 tablet, Oral, Every 4 Hours PRN         Continue These Medications      Instructions Start Date   allopurinol 300 MG tablet  Commonly known as:  ZYLOPRIM   300 mg, Oral, Every Evening      amLODIPine 10 MG tablet  Commonly known as:  NORVASC   10 mg, Oral, Every Evening      aspirin 81 MG tablet   81 mg, Oral, Daily      azelastine 0.1 % nasal spray  Commonly known as:  ASTELIN   2 sprays, Nasal, 2 Times Daily PRN      bumetanide 1 MG tablet  Commonly known as:  BUMEX   1 mg, Oral, Daily (Monday-Friday)      calcium carbonate-cholecalciferol 500-400 MG-UNIT tablet  tablet   1 tablet, Oral, Daily      carvedilol 6.25 MG tablet  Commonly known as:  COREG   6.25 mg, Oral, 2 Times Daily With Meals      celecoxib 200 MG capsule  Commonly known as:  CeleBREX   200 mg, Oral, Daily, OK TO TAKE PER MD      cetirizine 10 MG tablet  Commonly known as:  zyrTEC   10 mg, Oral, Daily      [employer] GLUCOSAMINE-CHONDROITIN tablet   1 tablet, Oral, Every Morning      docusate sodium 100 MG capsule  Commonly known as:  COLACE   100 mg, Oral, Daily      esomeprazole 40 MG capsule  Commonly known as:  nexIUM   40 mg, Oral, 2 Times Daily      hydrochlorothiazide 25 MG tablet  Commonly known as:  HYDRODIURIL   25 mg, Oral, 2 Times Weekly, Saturday and Sunday      HYDROcodone-acetaminophen  MG per tablet  Commonly known as:  NORCO   1 tablet, Oral, Every 4 Hours PRN      isosorbide mononitrate 30 MG 24 hr tablet  Commonly known as:  IMDUR   30 mg, Every Evening      LANTUS SOLOSTAR 100 UNIT/ML injection pen  Generic drug:  Insulin Glargine   24 Units, Subcutaneous, Nightly      LANTUS SOLOSTAR 100 UNIT/ML injection pen  Generic drug:  Insulin Glargine   36 Units, Subcutaneous, Every Morning      losartan 100 MG tablet  Commonly known as:  COZAAR   100 mg, Oral, Daily      melatonin 5 MG tablet tablet   10 mg, Oral, Nightly      nitroglycerin 0.4 MG SL tablet  Commonly known as:  NITROSTAT   0.4 mg, Oral, Every 5 Minutes PRN      vitamin B-12 100 MCG tablet  Commonly known as:  CYANOCOBALAMIN   150 mcg, Oral, Daily      Vitamin D2 2000 units tablet   2,000 Units, Oral      Vitamin D3 2000 units capsule   2,000 Units, Oral, Daily                 Past Medical History:   Diagnosis Date   • Cancer (CMS/HCC)     PROSTATE AND SKIN   • Cataract    • Coronary artery disease    • Diabetes (CMS/HCC)     TYPE 2   • GERD (gastroesophageal reflux disease)    • Gout    • Heart attack (CMS/HCC)     X 7   • High cholesterol    • Hypersomnia    • Hypertension    • Kidney stones    • Low back pain    • MRSA  (methicillin resistant staph aureus) culture positive     BILATERAL ELBOWS, 2015   • Right shoulder pain    • Sleep apnea     C pap   • Waldenstrom macroglobulinemia (CMS/HCC)     2014        Past Surgical History:   Procedure Laterality Date   • ANGIOPLASTY      X 7   • BACK SURGERY      X4   • CARDIAC CATHETERIZATION     • CARPAL TUNNEL RELEASE Right    • CATARACT EXTRACTION WITH INTRAOCULAR LENS IMPLANT     • COLON RESECTION     • COLONOSCOPY     • ESOPHAGOSCOPY / EGD     • EXTRACORPOREAL SHOCK WAVE LITHOTRIPSY (ESWL)     • FINGER SURGERY Left    • GALLBLADDER SURGERY     • HERNIA REPAIR     • INCISION AND DRAINAGE ABSCESS      MULTIPLE   • LAMINECTOMY     • [place] REMOVAL OF ELBOW BURSA Left 9/15/2016    Procedure: LT ELBOW I&D W/ BONE CURRETTAGE;  Surgeon: Kirk Gross MD;  Location: [facility] OR [facility];  Service: Orthopedics   • PROSTATE SURGERY     • ROTATOR CUFF REPAIR Right    • SKIN CANCER EXCISION      MULTIPLE   • TYMPANOPLASTY Right         Social History     Occupational History   • Not on file   Tobacco Use   • Smoking status: Current Every Day Smoker     Years: 60.00     Types: Cigars   • Smokeless tobacco: Never Used   • Tobacco comment: QUIT 1999 CIGARETTES/STILL SMOKES CIGARS   Substance and Sexual Activity   • Alcohol use: Yes     Comment: VERY RARE   • Drug use: Not on file   • Sexual activity: Not on file      Social History     Social History Narrative   • Not on file      History reviewed. No pertinent family history.      Physical Exam: 78 y.o. male  General Appearance:    Alert, cooperative, in no acute distress                      Vitals:    01/10/19 1910 01/10/19 2324 01/11/19 0259 01/11/19 0731   BP: 148/67 124/69 114/58 105/55   BP Location: Left arm Left arm Left arm Left arm   Patient Position: Lying Lying Lying Lying   Pulse: 69 83 62 59   Resp: 16 16 16 16   Temp: 99.6 °F (37.6 °C) 98.9 °F (37.2 °C) 98 °F (36.7 °C) 98.7 °F (37.1 °C)   TempSrc: Oral Oral Oral Oral   SpO2: 95% 93% 93% 91%    Weight:       Height:            Head:    Normocephalic, without obvious abnormality, atraumatic   Eyes:            Lids and lashes normal, conjunctivae and sclerae normal, no   icterus, no pallor, corneas clear, PERRLA   Ears:    Ears appear intact with no abnormalities noted   Throat:   No oral lesions, no thrush, oral mucosa moist   Neck:   No adenopathy, supple, trachea midline, no thyromegaly, no    carotid bruit, no JVD   Back:     No kyphosis present, no scoliosis present, no skin lesions,       erythema or scars, no tenderness to percussion or                   palpation,   range of motion normal   Lungs:     Clear to auscultation,respirations regular, even and                   unlabored    Heart:    Regular rhythm and normal rate, normal S1 and S2, no            murmur, no gallop, no rub, no click   Chest Wall:    No abnormalities observed   Abdomen:     Normal bowel sounds, no masses, no organomegaly, soft        non-tender, non-distended, no guarding, no rebound                 tenderness   Rectal:     Deferred   Extremities:   Incision intact without signs or symptoms of infection.               Neurovascular status remains intact to operative extremity.      Moves all extremities well, no edema, no cyanosis, no              redness   Pulses:   Pulses palpable and equal bilaterally   Skin:   No bleeding, bruising or rash   Lymph nodes:   No palpable adenopathy   Neurologic:   Cranial nerves 2 - 12 grossly intact, sensation intact, DTR        present and equal bilaterally           Hospital Course:  78 y.o. male admitted to [facility] to services of Kirk Gross MD with rotator cuff arthropathy right shoulder on 1/10/2019 and underwent right reverse total shoulder arthroplasty   Antibiotic and VTE prophylaxis were per SCIP protocols. Post-operatively the patient transferred to the post-operative floor where the patient underwent mobilization therapy that included active as well as passive ROM  exercises. Opioids were titrated to achieve appropriate pain management to allow for participation in mobilization exercises. Vital signs are now stable. The incision is intact without signs or symptoms of infection. Operative extremity neurovascular status remains intact.   Appropriate education re: incision care, activity levels, medications, and follow up visits was completed and all questions were answered. The patient is now deemed stable for discharge to Home.      DIAGNOSTIC TESTS:   [unfilled]      [unfilled]    Discharge and Follow up Instructions: Discharged home with follow-up in 7-10 days.  Heart outpatient physical therapy.        Date: [unfilled]  Kirk Gross MD      Time:

## 2019-01-11 NOTE — PLAN OF CARE
Problem: Patient Care Overview  Goal: Plan of Care Review  Outcome: Ongoing (interventions implemented as appropriate)   01/11/19 0266   Coping/Psychosocial   Plan of Care Reviewed With patient   Plan of Care Review   Progress improving   OTHER   Outcome Summary VSS. Pain controlled with PO pain med. Dressing changed, c/d/i. Up to chair. Ambulates with assist. Discussed BP med and monitoring r/t HTN. Discharging home with spouse.        Problem: Fall Risk (Adult)  Goal: Absence of Fall  Outcome: Ongoing (interventions implemented as appropriate)      Problem: Shoulder Arthroplasty (Adult)  Goal: Signs and Symptoms of Listed Potential Problems Will be Absent, Minimized or Managed (Shoulder Arthroplasty)  Outcome: Ongoing (interventions implemented as appropriate)    Goal: Anesthesia/Sedation Recovery  Outcome: Ongoing (interventions implemented as appropriate)

## 2019-01-11 NOTE — PLAN OF CARE
Problem: Patient Care Overview  Goal: Plan of Care Review  Outcome: Ongoing (interventions implemented as appropriate)   01/11/19 0122   Coping/Psychosocial   Plan of Care Reviewed With patient   Plan of Care Review   Progress improving   OTHER   Outcome Summary patient resting comfortably at this time, increased pain at beginning of shift, medications changed, pain more controlled not, educated on b/p monitoring     Goal: Individualization and Mutuality  Outcome: Ongoing (interventions implemented as appropriate)    Goal: Discharge Needs Assessment  Outcome: Ongoing (interventions implemented as appropriate)    Goal: Interprofessional Rounds/Family Conf  Outcome: Ongoing (interventions implemented as appropriate)      Problem: Fall Risk (Adult)  Goal: Absence of Fall  Outcome: Ongoing (interventions implemented as appropriate)      Problem: Shoulder Arthroplasty (Adult)  Goal: Signs and Symptoms of Listed Potential Problems Will be Absent, Minimized or Managed (Shoulder Arthroplasty)  Outcome: Ongoing (interventions implemented as appropriate)    Goal: Anesthesia/Sedation Recovery  Outcome: Ongoing (interventions implemented as appropriate)

## 2019-01-11 NOTE — PROGRESS NOTES
Orthopedic Progress Note    Subjective     Post-Operative Day: 1  Systemic or Specific Complaints: No Complaints.  Block is worn off.  Pain adequately controlled with oral pain medicine.    Objective     Vital signs in last 24 hours:  Temp:  [97 °F (36.1 °C)-99.6 °F (37.6 °C)] 98.7 °F (37.1 °C)  Heart Rate:  [50-83] 59  Resp:  [14-16] 16  BP: (105-163)/(55-94) 105/55    General: alert, appears stated age and cooperative   Neurovascular: Radial nerve: Intact, Ulnar nerve: Intact and Median nerve: Intact  Radial pulse: 2+   Wound: Wound is appropriately dressed and the dressing appears clean and dry.     Range of Motion: Not tested     Data Review  CBC:  Results from last 7 days   Lab Units  01/11/19   0252   HEMOGLOBIN g/dL  9.8*   HEMATOCRIT %  30.6*       Assessment/Plan     IMPRESSION: Doing well postoperative day 1 right reverse total shoulder arthroplasty    Pain Relief: some relief    PLAN: Discharge home.  Follow-up 7-10 days.  Physical therapy as ordered.    Activity:      LOS: 1 day     Kirk Gross MD    Date: 1/11/2019  Time: 7:58 AM

## 2019-01-12 ENCOUNTER — READMISSION MANAGEMENT (OUTPATIENT)
Dept: CALL CENTER | Facility: HOSPITAL | Age: 79
End: 2019-01-12

## 2019-01-12 NOTE — OUTREACH NOTE
Prep Survey      Responses   Facility patient discharged from?  [place]   Is patient eligible?  Yes   Discharge diagnosis  Pain of metastatic malignancy  rotator cuff arthropathy right shoulder on 1/10/2019 and underwent right reverse total shoulder arthroplasty   Does the patient have one of the following disease processes/diagnoses(primary or secondary)?  General Surgery   Does the patient have Home health ordered?  No   Is there a DME ordered?  No   Prep survey completed?  Yes          Mary Laguerre RN

## 2019-01-14 ENCOUNTER — READMISSION MANAGEMENT (OUTPATIENT)
Dept: CALL CENTER | Facility: HOSPITAL | Age: 79
End: 2019-01-14

## 2019-01-14 NOTE — OUTREACH NOTE
General Surgery Week 1 Survey      Responses   Facility patient discharged from?  [place]   Does the patient have one of the following disease processes/diagnoses(primary or secondary)?  General Surgery   Is there a successful TCM telephone encounter documented?  No   Week 1 attempt successful?  Yes   Call start time  1611   Call end time  1619   Discharge diagnosis  Pain of metastatic malignancy  rotator cuff arthropathy right shoulder on 1/10/2019 and underwent right reverse total shoulder arthroplasty   Is patient permission given to speak with other caregiver?  Yes   Person spoke with today (if not patient) and relationship  Cornelia Ojeda reviewed with patient/caregiver?  Yes   Is the patient having any side effects they believe may be caused by any medication additions or changes?  No   Does the patient have all medications related to this admission filled (includes all antibiotics, pain medications, etc.)  Yes   Is the patient taking all medications as directed (includes completed medication regime)?  Yes   Does the patient have a follow up appointment scheduled with their surgeon?  Yes   Has the patient kept scheduled appointments due by today?  N/A   Has home health visited the patient within 72 hours of discharge?  N/A   Psychosocial issues?  No   Did the patient receive a copy of their discharge instructions?  Yes   Nursing interventions  Reviewed instructions with patient   What is the patient's perception of their health status since discharge?  Improving   Nursing interventions  Nurse provided patient education   Is the patient /caregiver able to teach back basic post-op care?  Lifting as instructed by MD in discharge instructions, Keep incision areas clean,dry and protected, Take showers only when approved by MD-sponge bathleandro until then, Drive as instructed by MD in discharge instructions, Practice 'cough and deep breath', Continue use of incentive spirometry at least 1 week post discharge   Is the  patient/caregiver able to teach back signs and symptoms of incisional infection?  Increased redness, swelling or pain at the incisonal site, Increased drainage or bleeding, Incisional warmth, Pus or odor from incision, Fever   Is the patient/caregiver able to teach back steps to recovery at home?  Set small, achievable goals for return to baseline health, Rest and rebuild strength, gradually increase activity, Practice good oral hygiene, Eat a well-balance diet, Make a list of questions for surgeon's appointment   Is the patient/caregiver able to teach back the hierarchy of who to call/visit for symptoms/problems? PCP, Specialist, Home health nurse, Urgent Care, ED, 911  Yes   Additional teach back comments  Encouraged more ambulation and exercises, eating well.   Week 1 call completed?  Yes          Mary Laguerre RN

## 2019-01-22 ENCOUNTER — READMISSION MANAGEMENT (OUTPATIENT)
Dept: CALL CENTER | Facility: HOSPITAL | Age: 79
End: 2019-01-22

## 2019-01-22 NOTE — OUTREACH NOTE
General Surgery Week 2 Survey      Responses   Facility patient discharged from?  [place]   Does the patient have one of the following disease processes/diagnoses(primary or secondary)?  General Surgery   Week 2 attempt successful?  Yes   Call start time  1108   Call end time  1112   Discharge diagnosis  Pain of metastatic malignancy  rotator cuff arthropathy right shoulder on 1/10/2019 and underwent right reverse total shoulder arthroplasty   Is patient permission given to speak with other caregiver?  Yes   Person spoke with today (if not patient) and relationship  Cornelia/ his wife   Meds reviewed with patient/caregiver?  Yes   Is the patient having any side effects they believe may be caused by any medication additions or changes?  No   Does the patient have all medications related to this admission filled (includes all antibiotics, pain medications, etc.)  Yes   Is the patient taking all medications as directed (includes completed medication regime)?  Yes   Does the patient have a follow up appointment scheduled with their surgeon?  Yes   Has the patient kept scheduled appointments due by today?  N/A   Comments  Has followup today with Dr Calle   Has home health visited the patient within 72 hours of discharge?  N/A   Psychosocial issues?  No   Did the patient receive a copy of their discharge instructions?  Yes   Nursing interventions  Reviewed instructions with patient   What is the patient's perception of their health status since discharge?  Improving   Nursing interventions  Nurse provided patient education   Is the patient /caregiver able to teach back basic post-op care?  Lifting as instructed by MD in discharge instructions, Keep incision areas clean,dry and protected, Take showers only when approved by MD-sponge bathe until then, Drive as instructed by MD in discharge instructions, Practice 'cough and deep breath', Continue use of incentive spirometry at least 1 week post discharge   Is the  patient/caregiver able to teach back signs and symptoms of incisional infection?  Increased redness, swelling or pain at the incisonal site, Increased drainage or bleeding, Incisional warmth, Pus or odor from incision, Fever   Is the patient/caregiver able to teach back steps to recovery at home?  Set small, achievable goals for return to baseline health, Rest and rebuild strength, gradually increase activity, Practice good oral hygiene, Eat a well-balance diet, Make a list of questions for surgeon's appointment   Is the patient/caregiver able to teach back the hierarchy of who to call/visit for symptoms/problems? PCP, Specialist, Home health nurse, Urgent Care, ED, 911  Yes   Additional teach back comments   Patient is doing better. Pain has improved. Wife reports no drainage from incision.    Week 2 call completed?  Yes          Juan David Fagan RN

## 2019-01-31 ENCOUNTER — OFFICE VISIT (OUTPATIENT)
Dept: PAIN MEDICINE | Facility: CLINIC | Age: 79
End: 2019-01-31

## 2019-01-31 VITALS
HEART RATE: 53 BPM | HEIGHT: 68 IN | BODY MASS INDEX: 31.83 KG/M2 | DIASTOLIC BLOOD PRESSURE: 58 MMHG | RESPIRATION RATE: 15 BRPM | OXYGEN SATURATION: 93 % | SYSTOLIC BLOOD PRESSURE: 127 MMHG | WEIGHT: 210 LBS | TEMPERATURE: 98.3 F

## 2019-01-31 DIAGNOSIS — M96.1 POSTLAMINECTOMY SYNDROME OF LUMBAR REGION: ICD-10-CM

## 2019-01-31 DIAGNOSIS — M51.24 THORACIC DISC HERNIATION: ICD-10-CM

## 2019-01-31 DIAGNOSIS — Z79.899 ENCOUNTER FOR LONG-TERM CURRENT USE OF HIGH RISK MEDICATION: ICD-10-CM

## 2019-01-31 DIAGNOSIS — G89.29 OTHER CHRONIC PAIN: Primary | ICD-10-CM

## 2019-01-31 DIAGNOSIS — M25.511 CHRONIC RIGHT SHOULDER PAIN: ICD-10-CM

## 2019-01-31 DIAGNOSIS — G89.29 CHRONIC RIGHT SHOULDER PAIN: ICD-10-CM

## 2019-01-31 PROCEDURE — 99214 OFFICE O/P EST MOD 30 MIN: CPT | Performed by: NURSE PRACTITIONER

## 2019-01-31 RX ORDER — HYDROCODONE BITARTRATE AND ACETAMINOPHEN 10; 325 MG/1; MG/1
1 TABLET ORAL EVERY 4 HOURS PRN
Qty: 180 TABLET | Refills: 0 | Status: SHIPPED | OUTPATIENT
Start: 2019-01-31 | End: 2019-02-28 | Stop reason: SDUPTHER

## 2019-01-31 NOTE — PROGRESS NOTES
CHIEF COMPLAINT  F/U back and shoulder pain. Pt had surgery on the 10th and states his shoulder and back pain have decreased since the last visit.     Subjective   Chevy Goodwin is a 78 y.o. male  who presents to the office for follow-up.He has a history of chronic back pain. ALso has a history of right shoulder pain and is s/p surgery 1-10-19. He admits his activity has been down since surgery. Is going to PT twice weekly.  Was given temporary prescription of Percocet 7.5/325 after surgery.     Complains of pain in his back and right shoulder. Today his pain is 5/10VAS.  Describes the pain as intermittent throbbing. Pain increases with activity, movement, PT; pain decreases with sitting, rest, medication.  Continues with Hydrocodone 10/3254-6/day. Denies any side effects from the regimen. The regimen helps decrease his pain by 50%. ADL's by self.   Presents with wife who helps with history.   Back Pain   This is a chronic (history of lumbar fusion with Dr. Yuan at [facility], was done in 2005) problem. The current episode started more than 1 year ago. The problem occurs constantly. The problem has been waxing and waning (improved since last office visit) since onset. The pain is present in the lumbar spine and thoracic spine. The quality of the pain is described as aching and burning. The pain radiates to the right foot and left foot. The pain is at a severity of 5/10. The pain is moderate. The pain is worse during the day. The symptoms are aggravated by bending and twisting (walking, any physical activity). Stiffness is present in the morning. Associated symptoms include numbness (fingers in right hand-states he was told this will go away) and weakness. Pertinent negatives include no bladder incontinence, bowel incontinence, chest pain, dysuria, fever, headaches or tingling. Risk factors include lack of exercise. He has tried NSAIDs (previous TESI.  Hydrocodone) for the symptoms. The treatment provided  "moderate relief.   Pain   This is a chronic problem. The current episode started more than 1 year ago. The problem occurs constantly. The problem has been gradually worsening (RIGHT SHOULDER--- improved--surgery 1-10-19). Associated symptoms include arthralgias, numbness (fingers in right hand-states he was told this will go away) and weakness. Pertinent negatives include no chest pain, chills, fever, headaches, nausea or vomiting.      PEG Assessment   What number best describes your pain on average in the past week?5  What number best describes how, during the past week, pain has interfered with your enjoyment of life?5  What number best describes how, during the past week, pain has interfered with your general activity?  5    The following portions of the patient's history were reviewed and updated as appropriate: allergies, current medications, past family history, past medical history, past social history, past surgical history and problem list.    Review of Systems   Constitutional: Negative for chills and fever.   Respiratory: Negative for shortness of breath.    Cardiovascular: Negative for chest pain.   Gastrointestinal: Negative for bowel incontinence, constipation, diarrhea, nausea and vomiting.   Genitourinary: Negative for bladder incontinence, difficulty urinating, dysuria and enuresis.   Musculoskeletal: Positive for arthralgias and back pain.   Neurological: Positive for weakness and numbness (fingers in right hand-states he was told this will go away). Negative for dizziness, tingling, light-headedness and headaches.   Psychiatric/Behavioral: Negative for agitation, confusion, hallucinations, self-injury, sleep disturbance and suicidal ideas. The patient is not nervous/anxious.        Vitals:    01/31/19 1342   BP: 127/58   Pulse: 53   Resp: 15   Temp: 98.3 °F (36.8 °C)   SpO2: 93%   Weight: 95.3 kg (210 lb)   Height: 172.7 cm (67.99\")   PainSc:   5   PainLoc: Back     Objective   Physical Exam "   Constitutional: He is oriented to person, place, and time. He appears well-developed and well-nourished. He is cooperative.   HENT:   Head: Normocephalic and atraumatic.   Nose: Nose normal.   Eyes: Conjunctivae and lids are normal.   Neck: Trachea normal. Neck supple.   Cardiovascular: Normal rate.   Pulmonary/Chest: Effort normal.   Musculoskeletal:        Left shoulder: He exhibits decreased range of motion and tenderness.        Thoracic back: He exhibits tenderness and pain.        Lumbar back: He exhibits tenderness and pain.   Sling of RUE  Minimal lumbar facet tenderness     Neurological: He is alert and oriented to person, place, and time. Gait (ambulates with cane) abnormal.   Reflex Scores:       Patellar reflexes are 0 on the right side and 0 on the left side.  Skin: Skin is intact.   Psychiatric: He has a normal mood and affect. His speech is normal and behavior is normal. Cognition and memory are normal.   Nursing note and vitals reviewed.      Assessment/Plan   Chevy was seen today for back pain.    Diagnoses and all orders for this visit:    Other chronic pain    Thoracic disc herniation T9-T10    Postlaminectomy syndrome of lumbar region    Encounter for long-term current use of high risk medication    Chronic right shoulder pain    Other orders  -     HYDROcodone-acetaminophen (NORCO)  MG per tablet; Take 1 tablet by mouth Every 4 (Four) Hours As Needed for Severe Pain . DNF until 2-3-19      --- The urine drug screen confirmation from 12-13-18 has been reviewed and the result is APPROPRIATE based on patient history and MARIOLA report  --- Refill Hydrocodone. Patient appears stable with current regimen. No adverse effects. Regarding continuation of opioids, there is no evidence of aberrant behavior or any red flags.  The patient continues with appropriate response to opioid therapy. ADL's remain intact by self.  Discussed trying to wean down a little at 90 days post-op.  --- Follow-up 1 month  or sooner if needed.       MARIOLA REPORT    As part of the patient's treatment plan, I am prescribing controlled substances. The patient has been made aware of appropriate use of such medications, including potential risk of somnolence, limited ability to drive and/or work safely, and the potential for dependence or overdose. It has also bee made clear that these medications are for use by this patient only, without concomitant use of alcohol or other substances unless prescribed.     Patient has completed prescribing agreement detailing terms of continued prescribing of controlled substances, including monitoring MARIOLA reports, urine drug screening, and pill counts if necessary. The patient is aware that inappropriate use will results in cessation of prescribing such medications.    MARIOLA report has been reviewed and scanned into the patient's chart.    As the clinician, I personally reviewed the MARIOLA from 1-28-19 while the patient was in the office today.    History and physical exam exhibit continued safe and appropriate use of controlled substances.      EMR Dragon/Transcription disclaimer:   Much of this encounter note is an electronic transcription/translation of spoken language to printed text. The electronic translation of spoken language may permit erroneous, or at times, nonsensical words or phrases to be inadvertently transcribed; Although I have reviewed the note for such errors, some may still exist.

## 2019-02-20 ENCOUNTER — OFFICE VISIT (OUTPATIENT)
Dept: SLEEP MEDICINE | Facility: HOSPITAL | Age: 79
End: 2019-02-20
Attending: INTERNAL MEDICINE

## 2019-02-20 VITALS
BODY MASS INDEX: 32.13 KG/M2 | OXYGEN SATURATION: 97 % | DIASTOLIC BLOOD PRESSURE: 58 MMHG | WEIGHT: 212 LBS | HEART RATE: 57 BPM | HEIGHT: 68 IN | SYSTOLIC BLOOD PRESSURE: 122 MMHG

## 2019-02-20 DIAGNOSIS — G47.14 HYPERSOMNIA DUE TO MEDICAL CONDITION: Primary | ICD-10-CM

## 2019-02-20 DIAGNOSIS — G47.33 OSA TREATED WITH BIPAP: ICD-10-CM

## 2019-02-20 PROCEDURE — 99213 OFFICE O/P EST LOW 20 MIN: CPT | Performed by: INTERNAL MEDICINE

## 2019-02-20 PROCEDURE — G0463 HOSPITAL OUTPT CLINIC VISIT: HCPCS

## 2019-02-20 NOTE — PROGRESS NOTES
"Follow Up [facility] Note     Chief Complaint:  JULIA     Primary Care Physician: Anthony Gutierrez MD    Interval History:   I last saw the patient one year ago.  He is stable without new complaints.  He goes to bed at 11 PM and awakens at 10 AM.  He will get up once and use the bathroom at night.  [place] Sleepiness Scale is abnormal at 14.    Patient did have a reverse right shoulder replacement 1/10/2019    Review of Systems:  Recorded on the Sleep Questionnaire.  Unremarkable except for CHANDLER    Social History:    Social History     Socioeconomic History   • Marital status:      Spouse name: Not on file   • Number of children: Not on file   • Years of education: Not on file   • Highest education level: Not on file   Tobacco Use   • Smoking status: Current Every Day Smoker     Years: 60.00     Types: Cigars   • Smokeless tobacco: Never Used   • Tobacco comment: QUIT 1999 CIGARETTES/STILL SMOKES CIGARS   Substance and Sexual Activity   • Alcohol use: Yes     Comment: VERY RARE       Allergies:  Contrast dye; Dye fdc red [red dye]; Iodine; Oxycodone; and Adhesive tape     Medication Review: His list was reviewed.      Vital Signs:    Vitals:    02/20/19 1100   BP: 122/58   Pulse: 57   SpO2: 97%   Weight: 96.2 kg (212 lb)   Height: 172.5 cm (67.9\")     Body mass index is 32.33 kg/m².    Physical Exam:    Constitutional:  Well developed 78 y.o. male that appears in no apparent distress.  Awake & oriented times 3.  Normal mood with normal recent and remote memory and normal judgement.  Eyes:  Conjunctivae normal.  Oropharynx:  moist mucous membranes without exudate and a large tongue and class III-4 MP airway     Results Review:  DME is [facility] and he uses a fullface mask.  Downloads between 11/22 through 2/19/2019 compliance 98%.  Average usage is 8 hours and 58 minutes.  Average AHI is normal without leak.  BiPAP pressure is IPAP of 20 and EPAP of 16.       Impression:   Obstructive sleep " apnea adequately treated with BiPAP 20/16 with good compliance and usage and no complaints of hypersomnolence.    Plan:  Good sleep hygiene measures should be maintained.  Weight loss would be beneficial in this patient who is obese by BMI.  The patient is benefiting from the treatment being provided.     The patient will continue BiPAP as he is doing.  The patient did complain about his full facemask and the sleep disorder staff did show him a new Air Touch F 20, medium.  A new prescription will be sent to his DME.    The patient will call for any problems and will follow up in 1 year.      Mulugeta Odonnell MD  Sleep Medicine  02/23/19  8:42 PM

## 2019-02-28 ENCOUNTER — OFFICE VISIT (OUTPATIENT)
Dept: PAIN MEDICINE | Facility: CLINIC | Age: 79
End: 2019-02-28

## 2019-02-28 VITALS
HEIGHT: 68 IN | TEMPERATURE: 98 F | DIASTOLIC BLOOD PRESSURE: 66 MMHG | OXYGEN SATURATION: 95 % | HEART RATE: 55 BPM | WEIGHT: 211 LBS | SYSTOLIC BLOOD PRESSURE: 149 MMHG | RESPIRATION RATE: 16 BRPM | BODY MASS INDEX: 31.98 KG/M2

## 2019-02-28 DIAGNOSIS — M96.1 POSTLAMINECTOMY SYNDROME OF LUMBAR REGION: ICD-10-CM

## 2019-02-28 DIAGNOSIS — M51.24 THORACIC DISC HERNIATION: ICD-10-CM

## 2019-02-28 DIAGNOSIS — M25.511 CHRONIC RIGHT SHOULDER PAIN: ICD-10-CM

## 2019-02-28 DIAGNOSIS — G89.29 CHRONIC RIGHT SHOULDER PAIN: ICD-10-CM

## 2019-02-28 DIAGNOSIS — Z79.899 ENCOUNTER FOR LONG-TERM CURRENT USE OF HIGH RISK MEDICATION: ICD-10-CM

## 2019-02-28 DIAGNOSIS — G89.29 OTHER CHRONIC PAIN: Primary | ICD-10-CM

## 2019-02-28 PROCEDURE — 99213 OFFICE O/P EST LOW 20 MIN: CPT | Performed by: NURSE PRACTITIONER

## 2019-02-28 RX ORDER — HYDROCODONE BITARTRATE AND ACETAMINOPHEN 10; 325 MG/1; MG/1
1 TABLET ORAL EVERY 4 HOURS PRN
Qty: 135 TABLET | Refills: 0 | Status: SHIPPED | OUTPATIENT
Start: 2019-02-28 | End: 2019-03-28 | Stop reason: SDUPTHER

## 2019-02-28 NOTE — PROGRESS NOTES
CHIEF COMPLAINT  Pt states his mid to lower back pain bilaterally with bilateral leg pain /numbness continues to be basically unchanged since 1/31/19 office visit.    Subjective   Chevy Goodwin is a 78 y.o. male  who presents to the office for follow-up.He has a history of chronic back and shoulder pain. Reports his pain is IMPROVED  Since last office visit.  Returned prescription from office visit 1-31-19. Has been working on decreasing his medication. Reports taking 4-5/day now.     Complains of pain in his mid back and right shoulder. His right shoulder pain is improved. Today his pain is 5/10VAS. His mid back pain is his primary complaint. Continues with Hydrocodone 10/325 456/day. Denies any side effects from the regimen. The regimen helps decrease his pain mdoeraetly. ADL's by self.   Presents with wife who helps with history.    Back Pain   This is a chronic (history of lumbar fusion with Dr. Yuan at [facility], was done in 2005) problem. The current episode started more than 1 year ago. The problem occurs constantly. The problem has been waxing and waning (unchanged since last office visit) since onset. The pain is present in the lumbar spine and thoracic spine. The quality of the pain is described as aching and burning. The pain radiates to the right foot and left foot. The pain is at a severity of 5/10 (pain ranges from 4-7/10VAS). The pain is moderate. The pain is worse during the day. The symptoms are aggravated by bending and twisting (walking, any physical activity). Stiffness is present in the morning. Associated symptoms include numbness (fingers in right hand-states he was told this will go away and lower legs bilat.) and weakness. Pertinent negatives include no bladder incontinence, bowel incontinence, chest pain, dysuria, fever, headaches or tingling. Risk factors include lack of exercise. He has tried NSAIDs (previous TESI.  Hydrocodone) for the symptoms. The treatment provided moderate  "relief.   Pain   This is a chronic problem. The current episode started more than 1 year ago. The problem occurs constantly. The problem has been gradually worsening (RIGHT SHOULDER--- improved--surgery 1-10-19). Associated symptoms include arthralgias, numbness (fingers in right hand-states he was told this will go away and lower legs bilat.) and weakness. Pertinent negatives include no chest pain, chills, fever, headaches, nausea or vomiting.      PEG Assessment   What number best describes your pain on average in the past week?5  What number best describes how, during the past week, pain has interfered with your enjoyment of life?5  What number best describes how, during the past week, pain has interfered with your general activity?  5    The following portions of the patient's history were reviewed and updated as appropriate: allergies, current medications, past family history, past medical history, past social history, past surgical history and problem list.    Review of Systems   Constitutional: Negative for activity change, chills and fever.   Respiratory: Negative for shortness of breath.    Cardiovascular: Negative for chest pain.   Gastrointestinal: Negative for bowel incontinence, constipation, diarrhea, nausea and vomiting.   Genitourinary: Negative for bladder incontinence, difficulty urinating, dysuria and enuresis.   Musculoskeletal: Positive for arthralgias and back pain.   Neurological: Positive for weakness and numbness (fingers in right hand-states he was told this will go away and lower legs bilat.). Negative for dizziness, tingling, light-headedness and headaches.   Psychiatric/Behavioral: Positive for sleep disturbance. Negative for agitation, confusion, hallucinations, self-injury and suicidal ideas. The patient is not nervous/anxious.        Vitals:    02/28/19 1542   BP: 149/66   Pulse: 55   Resp: 16   Temp: 98 °F (36.7 °C)   SpO2: 95%   Weight: 95.7 kg (211 lb)   Height: 172.5 cm (67.91\") "   PainSc: 5  Comment: LBP ranges from 4-7/10     Objective   Physical Exam   Constitutional: He is oriented to person, place, and time. He appears well-developed and well-nourished. He is cooperative.   HENT:   Head: Normocephalic and atraumatic.   Nose: Nose normal.   Eyes: Conjunctivae and lids are normal.   Neck: Trachea normal. Neck supple.   Cardiovascular: Normal rate.   Pulmonary/Chest: Effort normal.   Musculoskeletal:        Thoracic back: He exhibits tenderness and pain.        Lumbar back: He exhibits tenderness and pain.   Minimal lumbar facet tenderness     Neurological: He is alert and oriented to person, place, and time. Gait (ambulates with cane) abnormal.   Reflex Scores:       Patellar reflexes are 0 on the right side and 0 on the left side.  Skin: Skin is intact.   Psychiatric: He has a normal mood and affect. His speech is normal and behavior is normal. Cognition and memory are normal.   Nursing note and vitals reviewed.      Assessment/Plan   Chevy was seen today for back pain.    Diagnoses and all orders for this visit:    Other chronic pain    Chronic right shoulder pain    Thoracic disc herniation T9-T10    Encounter for long-term current use of high risk medication    Postlaminectomy syndrome of lumbar region    Other orders  -     HYDROcodone-acetaminophen (NORCO)  MG per tablet; Take 1 tablet by mouth Every 4 (Four) Hours As Needed for Severe Pain . NTE-5/day      --- The urine drug screen confirmation from 12-13-18 has been reviewed and the result is APPROPRIATE based on patient history and MARIOLA report  --- REfill Hydrocodone but change to #135. Patient appears stable with current regimen. No adverse effects. Regarding continuation of opioids, there is no evidence of aberrant behavior or any red flags.  The patient continues with appropriate response to opioid therapy. ADL's remain intact by self.   --- Follow-up 1 month or sooner if needed.       MARIOLA REPORT    As part of the  patient's treatment plan, I am prescribing controlled substances. The patient has been made aware of appropriate use of such medications, including potential risk of somnolence, limited ability to drive and/or work safely, and the potential for dependence or overdose. It has also bee made clear that these medications are for use by this patient only, without concomitant use of alcohol or other substances unless prescribed.     Patient has completed prescribing agreement detailing terms of continued prescribing of controlled substances, including monitoring MARIOLA reports, urine drug screening, and pill counts if necessary. The patient is aware that inappropriate use will results in cessation of prescribing such medications.    MARIOLA report has been reviewed and scanned into the patient's chart.    As the clinician, I personally reviewed the MARIOLA from 2-27-19 while the patient was in the office today.    History and physical exam exhibit continued safe and appropriate use of controlled substances.      EMR Dragon/Transcription disclaimer:   Much of this encounter note is an electronic transcription/translation of spoken language to printed text. The electronic translation of spoken language may permit erroneous, or at times, nonsensical words or phrases to be inadvertently transcribed; Although I have reviewed the note for such errors, some may still exist.

## 2019-03-11 ENCOUNTER — TELEPHONE (OUTPATIENT)
Dept: SLEEP MEDICINE | Facility: HOSPITAL | Age: 79
End: 2019-03-11

## 2019-03-11 NOTE — TELEPHONE ENCOUNTER
Patient called to report that he cannot tolerate using the mask the BANDAR Spears PSGT gave him during mask fit while in clinic.  Pt absolutely does not want to have that mask ordered from [facility] he states.  Pt wants Dr. Odonnell to send orders for his previous mask, Res Med Mirage Quattro Medium to [facility].  Mask order obtained and faxed to [facility]. jbo

## 2019-03-28 ENCOUNTER — OFFICE VISIT (OUTPATIENT)
Dept: PAIN MEDICINE | Facility: CLINIC | Age: 79
End: 2019-03-28

## 2019-03-28 VITALS
WEIGHT: 213 LBS | OXYGEN SATURATION: 94 % | TEMPERATURE: 98.4 F | DIASTOLIC BLOOD PRESSURE: 68 MMHG | SYSTOLIC BLOOD PRESSURE: 158 MMHG | HEIGHT: 68 IN | BODY MASS INDEX: 32.28 KG/M2 | RESPIRATION RATE: 18 BRPM | HEART RATE: 55 BPM

## 2019-03-28 DIAGNOSIS — M25.511 CHRONIC RIGHT SHOULDER PAIN: ICD-10-CM

## 2019-03-28 DIAGNOSIS — M51.36 DEGENERATION OF INTERVERTEBRAL DISC OF LUMBAR REGION: ICD-10-CM

## 2019-03-28 DIAGNOSIS — Z79.891 LONG TERM (CURRENT) USE OF OPIATE ANALGESIC: ICD-10-CM

## 2019-03-28 DIAGNOSIS — M51.24 THORACIC DISC HERNIATION: ICD-10-CM

## 2019-03-28 DIAGNOSIS — G89.29 CHRONIC RIGHT SHOULDER PAIN: ICD-10-CM

## 2019-03-28 DIAGNOSIS — M96.1 POSTLAMINECTOMY SYNDROME OF LUMBAR REGION: ICD-10-CM

## 2019-03-28 DIAGNOSIS — G89.29 OTHER CHRONIC PAIN: Primary | ICD-10-CM

## 2019-03-28 PROBLEM — Z79.899 ENCOUNTER FOR LONG-TERM CURRENT USE OF HIGH RISK MEDICATION: Status: RESOLVED | Noted: 2017-01-24 | Resolved: 2019-03-28

## 2019-03-28 PROCEDURE — 99213 OFFICE O/P EST LOW 20 MIN: CPT | Performed by: NURSE PRACTITIONER

## 2019-03-28 RX ORDER — LEVOTHYROXINE SODIUM 0.05 MG/1
50 TABLET ORAL DAILY
Refills: 0 | COMMUNITY
Start: 2019-03-09 | End: 2021-01-01 | Stop reason: SDUPTHER

## 2019-03-28 RX ORDER — HYDROCODONE BITARTRATE AND ACETAMINOPHEN 10; 325 MG/1; MG/1
1 TABLET ORAL EVERY 4 HOURS PRN
Qty: 135 TABLET | Refills: 0 | Status: SHIPPED | OUTPATIENT
Start: 2019-03-28 | End: 2019-04-29 | Stop reason: SDUPTHER

## 2019-03-28 NOTE — PROGRESS NOTES
CHIEF COMPLAINT  Follow-up for back pain. Mr. Goodwin states that his back pain is unchanged.    Subjective   Chevy Goodwin is a 78 y.o. male  who presents to the office for follow-up.He has a history of chronic back and right shoulder pain. Reports his pain is unchanged since last office visit.    Complains of pain in his back (mid and low) and right shoulder. Today his pain is 6/10VAS.  Describes his pain as continuous aching and throbbing. Pain increase with walking, standing; pain decreases iwth medication and rest. Continues with Hydrocodone 10/325 4-6/day. Denies any side effects from the regimen. The regimen helps decrease his pain by 60%. ADL's by self. Denies any bowel or bladder incontinence. Was able to garden yesterday.     HAd recent lab-work. Had elevated Potassium. Treated with Kayexelate. Also had slightly elevated BUN and CR. Referred to nephrologist. Awaiting appointment.     He completed a Bilateral SI joint injection   on  2-1-18 performed by Dr. Brambila for management of back pain. Patient reports 25% relief from the procedure for 8 hours. He completed a Thoracic Epidural   on  12/5/17 performed by Dr. Brambila for management of back pain. Patient reports no relief from the procedure.   Back Pain   This is a chronic (history of lumbar fusion with Dr. Yuan at [facility], was done in 2005) problem. The current episode started more than 1 year ago. The problem occurs constantly. The problem has been waxing and waning (unchanged since last office visit) since onset. The pain is present in the lumbar spine and thoracic spine. The quality of the pain is described as aching and burning. The pain radiates to the right foot and left foot. The pain is at a severity of 6/10 (pain ranges from 4-7/10VAS). The pain is moderate. The pain is worse during the day. The symptoms are aggravated by bending and twisting (walking, any physical activity). Stiffness is present in the morning. Associated symptoms  include numbness (bilateral shins). Pertinent negatives include no bladder incontinence, bowel incontinence, chest pain, dysuria, fever, headaches, tingling or weakness. Risk factors include lack of exercise. He has tried NSAIDs (previous TESI.  Hydrocodone) for the symptoms. The treatment provided moderate relief.   Pain   This is a chronic problem. The current episode started more than 1 year ago. Progression since onset: RIGHT SHOULDER--- improved--surgery 1-10-19. Associated symptoms include arthralgias and numbness (bilateral shins). Pertinent negatives include no chest pain, chills, congestion, coughing, fatigue, fever, headaches, nausea, vomiting or weakness.      PEG Assessment   What number best describes your pain on average in the past week?6  What number best describes how, during the past week, pain has interfered with your enjoyment of life?5  What number best describes how, during the past week, pain has interfered with your general activity?  6    The following portions of the patient's history were reviewed and updated as appropriate: allergies, current medications, past family history, past medical history, past social history, past surgical history and problem list.    Review of Systems   Constitutional: Negative for chills, fatigue and fever.   HENT: Negative for congestion.    Eyes: Negative for visual disturbance.   Respiratory: Negative for cough, shortness of breath and wheezing.    Cardiovascular: Negative.  Negative for chest pain.   Gastrointestinal: Negative for bowel incontinence, constipation, diarrhea, nausea and vomiting.   Genitourinary: Negative for bladder incontinence, difficulty urinating and dysuria.   Musculoskeletal: Positive for arthralgias and back pain.   Neurological: Positive for numbness (bilateral shins). Negative for tingling, weakness and headaches.   Psychiatric/Behavioral: Positive for sleep disturbance. Negative for suicidal ideas. The patient is not  "nervous/anxious.        Vitals:    03/28/19 1528   BP: 158/68   Pulse: 55   Resp: 18   Temp: 98.4 °F (36.9 °C)   SpO2: 94%   Weight: 96.6 kg (213 lb)   Height: 172.5 cm (67.91\")   PainSc:   6   PainLoc: Back     Objective   Physical Exam   Constitutional: He is oriented to person, place, and time. He appears well-developed and well-nourished. He is cooperative.   HENT:   Head: Normocephalic and atraumatic.   Nose: Nose normal.   Eyes: Conjunctivae and lids are normal.   Neck: Trachea normal. Neck supple.   Cardiovascular: Normal rate.   Pulmonary/Chest: Effort normal.   Musculoskeletal:        Right shoulder: He exhibits tenderness.        Thoracic back: He exhibits tenderness and pain.        Lumbar back: He exhibits tenderness and pain.   Minimal lumbar facet tenderness     Neurological: He is alert and oriented to person, place, and time. Gait (ambulates with cane) abnormal.   Reflex Scores:       Patellar reflexes are 0 on the right side and 0 on the left side.  Skin: Skin is intact.   Psychiatric: He has a normal mood and affect. His speech is normal and behavior is normal. Cognition and memory are normal.   Nursing note and vitals reviewed.    Assessment/Plan   Chevy was seen today for back pain.    Diagnoses and all orders for this visit:    Other chronic pain    Degeneration of intervertebral disc of lumbar region    Thoracic disc herniation T9-T10    Chronic right shoulder pain    Postlaminectomy syndrome of lumbar region    Long term (current) use of opiate analgesic    Other orders  -     HYDROcodone-acetaminophen (NORCO)  MG per tablet; Take 1 tablet by mouth Every 4 (Four) Hours As Needed for Severe Pain . NTE-5/day      --- The urine drug screen confirmation from 12-13-18 has been reviewed and the result is APPROPRIATE based on patient history and MARIOLA report  --- Refill Hydrocodone. Patient appears stable with current regimen. No adverse effects. Regarding continuation of opioids, there is no " evidence of aberrant behavior or any red flags.  The patient continues with appropriate response to opioid therapy. ADL's remain intact by self.  WILL BRING BOTTLE TO NEXT OFFICE VISIT.  --- Follow-up 1 month or sooner if needed     MARIOLA REPORT    As part of the patient's treatment plan, I am prescribing controlled substances. The patient has been made aware of appropriate use of such medications, including potential risk of somnolence, limited ability to drive and/or work safely, and the potential for dependence or overdose. It has also bee made clear that these medications are for use by this patient only, without concomitant use of alcohol or other substances unless prescribed.     Patient has completed prescribing agreement detailing terms of continued prescribing of controlled substances, including monitoring MARIOLA reports, urine drug screening, and pill counts if necessary. The patient is aware that inappropriate use will results in cessation of prescribing such medications.    MARIOLA report has been reviewed and scanned into the patient's chart.    As the clinician, I personally reviewed the MARIOLA from 3-27-19 while the patient was in the office today.    History and physical exam exhibit continued safe and appropriate use of controlled substances.      EMR Dragon/Transcription disclaimer:   Much of this encounter note is an electronic transcription/translation of spoken language to printed text. The electronic translation of spoken language may permit erroneous, or at times, nonsensical words or phrases to be inadvertently transcribed; Although I have reviewed the note for such errors, some may still exist.

## 2019-04-29 ENCOUNTER — OFFICE VISIT (OUTPATIENT)
Dept: PAIN MEDICINE | Facility: CLINIC | Age: 79
End: 2019-04-29

## 2019-04-29 VITALS
HEART RATE: 55 BPM | DIASTOLIC BLOOD PRESSURE: 69 MMHG | BODY MASS INDEX: 32.1 KG/M2 | WEIGHT: 211.8 LBS | RESPIRATION RATE: 18 BRPM | HEIGHT: 68 IN | OXYGEN SATURATION: 93 % | SYSTOLIC BLOOD PRESSURE: 156 MMHG | TEMPERATURE: 98.8 F

## 2019-04-29 DIAGNOSIS — G89.29 OTHER CHRONIC PAIN: Primary | ICD-10-CM

## 2019-04-29 DIAGNOSIS — M96.1 POSTLAMINECTOMY SYNDROME OF LUMBAR REGION: ICD-10-CM

## 2019-04-29 DIAGNOSIS — Z79.891 LONG TERM (CURRENT) USE OF OPIATE ANALGESIC: ICD-10-CM

## 2019-04-29 DIAGNOSIS — M25.511 CHRONIC RIGHT SHOULDER PAIN: ICD-10-CM

## 2019-04-29 DIAGNOSIS — M51.36 DEGENERATION OF INTERVERTEBRAL DISC OF LUMBAR REGION: ICD-10-CM

## 2019-04-29 DIAGNOSIS — M51.24 THORACIC DISC HERNIATION: ICD-10-CM

## 2019-04-29 DIAGNOSIS — G89.29 CHRONIC RIGHT SHOULDER PAIN: ICD-10-CM

## 2019-04-29 PROCEDURE — 99213 OFFICE O/P EST LOW 20 MIN: CPT | Performed by: NURSE PRACTITIONER

## 2019-04-29 RX ORDER — PANTOPRAZOLE SODIUM 40 MG/1
TABLET, DELAYED RELEASE ORAL
COMMUNITY
Start: 2019-04-04 | End: 2020-01-22 | Stop reason: SDUPTHER

## 2019-04-29 RX ORDER — PRAVASTATIN SODIUM 40 MG
TABLET ORAL
COMMUNITY
Start: 2019-04-04 | End: 2020-01-22 | Stop reason: SDUPTHER

## 2019-04-29 RX ORDER — HYDROCODONE BITARTRATE AND ACETAMINOPHEN 10; 325 MG/1; MG/1
1 TABLET ORAL EVERY 4 HOURS PRN
Qty: 150 TABLET | Refills: 0 | Status: SHIPPED | OUTPATIENT
Start: 2019-04-29 | End: 2019-05-29 | Stop reason: SDUPTHER

## 2019-05-29 ENCOUNTER — OFFICE VISIT (OUTPATIENT)
Dept: PAIN MEDICINE | Facility: CLINIC | Age: 79
End: 2019-05-29

## 2019-05-29 VITALS
SYSTOLIC BLOOD PRESSURE: 142 MMHG | OXYGEN SATURATION: 93 % | DIASTOLIC BLOOD PRESSURE: 68 MMHG | RESPIRATION RATE: 16 BRPM | HEART RATE: 69 BPM | HEIGHT: 68 IN | WEIGHT: 209.6 LBS | BODY MASS INDEX: 31.77 KG/M2 | TEMPERATURE: 98.4 F

## 2019-05-29 DIAGNOSIS — Z79.891 LONG TERM (CURRENT) USE OF OPIATE ANALGESIC: ICD-10-CM

## 2019-05-29 DIAGNOSIS — G89.29 OTHER CHRONIC PAIN: Primary | ICD-10-CM

## 2019-05-29 DIAGNOSIS — M96.1 POSTLAMINECTOMY SYNDROME, CERVICAL REGION: ICD-10-CM

## 2019-05-29 DIAGNOSIS — M51.24 THORACIC DISC HERNIATION: ICD-10-CM

## 2019-05-29 DIAGNOSIS — M96.1 POSTLAMINECTOMY SYNDROME OF LUMBAR REGION: ICD-10-CM

## 2019-05-29 PROCEDURE — 99214 OFFICE O/P EST MOD 30 MIN: CPT | Performed by: NURSE PRACTITIONER

## 2019-05-29 RX ORDER — HYDROCODONE BITARTRATE AND ACETAMINOPHEN 10; 325 MG/1; MG/1
1 TABLET ORAL EVERY 4 HOURS PRN
Qty: 150 TABLET | Refills: 0 | Status: SHIPPED | OUTPATIENT
Start: 2019-05-29 | End: 2019-06-26 | Stop reason: SDUPTHER

## 2019-05-29 NOTE — PROGRESS NOTES
"CHIEF COMPLAINT  F/U back pain- patients back pain has remained unchanged but his neck pain has worsened    Subjective   Chevy Goodwin is a 78 y.o. male  who presents to the office for follow-up.He has a history of chronic back and neck pain. Reports his back pain is unchanged. However, his neck pain is worse.    Complains of pain in his low back and neck. Today his pain is 5/10VAS. Describes the pain as continuous aching and throbbing. Continues with Hydrocodone 10/325 4-5/day(more recently averaging 5/day). Denies any side effects from the regimen. He takes daily stool softener daily to be pro-active for constipation prevention. The regimen helps decrease his pain by 50%. ADL's by self. Denies any bowel or bladder incontinence.     He has a history of cervical fusion. \"but it didn't take.\" Was performed over 20 years ago with Dr Hampton. He admits it may be worse due to gardening. \"When I get done, it will probably get better.\"    Having issues with his pharmacy at [facility] not reporting to his refill to [facility]. He did bring his bottle today. He gets his medication filled for 2 months in a row at [facility]. He then goes every third month to a retail pharmacy.      Is pending evaluation with nephrology.   He completed a Bilateral SI joint injection   on  2-1-18 performed by Dr. Brambila for management of back pain. Patient reports 25% relief from the procedure for 8 hours. He completed a Thoracic Epidural   on  12/5/17 performed by Dr. Brambila for management of back pain. Patient reports no relief from the procedure.   Back Pain   This is a chronic (history of lumbar fusion with Dr. Yuan at [facility], was done in 2005) problem. The current episode started more than 1 year ago. The problem occurs constantly. The problem has been waxing and waning (unchanged since last office visit) since onset. The pain is present in the lumbar spine and thoracic spine. The quality of the pain is described as " aching and burning. The pain radiates to the right foot and left foot. The pain is at a severity of 5/10. The pain is moderate. The pain is worse during the day. The symptoms are aggravated by bending and twisting (walking, any physical activity). Stiffness is present in the morning. Associated symptoms include numbness (bilateral shins). Pertinent negatives include no abdominal pain, bladder incontinence, bowel incontinence, chest pain, dysuria, fever, headaches, tingling or weakness. Risk factors include lack of exercise. He has tried NSAIDs (previous TESI.  Hydrocodone) for the symptoms. The treatment provided moderate relief.   Pain   This is a chronic problem. The current episode started more than 1 year ago. Progression since onset: RIGHT SHOULDER--- improved--surgery 1-10-19. Associated symptoms include arthralgias, joint swelling (bilateral ankles), neck pain and numbness (bilateral shins). Pertinent negatives include no abdominal pain, chest pain, chills, congestion, coughing, fever, headaches, nausea, vomiting or weakness.   Neck Pain    This is a chronic problem. The current episode started more than 1 year ago. The problem occurs 2 to 4 times per day. The problem has been waxing and waning. The pain is present in the midline, right side and left side. The quality of the pain is described as aching. The pain is at a severity of 6/10. The pain is moderate. The symptoms are aggravated by bending, position, stress and twisting. The pain is worse during the day. Stiffness is present in the morning and all day. Associated symptoms include numbness (bilateral shins). Pertinent negatives include no chest pain, fever, headaches, tingling or weakness. He has tried acetaminophen, bed rest, heat, home exercises, ice, neck support, oral narcotics and NSAIDs for the symptoms. The treatment provided mild relief.      PEG Assessment   What number best describes your pain on average in the past week?6  What number best  "describes how, during the past week, pain has interfered with your enjoyment of life?6  What number best describes how, during the past week, pain has interfered with your general activity?  6    The following portions of the patient's history were reviewed and updated as appropriate: allergies, current medications, past family history, past medical history, past social history, past surgical history and problem list.    Review of Systems   Constitutional: Negative for chills and fever.   HENT: Negative for congestion.    Eyes: Negative for visual disturbance.   Respiratory: Negative for cough, shortness of breath and wheezing.    Cardiovascular: Negative.  Negative for chest pain.   Gastrointestinal: Negative for abdominal pain, bowel incontinence, constipation, diarrhea, nausea and vomiting.   Genitourinary: Negative for bladder incontinence, difficulty urinating and dysuria.   Musculoskeletal: Positive for arthralgias, back pain, joint swelling (bilateral ankles) and neck pain.   Neurological: Positive for numbness (bilateral shins). Negative for dizziness, tingling, weakness, light-headedness and headaches.   Psychiatric/Behavioral: Positive for sleep disturbance. Negative for agitation and suicidal ideas. The patient is not nervous/anxious.        Vitals:    05/29/19 1129   BP: 142/68   Pulse: 69   Resp: 16   Temp: 98.4 °F (36.9 °C)   SpO2: 93%   Weight: 95.1 kg (209 lb 9.6 oz)   Height: 172.7 cm (68\")   PainSc:   5   PainLoc: Back     Objective   Physical Exam   Constitutional: He is oriented to person, place, and time. He appears well-developed and well-nourished. He is cooperative.   HENT:   Head: Normocephalic and atraumatic.   Nose: Nose normal.   Eyes: Conjunctivae and lids are normal.   Neck: Trachea normal. Neck supple. Spinous process tenderness (bilateral C3-C5 facet tenderness--mild to moderate) present.   Cardiovascular: Normal rate.   Pulmonary/Chest: Effort normal.   Musculoskeletal:        Right " shoulder: He exhibits tenderness.        Cervical back: He exhibits decreased range of motion, tenderness and bony tenderness.        Thoracic back: He exhibits tenderness and pain.        Lumbar back: He exhibits tenderness and pain.   Minimal lumbar facet tenderness     Neurological: He is alert and oriented to person, place, and time. Gait (ambulates with cane) abnormal.   Reflex Scores:       Bicep reflexes are 1+ on the right side and 1+ on the left side.       Brachioradialis reflexes are 1+ on the right side and 1+ on the left side.       Patellar reflexes are 0 on the right side and 0 on the left side.  Skin: Skin is intact.   Psychiatric: He has a normal mood and affect. His speech is normal and behavior is normal. Cognition and memory are normal.   Nursing note and vitals reviewed.    Assessment/Plan   Chevy was seen today for back pain.    Diagnoses and all orders for this visit:    Other chronic pain    Thoracic disc herniation T9-T10    Postlaminectomy syndrome of lumbar region    Long term (current) use of opiate analgesic    Postlaminectomy syndrome, cervical region    Other orders  -     HYDROcodone-acetaminophen (NORCO)  MG per tablet; Take 1 tablet by mouth Every 4 (Four) Hours As Needed for Severe Pain . NTE-5/day; DNF until 6-5-19      --- The urine drug screen confirmation from 12-23-18 has been reviewed and the result is APPROPRIATE based on patient history and MARIOLA report  --- Refill Hydrocodone. DNF until 6-5-19. Patient appears stable with current regimen. No adverse effects. Regarding continuation of opioids, there is no evidence of aberrant behavior or any red flags.  The patient continues with appropriate response to opioid therapy. ADL's remain intact by self.   --- Patient declined cervical MRI or interventions.  --- Follow-up 1 month Or sooner if needed.     MARIOLA REPORT    As part of the patient's treatment plan, I am prescribing controlled substances. The patient has been  made aware of appropriate use of such medications, including potential risk of somnolence, limited ability to drive and/or work safely, and the potential for dependence or overdose. It has also bee made clear that these medications are for use by this patient only, without concomitant use of alcohol or other substances unless prescribed.     Patient has completed prescribing agreement detailing terms of continued prescribing of controlled substances, including monitoring MARIOLA reports, urine drug screening, and pill counts if necessary. The patient is aware that inappropriate use will results in cessation of prescribing such medications.    MARIOLA report has been reviewed and scanned into the patient's chart.    As the clinician, I personally reviewed the MARIOLA from 5-28-19 while the patient was in the office today.    History and physical exam exhibit continued safe and appropriate use of controlled substances.      EMR Dragon/Transcription disclaimer:   Much of this encounter note is an electronic transcription/translation of spoken language to printed text. The electronic translation of spoken language may permit erroneous, or at times, nonsensical words or phrases to be inadvertently transcribed; Although I have reviewed the note for such errors, some may still exist.

## 2019-06-26 ENCOUNTER — OFFICE VISIT (OUTPATIENT)
Dept: PAIN MEDICINE | Facility: CLINIC | Age: 79
End: 2019-06-26

## 2019-06-26 VITALS
BODY MASS INDEX: 31.43 KG/M2 | SYSTOLIC BLOOD PRESSURE: 130 MMHG | RESPIRATION RATE: 16 BRPM | HEART RATE: 58 BPM | OXYGEN SATURATION: 95 % | DIASTOLIC BLOOD PRESSURE: 66 MMHG | HEIGHT: 68 IN | WEIGHT: 207.38 LBS | TEMPERATURE: 98.8 F

## 2019-06-26 DIAGNOSIS — M96.1 POSTLAMINECTOMY SYNDROME OF LUMBAR REGION: ICD-10-CM

## 2019-06-26 DIAGNOSIS — M96.1 POSTLAMINECTOMY SYNDROME, CERVICAL REGION: ICD-10-CM

## 2019-06-26 DIAGNOSIS — Z79.891 LONG TERM (CURRENT) USE OF OPIATE ANALGESIC: ICD-10-CM

## 2019-06-26 DIAGNOSIS — M51.24 THORACIC DISC HERNIATION: ICD-10-CM

## 2019-06-26 DIAGNOSIS — M51.36 DEGENERATION OF INTERVERTEBRAL DISC OF LUMBAR REGION: ICD-10-CM

## 2019-06-26 DIAGNOSIS — G89.29 OTHER CHRONIC PAIN: Primary | ICD-10-CM

## 2019-06-26 PROCEDURE — 99214 OFFICE O/P EST MOD 30 MIN: CPT | Performed by: NURSE PRACTITIONER

## 2019-06-26 RX ORDER — HYDROCODONE BITARTRATE AND ACETAMINOPHEN 10; 325 MG/1; MG/1
1 TABLET ORAL EVERY 4 HOURS PRN
Qty: 150 TABLET | Refills: 0 | Status: SHIPPED | OUTPATIENT
Start: 2019-06-26 | End: 2019-08-09 | Stop reason: HOSPADM

## 2019-06-26 NOTE — PROGRESS NOTES
"CHIEF COMPLAINT  Follow up lower back pain. Pain is the same as last visit.    Subjective   Chevy Goodwin is a 78 y.o. male  who presents to the office for follow-up.He has a history of chronic low back and neck pain. REports this is unchanged since last office visit. He admits \"the neck pain is getting worse.\" Does not want to proceed with interventions and/or surgery and/or imaging.     Complains of pain in his back and neck. Today his pain is 6/10VAS. Describes the pain as nearly continuous aching and throbbing. Pain increases with walking, standing, activity; pain decreases with medication, rest.  Continues with Hydrocodone 10/325 4-5/day(more recently averaging 5/day). Denies any side effects from the regimen. He takes daily stool softener daily to be pro-active for constipation prevention. The regimen helps decrease his pain by 50%. ADL's by self. Denies any bowel or bladder incontinence.     Having issues with his pharmacy at [facility] not reporting to his refill to [facility]. He did bring his bottle today. He gets his medication filled for 2 months in a row at [facility] pharmacy. He then goes every third month to a retail pharmacy.      Is pending evaluation with nephrology.   He completed a Bilateral SI joint injection   on  2-1-18 performed by Dr. Brambila for management of back pain. Patient reports 25% relief from the procedure for 8 hours. He completed a Thoracic Epidural   on  12/5/17 performed by Dr. Brambila for management of back pain. Patient reports no relief from the procedure.     Back Pain   This is a chronic (history of lumbar fusion with Dr. Yuan at [facility], was done in 2005) problem. The current episode started more than 1 year ago. The problem occurs constantly. The problem has been waxing and waning (unchanged since last office visit) since onset. The pain is present in the lumbar spine and thoracic spine. The quality of the pain is described as aching and burning. The pain radiates " to the right foot and left foot. The pain is at a severity of 6/10. The pain is moderate. The pain is worse during the day. The symptoms are aggravated by bending and twisting (walking, any physical activity). Stiffness is present in the morning. Associated symptoms include numbness (legs since surgery). Pertinent negatives include no abdominal pain, bladder incontinence, bowel incontinence, chest pain, dysuria, fever, headaches, tingling or weakness. Risk factors include lack of exercise. He has tried NSAIDs (previous TESI.  Hydrocodone) for the symptoms. The treatment provided moderate relief.   Neck Pain    This is a chronic problem. The current episode started more than 1 year ago. The problem occurs 2 to 4 times per day. The problem has been waxing and waning (unchanged since last office visit. ). The pain is present in the midline, right side and left side. The quality of the pain is described as aching. The pain is moderate. The symptoms are aggravated by bending, position, stress and twisting. The pain is worse during the day. Stiffness is present in the morning and all day. Associated symptoms include numbness (legs since surgery). Pertinent negatives include no chest pain, fever, headaches, tingling or weakness. He has tried acetaminophen, bed rest, heat, home exercises, ice, neck support, oral narcotics and NSAIDs for the symptoms. The treatment provided mild relief.      PEG Assessment   What number best describes your pain on average in the past week?6  What number best describes how, during the past week, pain has interfered with your enjoyment of life?6  What number best describes how, during the past week, pain has interfered with your general activity?  6    The following portions of the patient's history were reviewed and updated as appropriate: allergies, current medications, past family history, past medical history, past social history, past surgical history and problem list.    Review of  "Systems   Constitutional: Negative for chills, fatigue and fever.   HENT: Negative for congestion, ear pain, postnasal drip, sinus pressure, sinus pain, sneezing and sore throat.    Eyes: Negative for visual disturbance.   Respiratory: Negative for cough, chest tightness, shortness of breath and wheezing.    Cardiovascular: Negative for chest pain and leg swelling.   Gastrointestinal: Positive for constipation (on stool softner). Negative for abdominal pain, bowel incontinence, diarrhea, nausea and vomiting.   Genitourinary: Negative for bladder incontinence, difficulty urinating, dysuria, frequency, hematuria and urgency.   Musculoskeletal: Positive for arthralgias, back pain (lower back pain) and joint swelling (bilateral ankles). Negative for neck pain and neck stiffness.   Neurological: Positive for numbness (legs since surgery). Negative for dizziness, tingling, syncope, weakness, light-headedness and headaches.   Psychiatric/Behavioral: Negative for confusion, self-injury and suicidal ideas.       Vitals:    06/26/19 1250   BP: 130/66   Pulse: 58   Resp: 16   Temp: 98.8 °F (37.1 °C)   SpO2: 95%   Weight: 94.1 kg (207 lb 6 oz)   Height: 172.7 cm (67.99\")   PainSc:   6     Objective   Physical Exam   Constitutional: He is oriented to person, place, and time. He appears well-developed and well-nourished. He is cooperative.   HENT:   Head: Normocephalic and atraumatic.   Nose: Nose normal.   Eyes: Conjunctivae and lids are normal.   Neck: Trachea normal. Neck supple. Spinous process tenderness (bilateral C3-C5 facet tenderness--mild to moderate) present.   Cardiovascular: Normal rate.   Pulmonary/Chest: Effort normal.   Musculoskeletal:        Right shoulder: He exhibits tenderness. He exhibits normal range of motion.        Cervical back: He exhibits decreased range of motion, tenderness and bony tenderness.        Thoracic back: He exhibits tenderness and pain.        Lumbar back: He exhibits tenderness and " pain.   Minimal lumbar facet tenderness     Neurological: He is alert and oriented to person, place, and time. Gait (ambulates with cane) abnormal.   Reflex Scores:       Bicep reflexes are 1+ on the right side and 1+ on the left side.       Brachioradialis reflexes are 1+ on the right side and 1+ on the left side.       Patellar reflexes are 0 on the right side and 0 on the left side.  Skin: Skin is intact.   Psychiatric: He has a normal mood and affect. His speech is normal and behavior is normal. Cognition and memory are normal.   Nursing note and vitals reviewed.      Assessment/Plan   Diagnoses and all orders for this visit:    Other chronic pain    Thoracic disc herniation T9-T10    Degeneration of intervertebral disc of lumbar region    Postlaminectomy syndrome of lumbar region    Postlaminectomy syndrome, cervical region    Long term (current) use of opiate analgesic    Other orders  -     HYDROcodone-acetaminophen (NORCO)  MG per tablet; Take 1 tablet by mouth Every 4 (Four) Hours As Needed for Severe Pain . NTE-5/day; DNF until 7-5-19      --- The urine drug screen confirmation from 12-23-18 has been reviewed and the result is APPROPRIATE based on patient history and MARIOLA report  --- Refill Hydrocodone DNF until 7-5-19. Patient appears stable with current regimen. No adverse effects. Regarding continuation of opioids, there is no evidence of aberrant behavior or any red flags.  The patient continues with appropriate response to opioid therapy. ADL's remain intact by self.   --- Follow-up 4-6 weeks or sooner if needed.       MARIOLA REPORT    As part of the patient's treatment plan, I am prescribing controlled substances. The patient has been made aware of appropriate use of such medications, including potential risk of somnolence, limited ability to drive and/or work safely, and the potential for dependence or overdose. It has also bee made clear that these medications are for use by this patient  only, without concomitant use of alcohol or other substances unless prescribed.     Patient has completed prescribing agreement detailing terms of continued prescribing of controlled substances, including monitoring MARIOLA reports, urine drug screening, and pill counts if necessary. The patient is aware that inappropriate use will results in cessation of prescribing such medications.    MARIOLA report has been reviewed and scanned into the patient's chart.    As the clinician, I personally reviewed the MARIOLA from 6-25-19 while the patient was in the office today.    History and physical exam exhibit continued safe and appropriate use of controlled substances.      EMR Dragon/Transcription disclaimer:   Much of this encounter note is an electronic transcription/translation of spoken language to printed text. The electronic translation of spoken language may permit erroneous, or at times, nonsensical words or phrases to be inadvertently transcribed; Although I have reviewed the note for such errors, some may still exist.

## 2019-07-08 ENCOUNTER — HOSPITAL ENCOUNTER (OUTPATIENT)
Dept: GENERAL RADIOLOGY | Facility: HOSPITAL | Age: 79
Discharge: HOME | End: 2019-07-08
Admitting: NURSE PRACTITIONER

## 2019-07-08 ENCOUNTER — TELEPHONE (OUTPATIENT)
Dept: PAIN MEDICINE | Facility: CLINIC | Age: 79
End: 2019-07-08

## 2019-07-08 DIAGNOSIS — M54.2 NECK PAIN: Primary | ICD-10-CM

## 2019-07-08 DIAGNOSIS — M54.2 NECK PAIN: ICD-10-CM

## 2019-07-08 PROCEDURE — 72050 X-RAY EXAM NECK SPINE 4/5VWS: CPT

## 2019-07-10 RX ORDER — METHYLPREDNISOLONE 4 MG/1
TABLET ORAL
Qty: 21 TABLET | Refills: 0 | Status: SHIPPED | OUTPATIENT
Start: 2019-07-10 | End: 2019-08-09 | Stop reason: HOSPADM

## 2019-07-10 NOTE — PROGRESS NOTES
Please let patient know xray shows degenerative changes and facet changes. Also fusion is solid. Is he feeling any better?

## 2019-07-10 NOTE — PROGRESS NOTES
Called and spoke with pt, gave him results of Xr. Pt sts he is not feeling better. Pt c/o grinding sound when he turns his head, having increased neck pain, can't sleep, and feels like electric shocks running from neck down to bilat shoulders and arms. He has been taking hydrocodone for his pain but has not been helping. What would you recommend?

## 2019-07-10 NOTE — PROGRESS NOTES
Not helping? At all? We could try a medrol dosepack. Also recommend PT. This sounds like arthritis of his neck.

## 2019-07-10 NOTE — PROGRESS NOTES
Spoke with pt, he is willing to try a medrol dosepack, can you send to his [facility] pharmacy? Also he does not want to try PT he said it hasn't helped him in the past.

## 2019-08-02 ENCOUNTER — APPOINTMENT (OUTPATIENT)
Dept: CT IMAGING | Facility: HOSPITAL | Age: 79
End: 2019-08-02

## 2019-08-02 ENCOUNTER — HOSPITAL ENCOUNTER (INPATIENT)
Facility: HOSPITAL | Age: 79
LOS: 7 days | Discharge: NURSING, REHAB OR LONG-TERM CARE | End: 2019-08-09
Attending: EMERGENCY MEDICINE | Admitting: NEUROLOGICAL SURGERY

## 2019-08-02 ENCOUNTER — APPOINTMENT (OUTPATIENT)
Dept: GENERAL RADIOLOGY | Facility: HOSPITAL | Age: 79
End: 2019-08-02

## 2019-08-02 DIAGNOSIS — R53.1 WEAKNESS: ICD-10-CM

## 2019-08-02 DIAGNOSIS — R13.12 OROPHARYNGEAL DYSPHAGIA: ICD-10-CM

## 2019-08-02 DIAGNOSIS — Z74.09 IMPAIRED FUNCTIONAL MOBILITY, BALANCE, GAIT, AND ENDURANCE: ICD-10-CM

## 2019-08-02 DIAGNOSIS — G99.2 MYELOPATHY CONCURRENT WITH AND DUE TO SPINAL STENOSIS OF CERVICAL REGION (HCC): ICD-10-CM

## 2019-08-02 DIAGNOSIS — I10 ESSENTIAL HYPERTENSION: ICD-10-CM

## 2019-08-02 DIAGNOSIS — R29.6 FREQUENT FALLS: ICD-10-CM

## 2019-08-02 DIAGNOSIS — M48.02 MYELOPATHY CONCURRENT WITH AND DUE TO SPINAL STENOSIS OF CERVICAL REGION (HCC): ICD-10-CM

## 2019-08-02 DIAGNOSIS — R00.1 BRADYCARDIA: Primary | ICD-10-CM

## 2019-08-02 PROBLEM — N18.30 CKD (CHRONIC KIDNEY DISEASE) STAGE 3, GFR 30-59 ML/MIN (HCC): Status: ACTIVE | Noted: 2019-08-02

## 2019-08-02 PROBLEM — M54.2 NECK PAIN: Status: ACTIVE | Noted: 2019-08-02

## 2019-08-02 PROBLEM — C88.0 WALDENSTROM MACROGLOBULINEMIA (HCC): Status: ACTIVE | Noted: 2019-08-02
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
## 2019-08-02 PROBLEM — D64.9 ANEMIA: Status: ACTIVE | Noted: 2019-08-02

## 2019-08-02 PROBLEM — M50.30 DDD (DEGENERATIVE DISC DISEASE), CERVICAL: Status: ACTIVE | Noted: 2019-08-02

## 2019-08-02 LAB
ALBUMIN SERPL-MCNC: 3.7 G/DL (ref 3.5–5.2)
ALBUMIN/GLOB SERPL: 1 G/DL
ALP SERPL-CCNC: 152 U/L (ref 39–117)
ALT SERPL W P-5'-P-CCNC: 21 U/L (ref 1–41)
ANION GAP SERPL CALCULATED.3IONS-SCNC: 9 MMOL/L (ref 5–15)
AST SERPL-CCNC: 23 U/L (ref 1–40)
BASOPHILS # BLD AUTO: 0.02 10*3/MM3 (ref 0–0.2)
BASOPHILS NFR BLD AUTO: 0.3 % (ref 0–1.5)
BILIRUB SERPL-MCNC: 0.2 MG/DL (ref 0.2–1.2)
BUN BLD-MCNC: 32 MG/DL (ref 8–23)
BUN/CREAT SERPL: 20.5 (ref 7–25)
CALCIUM SPEC-SCNC: 9.7 MG/DL (ref 8.6–10.5)
CHLORIDE SERPL-SCNC: 106 MMOL/L (ref 98–107)
CO2 SERPL-SCNC: 25 MMOL/L (ref 22–29)
CREAT BLD-MCNC: 1.56 MG/DL (ref 0.76–1.27)
DEPRECATED RDW RBC AUTO: 54 FL (ref 37–54)
EOSINOPHIL # BLD AUTO: 0.38 10*3/MM3 (ref 0–0.4)
EOSINOPHIL NFR BLD AUTO: 6.1 % (ref 0.3–6.2)
ERYTHROCYTE [DISTWIDTH] IN BLOOD BY AUTOMATED COUNT: 14.3 % (ref 12.3–15.4)
GFR SERPL CREATININE-BSD FRML MDRD: 43 ML/MIN/1.73
GLOBULIN UR ELPH-MCNC: 3.8 GM/DL
GLUCOSE BLD-MCNC: 160 MG/DL (ref 65–99)
GLUCOSE BLDC GLUCOMTR-MCNC: 202 MG/DL (ref 70–130)
HBA1C MFR BLD: 6.39 % (ref 4.8–5.6)
HCT VFR BLD AUTO: 31.2 % (ref 37.5–51)
HGB BLD-MCNC: 10.1 G/DL (ref 13–17.7)
IMM GRANULOCYTES # BLD AUTO: 0.01 10*3/MM3 (ref 0–0.05)
IMM GRANULOCYTES NFR BLD AUTO: 0.2 % (ref 0–0.5)
LYMPHOCYTES # BLD AUTO: 1.56 10*3/MM3 (ref 0.7–3.1)
LYMPHOCYTES NFR BLD AUTO: 25.2 % (ref 19.6–45.3)
MCH RBC QN AUTO: 33.3 PG (ref 26.6–33)
MCHC RBC AUTO-ENTMCNC: 32.4 G/DL (ref 31.5–35.7)
MCV RBC AUTO: 103 FL (ref 79–97)
MONOCYTES # BLD AUTO: 0.47 10*3/MM3 (ref 0.1–0.9)
MONOCYTES NFR BLD AUTO: 7.6 % (ref 5–12)
NEUTROPHILS # BLD AUTO: 3.75 10*3/MM3 (ref 1.7–7)
NEUTROPHILS NFR BLD AUTO: 60.6 % (ref 42.7–76)
NRBC BLD AUTO-RTO: 0 /100 WBC (ref 0–0.2)
PLATELET # BLD AUTO: 172 10*3/MM3 (ref 140–450)
PMV BLD AUTO: 10.3 FL (ref 6–12)
POTASSIUM BLD-SCNC: 4.2 MMOL/L (ref 3.5–5.2)
PROT SERPL-MCNC: 7.5 G/DL (ref 6–8.5)
RBC # BLD AUTO: 3.03 10*6/MM3 (ref 4.14–5.8)
SODIUM BLD-SCNC: 140 MMOL/L (ref 136–145)
TROPONIN T SERPL-MCNC: <0.01 NG/ML (ref 0–0.03)
WBC NRBC COR # BLD: 6.19 10*3/MM3 (ref 3.4–10.8)

## 2019-08-02 PROCEDURE — 63710000001 INSULIN LISPRO (HUMAN) PER 5 UNITS: Performed by: INTERNAL MEDICINE

## 2019-08-02 PROCEDURE — 99285 EMERGENCY DEPT VISIT HI MDM: CPT

## 2019-08-02 PROCEDURE — 93010 ELECTROCARDIOGRAM REPORT: CPT | Performed by: INTERNAL MEDICINE

## 2019-08-02 PROCEDURE — 72125 CT NECK SPINE W/O DYE: CPT

## 2019-08-02 PROCEDURE — 70450 CT HEAD/BRAIN W/O DYE: CPT

## 2019-08-02 PROCEDURE — 93005 ELECTROCARDIOGRAM TRACING: CPT | Performed by: PHYSICIAN ASSISTANT

## 2019-08-02 PROCEDURE — 82962 GLUCOSE BLOOD TEST: CPT

## 2019-08-02 PROCEDURE — 63710000001 INSULIN GLARGINE PER 5 UNITS: Performed by: INTERNAL MEDICINE

## 2019-08-02 PROCEDURE — 80053 COMPREHEN METABOLIC PANEL: CPT | Performed by: PHYSICIAN ASSISTANT

## 2019-08-02 PROCEDURE — 25010000002 ONDANSETRON PER 1 MG: Performed by: EMERGENCY MEDICINE

## 2019-08-02 PROCEDURE — 25010000002 METHYLPREDNISOLONE PER 125 MG: Performed by: INTERNAL MEDICINE

## 2019-08-02 PROCEDURE — 85025 COMPLETE CBC W/AUTO DIFF WBC: CPT | Performed by: PHYSICIAN ASSISTANT

## 2019-08-02 PROCEDURE — 84484 ASSAY OF TROPONIN QUANT: CPT | Performed by: PHYSICIAN ASSISTANT

## 2019-08-02 PROCEDURE — 83036 HEMOGLOBIN GLYCOSYLATED A1C: CPT | Performed by: INTERNAL MEDICINE

## 2019-08-02 PROCEDURE — 71046 X-RAY EXAM CHEST 2 VIEWS: CPT

## 2019-08-02 PROCEDURE — 25010000002 MORPHINE PER 10 MG: Performed by: EMERGENCY MEDICINE

## 2019-08-02 RX ORDER — PANTOPRAZOLE SODIUM 40 MG/1
40 TABLET, DELAYED RELEASE ORAL
Status: DISCONTINUED | OUTPATIENT
Start: 2019-08-02 | End: 2019-08-07

## 2019-08-02 RX ORDER — CARVEDILOL 3.12 MG/1
3.12 TABLET ORAL 2 TIMES DAILY WITH MEALS
Status: DISCONTINUED | OUTPATIENT
Start: 2019-08-02 | End: 2019-08-09 | Stop reason: HOSPADM

## 2019-08-02 RX ORDER — ALLOPURINOL 300 MG/1
300 TABLET ORAL EVERY EVENING
Status: DISCONTINUED | OUTPATIENT
Start: 2019-08-02 | End: 2019-08-09 | Stop reason: HOSPADM

## 2019-08-02 RX ORDER — SODIUM CHLORIDE 0.9 % (FLUSH) 0.9 %
3 SYRINGE (ML) INJECTION EVERY 12 HOURS SCHEDULED
Status: DISCONTINUED | OUTPATIENT
Start: 2019-08-02 | End: 2019-08-09 | Stop reason: HOSPADM

## 2019-08-02 RX ORDER — LEVOTHYROXINE SODIUM 0.03 MG/1
25 TABLET ORAL
Status: DISCONTINUED | OUTPATIENT
Start: 2019-08-03 | End: 2019-08-09 | Stop reason: HOSPADM

## 2019-08-02 RX ORDER — CHOLECALCIFEROL (VITAMIN D3) 125 MCG
10 CAPSULE ORAL NIGHTLY
Status: DISCONTINUED | OUTPATIENT
Start: 2019-08-02 | End: 2019-08-09 | Stop reason: HOSPADM

## 2019-08-02 RX ORDER — ASPIRIN 81 MG/1
81 TABLET ORAL DAILY
Status: DISCONTINUED | OUTPATIENT
Start: 2019-08-02 | End: 2019-08-09 | Stop reason: HOSPADM

## 2019-08-02 RX ORDER — AMLODIPINE BESYLATE 10 MG/1
10 TABLET ORAL EVERY EVENING
Status: DISCONTINUED | OUTPATIENT
Start: 2019-08-02 | End: 2019-08-09 | Stop reason: HOSPADM

## 2019-08-02 RX ORDER — SODIUM CHLORIDE 0.9 % (FLUSH) 0.9 %
3-10 SYRINGE (ML) INJECTION AS NEEDED
Status: DISCONTINUED | OUTPATIENT
Start: 2019-08-02 | End: 2019-08-09 | Stop reason: HOSPADM

## 2019-08-02 RX ORDER — MORPHINE SULFATE 2 MG/ML
4 INJECTION, SOLUTION INTRAMUSCULAR; INTRAVENOUS ONCE
Status: COMPLETED | OUTPATIENT
Start: 2019-08-02 | End: 2019-08-02

## 2019-08-02 RX ORDER — INSULIN GLARGINE 100 [IU]/ML
20 INJECTION, SOLUTION SUBCUTANEOUS NIGHTLY
Status: DISCONTINUED | OUTPATIENT
Start: 2019-08-02 | End: 2019-08-03

## 2019-08-02 RX ORDER — AZELASTINE 1 MG/ML
2 SPRAY, METERED NASAL 2 TIMES DAILY PRN
Status: DISCONTINUED | OUTPATIENT
Start: 2019-08-02 | End: 2019-08-09 | Stop reason: HOSPADM

## 2019-08-02 RX ORDER — ONDANSETRON 2 MG/ML
4 INJECTION INTRAMUSCULAR; INTRAVENOUS ONCE
Status: COMPLETED | OUTPATIENT
Start: 2019-08-02 | End: 2019-08-02

## 2019-08-02 RX ORDER — HYDROCODONE BITARTRATE AND ACETAMINOPHEN 10; 325 MG/1; MG/1
1 TABLET ORAL EVERY 4 HOURS PRN
Status: DISCONTINUED | OUTPATIENT
Start: 2019-08-02 | End: 2019-08-09 | Stop reason: HOSPADM

## 2019-08-02 RX ORDER — NITROGLYCERIN 0.4 MG/1
0.4 TABLET SUBLINGUAL
Status: DISCONTINUED | OUTPATIENT
Start: 2019-08-02 | End: 2019-08-09 | Stop reason: HOSPADM

## 2019-08-02 RX ORDER — DEXTROSE MONOHYDRATE 25 G/50ML
25 INJECTION, SOLUTION INTRAVENOUS
Status: DISCONTINUED | OUTPATIENT
Start: 2019-08-02 | End: 2019-08-09 | Stop reason: HOSPADM

## 2019-08-02 RX ORDER — SODIUM CHLORIDE 0.9 % (FLUSH) 0.9 %
10 SYRINGE (ML) INJECTION AS NEEDED
Status: DISCONTINUED | OUTPATIENT
Start: 2019-08-02 | End: 2019-08-09 | Stop reason: HOSPADM

## 2019-08-02 RX ORDER — PRAVASTATIN SODIUM 20 MG
20 TABLET ORAL NIGHTLY
Status: DISCONTINUED | OUTPATIENT
Start: 2019-08-02 | End: 2019-08-09 | Stop reason: HOSPADM

## 2019-08-02 RX ORDER — ACETAMINOPHEN 325 MG/1
650 TABLET ORAL EVERY 4 HOURS PRN
Status: DISCONTINUED | OUTPATIENT
Start: 2019-08-02 | End: 2019-08-09 | Stop reason: HOSPADM

## 2019-08-02 RX ORDER — HYDROCODONE BITARTRATE AND ACETAMINOPHEN 10; 325 MG/1; MG/1
1 TABLET ORAL EVERY 4 HOURS PRN
Status: DISCONTINUED | OUTPATIENT
Start: 2019-08-02 | End: 2019-08-02

## 2019-08-02 RX ORDER — NICOTINE POLACRILEX 4 MG
15 LOZENGE BUCCAL
Status: DISCONTINUED | OUTPATIENT
Start: 2019-08-02 | End: 2019-08-09 | Stop reason: HOSPADM

## 2019-08-02 RX ORDER — INSULIN GLARGINE 100 [IU]/ML
40 INJECTION, SOLUTION SUBCUTANEOUS EVERY MORNING
Status: DISCONTINUED | OUTPATIENT
Start: 2019-08-03 | End: 2019-08-04

## 2019-08-02 RX ORDER — DOCUSATE SODIUM 100 MG/1
100 CAPSULE, LIQUID FILLED ORAL DAILY
Status: DISCONTINUED | OUTPATIENT
Start: 2019-08-02 | End: 2019-08-09 | Stop reason: HOSPADM

## 2019-08-02 RX ORDER — HYDROCODONE BITARTRATE AND ACETAMINOPHEN 10; 325 MG/1; MG/1
2 TABLET ORAL EVERY 4 HOURS PRN
Status: DISCONTINUED | OUTPATIENT
Start: 2019-08-02 | End: 2019-08-09 | Stop reason: HOSPADM

## 2019-08-02 RX ORDER — METHYLPREDNISOLONE SODIUM SUCCINATE 125 MG/2ML
80 INJECTION, POWDER, LYOPHILIZED, FOR SOLUTION INTRAMUSCULAR; INTRAVENOUS EVERY 8 HOURS
Status: DISCONTINUED | OUTPATIENT
Start: 2019-08-02 | End: 2019-08-04

## 2019-08-02 RX ORDER — NITROGLYCERIN 0.4 MG/1
0.4 TABLET SUBLINGUAL
Status: DISCONTINUED | OUTPATIENT
Start: 2019-08-02 | End: 2019-08-02

## 2019-08-02 RX ORDER — ISOSORBIDE MONONITRATE 30 MG/1
30 TABLET, EXTENDED RELEASE ORAL EVERY EVENING
Status: DISCONTINUED | OUTPATIENT
Start: 2019-08-02 | End: 2019-08-04

## 2019-08-02 RX ORDER — ONDANSETRON 2 MG/ML
4 INJECTION INTRAMUSCULAR; INTRAVENOUS EVERY 6 HOURS PRN
Status: DISCONTINUED | OUTPATIENT
Start: 2019-08-02 | End: 2019-08-05 | Stop reason: SDUPTHER

## 2019-08-02 RX ORDER — BACLOFEN 10 MG/1
10 TABLET ORAL 4 TIMES DAILY
COMMUNITY
End: 2020-01-22

## 2019-08-02 RX ORDER — LOSARTAN POTASSIUM 100 MG/1
100 TABLET ORAL DAILY
Status: DISCONTINUED | OUTPATIENT
Start: 2019-08-02 | End: 2019-08-09 | Stop reason: HOSPADM

## 2019-08-02 RX ORDER — BACLOFEN 10 MG/1
10 TABLET ORAL EVERY 8 HOURS SCHEDULED
Status: DISCONTINUED | OUTPATIENT
Start: 2019-08-02 | End: 2019-08-09 | Stop reason: HOSPADM

## 2019-08-02 RX ADMIN — INSULIN LISPRO 4 UNITS: 100 INJECTION, SOLUTION INTRAVENOUS; SUBCUTANEOUS at 20:31

## 2019-08-02 RX ADMIN — HYDROCODONE BITARTRATE AND ACETAMINOPHEN 2 TABLET: 10; 325 TABLET ORAL at 23:04

## 2019-08-02 RX ADMIN — DOCUSATE SODIUM 100 MG: 100 CAPSULE, LIQUID FILLED ORAL at 20:15

## 2019-08-02 RX ADMIN — HYDROCODONE BITARTRATE AND ACETAMINOPHEN 2 TABLET: 10; 325 TABLET ORAL at 18:48

## 2019-08-02 RX ADMIN — AMLODIPINE BESYLATE 10 MG: 10 TABLET ORAL at 20:15

## 2019-08-02 RX ADMIN — ASPIRIN 81 MG: 81 TABLET, COATED ORAL at 20:15

## 2019-08-02 RX ADMIN — LOSARTAN POTASSIUM 100 MG: 100 TABLET, FILM COATED ORAL at 20:15

## 2019-08-02 RX ADMIN — PANTOPRAZOLE SODIUM 40 MG: 40 TABLET, DELAYED RELEASE ORAL at 20:15

## 2019-08-02 RX ADMIN — ACETAMINOPHEN 650 MG: 325 TABLET, FILM COATED ORAL at 17:26

## 2019-08-02 RX ADMIN — INSULIN GLARGINE 20 UNITS: 100 INJECTION, SOLUTION SUBCUTANEOUS at 23:09

## 2019-08-02 RX ADMIN — ISOSORBIDE MONONITRATE 30 MG: 30 TABLET ORAL at 20:15

## 2019-08-02 RX ADMIN — METHYLPREDNISOLONE SODIUM SUCCINATE 80 MG: 125 INJECTION, POWDER, FOR SOLUTION INTRAMUSCULAR; INTRAVENOUS at 18:48

## 2019-08-02 RX ADMIN — PRAVASTATIN SODIUM 20 MG: 20 TABLET ORAL at 20:15

## 2019-08-02 RX ADMIN — CARVEDILOL 3.12 MG: 3.12 TABLET, FILM COATED ORAL at 20:14

## 2019-08-02 RX ADMIN — MORPHINE SULFATE 4 MG: 2 INJECTION, SOLUTION INTRAMUSCULAR; INTRAVENOUS at 13:28

## 2019-08-02 RX ADMIN — ONDANSETRON 4 MG: 2 INJECTION INTRAMUSCULAR; INTRAVENOUS at 13:27

## 2019-08-02 RX ADMIN — BACLOFEN 10 MG: 10 TABLET ORAL at 20:16

## 2019-08-02 RX ADMIN — SODIUM CHLORIDE, PRESERVATIVE FREE 3 ML: 5 INJECTION INTRAVENOUS at 20:16

## 2019-08-02 RX ADMIN — ALLOPURINOL 300 MG: 300 TABLET ORAL at 20:15

## 2019-08-02 RX ADMIN — Medication 10 MG: at 20:15

## 2019-08-02 NOTE — CONSULTS
Chevy Goodwin   78 y.o.  male    LOS: 0 days   Patient Care Team:  Anthony Gutierrez MD as PCP - General (Internal Medicine)  Anthony Gutierrez MD as PCP - Internal Medicine  Charisse Winters APRN as PCP - Claims Attributed  Bubba Hernandez MD as Consulting Physician (Cardiology)  Girma Olsen MD as Consulting Physician (Urology)  Nickolas Olsen MD as Consulting Physician (Dermatology)  Kirk Gross MD as Consulting Physician (Orthopedic Surgery)      Subjective     Patient Complaints: Dizziness and elevated blood pressure    History of Present Illness:   Mr. Goodwin is a 78-year-old male who follows with Dr. Hernandez in the office.  He has a past medical history of bradycardia, MI, coronary artery disease status post PCI x3 (states last heart cath was in 2005), obstructive sleep apnea with CPAP use, hypertension, hyperlipidemia, non-Hodgkin's lymphoma, prostate cancer and type 2 diabetes.     Mr. Goodwin states that he has been dizzy for a few weeks at home.  He has been weak at home. He states that [facility] they checked his blood pressure his blood pressure was elevated at 200/85.  [facility] but does not remember driving there.  He states he is also been experiencing posterior neck pain for 2 weeks.  He denies any shortness of breath, chest pain or syncope.    In the emergency department, his EKG showed sinus bradycardia with a rate of 45.  Troponin less than 0.01.  The patient is known to have bradycardia.  CT of head showed no acute skull fracture or intracranial hemorrhage.  CT of cervical spine showed no acute fracture severe cervical spondylosis with some posterior disc osteophyte complex and moderate-to-severe canal narrowing at C3-4 and C4-5.  There is mild-to-moderate if not moderate canal  narrowing at C6-7.      Review of Systems:   All systems were reviewed and negative except for:   Cardiovascular: positive for  lower extremity edema, Intermittent  Gastrointestinal: positive for blood in stool  Musculoskeletal: positive for neck pain  Neurological: positive for dizziness    Medication Review:   Prior to Admission medications    Medication Sig Start Date End Date Taking? Authorizing Provider   baclofen (LIORESAL) 10 MG tablet Take 10 mg by mouth 4 (Four) Times a Day.   Yes Lisseth Singh MD   allopurinol (ZYLOPRIM) 300 MG tablet Take 300 mg by mouth every evening. 6/10/15   Lisseth Singh MD   amLODIPine (NORVASC) 10 MG tablet Take 10 mg by mouth every evening. 3/8/13   Lisseth Singh MD   aspirin 81 MG tablet Take 81 mg by mouth Daily. 3/8/13   Lisseth Singh MD   azelastine (ASTELIN) 0.1 % nasal spray 2 sprays into the nostril(s) as directed by provider 2 (Two) Times a Day As Needed. 3/8/13   Lisseth Singh MD   bumetanide (BUMEX) 1 MG tablet Take 1 mg by mouth Daily (Monday-Friday). 4/14/18   Lisseth Singh MD   calcium carbonate-cholecalciferol 500-400 MG-UNIT tablet tablet Take 1 tablet by mouth Daily.    Lisseth Singh MD   carvedilol (COREG) 6.25 MG tablet Take 6.25 mg by mouth 2 (Two) Times a Day With Meals.    Lisseth Singh MD   celecoxib (CeleBREX) 200 MG capsule Take 200 mg by mouth Daily. OK TO TAKE PER MD    Lisseth Singh MD   cetirizine (zyrTEC) 10 MG tablet Take 10 mg by mouth Daily. 10/2/17   Lisseth Singh MD   Cholecalciferol (VITAMIN D3) 2000 UNITS capsule Take 2,000 Units by mouth Daily. 3/8/13   Lisseth Singh MD   docusate sodium (COLACE) 100 MG capsule Take 100 mg by mouth Daily.    Lisseth Singh MD   Ergocalciferol (VITAMIN D2) 2000 units tablet Take 2,000 Units by mouth.    Lisseth Singh MD   Glucosamine-Chondroit-Vit C-Mn ([employer] GLUCOSAMINE-CHONDROITIN) tablet Take 1 tablet by mouth Every Morning. 6/10/15   Lisseth Singh MD   hydrochlorothiazide (HYDRODIURIL) 25 MG  tablet Take 25 mg by mouth 2 (Two) Times a Week. Saturday and Sunday 9/28/16   Lisseth Singh MD   HYDROcodone-acetaminophen (NORCO)  MG per tablet Take 1 tablet by mouth Every 4 (Four) Hours As Needed for Severe Pain . NTE-5/day; DNF until 7-5-19 6/26/19   Charisse Winters APRN   isosorbide mononitrate (IMDUR) 30 MG 24 hr tablet 30 mg every evening. 3/8/13   Lisseth Singh MD   LANJOSE ANTONIO SOLOSTAR 100 UNIT/ML injection pen Inject 40 Units under the skin into the appropriate area as directed Every Morning. Inject 40 units in Am and 30 units at night 10/2/17   Lisseth Singh MD   levothyroxine (SYNTHROID, LEVOTHROID) 25 MCG tablet Take  by mouth Daily. 3/9/19   Lisseth Singh MD   losartan (COZAAR) 100 MG tablet Take 100 mg by mouth Daily.    Lisseth Singh MD   melatonin 5 MG tablet tablet Take 10 mg by mouth every night.    Lisseth Singh MD   metFORMIN (GLUCOPHAGE) 500 MG tablet  4/4/19   Lisseth Singh MD   methylPREDNISolone (MEDROL, [place]RA[place],) 4 MG tablet Take as directed on package instructions. 7/10/19   Charisse Winters APRN   nitroglycerin (NITROSTAT) 0.4 MG SL tablet Take 0.4 mg by mouth Every 5 (Five) Minutes As Needed. 6/9/14   Lisseth Singh MD   pantoprazole (PROTONIX) 40 MG EC tablet  4/4/19   Lisseth Singh MD   pravastatin (PRAVACHOL) 40 MG tablet  4/4/19   Lisseth Singh MD   vitamin B-12 (CYANOCOBALAMIN) 100 MCG tablet Take 150 mcg by mouth Daily. 3/8/13   Lisseth Singh MD         History reviewed. No pertinent family history.  Social History     Socioeconomic History   • Marital status:      Spouse name: Not on file   • Number of children: Not on file   • Years of education: Not on file   • Highest education level: Not on file   Tobacco Use   • Smoking status: Current Every Day Smoker     Years: 60.00     Types: Cigars   • Smokeless tobacco: Never Used   • Tobacco comment: QUIT 1999 CIGARETTES/STILL SMOKES  CIGARS   Substance and Sexual Activity   • Alcohol use: Yes     Comment: VERY RARE     Objective   Past Surgical History:   Procedure Laterality Date   • ANGIOPLASTY      X 3   • BACK SURGERY      X4   • CARDIAC CATHETERIZATION     • CARPAL TUNNEL RELEASE Right    • CATARACT EXTRACTION WITH INTRAOCULAR LENS IMPLANT     • COLON RESECTION     • COLONOSCOPY     • ESOPHAGOSCOPY / EGD     • EXTRACORPOREAL SHOCK WAVE LITHOTRIPSY (ESWL)     • FINGER SURGERY Left    • GALLBLADDER SURGERY     • HERNIA REPAIR     • INCISION AND DRAINAGE ABSCESS      MULTIPLE   • LAMINECTOMY     • NM REMOVAL OF ELBOW BURSA Left 9/15/2016    Procedure: LT ELBOW I&D W/ BONE CURRETTAGE;  Surgeon: Kirk Gross MD;  Location: [facility] OR [facility];  Service: Orthopedics   • PROSTATE SURGERY     • ROTATOR CUFF REPAIR Right    • SKIN CANCER EXCISION      MULTIPLE   • TOTAL SHOULDER ARTHROPLASTY W/ DISTAL CLAVICLE EXCISION Right 1/10/2019    Procedure: RT TOTAL SHOULDER REVERSE ARTHROPLASTY;  Surgeon: Kirk Gross MD;  Location: [facility] OR [facility];  Service: Orthopedics   • TYMPANOPLASTY Right      Past Medical History:   Diagnosis Date   • Cancer (CMS/HCC)     PROSTATE AND SKIN    Non-Hodgkin's lymphoma    • Cataract    • Coronary artery disease that is post PCI x3    • Diabetes (CMS/HCC)     TYPE 2   • GERD (gastroesophageal reflux disease)    • Gout    • Heart attack (CMS/HCC)     X 7   • High cholesterol    • Hypersomnia    • Hypertension    • Kidney stones    • Low back pain    • MRSA (methicillin resistant staph aureus) culture positive     BILATERAL ELBOWS, 2015   • Right shoulder pain    • Sleep apnea     C pap   • Waldenstrom macroglobulinemia (CMS/HCC)     2014       Vital Sign Min/Max for last 24 hours  Temp  Min: 98.3 °F (36.8 °C)  Max: 98.3 °F (36.8 °C)   BP  Min: 134/68  Max: 175/74    Pulse  Min: 42  Max: 60     Wt Readings from Last 3 Encounters:   08/02/19 93.9 kg (207 lb)   06/26/19 94.1 kg (207 lb 6 oz)   05/29/19 95.1 kg (209 lb 9.6 oz)         Physical Exam:      General Appearance:    Alert, cooperative, in no acute distress   Head:    Normocephalic, without obvious abnormality, atraumatic   Eyes:            Conjunctivae normal, no   icterus   Neck:    Very supple, trachea midline, no   carotid bruit, + JVD   Lungs:    Right lower lobe coarse,respirations regular, even and                  unlabored    Heart:    Regular rhythm and slow rate, normal S1 and S2,            *No murmur, no gallop, no rub, no click   Chest Wall:    No abnormalities observed   Abdomen:     Normal bowel sounds, soft        non-tender, non-distended,        Extremities:   No edema. Moves all extremities well, no cyanosis, no erythema       Skin:   No bleeding, bruising or rash   Neurologic:  Alert and oriented oriented x3  Psych: mood and affect normal   Musculoskeleatal: no  Gross joint deformity           Results Review:     I reviewed the patient's new clinical results.  Potassium   Date Value Ref Range Status   08/02/2019 4.2 3.5 - 5.2 mmol/L Final     Creatinine   Date Value Ref Range Status   08/02/2019 1.56 (H) 0.76 - 1.27 mg/dL Final     Troponin T   Date Value Ref Range Status   08/02/2019 <0.010 0.000 - 0.030 ng/mL Final        Echo EF Estimated  Prior LVEF normal     Assessment/Plan   1. Weakness generalized with dizziness  2. Hypertension  3. Sinus bradycardia  4. Coronary artery disease status post PCI x3, last cardiac catheterization 2005  5. JULIA treated with BiPAP 20/16  6. CKD (chronic kidney disease) stage 3, GFR 30-59 ml/min (CMS/HCC)     Plan:  Check chest x-ray, decrease Coreg to 3.125.  Orthostatic blood pressures were checked in the emergency room and were negative.  Will check 2D echocardiogram. continue to monitor heart rhythm.  MD to follow further recommendations.       Agree with Kathie 's assessment and plan above with additions below:   Patient seen and examined on 8/2/19. Plan discussed personally with hospitalist service.   78-year-old  gentleman with prior history of coronary artery disease and PCI in past, obstructive sleep apnea, hypertension, hyperlipidemia comes with weakness fatigue and decreased sensations in the upper extremity.  He is noted to be bradycardic in the ER.  But his blood pressure is elevated.  I do not think bradycardia is contributing to patient's symptoms.  He is not currently 6.25 mg twice daily of carvedilol we will decrease that to 3.25 mg.  I suspect symptoms likely secondary to cervical spine stenosis/degenerative disc disease.  He does not appear to be in heart failure.  Currently with no anginal discomfort.  Currently stable from cardiac standpoint.  We will continue to monitor.    FRANDY Pruett APRN  08/02/19  4:20 PM

## 2019-08-02 NOTE — ED TRIAGE NOTES
Pt was dizzy yest and fell.  C/o neck pain and ha today.  Went to pmd.  bp 200/120.  Was given 0.2 mg clonidine - now sbp 160s

## 2019-08-02 NOTE — ED PROVIDER NOTES
"Pt presents to the ED complaining of recent episodes of falling and neck pain per pt family that started a couple of weeks ago. Pt family states that he \"does not look normal self\" and his neck pain feels \"burning\" and radiates into L shoulder. Per family, pt went to [facility] this morning and they called EMS. Pt's family states she does not know why they called EMS. Pt affirms he is fatigued and is typically bradycardic but denies fever,vomiting, diarrhea, and rashes.    On exam, pt is   GENERAL: not distressed  HENT: nares patent  EYES: no scleral icterus  CV: regular rhythm, bradycardic  RESPIRATORY: normal effort  ABDOMEN: soft  MUSCULOSKELETAL: no deformity  NEURO:   Patient is sleepy and has poor attention. He wakes up to voice and answers questions appropriately. Language is fluent. Speech is clear. The speech is non-dysarthric.   Symmetric smile with no facial droop.  Eyes close shut strongly bilaterally.  Symmetric eyebrow raise bilaterally.  EOMI, PERRL  CN II-XII grossly normal otherwise.  5/5 strength to extremities except for 4/5 strength to LLE at hip flexion.  No pronator drift.  Intact FNF.  No meningismus.     SKIN: warm, dry    Vital signs and nursing notes reviewed.        EKG          EKG time: 1118  Rhythm/Rate: sinus rate 45  P waves and CA: normal   QRS, axis: normal   ST and T waves: normal     Interpreted Contemporaneously by me, independently viewed  unchanged compared to prior 01/28/16      I agree with midlevel plan to admit the pt for further testing due to bradycardia, generalized weakness, and recent complaints of falls.    The JORGE LUIS and I have discussed this patient's history, physical exam, and treatment plan.  I have reviewed the documentation and personally had a face to face interaction with the patient. I affirm the documentation and agree with the treatment and plan.  The attached note describes my personal findings.    Documentation assistance provided by elmer Johansen " for Dr. Billings.  Information recorded by the scribe was done at my direction and has been verified and validated by me.       Jessica Johansen  08/02/19 1240       Francois Billings II, MD  08/03/19 0664

## 2019-08-02 NOTE — H&P
"    Patient Name:  Chevy Goodwin  YOB: 1940  MRN:  [id number]  Admit Date:  8/2/2019  Patient Care Team:  Anthony Gutierrez MD as PCP - General (Internal Medicine)  Anthony Gutierrez MD as PCP - Internal Medicine  Charisse Winters APRN as PCP - Claims Attributed  Bubba Hernandez MD as Consulting Physician (Cardiology)  Girma Olsen MD as Consulting Physician (Urology)  Nickolas Olsen MD as Consulting Physician (Dermatology)  Kirk Gross MD as Consulting Physician (Orthopedic Surgery)      Subjective   History Present Illness     Chief Complaint   Patient presents with   • Dizziness   • Hypertension   • Fall     History of Present Illness   Mr. Goodwin is a 78 y.o. non-smoker with a history of CAD, JULIA on CPAP, chronic pain on opioids, DM2, CKD3, Waldenstrom macroglobulinemia and prostate cancer that presents to [facility] complaining of weakness. The patient states he has been progressively weak for the last month. In the last couple days, his weakness has worsened and he subsequently fell the last 2 days. The last fall he states he fell and hit his neck on the couch and has left-sided neck pain as a result. He describes the pain as \"firey\" with some radiation into the top of his left shoulder. The pain is worsened with movement and relieved with pain medication. He denies any dyspnea or chest pain. He denies any cough as well as fever or chills. However, he does state that his food and water \"will go down the wrong pipe\" and cause him to choke at times. He states this has also been going on for the last month.   He denies any abdominal pain, nausea or vomiting. He had 4 bowel movements yesterday, but reports this is not unusual for him. It is questionable whether there was notable blood in his stool. The pain states there was blood on toilet paper, but his wife states he had cherry tomatoes yesterday that may have appeared to be blood. He states his " last colonoscopy was approximately 8 to 9 years ago. He has a history of CAD and follows Dr. Baker for this. He states he follows up regularly and takes his medications compliantly. He does report a chronic low heart rate and states it is in the 50s on average. He does take a beta blocker at home. His heart rate in the ED was noted to be as low as the 30s. He denies any syncope, but does report getting lightheaded with position changes at times. His wife states he sleeps all the time, but attributes this to his opiate use and states it has been going on for years.   CT imaging of his head and neck were taken in the ED. CT of his neck revealed no acute fractures, but rather severe cervical spondylosis and moderate to severe canal narrowing at C3-4 and C4-5. CT of his head was negative. Because of his low heart rate and weakness, he is being admitted for further work-up.     Review of Systems   Constitutional: Positive for activity change and fatigue. Negative for appetite change, chills and fever.   HENT: Negative for congestion, ear pain, postnasal drip and sore throat.    Eyes: Negative for pain and discharge.   Respiratory: Positive for choking. Negative for cough, chest tightness and shortness of breath.    Cardiovascular: Positive for leg swelling. Negative for chest pain and palpitations.   Gastrointestinal: Positive for blood in stool. Negative for abdominal distention, abdominal pain, constipation, nausea and vomiting.   Endocrine: Negative for cold intolerance and heat intolerance.   Genitourinary: Negative for difficulty urinating and dysuria.   Musculoskeletal: Positive for arthralgias, back pain (chronic), gait problem (off balance), myalgias and neck pain.   Skin: Negative for color change and pallor.   Neurological: Positive for weakness and light-headedness. Negative for dizziness, syncope and headaches.   Psychiatric/Behavioral: Negative for confusion. The patient is not nervous/anxious.          Personal History     Past Medical History:   Diagnosis Date   • Cancer (CMS/HCC)     PROSTATE AND SKIN   • Cataract    • Coronary artery disease    • Diabetes (CMS/HCC)     TYPE 2   • GERD (gastroesophageal reflux disease)    • Gout    • Heart attack (CMS/HCC)     X 7   • High cholesterol    • Hypersomnia    • Hypertension    • Kidney stones    • Low back pain    • MRSA (methicillin resistant staph aureus) culture positive     BILATERAL ELBOWS, 2015   • Right shoulder pain    • Sleep apnea     C pap   • Waldenstrom macroglobulinemia (CMS/HCC)     2014     Past Surgical History:   Procedure Laterality Date   • ANGIOPLASTY      X 7   • BACK SURGERY      X4   • CARDIAC CATHETERIZATION     • CARPAL TUNNEL RELEASE Right    • CATARACT EXTRACTION WITH INTRAOCULAR LENS IMPLANT     • COLON RESECTION     • COLONOSCOPY     • ESOPHAGOSCOPY / EGD     • EXTRACORPOREAL SHOCK WAVE LITHOTRIPSY (ESWL)     • FINGER SURGERY Left    • GALLBLADDER SURGERY     • HERNIA REPAIR     • INCISION AND DRAINAGE ABSCESS      MULTIPLE   • LAMINECTOMY     • ND REMOVAL OF ELBOW BURSA Left 9/15/2016    Procedure: LT ELBOW I&D W/ BONE CURRETTAGE;  Surgeon: Kirk Gross MD;  Location: [facility] OR [facility];  Service: Orthopedics   • PROSTATE SURGERY     • ROTATOR CUFF REPAIR Right    • SKIN CANCER EXCISION      MULTIPLE   • TOTAL SHOULDER ARTHROPLASTY W/ DISTAL CLAVICLE EXCISION Right 1/10/2019    Procedure: RT TOTAL SHOULDER REVERSE ARTHROPLASTY;  Surgeon: Kirk Gross MD;  Location: [facility] OR [facility];  Service: Orthopedics   • TYMPANOPLASTY Right      History reviewed. No pertinent family history.  Social History     Tobacco Use   • Smoking status: Current Every Day Smoker     Years: 60.00     Types: Cigars   • Smokeless tobacco: Never Used   • Tobacco comment: QUIT 1999 CIGARETTES/STILL SMOKES CIGARS   Substance Use Topics   • Alcohol use: Yes     Comment: VERY RARE   • Drug use: Not on file       (Not in a hospital admission)  Allergies:    Allergies    Allergen Reactions   • Contrast Dye Hives   • Dye Fdc Red [Red Dye] Hives   • Oxycodone Nausea And Vomiting   • Adhesive Tape Rash       Objective    Objective     Vital Signs  Temp:  [98.3 °F (36.8 °C)] 98.3 °F (36.8 °C)  Heart Rate:  [41-60] 41  Resp:  [12-18] 12  BP: (134-175)/(59-74) 137/65  SpO2:  [91 %-97 %] 97 %  on   ;   Device (Oxygen Therapy): room air  Body mass index is 31.47 kg/m².    Physical Exam   Constitutional: He is oriented to person, place, and time. He appears well-developed and well-nourished. No distress.   HENT:   Head: Normocephalic and atraumatic.   Nose: Nose normal.   Eyes: Conjunctivae and EOM are normal. Right eye exhibits no discharge. Left eye exhibits no discharge.   Neck: Neck supple.   Limited, painful movement noted.   Cardiovascular: Regular rhythm, normal heart sounds and intact distal pulses. Bradycardia present.   Pulmonary/Chest: Effort normal. No respiratory distress. He has no wheezes. He has rales (Bilateral lower lobes posteriorly).   Abdominal: Soft. Bowel sounds are normal. He exhibits no distension. There is no tenderness.   Musculoskeletal: He exhibits edema (1+ BLE). He exhibits no tenderness (left-side neck to top left shoulder ).   Neurological: He is alert and oriented to person, place, and time.   Skin: Skin is warm and dry. No erythema.   Psychiatric: He has a normal mood and affect. His behavior is normal.   Drowsy.   Nursing note and vitals reviewed.     Results Review:  I reviewed the patient's new clinical results.  I reviewed the patient's new imaging results and agree with the interpretation.  I reviewed the patient's other test results and agree with the interpretation  I personally viewed and interpreted the patient's EKG/Telemetry data  Discussed with ED provider.    Lab Results (last 24 hours)     Procedure Component Value Units Date/Time    CBC & Differential [[id number]] Collected:  08/02/19 1122    Specimen:  Blood Updated:  08/02/19 3959     Narrative:       The following orders were created for panel order CBC & Differential.  Procedure                               Abnormality         Status                     ---------                               -----------         ------                     CBC Auto Differential[[id number]]        Abnormal            Final result                 Please view results for these tests on the individual orders.    Comprehensive Metabolic Panel [[id number]]  (Abnormal) Collected:  08/02/19 1122    Specimen:  Blood Updated:  08/02/19 1155     Glucose 160 mg/dL      BUN 32 mg/dL      Creatinine 1.56 mg/dL      Sodium 140 mmol/L      Potassium 4.2 mmol/L      Chloride 106 mmol/L      CO2 25.0 mmol/L      Calcium 9.7 mg/dL      Total Protein 7.5 g/dL      Albumin 3.70 g/dL      ALT (SGPT) 21 U/L      AST (SGOT) 23 U/L      Alkaline Phosphatase 152 U/L      Total Bilirubin 0.2 mg/dL      eGFR Non African Amer 43 mL/min/1.73      Globulin 3.8 gm/dL      A/G Ratio 1.0 g/dL      BUN/Creatinine Ratio 20.5     Anion Gap 9.0 mmol/L     Narrative:       GFR Normal >60  Chronic Kidney Disease <60  Kidney Failure <15    Troponin [[id number]]  (Normal) Collected:  08/02/19 1122    Specimen:  Blood Updated:  08/02/19 1155     Troponin T <0.010 ng/mL     Narrative:       Troponin T Reference Range:  <= 0.03 ng/mL-   Negative for AMI  >0.03 ng/mL-     Abnormal for myocardial necrosis.  Clinicians would have to utilize clinical acumen, EKG, Troponin and serial changes to determine if it is an Acute Myocardial Infarction or myocardial injury due to an underlying chronic condition.     CBC Auto Differential [[id number]]  (Abnormal) Collected:  08/02/19 1122    Specimen:  Blood Updated:  08/02/19 1135     WBC 6.19 10*3/mm3      RBC 3.03 10*6/mm3      Hemoglobin 10.1 g/dL      Hematocrit 31.2 %      .0 fL      MCH 33.3 pg      MCHC 32.4 g/dL      RDW 14.3 %      RDW-SD 54.0 fl      MPV 10.3 fL      Platelets 172 10*3/mm3      Neutrophil  % 60.6 %      Lymphocyte % 25.2 %      Monocyte % 7.6 %      Eosinophil % 6.1 %      Basophil % 0.3 %      Immature Grans % 0.2 %      Neutrophils, Absolute 3.75 10*3/mm3      Lymphocytes, Absolute 1.56 10*3/mm3      Monocytes, Absolute 0.47 10*3/mm3      Eosinophils, Absolute 0.38 10*3/mm3      Basophils, Absolute 0.02 10*3/mm3      Immature Grans, Absolute 0.01 10*3/mm3      nRBC 0.0 /100 WBC           Imaging Results (last 24 hours)     Procedure Component Value Units Date/Time    CT Head Without Contrast [[id number]] Collected:  08/02/19 1239     Updated:  08/02/19 1239    Narrative:       EMERGENCY NONCONTRAST HEAD CT AND NONCONTRAST CERVICAL SPINE CT  08/02/2019     CLINICAL HISTORY: Patient had weakness, fell, head trauma, complains of  headache and neck pain, status post fall.  Admits to some dizziness and  ataxia.     HEAD CT TECHNIQUE: Spiral CT images were obtained from the base of the  skull to the vertex without intravenous contrast. Images were  reformatted and submitted in 3 mm thick axial CT sections with brain  algorithm and 2 mm thick axial CT section with high resolution bone  algorithm and 2 mm thick sagittal and coronal reconstructions were  performed and submitted in brain algorithm.     This is correlated to a remote prior noncontrast head CT from [facility] on 11/26/2002, close to 17 years ago.     FINDINGS: There is minimal low-density in the periventricular white  matter consistent with minimal small vessel disease. The remainder of  the brain parenchyma is normal in attenuation. The ventricles are normal  in size. There is no focal mass effect. There is no midline shift. No  extra-axial fluid collections are identified. There is no evidence of  acute intracranial hemorrhage. The paranasal sinuses and the mastoid air  cells and middle ear cavities are clear.  No acute skull fracture is  identified.       Impression:       Essentially negative head CT with no acute skull  fracture  or intracranial hemorrhage identified.     CERVICAL SPINE CT TECHNIQUE: Spiral CT images were obtained from the  skull base down to the T1-2 thoracic level and images were reformatted  and submitted in 2 mm thick axial, sagittal and coronal CT sections with  soft tissue algorithm and 1 mm thick axial, sagittal and coronal CT  sections with high-resolution bone algorithm.     There are no prior cervical spine imaging studies from [facility] for comparison.     FINDINGS: There are prominent arthritic changes at the atlantodental  interval with marginal spurring off the anterior ring of the C1 and the  odontoid process. Otherwise, the C1-2 level is normal in appearance.     At C2-3 there is mild-to-moderate left facet overgrowth.  The posterior  disc margin is normal.  There is no canal or foraminal narrowing.     At C3-4 there is moderate bilateral facet overgrowth.  There is  prominent posterior spurring and bulging disc material.  There is severe  narrowing of the cervical thecal sac with some flattening of the cord at  this level.  There is at least mild-to-moderate bilateral foraminal  narrowing.     At C4-5 there is mild left and mild-to-moderate right facet overgrowth,  severe disc space narrowing and degenerative disc and endplate changes  and diffuse posterior disc osteophyte complex and there is severe canal  narrowing with flattening of the cord.  There is bilateral uncovertebral  joint hypertrophy and there is mild-to-moderate left and moderate right  bony foraminal narrowing.     At C5-6 there is severe disc space narrowing.  There is some bony fusion  across the endplates.  There is posterior spurring.  There is mild canal  narrowing.  There is mild facet overgrowth and uncovertebral joint  hypertrophy.  There is mild left and mild-to-moderate right bony  foraminal narrowing.     At C6-7 there is severe disc space narrowing, degenerative endplate  changes, diffuse posterior disc  osteophyte complex and there is at least  mild-to-moderate if not moderate canal narrowing there is only minimal  facet overgrowth, but there is prominent bilateral uncovertebral joint  hypertrophy and there is moderate bilateral bony foraminal narrowing.     At C7-T1 there is mild right and mild-to-moderate left facet overgrowth.   There is disc space narrowing, degenerative endplate changes and  posterior endplate spurring.  There is no canal or left foraminal  narrowing.  There is mild right bony foraminal narrowing.     No acute fracture is seen in the cervical spine.     IMPRESSION:  1. No acute fracture is seen in the cervical spine.  2. There is bony fusion across the endplates at C5-6, may be from prior  discectomy and fusion procedure, and correlation with clinical history  is suggested.  There is rather severe cervical spondylosis with some  posterior disc osteophyte complex and moderate-to-severe canal narrowing  at C3-4 and C4-5.  There is mild-to-moderate if not moderate canal  narrowing at C6-7.  There is multilevel bony foraminal narrowing.      Radiation dose reduction techniques were utilized, including automated  exposure control and exposure modulation based on body size.          CT Cervical Spine Without Contrast [[id number]] Collected:  08/02/19 1239     Updated:  08/02/19 1239    Narrative:       EMERGENCY NONCONTRAST HEAD CT AND NONCONTRAST CERVICAL SPINE CT  08/02/2019     CLINICAL HISTORY: Patient had weakness, fell, head trauma, complains of  headache and neck pain, status post fall.  Admits to some dizziness and  ataxia.     HEAD CT TECHNIQUE: Spiral CT images were obtained from the base of the  skull to the vertex without intravenous contrast. Images were  reformatted and submitted in 3 mm thick axial CT sections with brain  algorithm and 2 mm thick axial CT section with high resolution bone  algorithm and 2 mm thick sagittal and coronal reconstructions were  performed and submitted in  brain algorithm.     This is correlated to a remote prior noncontrast head CT from [facility] on 11/26/2002, close to 17 years ago.     FINDINGS: There is minimal low-density in the periventricular white  matter consistent with minimal small vessel disease. The remainder of  the brain parenchyma is normal in attenuation. The ventricles are normal  in size. There is no focal mass effect. There is no midline shift. No  extra-axial fluid collections are identified. There is no evidence of  acute intracranial hemorrhage. The paranasal sinuses and the mastoid air  cells and middle ear cavities are clear.  No acute skull fracture is  identified.       Impression:       Essentially negative head CT with no acute skull fracture  or intracranial hemorrhage identified.     CERVICAL SPINE CT TECHNIQUE: Spiral CT images were obtained from the  skull base down to the T1-2 thoracic level and images were reformatted  and submitted in 2 mm thick axial, sagittal and coronal CT sections with  soft tissue algorithm and 1 mm thick axial, sagittal and coronal CT  sections with high-resolution bone algorithm.     There are no prior cervical spine imaging studies from [facility] for comparison.     FINDINGS: There are prominent arthritic changes at the atlantodental  interval with marginal spurring off the anterior ring of the C1 and the  odontoid process. Otherwise, the C1-2 level is normal in appearance.     At C2-3 there is mild-to-moderate left facet overgrowth.  The posterior  disc margin is normal.  There is no canal or foraminal narrowing.     At C3-4 there is moderate bilateral facet overgrowth.  There is  prominent posterior spurring and bulging disc material.  There is severe  narrowing of the cervical thecal sac with some flattening of the cord at  this level.  There is at least mild-to-moderate bilateral foraminal  narrowing.     At C4-5 there is mild left and mild-to-moderate right facet  overgrowth,  severe disc space narrowing and degenerative disc and endplate changes  and diffuse posterior disc osteophyte complex and there is severe canal  narrowing with flattening of the cord.  There is bilateral uncovertebral  joint hypertrophy and there is mild-to-moderate left and moderate right  bony foraminal narrowing.     At C5-6 there is severe disc space narrowing.  There is some bony fusion  across the endplates.  There is posterior spurring.  There is mild canal  narrowing.  There is mild facet overgrowth and uncovertebral joint  hypertrophy.  There is mild left and mild-to-moderate right bony  foraminal narrowing.     At C6-7 there is severe disc space narrowing, degenerative endplate  changes, diffuse posterior disc osteophyte complex and there is at least  mild-to-moderate if not moderate canal narrowing there is only minimal  facet overgrowth, but there is prominent bilateral uncovertebral joint  hypertrophy and there is moderate bilateral bony foraminal narrowing.     At C7-T1 there is mild right and mild-to-moderate left facet overgrowth.   There is disc space narrowing, degenerative endplate changes and  posterior endplate spurring.  There is no canal or left foraminal  narrowing.  There is mild right bony foraminal narrowing.     No acute fracture is seen in the cervical spine.     IMPRESSION:  1. No acute fracture is seen in the cervical spine.  2. There is bony fusion across the endplates at C5-6, may be from prior  discectomy and fusion procedure, and correlation with clinical history  is suggested.  There is rather severe cervical spondylosis with some  posterior disc osteophyte complex and moderate-to-severe canal narrowing  at C3-4 and C4-5.  There is mild-to-moderate if not moderate canal  narrowing at C6-7.  There is multilevel bony foraminal narrowing.      Radiation dose reduction techniques were utilized, including automated  exposure control and exposure modulation based on body  size.                     ECG 12 Lead   Final Result   HEART RATE= 45  bpm   RR Interval= 1344  ms   MS Interval= 181  ms   P Horizontal Axis= -2  deg   P Front Axis= 59  deg   QRSD Interval= 112  ms   QT Interval= 450  ms   QRS Axis= -19  deg   T Wave Axis= 26  deg   - OTHERWISE NORMAL ECG -   Sinus bradycardia   No change from prior tracing   Electronically Signed By: Vito Malhotra ([facility]) 02-Aug-2019 14:31:26   Date and Time of Study: 2019-08-02 11:18:13           Assessment/Plan     Active Hospital Problems    Diagnosis POA   • **Weakness generalized [R53.1] Yes   • Waldenstrom macroglobulinemia (CMS/HCC) [C88.0] Unknown     2014     • CKD (chronic kidney disease) stage 3, GFR 30-59 ml/min (CMS/HCC) [N18.3] Yes   • Anemia [D64.9] Yes   • Bradycardia [R00.1] Yes   • Fall [W19.XXXA] Yes   • Neck pain [M54.2] Yes   • JULIA treated with BiPAP 20/16 [G47.33] Yes   • Long term (current) use of opiate analgesic [Z79.891] Not Applicable   • Hypertension [I10] Yes   • Diabetes (CMS/HCC) [E11.9] Yes   • Non-Hodgkin's lymphoma (CMS/HCC) [C85.90] Yes   • Chronic pain [G89.29] Yes     Weakness/bradycardia  · Cardiology consult for low heart rate.  · Given rales on exam and reports of choking on food, check chest x-ray. ST consult.  · Troponin negative.    Fall/neck pain  · CT with severe cervical spondylosis. Nothing acute. Will need outpatient follow up for this.  · Falls precautions.  · PRN pain medications.  · PT consult.    CKD3  · 1.56. Appears near baseline for patient.  · Will monitor labs.    Anemia  · Chronic. Hemoglobin 10.1. Around baseline.  · Check occult stool for blood. ? Reports of stool in blood per patient.    JULIA   · Continue home use of CPAP.    DM2  · Monitor AC and HS. SSI.      I discussed the patients findings and my recommendations with patient and family.    VTE Prophylaxis - SCDs .  Code Status - Full code. Confirmed with patient and wife.       DANY Campos  [facility]  08/02/19  4:24 PM    Addendum:   I, Kendra Parker MD, personally performed the services described in this documentation as scribed by the above named individual in my presence, and it is both accurate and complete.     I personally interviewed and examined the patient.  I discussed his care with Corinne Lin NP and with his nurse on the unit.  I agree with above in addition to the following comments:    He is falling because he gets lightheaded.  No chest pain or palpitations.  Heart rate is been low for a long time.    Neck pain is terrible and constant.  He uses Norco at home.   strength is decreased.  He had epidurals for the thoracic and lower back which did not help.  Last epidural was 6 months ago    He looks about his stated age.  Pleasant and cooperative.  Heart is bradycardic with a grade 3/6 systolic murmur heard right and left sternal borders.  Lungs are clear.  Breath sounds equal though decreased.  Extremities trace edema.  He has loss of muscle especially at the intraosseous site between thumb and index finger.  Sensation is decreased both upper extremities and worse on the left compared to the right.   strength is decreased.    Lightheadedness probably related to severity of bradycardia plus minus because of his heart murmur.  Needs an echocardiogram.  Coreg dosage has been decreased.  Await further input from cardiology.    Severe degenerative disc disease with stenosis at cervical spine.  IV steroids to see if we can relieve his pain.  We will ask neurosurgery to  see.    Anemia, probably from chronic disease but will repeat in a.m. with usual studies.  None in our system.     Kendra Parker MD   8/2/2019  7:34 PM

## 2019-08-02 NOTE — ED PROVIDER NOTES
EMERGENCY DEPARTMENT ENCOUNTER    CHIEF COMPLAINT  Chief Complaint: neck pain  History given by: patient  History limited by: Poor historian  Room Number: 28/28  PMD: Anthony Gutierrez MD      HPI:  Pt is a 78 y.o. male who presents to the ED via EMS complaining of neck pain s/p fall yesterday.  Patient is somewhat confused and slow to respond.  Pt admits to dizziness prior to the fall causing him to fall. He still c/o dizziness and feels disoriented. He was seen at the [facility] pta and sent to the ED after he informed them of the incident. While there, they also observed he was hypertensive and administered 0.2 mg of Clonidine. He admits to striking his head during the fall and his chronic back pain is worse than nml. He denies hip pain, BUE pain, BLE pain, and other complaints or injuries. Pt has been ambulatory since fall but admits he's been having increased difficulty ambulating due to feeling off balance.        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) 09/16/2016   • Hypertension 09/16/2016   • Long term (current) use of opiate analgesic 10/04/2016   • Pain in lateral portion of right knee 03/22/2017   • JULIA treated with BiPAP 20/16 05/21/2017   • Hypersomnia due to medical condition 02/24/2018   • Chronic right shoulder pain 11/14/2018   • Status post reverse total arthroplasty of right shoulder 01/10/2019   • Postlaminectomy syndrome, cervical region 05/29/2019     Resolved Ambulatory Problems     Diagnosis Date Noted   • Acute post-operative pain  10/04/2016   • Encounter for long-term current use of high risk medication 01/24/2017     Past Medical History:   Diagnosis Date   • Cancer (CMS/HCC)    • Cataract    • Coronary artery disease    • Diabetes (CMS/HCC)    • GERD (gastroesophageal reflux disease)    • Gout    • Heart attack (CMS/HCC)    • High cholesterol    • Hypersomnia    • Hypertension    • Kidney stones    • Low back pain    • MRSA (methicillin resistant staph aureus) culture positive    • Right shoulder pain    • Sleep apnea    • Waldenstrom macroglobulinemia (CMS/HCC)        PAST SURGICAL HISTORY  Past Surgical History:   Procedure Laterality Date   • ANGIOPLASTY      X 7   • BACK SURGERY      X4   • CARDIAC CATHETERIZATION     • CARPAL TUNNEL RELEASE Right    • CATARACT EXTRACTION WITH INTRAOCULAR LENS IMPLANT     • COLON RESECTION     • COLONOSCOPY     • ESOPHAGOSCOPY / EGD     • EXTRACORPOREAL SHOCK WAVE LITHOTRIPSY (ESWL)     • FINGER SURGERY Left    • GALLBLADDER SURGERY     • HERNIA REPAIR     • INCISION AND DRAINAGE ABSCESS      MULTIPLE   • LAMINECTOMY     • AZ REMOVAL OF ELBOW BURSA Left 9/15/2016    Procedure: LT ELBOW I&D W/ BONE CURRETTAGE;  Surgeon: Kirk Gross MD;  Location: [facility] OR [facility];  Service: Orthopedics   • PROSTATE SURGERY     • ROTATOR CUFF REPAIR Right    • SKIN CANCER EXCISION      MULTIPLE   • TOTAL SHOULDER ARTHROPLASTY W/ DISTAL CLAVICLE EXCISION Right 1/10/2019    Procedure: RT TOTAL SHOULDER REVERSE ARTHROPLASTY;  Surgeon: Kirk Gross MD;  Location: [facility];  Service: Orthopedics   • TYMPANOPLASTY Right        FAMILY HISTORY  History reviewed. No pertinent family history.    SOCIAL HISTORY  Social History     Socioeconomic History   • Marital status:      Spouse name: Not on file   • Number of children: Not on file   • Years of education: Not on file   • Highest education level: Not on file   Tobacco Use   • Smoking status: Current Every Day Smoker     Years: 60.00     Types: Cigars   •  Smokeless tobacco: Never Used   • Tobacco comment: QUIT 1999 CIGARETTES/STILL SMOKES CIGARS   Substance and Sexual Activity   • Alcohol use: Yes     Comment: VERY RARE       ALLERGIES  Contrast dye; Dye fdc red [red dye]; Oxycodone; and Adhesive tape    REVIEW OF SYSTEMS  Review of Systems   Constitutional: Negative for activity change, appetite change and fever.   HENT: Negative for congestion and sore throat.    Eyes: Negative.    Respiratory: Negative for cough and shortness of breath.    Cardiovascular: Negative for chest pain and leg swelling.   Gastrointestinal: Negative for abdominal pain, diarrhea and vomiting.   Endocrine: Negative.    Genitourinary: Negative for decreased urine volume and dysuria.   Musculoskeletal: Positive for back pain (acute on chronic) and neck pain.   Skin: Negative for rash and wound.   Allergic/Immunologic: Positive for immunocompromised state.   Neurological: Positive for dizziness. Negative for weakness, numbness and headaches.   Hematological: Negative.    Psychiatric/Behavioral: Negative.    All other systems reviewed and are negative.      PHYSICAL EXAM  ED Triage Vitals [08/02/19 1025]   Temp Heart Rate Resp BP SpO2   98.3 °F (36.8 °C) 50 16 161/65 96 %      Temp src Heart Rate Source Patient Position BP Location FiO2 (%)   Tympanic Monitor -- -- --       Physical Exam   Constitutional: He is oriented to person, place, and time. No distress.   Somnolent   HENT:   Head: Normocephalic and atraumatic.   Eyes: EOM are normal. Pupils are equal, round, and reactive to light.   Neck: Normal range of motion. Neck supple.   EMS c-collar in place. C-spine tenderness.   Cardiovascular: Regular rhythm and normal heart sounds. Bradycardia present.   Pulmonary/Chest: Effort normal and breath sounds normal. No respiratory distress.   Abdominal: Soft. There is no tenderness. There is no rebound and no guarding.   Musculoskeletal: Normal range of motion. He exhibits no edema.   Neurological:  He is oriented to person, place, and time. He has normal sensation and normal strength. He has a normal Straight Leg Raise Test.   No focal neurological deficits   Skin: Skin is warm and dry.   Psychiatric: He has a flat affect.   Nursing note and vitals reviewed.      LAB RESULTS  Lab Results (last 24 hours)     Procedure Component Value Units Date/Time    CBC & Differential [[id number]] Collected:  08/02/19 1122    Specimen:  Blood Updated:  08/02/19 1135    Narrative:       The following orders were created for panel order CBC & Differential.  Procedure                               Abnormality         Status                     ---------                               -----------         ------                     CBC Auto Differential[[id number]]        Abnormal            Final result                 Please view results for these tests on the individual orders.    Comprehensive Metabolic Panel [[id number]]  (Abnormal) Collected:  08/02/19 1122    Specimen:  Blood Updated:  08/02/19 1155     Glucose 160 mg/dL      BUN 32 mg/dL      Creatinine 1.56 mg/dL      Sodium 140 mmol/L      Potassium 4.2 mmol/L      Chloride 106 mmol/L      CO2 25.0 mmol/L      Calcium 9.7 mg/dL      Total Protein 7.5 g/dL      Albumin 3.70 g/dL      ALT (SGPT) 21 U/L      AST (SGOT) 23 U/L      Alkaline Phosphatase 152 U/L      Total Bilirubin 0.2 mg/dL      eGFR Non African Amer 43 mL/min/1.73      Globulin 3.8 gm/dL      A/G Ratio 1.0 g/dL      BUN/Creatinine Ratio 20.5     Anion Gap 9.0 mmol/L     Narrative:       GFR Normal >60  Chronic Kidney Disease <60  Kidney Failure <15    Troponin [[id number]]  (Normal) Collected:  08/02/19 1122    Specimen:  Blood Updated:  08/02/19 1155     Troponin T <0.010 ng/mL     Narrative:       Troponin T Reference Range:  <= 0.03 ng/mL-   Negative for AMI  >0.03 ng/mL-     Abnormal for myocardial necrosis.  Clinicians would have to utilize clinical acumen, EKG, Troponin and serial changes to determine if  it is an Acute Myocardial Infarction or myocardial injury due to an underlying chronic condition.     CBC Auto Differential [[id number]]  (Abnormal) Collected:  08/02/19 1122    Specimen:  Blood Updated:  08/02/19 1135     WBC 6.19 10*3/mm3      RBC 3.03 10*6/mm3      Hemoglobin 10.1 g/dL      Hematocrit 31.2 %      .0 fL      MCH 33.3 pg      MCHC 32.4 g/dL      RDW 14.3 %      RDW-SD 54.0 fl      MPV 10.3 fL      Platelets 172 10*3/mm3      Neutrophil % 60.6 %      Lymphocyte % 25.2 %      Monocyte % 7.6 %      Eosinophil % 6.1 %      Basophil % 0.3 %      Immature Grans % 0.2 %      Neutrophils, Absolute 3.75 10*3/mm3      Lymphocytes, Absolute 1.56 10*3/mm3      Monocytes, Absolute 0.47 10*3/mm3      Eosinophils, Absolute 0.38 10*3/mm3      Basophils, Absolute 0.02 10*3/mm3      Immature Grans, Absolute 0.01 10*3/mm3      nRBC 0.0 /100 WBC           I ordered the above labs and reviewed the results    RADIOLOGY  CT Head Without Contrast   Preliminary Result   Essentially negative head CT with no acute skull fracture   or intracranial hemorrhage identified.       CERVICAL SPINE CT TECHNIQUE: Spiral CT images were obtained from the   skull base down to the T1-2 thoracic level and images were reformatted   and submitted in 2 mm thick axial, sagittal and coronal CT sections with   soft tissue algorithm and 1 mm thick axial, sagittal and coronal CT   sections with high-resolution bone algorithm.       There are no prior cervical spine imaging studies from [facility] for comparison.       FINDINGS: There are prominent arthritic changes at the atlantodental   interval with marginal spurring off the anterior ring of the C1 and the   odontoid process. Otherwise, the C1-2 level is normal in appearance.       At C2-3 there is mild-to-moderate left facet overgrowth.  The posterior   disc margin is normal.  There is no canal or foraminal narrowing.       At C3-4 there is moderate bilateral facet  overgrowth.  There is   prominent posterior spurring and bulging disc material.  There is severe   narrowing of the cervical thecal sac with some flattening of the cord at   this level.  There is at least mild-to-moderate bilateral foraminal   narrowing.       At C4-5 there is mild left and mild-to-moderate right facet overgrowth,   severe disc space narrowing and degenerative disc and endplate changes   and diffuse posterior disc osteophyte complex and there is severe canal   narrowing with flattening of the cord.  There is bilateral uncovertebral   joint hypertrophy and there is mild-to-moderate left and moderate right   bony foraminal narrowing.       At C5-6 there is severe disc space narrowing.  There is some bony fusion   across the endplates.  There is posterior spurring.  There is mild canal   narrowing.  There is mild facet overgrowth and uncovertebral joint   hypertrophy.  There is mild left and mild-to-moderate right bony   foraminal narrowing.       At C6-7 there is severe disc space narrowing, degenerative endplate   changes, diffuse posterior disc osteophyte complex and there is at least   mild-to-moderate if not moderate canal narrowing there is only minimal   facet overgrowth, but there is prominent bilateral uncovertebral joint   hypertrophy and there is moderate bilateral bony foraminal narrowing.       At C7-T1 there is mild right and mild-to-moderate left facet overgrowth.    There is disc space narrowing, degenerative endplate changes and   posterior endplate spurring.  There is no canal or left foraminal   narrowing.  There is mild right bony foraminal narrowing.       No acute fracture is seen in the cervical spine.       IMPRESSION:   1. No acute fracture is seen in the cervical spine.   2. There is bony fusion across the endplates at C5-6, may be from prior   discectomy and fusion procedure, and correlation with clinical history   is suggested.  There is rather severe cervical spondylosis  with some   posterior disc osteophyte complex and moderate-to-severe canal narrowing   at C3-4 and C4-5.  There is mild-to-moderate if not moderate canal   narrowing at C6-7.  There is multilevel bony foraminal narrowing.        Radiation dose reduction techniques were utilized, including automated   exposure control and exposure modulation based on body size.              CT Cervical Spine Without Contrast   Preliminary Result   Essentially negative head CT with no acute skull fracture   or intracranial hemorrhage identified.       CERVICAL SPINE CT TECHNIQUE: Spiral CT images were obtained from the   skull base down to the T1-2 thoracic level and images were reformatted   and submitted in 2 mm thick axial, sagittal and coronal CT sections with   soft tissue algorithm and 1 mm thick axial, sagittal and coronal CT   sections with high-resolution bone algorithm.       There are no prior cervical spine imaging studies from [facility] for comparison.       FINDINGS: There are prominent arthritic changes at the atlantodental   interval with marginal spurring off the anterior ring of the C1 and the   odontoid process. Otherwise, the C1-2 level is normal in appearance.       At C2-3 there is mild-to-moderate left facet overgrowth.  The posterior   disc margin is normal.  There is no canal or foraminal narrowing.       At C3-4 there is moderate bilateral facet overgrowth.  There is   prominent posterior spurring and bulging disc material.  There is severe   narrowing of the cervical thecal sac with some flattening of the cord at   this level.  There is at least mild-to-moderate bilateral foraminal   narrowing.       At C4-5 there is mild left and mild-to-moderate right facet overgrowth,   severe disc space narrowing and degenerative disc and endplate changes   and diffuse posterior disc osteophyte complex and there is severe canal   narrowing with flattening of the cord.  There is bilateral uncovertebral    joint hypertrophy and there is mild-to-moderate left and moderate right   bony foraminal narrowing.       At C5-6 there is severe disc space narrowing.  There is some bony fusion   across the endplates.  There is posterior spurring.  There is mild canal   narrowing.  There is mild facet overgrowth and uncovertebral joint   hypertrophy.  There is mild left and mild-to-moderate right bony   foraminal narrowing.       At C6-7 there is severe disc space narrowing, degenerative endplate   changes, diffuse posterior disc osteophyte complex and there is at least   mild-to-moderate if not moderate canal narrowing there is only minimal   facet overgrowth, but there is prominent bilateral uncovertebral joint   hypertrophy and there is moderate bilateral bony foraminal narrowing.       At C7-T1 there is mild right and mild-to-moderate left facet overgrowth.    There is disc space narrowing, degenerative endplate changes and   posterior endplate spurring.  There is no canal or left foraminal   narrowing.  There is mild right bony foraminal narrowing.       No acute fracture is seen in the cervical spine.       IMPRESSION:   1. No acute fracture is seen in the cervical spine.   2. There is bony fusion across the endplates at C5-6, may be from prior   discectomy and fusion procedure, and correlation with clinical history   is suggested.  There is rather severe cervical spondylosis with some   posterior disc osteophyte complex and moderate-to-severe canal narrowing   at C3-4 and C4-5.  There is mild-to-moderate if not moderate canal   narrowing at C6-7.  There is multilevel bony foraminal narrowing.        Radiation dose reduction techniques were utilized, including automated   exposure control and exposure modulation based on body size.                   I ordered the above noted radiological studies. Interpreted by radiologist. Reviewed by me in PACS.       PROCEDURES  Procedures      PROGRESS AND CONSULTS      6460  BP-  154/60 HR- 56 Temp- 98.3 °F (36.8 °C) (Tympanic) O2 sat- 95%  Rechecked the patient who is in NAD and is resting comfortably. Pt's wife bedside. Denies any anticoagulant use. Pt and wife made aware that CT head and neck are negative. C-Collar removed.    1202  Checked pt's HR. It is currently 42 at rest.    1238  Discussed the pt with Dr. Billings. After performing their own physical exam of the pt, they agreed with the plan of care to admit    1320  Discussed the pt w/ NAREN Michele. She advised to speak to cardiology to see if they would admit due to pt's bradycardia.    1510  Discussed case with nurse practitioner from cardiology group.  Patient has multiple comorbidities as well as confusion, she would like patient to be admitted to medicine.    1550  Discussed case with Dr. Parker.  She will admit to the hospital.    MEDICAL DECISION MAKING  Results were reviewed/discussed with the patient and they were also made aware of online access. Pt also made aware that some labs, such as cultures, will not be resulted during ER visit and follow up with PMD is necessary.     Pt weak and bradycardic, with multiple falls.  Will admit for further work-up.        MDM  Number of Diagnoses or Management Options  Bradycardia:   Frequent falls:   Weakness:      Amount and/or Complexity of Data Reviewed  Clinical lab tests: reviewed (Creatinine 1.56)  Tests in the radiology section of CPT®: reviewed (CT head and neck-negative acute)  Decide to obtain previous medical records or to obtain history from someone other than the patient: yes  Discuss the patient with other providers: yes (Dr. Parker [facility]  Dr. Giraldo, cardiology)    Patient Progress  Patient progress: stable         DIAGNOSIS  Final diagnoses:   Bradycardia   Weakness   Frequent falls       DISPOSITION  ADMISSION    Discussed treatment plan and reason for admission with pt/family and admitting physician.  Pt/family voiced understanding of the plan for admission for  further testing/treatment as needed.       Latest Documented Vital Signs:  As of 1:22 PM  BP- 169/68 HR- (!) 42 Temp- 98.3 °F (36.8 °C) (Tympanic) O2 sat- 96%    --  Documentation assistance provided by elmer Villanueva for Jean-Pierre Hill PA-C.  Information recorded by the scribe was done at my direction and has been verified and validated by me.     Radha Villanueva  08/02/19 1403       Radha Villanueva  08/02/19 [id number]       Jean-Pierre Hill PA  08/02/19 2733

## 2019-08-03 ENCOUNTER — APPOINTMENT (OUTPATIENT)
Dept: MRI IMAGING | Facility: HOSPITAL | Age: 79
End: 2019-08-03

## 2019-08-03 ENCOUNTER — APPOINTMENT (OUTPATIENT)
Dept: CARDIOLOGY | Facility: HOSPITAL | Age: 79
End: 2019-08-03

## 2019-08-03 PROBLEM — M48.02 SPINAL STENOSIS OF CERVICAL REGION: Status: ACTIVE | Noted: 2019-08-03

## 2019-08-03 LAB
ANION GAP SERPL CALCULATED.3IONS-SCNC: 12.8 MMOL/L (ref 5–15)
ANION GAP SERPL CALCULATED.3IONS-SCNC: 14.2 MMOL/L (ref 5–15)
AV HCM GRAD VALS: 16 MMHG
AV LVOT PEAK GRADIENT: 16 MMHG
BH CV ECHO MEAS - ACS: 2.2 CM
BH CV ECHO MEAS - AO MAX PG (FULL): 6.3 MMHG
BH CV ECHO MEAS - AO MAX PG: 15.7 MMHG
BH CV ECHO MEAS - AO MEAN PG (FULL): 2 MMHG
BH CV ECHO MEAS - AO MEAN PG: 7 MMHG
BH CV ECHO MEAS - AO ROOT AREA (BSA CORRECTED): 1.6
BH CV ECHO MEAS - AO ROOT AREA: 9.6 CM^2
BH CV ECHO MEAS - AO ROOT DIAM: 3.5 CM
BH CV ECHO MEAS - AO V2 MAX: 198 CM/SEC
BH CV ECHO MEAS - AO V2 MEAN: 121 CM/SEC
BH CV ECHO MEAS - AO V2 VTI: 36.7 CM
BH CV ECHO MEAS - AVA(I,A): 2.7 CM^2
BH CV ECHO MEAS - AVA(I,D): 2.7 CM^2
BH CV ECHO MEAS - AVA(V,A): 2.4 CM^2
BH CV ECHO MEAS - AVA(V,D): 2.4 CM^2
BH CV ECHO MEAS - BSA(HAYCOCK): 2.4 M^2
BH CV ECHO MEAS - BSA: 2.2 M^2
BH CV ECHO MEAS - BZI_BMI: 37.1 KILOGRAMS/M^2
BH CV ECHO MEAS - BZI_METRIC_HEIGHT: 172.7 CM
BH CV ECHO MEAS - BZI_METRIC_WEIGHT: 110.7 KG
BH CV ECHO MEAS - EDV(CUBED): 201.2 ML
BH CV ECHO MEAS - EDV(MOD-SP2): 91 ML
BH CV ECHO MEAS - EDV(MOD-SP4): 69 ML
BH CV ECHO MEAS - EDV(TEICH): 170.5 ML
BH CV ECHO MEAS - EF(CUBED): 86.4 %
BH CV ECHO MEAS - EF(MOD-BP): 67 %
BH CV ECHO MEAS - EF(MOD-SP2): 63.7 %
BH CV ECHO MEAS - EF(MOD-SP4): 68.1 %
BH CV ECHO MEAS - EF(TEICH): 79.3 %
BH CV ECHO MEAS - ESV(CUBED): 27.3 ML
BH CV ECHO MEAS - ESV(MOD-SP2): 33 ML
BH CV ECHO MEAS - ESV(MOD-SP4): 22 ML
BH CV ECHO MEAS - ESV(TEICH): 35.3 ML
BH CV ECHO MEAS - FS: 48.6 %
BH CV ECHO MEAS - IVS/LVPW: 1
BH CV ECHO MEAS - IVSD: 1.1 CM
BH CV ECHO MEAS - LAT PEAK E' VEL: 7 CM/SEC
BH CV ECHO MEAS - LV DIASTOLIC VOL/BSA (35-75): 31 ML/M^2
BH CV ECHO MEAS - LV MASS(C)D: 256 GRAMS
BH CV ECHO MEAS - LV MASS(C)DI: 115.1 GRAMS/M^2
BH CV ECHO MEAS - LV MAX PG: 9.4 MMHG
BH CV ECHO MEAS - LV MEAN PG: 5 MMHG
BH CV ECHO MEAS - LV SYSTOLIC VOL/BSA (12-30): 9.9 ML/M^2
BH CV ECHO MEAS - LV V1 MAX: 153 CM/SEC
BH CV ECHO MEAS - LV V1 MEAN: 102 CM/SEC
BH CV ECHO MEAS - LV V1 VTI: 31.2 CM
BH CV ECHO MEAS - LVIDD: 5.9 CM
BH CV ECHO MEAS - LVIDS: 3 CM
BH CV ECHO MEAS - LVLD AP2: 7.7 CM
BH CV ECHO MEAS - LVLD AP4: 7.4 CM
BH CV ECHO MEAS - LVLS AP2: 6.3 CM
BH CV ECHO MEAS - LVLS AP4: 6.7 CM
BH CV ECHO MEAS - LVOT AREA (M): 3.1 CM^2
BH CV ECHO MEAS - LVOT AREA: 3.1 CM^2
BH CV ECHO MEAS - LVOT DIAM: 2 CM
BH CV ECHO MEAS - LVPWD: 1.1 CM
BH CV ECHO MEAS - MED PEAK E' VEL: 6 CM/SEC
BH CV ECHO MEAS - MV A DUR: 0.13 SEC
BH CV ECHO MEAS - MV A MAX VEL: 157 CM/SEC
BH CV ECHO MEAS - MV DEC SLOPE: 545.5 CM/SEC^2
BH CV ECHO MEAS - MV DEC TIME: 0.18 SEC
BH CV ECHO MEAS - MV E MAX VEL: 71.7 CM/SEC
BH CV ECHO MEAS - MV E/A: 0.46
BH CV ECHO MEAS - MV MAX PG: 12.3 MMHG
BH CV ECHO MEAS - MV MEAN PG: 4 MMHG
BH CV ECHO MEAS - MV P1/2T MAX VEL: 94.5 CM/SEC
BH CV ECHO MEAS - MV P1/2T: 50.7 MSEC
BH CV ECHO MEAS - MV V2 MAX: 175 CM/SEC
BH CV ECHO MEAS - MV V2 MEAN: 87.9 CM/SEC
BH CV ECHO MEAS - MV V2 VTI: 30.3 CM
BH CV ECHO MEAS - MVA P1/2T LCG: 2.3 CM^2
BH CV ECHO MEAS - MVA(P1/2T): 4.3 CM^2
BH CV ECHO MEAS - MVA(VTI): 3.2 CM^2
BH CV ECHO MEAS - PA ACC TIME: 0.12 SEC
BH CV ECHO MEAS - PA MAX PG (FULL): 3.8 MMHG
BH CV ECHO MEAS - PA MAX PG: 7.5 MMHG
BH CV ECHO MEAS - PA PR(ACCEL): 23.7 MMHG
BH CV ECHO MEAS - PA V2 MAX: 137 CM/SEC
BH CV ECHO MEAS - PULM A REVS DUR: 0.12 SEC
BH CV ECHO MEAS - PULM A REVS VEL: 41.2 CM/SEC
BH CV ECHO MEAS - PULM DIAS VEL: 42.1 CM/SEC
BH CV ECHO MEAS - PULM S/D: 1.5
BH CV ECHO MEAS - PULM SYS VEL: 61.6 CM/SEC
BH CV ECHO MEAS - PVA(V,A): 2.2 CM^2
BH CV ECHO MEAS - PVA(V,D): 2.2 CM^2
BH CV ECHO MEAS - QP/QS: 0.62
BH CV ECHO MEAS - RAP SYSTOLE: 3 MMHG
BH CV ECHO MEAS - RV MAX PG: 3.7 MMHG
BH CV ECHO MEAS - RV MEAN PG: 2 MMHG
BH CV ECHO MEAS - RV V1 MAX: 95.8 CM/SEC
BH CV ECHO MEAS - RV V1 MEAN: 62.7 CM/SEC
BH CV ECHO MEAS - RV V1 VTI: 19.2 CM
BH CV ECHO MEAS - RVOT AREA: 3.1 CM^2
BH CV ECHO MEAS - RVOT DIAM: 2 CM
BH CV ECHO MEAS - SI(AO): 158.8 ML/M^2
BH CV ECHO MEAS - SI(CUBED): 78.2 ML/M^2
BH CV ECHO MEAS - SI(LVOT): 44.1 ML/M^2
BH CV ECHO MEAS - SI(MOD-SP2): 26.1 ML/M^2
BH CV ECHO MEAS - SI(MOD-SP4): 21.1 ML/M^2
BH CV ECHO MEAS - SI(TEICH): 60.8 ML/M^2
BH CV ECHO MEAS - SV(AO): 353.1 ML
BH CV ECHO MEAS - SV(CUBED): 174 ML
BH CV ECHO MEAS - SV(LVOT): 98 ML
BH CV ECHO MEAS - SV(MOD-SP2): 58 ML
BH CV ECHO MEAS - SV(MOD-SP4): 47 ML
BH CV ECHO MEAS - SV(RVOT): 60.3 ML
BH CV ECHO MEAS - SV(TEICH): 135.2 ML
BH CV ECHO MEAS - TAPSE (>1.6): 2 CM2
BH CV ECHO MEASUREMENTS AVERAGE E/E' RATIO: 11.03
BH CV XLRA - RV BASE: 3.4 CM
BH CV XLRA - TDI S': 17 CM/SEC
BUN BLD-MCNC: 35 MG/DL (ref 8–23)
BUN BLD-MCNC: 36 MG/DL (ref 8–23)
BUN/CREAT SERPL: 24.3 (ref 7–25)
BUN/CREAT SERPL: 25.9 (ref 7–25)
CALCIUM SPEC-SCNC: 9.6 MG/DL (ref 8.6–10.5)
CALCIUM SPEC-SCNC: 9.9 MG/DL (ref 8.6–10.5)
CHLORIDE SERPL-SCNC: 105 MMOL/L (ref 98–107)
CHLORIDE SERPL-SCNC: 107 MMOL/L (ref 98–107)
CO2 SERPL-SCNC: 22.8 MMOL/L (ref 22–29)
CO2 SERPL-SCNC: 23.2 MMOL/L (ref 22–29)
CREAT BLD-MCNC: 1.35 MG/DL (ref 0.76–1.27)
CREAT BLD-MCNC: 1.48 MG/DL (ref 0.76–1.27)
DEPRECATED RDW RBC AUTO: 53.9 FL (ref 37–54)
ERYTHROCYTE [DISTWIDTH] IN BLOOD BY AUTOMATED COUNT: 14.2 % (ref 12.3–15.4)
FOLATE SERPL-MCNC: 15.2 NG/ML (ref 4.78–24.2)
GFR SERPL CREATININE-BSD FRML MDRD: 46 ML/MIN/1.73
GFR SERPL CREATININE-BSD FRML MDRD: 51 ML/MIN/1.73
GLUCOSE BLD-MCNC: 204 MG/DL (ref 65–99)
GLUCOSE BLD-MCNC: 283 MG/DL (ref 65–99)
GLUCOSE BLDC GLUCOMTR-MCNC: 206 MG/DL (ref 70–130)
GLUCOSE BLDC GLUCOMTR-MCNC: 286 MG/DL (ref 70–130)
GLUCOSE BLDC GLUCOMTR-MCNC: 293 MG/DL (ref 70–130)
GLUCOSE BLDC GLUCOMTR-MCNC: 298 MG/DL (ref 70–130)
HCT VFR BLD AUTO: 33.1 % (ref 37.5–51)
HEMOCCULT STL QL: NEGATIVE
HGB BLD-MCNC: 10.4 G/DL (ref 13–17.7)
IRON 24H UR-MRATE: 144 MCG/DL (ref 59–158)
IRON SATN MFR SERPL: 40 % (ref 20–50)
LEFT ATRIUM VOLUME INDEX: 28 ML/M2
MCH RBC QN AUTO: 32.5 PG (ref 26.6–33)
MCHC RBC AUTO-ENTMCNC: 31.4 G/DL (ref 31.5–35.7)
MCV RBC AUTO: 103.4 FL (ref 79–97)
PLATELET # BLD AUTO: 204 10*3/MM3 (ref 140–450)
PMV BLD AUTO: 11.2 FL (ref 6–12)
POTASSIUM BLD-SCNC: 5.1 MMOL/L (ref 3.5–5.2)
POTASSIUM BLD-SCNC: 5.3 MMOL/L (ref 3.5–5.2)
RBC # BLD AUTO: 3.2 10*6/MM3 (ref 4.14–5.8)
SODIUM BLD-SCNC: 142 MMOL/L (ref 136–145)
SODIUM BLD-SCNC: 143 MMOL/L (ref 136–145)
TIBC SERPL-MCNC: 359 MCG/DL (ref 298–536)
TRANSFERRIN SERPL-MCNC: 241 MG/DL (ref 200–360)
TSH SERPL DL<=0.05 MIU/L-ACNC: 1.19 MIU/ML (ref 0.27–4.2)
VIT B12 BLD-MCNC: 1662 PG/ML (ref 211–946)
WBC NRBC COR # BLD: 5.93 10*3/MM3 (ref 3.4–10.8)

## 2019-08-03 PROCEDURE — 72156 MRI NECK SPINE W/O & W/DYE: CPT

## 2019-08-03 PROCEDURE — 84466 ASSAY OF TRANSFERRIN: CPT | Performed by: INTERNAL MEDICINE

## 2019-08-03 PROCEDURE — A9577 INJ MULTIHANCE: HCPCS | Performed by: INTERNAL MEDICINE

## 2019-08-03 PROCEDURE — 93306 TTE W/DOPPLER COMPLETE: CPT | Performed by: INTERNAL MEDICINE

## 2019-08-03 PROCEDURE — 83540 ASSAY OF IRON: CPT | Performed by: INTERNAL MEDICINE

## 2019-08-03 PROCEDURE — 63710000001 INSULIN LISPRO (HUMAN) PER 5 UNITS: Performed by: INTERNAL MEDICINE

## 2019-08-03 PROCEDURE — 93306 TTE W/DOPPLER COMPLETE: CPT

## 2019-08-03 PROCEDURE — 25010000002 METHYLPREDNISOLONE PER 125 MG: Performed by: INTERNAL MEDICINE

## 2019-08-03 PROCEDURE — 63710000001 INSULIN GLARGINE PER 5 UNITS: Performed by: INTERNAL MEDICINE

## 2019-08-03 PROCEDURE — 85027 COMPLETE CBC AUTOMATED: CPT | Performed by: NURSE PRACTITIONER

## 2019-08-03 PROCEDURE — 80048 BASIC METABOLIC PNL TOTAL CA: CPT | Performed by: INTERNAL MEDICINE

## 2019-08-03 PROCEDURE — 82962 GLUCOSE BLOOD TEST: CPT

## 2019-08-03 PROCEDURE — 82272 OCCULT BLD FECES 1-3 TESTS: CPT | Performed by: NURSE PRACTITIONER

## 2019-08-03 PROCEDURE — 36415 COLL VENOUS BLD VENIPUNCTURE: CPT | Performed by: NURSE PRACTITIONER

## 2019-08-03 PROCEDURE — 80048 BASIC METABOLIC PNL TOTAL CA: CPT | Performed by: NURSE PRACTITIONER

## 2019-08-03 PROCEDURE — 84443 ASSAY THYROID STIM HORMONE: CPT | Performed by: INTERNAL MEDICINE

## 2019-08-03 PROCEDURE — 25010000002 HYDROMORPHONE 1 MG/ML SOLUTION: Performed by: INTERNAL MEDICINE

## 2019-08-03 PROCEDURE — 82746 ASSAY OF FOLIC ACID SERUM: CPT | Performed by: INTERNAL MEDICINE

## 2019-08-03 PROCEDURE — 82607 VITAMIN B-12: CPT | Performed by: INTERNAL MEDICINE

## 2019-08-03 PROCEDURE — 92523 SPEECH SOUND LANG COMPREHEN: CPT

## 2019-08-03 PROCEDURE — 0 GADOBENATE DIMEGLUMINE 529 MG/ML SOLUTION: Performed by: INTERNAL MEDICINE

## 2019-08-03 RX ORDER — INSULIN GLARGINE 100 [IU]/ML
30 INJECTION, SOLUTION SUBCUTANEOUS NIGHTLY
Status: DISCONTINUED | OUTPATIENT
Start: 2019-08-03 | End: 2019-08-04

## 2019-08-03 RX ORDER — HYDROMORPHONE HYDROCHLORIDE 1 MG/ML
0.5 INJECTION, SOLUTION INTRAMUSCULAR; INTRAVENOUS; SUBCUTANEOUS EVERY 4 HOURS PRN
Status: DISCONTINUED | OUTPATIENT
Start: 2019-08-03 | End: 2019-08-09

## 2019-08-03 RX ADMIN — METHYLPREDNISOLONE SODIUM SUCCINATE 80 MG: 125 INJECTION, POWDER, FOR SOLUTION INTRAMUSCULAR; INTRAVENOUS at 11:20

## 2019-08-03 RX ADMIN — HYDROCODONE BITARTRATE AND ACETAMINOPHEN 1 TABLET: 10; 325 TABLET ORAL at 08:29

## 2019-08-03 RX ADMIN — HYDROMORPHONE HYDROCHLORIDE 1 MG: 1 INJECTION, SOLUTION INTRAMUSCULAR; INTRAVENOUS; SUBCUTANEOUS at 16:14

## 2019-08-03 RX ADMIN — INSULIN LISPRO 6 UNITS: 100 INJECTION, SOLUTION INTRAVENOUS; SUBCUTANEOUS at 12:22

## 2019-08-03 RX ADMIN — GADOBENATE DIMEGLUMINE 20 ML: 529 INJECTION, SOLUTION INTRAVENOUS at 14:41

## 2019-08-03 RX ADMIN — INSULIN LISPRO 6 UNITS: 100 INJECTION, SOLUTION INTRAVENOUS; SUBCUTANEOUS at 21:37

## 2019-08-03 RX ADMIN — LEVOTHYROXINE SODIUM 25 MCG: 25 TABLET ORAL at 06:41

## 2019-08-03 RX ADMIN — HYDROCODONE BITARTRATE AND ACETAMINOPHEN 2 TABLET: 10; 325 TABLET ORAL at 17:13

## 2019-08-03 RX ADMIN — Medication 10 MG: at 21:36

## 2019-08-03 RX ADMIN — INSULIN LISPRO 6 UNITS: 100 INJECTION, SOLUTION INTRAVENOUS; SUBCUTANEOUS at 17:19

## 2019-08-03 RX ADMIN — CARVEDILOL 3.12 MG: 3.12 TABLET, FILM COATED ORAL at 17:13

## 2019-08-03 RX ADMIN — ALLOPURINOL 300 MG: 300 TABLET ORAL at 17:13

## 2019-08-03 RX ADMIN — LOSARTAN POTASSIUM 100 MG: 100 TABLET, FILM COATED ORAL at 08:30

## 2019-08-03 RX ADMIN — INSULIN LISPRO 4 UNITS: 100 INJECTION, SOLUTION INTRAVENOUS; SUBCUTANEOUS at 08:30

## 2019-08-03 RX ADMIN — INSULIN GLARGINE 40 UNITS: 100 INJECTION, SOLUTION SUBCUTANEOUS at 08:35

## 2019-08-03 RX ADMIN — DOCUSATE SODIUM 100 MG: 100 CAPSULE, LIQUID FILLED ORAL at 08:29

## 2019-08-03 RX ADMIN — METHYLPREDNISOLONE SODIUM SUCCINATE 80 MG: 125 INJECTION, POWDER, FOR SOLUTION INTRAMUSCULAR; INTRAVENOUS at 17:12

## 2019-08-03 RX ADMIN — SODIUM CHLORIDE, PRESERVATIVE FREE 3 ML: 5 INJECTION INTRAVENOUS at 11:20

## 2019-08-03 RX ADMIN — BACLOFEN 10 MG: 10 TABLET ORAL at 06:41

## 2019-08-03 RX ADMIN — ISOSORBIDE MONONITRATE 30 MG: 30 TABLET ORAL at 17:13

## 2019-08-03 RX ADMIN — PANTOPRAZOLE SODIUM 40 MG: 40 TABLET, DELAYED RELEASE ORAL at 06:41

## 2019-08-03 RX ADMIN — AMLODIPINE BESYLATE 10 MG: 10 TABLET ORAL at 17:13

## 2019-08-03 RX ADMIN — HYDROCODONE BITARTRATE AND ACETAMINOPHEN 2 TABLET: 10; 325 TABLET ORAL at 12:22

## 2019-08-03 RX ADMIN — HYDROCODONE BITARTRATE AND ACETAMINOPHEN 2 TABLET: 10; 325 TABLET ORAL at 21:38

## 2019-08-03 RX ADMIN — BACLOFEN 10 MG: 10 TABLET ORAL at 21:37

## 2019-08-03 RX ADMIN — ASPIRIN 81 MG: 81 TABLET, COATED ORAL at 08:30

## 2019-08-03 RX ADMIN — PRAVASTATIN SODIUM 20 MG: 20 TABLET ORAL at 21:37

## 2019-08-03 RX ADMIN — METHYLPREDNISOLONE SODIUM SUCCINATE 80 MG: 125 INJECTION, POWDER, FOR SOLUTION INTRAMUSCULAR; INTRAVENOUS at 02:56

## 2019-08-03 RX ADMIN — CARVEDILOL 3.12 MG: 3.12 TABLET, FILM COATED ORAL at 08:30

## 2019-08-03 RX ADMIN — BACLOFEN 10 MG: 10 TABLET ORAL at 13:31

## 2019-08-03 RX ADMIN — INSULIN GLARGINE 30 UNITS: 100 INJECTION, SOLUTION SUBCUTANEOUS at 21:37

## 2019-08-03 NOTE — PLAN OF CARE
Problem: Patient Care Overview  Goal: Plan of Care Review  Outcome: Ongoing (interventions implemented as appropriate)   08/03/19 8528   Coping/Psychosocial   Plan of Care Reviewed With patient;spouse   Plan of Care Review   Progress no change   OTHER   Outcome Summary Pt had an Echo and MRI of the spine today. Pt did not tolerate the MRI well. Came back in trememdous pain. Call placed to MD for IV pain med. VSS. Will cont to monitor.       Problem: Pain, Chronic (Adult)  Goal: Identify Related Risk Factors and Signs and Symptoms  Outcome: Ongoing (interventions implemented as appropriate)    Goal: Acceptable Pain/Comfort Level and Functional Ability  Outcome: Ongoing (interventions implemented as appropriate)      Problem: Cardiac Output Decreased (Adult)  Goal: Effective Tissue Perfusion  Outcome: Ongoing (interventions implemented as appropriate)      Problem: Fall Risk (Adult)  Goal: Identify Related Risk Factors and Signs and Symptoms  Outcome: Ongoing (interventions implemented as appropriate)    Goal: Absence of Fall  Outcome: Ongoing (interventions implemented as appropriate)      Problem: Skin Injury Risk (Adult)  Goal: Skin Health and Integrity  Outcome: Ongoing (interventions implemented as appropriate)

## 2019-08-03 NOTE — SIGNIFICANT NOTE
08/03/19 1042   Rehab Time/Intention   Evaluation Not Performed patient unavailable for evaluation  (off floor to cardio. will follow up tomorrow.)   Rehab Treatment   Discipline physical therapist   Recommendation   PT - Next Appointment 08/04/19

## 2019-08-03 NOTE — PLAN OF CARE
Problem: Patient Care Overview  Goal: Plan of Care Review   08/03/19 1205   Coping/Psychosocial   Plan of Care Reviewed With patient;spouse   OTHER   Outcome Summary Pt presented WFL's all stages of the swallow. Ptd/miguel from speech.

## 2019-08-03 NOTE — THERAPY DISCHARGE NOTE
Acute Care - Speech Language Pathology   Swallow Eval/Discharge [facility]     Patient Name: Chevy Goodwin  : 1940  MRN: [id number]  Today's Date: 8/3/2019               Admit Date: 2019    Visit Dx:    ICD-10-CM ICD-9-CM   1. Bradycardia R00.1 427.89   2. Weakness R53.1 780.79   3. Frequent falls R29.6 V15.88   4. Oropharyngeal dysphagia R13.12 787.22     Patient Active Problem List   Diagnosis   • Pain of metastatic malignancy   • Chronic pain   • Adhesive arachnoiditis   • Degeneration of intervertebral disc of lumbar region   • Low back pain   • Non-Hodgkin's lymphoma (CMS/HCC)   • Postlaminectomy syndrome of lumbar region   • Thoracic disc herniation T9-T10   • Diabetes (CMS/HCC)   • Hypertension   • Long term (current) use of opiate analgesic   • Pain in lateral portion of right knee   • JULIA treated with BiPAP    • Hypersomnia due to medical condition   • Chronic right shoulder pain   • Status post reverse total arthroplasty of right shoulder   • Postlaminectomy syndrome, cervical region   • Waldenstrom macroglobulinemia (CMS/HCC)   • Weakness generalized   • CKD (chronic kidney disease) stage 3, GFR 30-59 ml/min (CMS/HCC)   • Anemia   • Bradycardia   • Fall   • Neck pain   • DDD (degenerative disc disease), cervical     Past Medical History:   Diagnosis Date   • Cancer (CMS/HCC)     PROSTATE AND SKIN   • Cataract    • Coronary artery disease    • Diabetes (CMS/HCC)     TYPE 2   • GERD (gastroesophageal reflux disease)    • Gout    • Heart attack (CMS/HCC)     X 7   • High cholesterol    • Hypersomnia    • Hypertension    • Kidney stones    • Low back pain    • MRSA (methicillin resistant staph aureus) culture positive     BILATERAL ELBOWS, 2015   • Right shoulder pain    • Sleep apnea     C pap   • Waldenstrom macroglobulinemia (CMS/HCC)          Past Surgical History:   Procedure Laterality Date   • ANGIOPLASTY      X 7   • BACK SURGERY      X4   • CARDIAC CATHETERIZATION     • CARPAL  TUNNEL RELEASE Right    • CATARACT EXTRACTION WITH INTRAOCULAR LENS IMPLANT     • COLON RESECTION     • COLONOSCOPY     • ESOPHAGOSCOPY / EGD     • EXTRACORPOREAL SHOCK WAVE LITHOTRIPSY (ESWL)     • FINGER SURGERY Left    • GALLBLADDER SURGERY     • HERNIA REPAIR     • INCISION AND DRAINAGE ABSCESS      MULTIPLE   • LAMINECTOMY     • [place] REMOVAL OF ELBOW BURSA Left 9/15/2016    Procedure: LT ELBOW I&D W/ BONE CURRETTAGE;  Surgeon: Kirk Gross MD;  Location:  [facility];  Service: Orthopedics   • PROSTATE SURGERY     • ROTATOR CUFF REPAIR Right    • SKIN CANCER EXCISION      MULTIPLE   • TOTAL SHOULDER ARTHROPLASTY W/ DISTAL CLAVICLE EXCISION Right 1/10/2019    Procedure: RT TOTAL SHOULDER REVERSE ARTHROPLASTY;  Surgeon: Kirk Gross MD;  Location:  [facility];  Service: Orthopedics   • TYMPANOPLASTY Right           SWALLOW EVALUATION (last 72 hours)      SLP Adult Swallow Evaluation     Row Name 08/03/19 1100                   Rehab Evaluation    Document Type  evaluation  -LS        Patient Effort  good  -LS           General Information    Patient Profile Reviewed  yes  -LS        Pertinent History Of Current Problem  78 yr old admitted due to generalized weakness and and being light headed  -LS        Current Method of Nutrition  regular textures;thin liquids  -LS        Precautions/Limitations, Vision  WFL with corrective lenses  -LS        Precautions/Limitations, Hearing  hearing aid, bilaterally  -LS        Prior Level of Function-Communication  WFL  -LS        Prior Level of Function-Swallowing  no diet consistency restrictions  -LS        Plans/Goals Discussed with  patient and family;agreed upon  -LS        Barriers to Rehab  medically complex  -LS        Patient's Goals for Discharge  return home  -LS           Oral Motor and Function    Dentition Assessment  natural, present and adequate  -LS        Secretion Management  WNL/WFL  -LS        Mucosal Quality  moist, healthy  -LS           Oral  Musculature and Cranial Nerve Assessment    Oral Motor General Assessment  generalized oral motor weakness  -LS           General Eating/Swallowing Observations    Respiratory Support Currently in Use  room air  -LS        Eating/Swallowing Skills  fed by SLP  -LS        Positioning During Eating  upright 90 degree;upright in bed  -LS        Utensils Used  cup  -LS        Consistencies Trialed  regular textures;thin liquids  -LS           Respiratory    Respiratory Status  WFL;room air  -LS           Clinical Swallow Eval    Oral Prep Phase  WFL  -LS        Oral Transit  WFL  -LS        Oral Residue  WFL  -LS        Pharyngeal Phase  no overt signs/symptoms of pharyngeal impairment  -LS        Clinical Swallow Evaluation Summary  Pt presents WFL's all stages of the swallow.  -LS           Clinical Impression    SLP Swallowing Diagnosis  functional oral phase;functional pharyngeal phase  -LS        Functional Impact  no impact on function  -LS        Rehab Potential/Prognosis, Swallowing  good, to achieve stated therapy goals  -LS        Swallow Criteria for Skilled Therapeutic Interventions Met  no problems identified which require skilled intervention  -LS           Recommendations    Therapy Frequency (Swallow)  PRN  -LS        Predicted Duration Therapy Intervention (Days)  until discharge  -LS        SLP Diet Recommendation  regular textures;thin liquids  -LS        Recommended Precautions and Strategies  upright posture during/after eating;small bites of food and sips of liquid  -LS        SLP Rec. for Method of Medication Administration  meds whole;with thin liquids  -LS        Monitor for Signs of Aspiration  yes;notify SLP if any concerns  -LS        Anticipated Dischage Disposition  home  -LS           Swallow Goals (SLP)    Oral Nutrition/Hydration Goal Selection (SLP)  oral nutrition/hydration, SLP goal 1  -LS          User Key  (r) = Recorded By, (t) = Taken By, (c) = Cosigned By    Initials Name  Effective Dates    Bonnie Momin MS CCC-SLP 06/08/18 -           EDUCATION  The patient has been educated in the following areas:   Dysphagia (Swallowing Impairment).    SLP Recommendation and Plan  SLP Swallowing Diagnosis: functional oral phase, functional pharyngeal phase  SLP Diet Recommendation: regular textures, thin liquids     Monitor for Signs of Aspiration: yes, notify SLP if any concerns     Swallow Criteria for Skilled Therapeutic Interventions Met: no problems identified which require skilled intervention  Anticipated Dischage Disposition: home  Rehab Potential/Prognosis, Swallowing: good, to achieve stated therapy goals  Therapy Frequency (Swallow): PRN  Predicted Duration Therapy Intervention (Days): until discharge       Plan of Care Reviewed With: patient, spouse  Outcome Summary: Pt presented WFL's all stages of the swallow. Ptd/miguel from speech.           SLP Outcome Measures (last 72 hours)      SLP Outcome Measures     Row Name 08/03/19 1100             SLP Outcome Measures    Outcome Measure Used?  Adult NOMS  -LS         Adult FCM Scores    FCM Chosen  Swallowing  -      Swallowing FCM Score  7  -        User Key  (r) = Recorded By, (t) = Taken By, (c) = Cosigned By    Initials Name Effective Dates    GLEN Hindselvira MS VIDHYA Nicole-SLP 06/08/18 -            Time Calculation:   Time Calculation- SLP     Row Name 08/03/19 1208             Time Calculation- SLP    SLP Received On  08/03/19  -        User Key  (r) = Recorded By, (t) = Taken By, (c) = Cosigned By    Initials Name Provider Type    Bonnie Momin MS CCC-SLP Speech and Language Pathologist          Therapy Charges for Today     Code Description Service Date Service Provider Modifiers Qty    [id number] [facility] EVAL SPEECH AND PROD W LANG  4 8/3/2019 Bonnie Richardson MS CCC-SLP GN 1               SLP Discharge Summary  Anticipated Dischage Disposition: home    MS SAIRA Beckham  8/3/2019

## 2019-08-03 NOTE — PROGRESS NOTES
"Patient Care Team:  Anthony Gutierrez MD as PCP - General (Internal Medicine)  Anthony Gutierrez MD as PCP - Internal Medicine  Charisse Winters APRN as PCP - Claims Attributed  Bubba Hernandez MD as Consulting Physician (Cardiology)  Girma Olsen MD as Consulting Physician (Urology)  Nickolas Olsen MD as Consulting Physician (Dermatology)  Kirk Gross MD as Consulting Physician (Orthopedic Surgery)    Sub: feels better  Still with alteration in upper extremity sensation  Tele with sinus bradycardia and no pauses. Slowest HR is low 40's while sleeping.     Objective   Vital Signs  Temp:  [97.3 °F (36.3 °C)-98.3 °F (36.8 °C)] 97.4 °F (36.3 °C)  Heart Rate:  [40-80] 80  Resp:  [12-18] 16  BP: (126-175)/(56-74) 130/56    Intake/Output Summary (Last 24 hours) at 8/3/2019 0954  Last data filed at 8/3/2019 0830  Gross per 24 hour   Intake 360 ml   Output 225 ml   Net 135 ml     Flowsheet Rows      First Filed Value   Admission Height  172.7 cm (68\") Documented at 08/02/2019 1040   Admission Weight  93.9 kg (207 lb) Documented at 08/02/2019 1040          Physical Exam:   General Appearance:    Alert, cooperative, in no acute distress   Lungs:     Clear to auscultation,respirations regular, even and                  unlabored    Heart:    Regular rhythm and normal rate, normal S1 and S2, soft systolic           murmur, no gallop, no rub, no click   Chest Wall:    No abnormalities observed   Abdomen:     Normal bowel sounds, no masses, no organomegaly, soft        non-tender, non-distended, no guarding, no rebound                tenderness   Extremities:   Moves all extremities well, no edema, no cyanosis, no             redness     Results Review:    Results from last 7 days   Lab Units 08/03/19  0623   SODIUM mmol/L 143   POTASSIUM mmol/L 5.3*   CHLORIDE mmol/L 107   CO2 mmol/L 23.2   BUN mg/dL 35*   CREATININE mg/dL 1.35*   GLUCOSE mg/dL 204*   CALCIUM mg/dL 9.6     Results from last 7 " days   Lab Units 08/02/19  1122   TROPONIN T ng/mL <0.010     Results from last 7 days   Lab Units 08/03/19  0623 08/02/19  1122   WBC 10*3/mm3 5.93 6.19   HEMOGLOBIN g/dL 10.4* 10.1*   HEMATOCRIT % 33.1* 31.2*   PLATELETS 10*3/mm3 204 172                     allopurinol 300 mg Oral Q PM   amLODIPine 10 mg Oral Q PM   aspirin 81 mg Oral Daily   baclofen 10 mg Oral Q8H   carvedilol 3.125 mg Oral BID With Meals   docusate sodium 100 mg Oral Daily   insulin glargine 20 Units Subcutaneous Nightly   insulin glargine 40 Units Subcutaneous QAM   insulin lispro 0-9 Units Subcutaneous 4x Daily With Meals & Nightly   isosorbide mononitrate 30 mg Oral Q PM   levothyroxine 25 mcg Oral Q AM   losartan 100 mg Oral Daily   melatonin 10 mg Oral Nightly   methylPREDNISolone sodium succinate 80 mg Intravenous Q8H   pantoprazole 40 mg Oral Q AM   pravastatin 20 mg Oral Nightly   sodium chloride 3 mL Intravenous Q12H            I reviewed the patient's new clinical results.  I personally viewed and interpreted the patient's EKG/Telemetry data    Assessment/Plan    1.  Weakness with upper extremity sensory disturbance/cervical spine stenosis.  2.  Hypertension  3.  Hyperlipidemia  4.  Coronary artery disease status post PCI in past  5.  Obstructive sleep apnea on CPAP/BiPAP  6.  CKD, creatinine improving.  1.35.  7.  Mild hyperkalemia.      Telemetry with no pauses.  Sinus rhythm maintained.  Continue decreased dose of carvedilol 3.125 mg p.o. twice daily.  His potassium is slightly elevated.  I will continue losartan at present.  Recheck BMP in the afternoon.    Neurological symptoms, appreciate assistance from hospitalist and neurosurgery.    08/03/19  9:54 AM

## 2019-08-04 PROBLEM — M48.02 MYELOPATHY CONCURRENT WITH AND DUE TO SPINAL STENOSIS OF CERVICAL REGION (HCC): Status: ACTIVE | Noted: 2019-08-02

## 2019-08-04 PROBLEM — G99.2 MYELOPATHY CONCURRENT WITH AND DUE TO SPINAL STENOSIS OF CERVICAL REGION (HCC): Status: ACTIVE | Noted: 2019-08-02

## 2019-08-04 LAB
ABO GROUP BLD: NORMAL
ANION GAP SERPL CALCULATED.3IONS-SCNC: 13.6 MMOL/L (ref 5–15)
BLD GP AB SCN SERPL QL: NEGATIVE
BUN BLD-MCNC: 41 MG/DL (ref 8–23)
BUN/CREAT SERPL: 29.7 (ref 7–25)
CALCIUM SPEC-SCNC: 10.1 MG/DL (ref 8.6–10.5)
CHLORIDE SERPL-SCNC: 102 MMOL/L (ref 98–107)
CO2 SERPL-SCNC: 22.4 MMOL/L (ref 22–29)
CREAT BLD-MCNC: 1.38 MG/DL (ref 0.76–1.27)
GFR SERPL CREATININE-BSD FRML MDRD: 50 ML/MIN/1.73
GLUCOSE BLD-MCNC: 266 MG/DL (ref 65–99)
GLUCOSE BLDC GLUCOMTR-MCNC: 278 MG/DL (ref 70–130)
GLUCOSE BLDC GLUCOMTR-MCNC: 315 MG/DL (ref 70–130)
GLUCOSE BLDC GLUCOMTR-MCNC: 342 MG/DL (ref 70–130)
GLUCOSE BLDC GLUCOMTR-MCNC: 350 MG/DL (ref 70–130)
POTASSIUM BLD-SCNC: 5.1 MMOL/L (ref 3.5–5.2)
RH BLD: POSITIVE
SODIUM BLD-SCNC: 138 MMOL/L (ref 136–145)
T&S EXPIRATION DATE: NORMAL

## 2019-08-04 PROCEDURE — 63710000001 INSULIN LISPRO (HUMAN) PER 5 UNITS: Performed by: INTERNAL MEDICINE

## 2019-08-04 PROCEDURE — 97162 PT EVAL MOD COMPLEX 30 MIN: CPT

## 2019-08-04 PROCEDURE — 25010000002 METHYLPREDNISOLONE PER 125 MG: Performed by: INTERNAL MEDICINE

## 2019-08-04 PROCEDURE — 97110 THERAPEUTIC EXERCISES: CPT

## 2019-08-04 PROCEDURE — 86850 RBC ANTIBODY SCREEN: CPT | Performed by: NEUROLOGICAL SURGERY

## 2019-08-04 PROCEDURE — 86900 BLOOD TYPING SEROLOGIC ABO: CPT | Performed by: NEUROLOGICAL SURGERY

## 2019-08-04 PROCEDURE — 86901 BLOOD TYPING SEROLOGIC RH(D): CPT | Performed by: NEUROLOGICAL SURGERY

## 2019-08-04 PROCEDURE — 25010000002 HYDROMORPHONE PER 4 MG: Performed by: INTERNAL MEDICINE

## 2019-08-04 PROCEDURE — 80048 BASIC METABOLIC PNL TOTAL CA: CPT | Performed by: INTERNAL MEDICINE

## 2019-08-04 PROCEDURE — 63710000001 INSULIN GLARGINE PER 5 UNITS: Performed by: INTERNAL MEDICINE

## 2019-08-04 PROCEDURE — 82962 GLUCOSE BLOOD TEST: CPT

## 2019-08-04 PROCEDURE — 25010000002 ONDANSETRON PER 1 MG: Performed by: NURSE PRACTITIONER

## 2019-08-04 RX ORDER — INSULIN GLARGINE 100 [IU]/ML
40 INJECTION, SOLUTION SUBCUTANEOUS NIGHTLY
Status: DISCONTINUED | OUTPATIENT
Start: 2019-08-04 | End: 2019-08-09 | Stop reason: HOSPADM

## 2019-08-04 RX ORDER — METHYLPREDNISOLONE SODIUM SUCCINATE 40 MG/ML
40 INJECTION, POWDER, LYOPHILIZED, FOR SOLUTION INTRAMUSCULAR; INTRAVENOUS EVERY 12 HOURS
Status: DISCONTINUED | OUTPATIENT
Start: 2019-08-05 | End: 2019-08-04

## 2019-08-04 RX ORDER — INSULIN GLARGINE 100 [IU]/ML
20 INJECTION, SOLUTION SUBCUTANEOUS EVERY MORNING
Status: DISCONTINUED | OUTPATIENT
Start: 2019-08-05 | End: 2019-08-09 | Stop reason: HOSPADM

## 2019-08-04 RX ORDER — ISOSORBIDE MONONITRATE 60 MG/1
60 TABLET, EXTENDED RELEASE ORAL EVERY EVENING
Status: DISCONTINUED | OUTPATIENT
Start: 2019-08-04 | End: 2019-08-09 | Stop reason: HOSPADM

## 2019-08-04 RX ADMIN — INSULIN LISPRO 7 UNITS: 100 INJECTION, SOLUTION INTRAVENOUS; SUBCUTANEOUS at 21:14

## 2019-08-04 RX ADMIN — Medication 10 MG: at 21:14

## 2019-08-04 RX ADMIN — BACLOFEN 10 MG: 10 TABLET ORAL at 21:14

## 2019-08-04 RX ADMIN — HYDROCODONE BITARTRATE AND ACETAMINOPHEN 2 TABLET: 10; 325 TABLET ORAL at 11:07

## 2019-08-04 RX ADMIN — DOCUSATE SODIUM 100 MG: 100 CAPSULE, LIQUID FILLED ORAL at 07:39

## 2019-08-04 RX ADMIN — HYDROCODONE BITARTRATE AND ACETAMINOPHEN 2 TABLET: 10; 325 TABLET ORAL at 14:51

## 2019-08-04 RX ADMIN — BACLOFEN 10 MG: 10 TABLET ORAL at 06:33

## 2019-08-04 RX ADMIN — HYDROMORPHONE HYDROCHLORIDE 0.5 MG: 1 INJECTION, SOLUTION INTRAMUSCULAR; INTRAVENOUS; SUBCUTANEOUS at 20:01

## 2019-08-04 RX ADMIN — HYDROMORPHONE HYDROCHLORIDE 0.5 MG: 1 INJECTION, SOLUTION INTRAMUSCULAR; INTRAVENOUS; SUBCUTANEOUS at 11:36

## 2019-08-04 RX ADMIN — ALLOPURINOL 300 MG: 300 TABLET ORAL at 17:39

## 2019-08-04 RX ADMIN — INSULIN LISPRO 8 UNITS: 100 INJECTION, SOLUTION INTRAVENOUS; SUBCUTANEOUS at 17:39

## 2019-08-04 RX ADMIN — LEVOTHYROXINE SODIUM 25 MCG: 25 TABLET ORAL at 06:33

## 2019-08-04 RX ADMIN — HYDROCODONE BITARTRATE AND ACETAMINOPHEN 2 TABLET: 10; 325 TABLET ORAL at 07:19

## 2019-08-04 RX ADMIN — HYDROCODONE BITARTRATE AND ACETAMINOPHEN 2 TABLET: 10; 325 TABLET ORAL at 23:06

## 2019-08-04 RX ADMIN — INSULIN GLARGINE 40 UNITS: 100 INJECTION, SOLUTION SUBCUTANEOUS at 21:14

## 2019-08-04 RX ADMIN — LOSARTAN POTASSIUM 100 MG: 100 TABLET, FILM COATED ORAL at 07:38

## 2019-08-04 RX ADMIN — HYDROCODONE BITARTRATE AND ACETAMINOPHEN 2 TABLET: 10; 325 TABLET ORAL at 18:55

## 2019-08-04 RX ADMIN — HYDROMORPHONE HYDROCHLORIDE 0.5 MG: 1 INJECTION, SOLUTION INTRAMUSCULAR; INTRAVENOUS; SUBCUTANEOUS at 07:39

## 2019-08-04 RX ADMIN — AMLODIPINE BESYLATE 10 MG: 10 TABLET ORAL at 17:39

## 2019-08-04 RX ADMIN — PANTOPRAZOLE SODIUM 40 MG: 40 TABLET, DELAYED RELEASE ORAL at 06:33

## 2019-08-04 RX ADMIN — METHYLPREDNISOLONE SODIUM SUCCINATE 80 MG: 125 INJECTION, POWDER, FOR SOLUTION INTRAMUSCULAR; INTRAVENOUS at 17:38

## 2019-08-04 RX ADMIN — METHYLPREDNISOLONE SODIUM SUCCINATE 80 MG: 125 INJECTION, POWDER, FOR SOLUTION INTRAMUSCULAR; INTRAVENOUS at 03:39

## 2019-08-04 RX ADMIN — INSULIN GLARGINE 40 UNITS: 100 INJECTION, SOLUTION SUBCUTANEOUS at 08:08

## 2019-08-04 RX ADMIN — INSULIN LISPRO 7 UNITS: 100 INJECTION, SOLUTION INTRAVENOUS; SUBCUTANEOUS at 12:39

## 2019-08-04 RX ADMIN — ASPIRIN 81 MG: 81 TABLET, COATED ORAL at 07:39

## 2019-08-04 RX ADMIN — INSULIN LISPRO 6 UNITS: 100 INJECTION, SOLUTION INTRAVENOUS; SUBCUTANEOUS at 08:08

## 2019-08-04 RX ADMIN — BACLOFEN 10 MG: 10 TABLET ORAL at 12:59

## 2019-08-04 RX ADMIN — INSULIN LISPRO 10 UNITS: 100 INJECTION, SOLUTION INTRAVENOUS; SUBCUTANEOUS at 12:40

## 2019-08-04 RX ADMIN — INSULIN LISPRO 10 UNITS: 100 INJECTION, SOLUTION INTRAVENOUS; SUBCUTANEOUS at 17:40

## 2019-08-04 RX ADMIN — CARVEDILOL 3.12 MG: 3.12 TABLET, FILM COATED ORAL at 17:39

## 2019-08-04 RX ADMIN — PRAVASTATIN SODIUM 20 MG: 20 TABLET ORAL at 21:14

## 2019-08-04 RX ADMIN — CARVEDILOL 3.12 MG: 3.12 TABLET, FILM COATED ORAL at 07:38

## 2019-08-04 RX ADMIN — ISOSORBIDE MONONITRATE 60 MG: 60 TABLET ORAL at 17:39

## 2019-08-04 RX ADMIN — HYDROMORPHONE HYDROCHLORIDE 0.5 MG: 1 INJECTION, SOLUTION INTRAMUSCULAR; INTRAVENOUS; SUBCUTANEOUS at 15:58

## 2019-08-04 RX ADMIN — ONDANSETRON 4 MG: 2 INJECTION INTRAMUSCULAR; INTRAVENOUS at 16:01

## 2019-08-04 NOTE — PLAN OF CARE
Problem: Patient Care Overview  Goal: Plan of Care Review  Outcome: Ongoing (interventions implemented as appropriate)   08/04/19 1112   Coping/Psychosocial   Plan of Care Reviewed With patient   OTHER   Outcome Summary Pt is a 79 y.o. male presenting to PT with impaired mobility and weakness. He was admitted to [facility] with cervical spinal stenosis and should be undergoing ACDF tomorrow. Pt demonstrates bed mobility, transfers, and ambulated 40ft with Minx1 and use of RWx. Upon standing he demonstrates a slight LOB posteriorly which requires Minx1 to recover. He has a history of falls and was slightly unsteady throughout ambulation. He originally ambulates with cane, but would recommend RWx at this time. Further mobility deferred secondary to fatigue. Pt may benefit from continuing skilled PT to address impairments and improve his independence with functional mobility. PT recommends d/c home with assist vs. SNF pending progress.

## 2019-08-04 NOTE — PROGRESS NOTES
"     LOS: 2 days   Primary Care Physician: Anthony Gutierrez MD     Subjective   I saw the patient this morning.  Neck pain continues to be very severe.  He and his wife agree to surgery tomorrow as recommended    Vital Signs  Body mass index is 30.61 kg/m².  Temp:  [97.7 °F (36.5 °C)-98.6 °F (37 °C)] 98.6 °F (37 °C)  Heart Rate:  [46-83] 73  Resp:  [14-17] 17  BP: (130-163)/(58-74) 138/58      Objective:  General Appearance:  Uncomfortable.    Vital signs: (most recent): Blood pressure 138/58, pulse 73, temperature 98.6 °F (37 °C), temperature source Oral, resp. rate 17, height 172.7 cm (68\"), weight 91.3 kg (201 lb 4.8 oz), SpO2 91 %.    HEENT: (No JVD.  Trachea midline)    Lungs:  He is not in respiratory distress.  No stridor.  There are decreased breath sounds.  No rales, wheezes or rhonchi.    Heart: Normal rate.  Regular rhythm.  Positive for murmur.  (Grade 3/6 early systolic murmur right and left sternal borders)  Abdomen: Abdomen is soft and non-distended.  Bowel sounds are normal.   There is no abdominal tenderness.   There is no splenomegaly. There is no hepatomegaly.   Extremities: There is dependent edema.  (Trace)  Neurological: Patient is alert.    Skin:  Warm and dry.          Results Review:    I reviewed the patient's new clinical results.    Results from last 7 days   Lab Units 08/03/19  0623 08/02/19  1122   WBC 10*3/mm3 5.93 6.19   HEMOGLOBIN g/dL 10.4* 10.1*   PLATELETS 10*3/mm3 204 172     Results from last 7 days   Lab Units 08/04/19  0828 08/03/19  1226   SODIUM mmol/L 138 142   POTASSIUM mmol/L 5.1 5.1   CHLORIDE mmol/L 102 105   CO2 mmol/L 22.4 22.8   BUN mg/dL 41* 36*   CREATININE mg/dL 1.38* 1.48*   CALCIUM mg/dL 10.1 9.9   GLUCOSE mg/dL 266* 283*         Hemoglobin A1C:  Lab Results   Component Value Date    HGBA1C 6.39 (H) 08/02/2019       Glucose Range:  Glucose   Date/Time Value Ref Range Status   08/04/2019 1607 350 (H) 70 - 130 mg/dL Final   08/04/2019 1108 342 (H) 70 - 130 " mg/dL Final   08/04/2019 0609 278 (H) 70 - 130 mg/dL Final   08/03/2019 2028 298 (H) 70 - 130 mg/dL Final   08/03/2019 1616 293 (H) 70 - 130 mg/dL Final   08/03/2019 1126 286 (H) 70 - 130 mg/dL Final       Lab Results   Component Value Date    PNCSYJLI14 1,662 (H) 08/03/2019       Lab Results   Component Value Date    TSH 1.190 08/03/2019       Assessment & Plan      Medication Review: Yes    Active Hospital Problems    Diagnosis  POA   • **Spinal stenosis of cervical region [M48.02]  Unknown   • Waldenstrom macroglobulinemia (CMS/HCC) [C88.0]  Unknown   • Weakness generalized [R53.1]  Yes   • CKD (chronic kidney disease) stage 3, GFR 30-59 ml/min (CMS/HCC) [N18.3]  Yes   • Anemia [D64.9]  Yes   • Bradycardia [R00.1]  Yes   • Fall [W19.XXXA]  Yes   • Neck pain [M54.2]  Yes   • DDD (degenerative disc disease), cervical [M50.30]  Unknown   • 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       Assessment/Plan  1.  Severe degenerative disc disease with cervical spinal stenosis.  Discussed at length with patient and wife.  Discussed with nurse.  Discussed with cardiology.  Patient is at increased but acceptable risk for surgery.  I did stop IV steroids.  He was on them only briefly and does not need to be tapered.  2.  Diabetes mellitus type 2 with hyperglycemia.  Will be better off the steroids.  Decrease a.m. long-acting dose by half in anticipation of surgery.  Continue sliding scale.  3.  Chronic kidney disease stage III.  Creatinine improved.  Baseline had been between 1.4 and 1.6.  Potassium generous but stable  4.   Hypertension.  He is on losartan as well as Norvasc and Coreg.  Numbers acceptable for now  5.  Coronary artery disease.  Per cardiology.  Note that he is on isosorbide.  Probably will need to change that to paste after surgery until he is taking po.    Kendra Parker MD  08/04/19  6:32 PM

## 2019-08-04 NOTE — THERAPY EVALUATION
Acute Care - Physical Therapy Initial Evaluation  [facility]     Patient Name: Chevy Goodwin  : 1940  MRN: [id number]  Today's Date: 2019      Date of Referral to PT: 19  Referring Physician: Delia      Admit Date: 2019    Visit Dx:     ICD-10-CM ICD-9-CM   1. Bradycardia R00.1 427.89   2. Weakness R53.1 780.79   3. Frequent falls R29.6 V15.88   4. Oropharyngeal dysphagia R13.12 787.22   5. 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 Impaired functional mobility, balance, gait, and endurance Z74.09 V49.89     Patient Active Problem List   Diagnosis   • Pain of metastatic malignancy   • Chronic pain   • Adhesive arachnoiditis   • Degeneration of intervertebral disc of lumbar region   • Low back pain   • Non-Hodgkin's lymphoma (CMS/HCC)   • Postlaminectomy syndrome of lumbar region   • Thoracic disc herniation T9-T10   • Diabetes (CMS/HCC)   • Hypertension   • Long term (current) use of opiate analgesic   • Pain in lateral portion of right knee   • JULIA treated with BiPAP    • Hypersomnia due to medical condition   • Chronic right shoulder pain   • Status post reverse total arthroplasty of right shoulder   • Postlaminectomy syndrome, cervical region   • Waldenstrom macroglobulinemia (CMS/HCC)   • Weakness generalized   • CKD (chronic kidney disease) stage 3, GFR 30-59 ml/min (CMS/HCC)   • Anemia   • Bradycardia   • Fall   • Neck pain   • DDD (degenerative disc disease), cervical   • Spinal stenosis of cervical region   • 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 (CMS/HCC)     Past Medical History:   Diagnosis Date   • Cancer (CMS/HCC)     PROSTATE AND SKIN   • Cataract    • Coronary artery disease    • Diabetes (CMS/HCC)     TYPE 2   • GERD (gastroesophageal reflux disease)    • Gout    • Heart attack (CMS/HCC)     X 7   • High cholesterol    • Hypersomnia    • Hypertension    • Kidney  stones    • Low back pain    • MRSA (methicillin resistant staph aureus) culture positive     BILATERAL ELBOWS, 2015   • Right shoulder pain    • Sleep apnea     C pap   • Waldenstrom macroglobulinemia (CMS/HCC)     2014     Past Surgical History:   Procedure Laterality Date   • ANGIOPLASTY      X 7   • BACK SURGERY      X4   • CARDIAC CATHETERIZATION     • CARPAL TUNNEL RELEASE Right    • CATARACT EXTRACTION WITH INTRAOCULAR LENS IMPLANT     • COLON RESECTION     • COLONOSCOPY     • ESOPHAGOSCOPY / EGD     • EXTRACORPOREAL SHOCK WAVE LITHOTRIPSY (ESWL)     • FINGER SURGERY Left    • GALLBLADDER SURGERY     • HERNIA REPAIR     • INCISION AND DRAINAGE ABSCESS      MULTIPLE   • LAMINECTOMY     • MS REMOVAL OF ELBOW BURSA Left 9/15/2016    Procedure: LT ELBOW I&D W/ BONE CURRETTAGE;  Surgeon: Kirk Gross MD;  Location:  [facility];  Service: Orthopedics   • PROSTATE SURGERY     • ROTATOR CUFF REPAIR Right    • SKIN CANCER EXCISION      MULTIPLE   • TOTAL SHOULDER ARTHROPLASTY W/ DISTAL CLAVICLE EXCISION Right 1/10/2019    Procedure: RT TOTAL SHOULDER REVERSE ARTHROPLASTY;  Surgeon: Kirk Gross MD;  Location:  [facility];  Service: Orthopedics   • TYMPANOPLASTY Right         PT ASSESSMENT (last 12 hours)      Physical Therapy Evaluation     Row Name 08/04/19 1049          PT Evaluation Time/Intention    Subjective Information  complains of;weakness  -DO     Document Type  evaluation  -DO     Mode of Treatment  physical therapy  -DO     Total Evaluation Minutes, Physical Therapy  14  -DO     Patient Effort  good  -DO     Symptoms Noted During/After Treatment  fatigue  -DO     Comment  Pt tolerated session well with no adverse s/s noted; VSS throughout.   -DO     Row Name 08/04/19 1042          General Information    Patient Profile Reviewed?  yes  -DO     Referring Physician  Delia  -DO     Patient Observations  alert;cooperative;agree to therapy  -DO     Patient/Family Observations  Pt supine in bed in no  acute distress upon entry to room. Wife present.   -DO     Prior Level of Function  independent:;all household mobility;community mobility  -DO     Equipment Currently Used at Home  cane, straight also has access to RWx.   -DO     Existing Precautions/Restrictions  fall  -DO     Benefits Reviewed  patient:;improve function;increase independence;increase strength;increase balance  -DO     Barriers to Rehab  none identified  -DO     Row Name 08/04/19 1049          Relationship/Environment    Lives With  spouse  -DO     Row Name 08/04/19 1049          Resource/Environmental Concerns    Current Living Arrangements  home/apartment/condo  -DO     Resource/Environmental Concerns  none  -DO     Row Name 08/04/19 1049          Cognitive Assessment/Intervention- PT/OT    Orientation Status (Cognition)  oriented x 4  -DO     Follows Commands (Cognition)  WFL  -DO     Safety Deficit (Cognitive)  mild deficit;awareness of need for assistance;insight into deficits/self awareness  -DO     Row Name 08/04/19 1049          Safety Issues, Functional Mobility    Safety Issues Affecting Function (Mobility)  awareness of need for assistance;insight into deficits/self awareness  -DO     Impairments Affecting Function (Mobility)  balance;endurance/activity tolerance;strength  -DO     Row Name 08/04/19 1049          Bed Mobility Assessment/Treatment    Bed Mobility Assessment/Treatment  supine-sit;sit-supine  -DO     Supine-Sit [place] (Bed Mobility)  minimum assist (75% patient effort);verbal cues;nonverbal cues (demo/gesture)  -DO     Sit-Supine [place] (Bed Mobility)  minimum assist (75% patient effort);verbal cues;nonverbal cues (demo/gesture)  -DO     Bed Mobility, Safety Issues  decreased use of arms for pushing/pulling;decreased use of legs for bridging/pushing  -DO     Row Name 08/04/19 1049          Transfer Assessment/Treatment    Transfer Assessment/Treatment  sit-stand transfer;stand-sit transfer  -DO     Sit-Stand  [place] (Transfers)  minimum assist (75% patient effort);verbal cues;nonverbal cues (demo/gesture)  -DO     Stand-Sit [place] (Transfers)  minimum assist (75% patient effort);verbal cues;nonverbal cues (demo/gesture)  -DO     Row Name 08/04/19 1049          Sit-Stand Transfer    Assistive Device (Sit-Stand Transfers)  walker, front-wheeled  -DO     Row Name 08/04/19 1049          Stand-Sit Transfer    Assistive Device (Stand-Sit Transfers)  walker, front-wheeled  -DO     Row Name 08/04/19 1049          Gait/Stairs Assessment/Training    [place] Level (Gait)  minimum assist (75% patient effort);verbal cues;nonverbal cues (demo/gesture)  -DO     Assistive Device (Gait)  walker, front-wheeled  -DO     Distance in Feet (Gait)  40  -DO     Pattern (Gait)  step-through  -DO     Deviations/Abnormal Patterns (Gait)  stephanie decreased;gait speed decreased;stride length decreased  -DO     Bilateral Gait Deviations  weight shift ability decreased  -DO     Row Name 08/04/19 1049          General ROM    GENERAL ROM COMMENTS  AROM is WFL for the BUE/BLE.   -DO     Row Name 08/04/19 1049          MMT (Manual Muscle Testing)    General MMT Comments  Pt demonstrates functional strength of at least 3/5 in the BUE/BLE.   -DO     Row Name 08/04/19 1049          Sensory Assessment/Intervention    Sensory General Assessment  no sensation deficits identified  -DO     Row Name 08/04/19 1049          Vision Assessment/Intervention    Visual Impairment/Limitations  WFL  -DO     Row Name 08/04/19 1049          Pain Assessment    Additional Documentation  Pain Scale: FACES Pre/Post-Treatment (Group)  -DO     Row Name 08/04/19 1049          Pain Scale: FACES Pre/Post-Treatment    Pain: FACES Scale, Pretreatment  2-->hurts little bit  -DO     Pain: FACES Scale, Post-Treatment  2-->hurts little bit  -DO     Row Name 08/04/19 1049          Physical Therapy Clinical Impression    Date of Referral to PT  08/04/19  -DO     PT Diagnosis  (PT Clinical Impression)  Impaired mobility  -DO     Prognosis (PT Clinical Impression)  good  -DO     Functional Level at Time of Evaluation (PT Clinical Impression)  Minx1 with use of RWx.   -DO     Criteria for Skilled Interventions Met (PT Clinical Impression)  yes  -DO     Pathology/Pathophysiology Noted (Describe Specifically for Each System)  musculoskeletal  -DO     Impairments Found (describe specific impairments)  aerobic capacity/endurance;gait, locomotion, and balance;muscle performance  -DO     Functional Limitations in Following Categories (Describe Specific Limitations)  self-care;home management;community/leisure  -DO     Rehab Potential (PT Clinical Summary)  good, to achieve stated therapy goals  -DO     Row Name 08/04/19 1049          Physical Therapy Goals    Bed Mobility Goal Selection (PT)  bed mobility, PT goal 1  -DO     Transfer Goal Selection (PT)  transfer, PT goal 1  -DO     Gait Training Goal Selection (PT)  gait training, PT goal 1  -DO     Row Name 08/04/19 1049          Bed Mobility Goal 1 (PT)    Activity/Assistive Device (Bed Mobility Goal 1, PT)  bed mobility activities, all  -DO     [place] Level/Cues Needed (Bed Mobility Goal 1, PT)  conditional independence  -DO     Time Frame (Bed Mobility Goal 1, PT)  long term goal (LTG);1 week  -DO     Progress/Outcomes (Bed Mobility Goal 1, PT)  continuing progress toward goal  -DO     Row Name 08/04/19 1049          Transfer Goal 1 (PT)    Activity/Assistive Device (Transfer Goal 1, PT)  transfers, all  -DO     [place] Level/Cues Needed (Transfer Goal 1, PT)  contact guard assist  -DO     Time Frame (Transfer Goal 1, PT)  long term goal (LTG);1 week  -DO     Progress/Outcome (Transfer Goal 1, PT)  continuing progress toward goal  -DO     Row Name 08/04/19 1049          Gait Training Goal 1 (PT)    Activity/Assistive Device (Gait Training Goal 1, PT)  gait (walking locomotion)  -DO     [place] Level (Gait Training Goal 1, PT)   contact guard assist  -DO     Distance (Gait Goal 1, PT)  150  -DO     Time Frame (Gait Training Goal 1, PT)  long term goal (LTG);1 week  -DO     Progress/Outcome (Gait Training Goal 1, PT)  continuing progress toward goal  -DO     Row Name 08/04/19 1049          Positioning and Restraints    Pre-Treatment Position  in bed  -DO     Post Treatment Position  bed  -DO     In Bed  supine;call light within reach;encouraged to call for assist;with other staff;with family/caregiver  -DO       User Key  (r) = Recorded By, (t) = Taken By, (c) = Cosigned By    Initials Name Provider Type    DO Bryson Atkinson, PT Physical Therapist        Physical Therapy Education     Title: PT OT SLP Therapies (Done)     Topic: Physical Therapy (Done)     Point: Mobility training (Done)     Learning Progress Summary           Patient Acceptance, E, VU,DU by DO at 8/4/2019 11:12 AM                   Point: Home exercise program (Done)     Learning Progress Summary           Patient Acceptance, E, VU,DU by DO at 8/4/2019 11:12 AM                   Point: Body mechanics (Done)     Learning Progress Summary           Patient Acceptance, E, VU,DU by DO at 8/4/2019 11:12 AM                   Point: Precautions (Done)     Learning Progress Summary           Patient Acceptance, E, VU,DU by DO at 8/4/2019 11:12 AM                               User Key     Initials Effective Dates Name Provider Type Discipline    DO 03/07/18 -  Bryson Atkinson, PT Physical Therapist PT              PT Recommendation and Plan  Anticipated Discharge Disposition (PT): home with assist, home with home health, skilled nursing facility  Planned Therapy Interventions (PT Eval): bed mobility training, gait training, strengthening, transfer training  Therapy Frequency (PT Clinical Impression): daily  Outcome Summary/Treatment Plan (PT)  Anticipated Equipment Needs at Discharge (PT): front wheeled walker  Anticipated Discharge Disposition (PT): home with assist, home with  home health, skilled nursing facility  Plan of Care Reviewed With: patient  Outcome Summary: Pt is a 79 y.o. male presenting to PT with impaired mobility and weakness. He was admitted to [facility] with cervical spinal stenosis and should be undergoing ACDF tomorrow. Pt demonstrates bed mobility, transfers, and ambulated 40ft with Minx1 and use of RWx. Upon standing he demonstrates a slight LOB posteriorly which requires Minx1 to recover. He has a history of falls and was slightly unsteady throughout ambulation. He originally ambulates with cane, but would recommend RWx at this time. Further mobility deferred secondary to fatigue. Pt may benefit from continuing skilled PT to address impairments and improve his independence with functional mobility. PT recommends d/c home with assist vs. SNF pending progress.   Outcome Measures     Row Name 08/04/19 1100             How much help from another person do you currently need...    Turning from your back to your side while in flat bed without using bedrails?  3  -DO      Moving from lying on back to sitting on the side of a flat bed without bedrails?  3  -DO      Moving to and from a bed to a chair (including a wheelchair)?  3  -DO      Standing up from a chair using your arms (e.g., wheelchair, bedside chair)?  3  -DO      Climbing 3-5 steps with a railing?  2  -DO      To walk in hospital room?  3  -DO      AM-PAC 6 Clicks Score (PT)  17  -DO         Functional Assessment    Outcome Measure Options  AM-PAC 6 Clicks Basic Mobility (PT)  -DO        User Key  (r) = Recorded By, (t) = Taken By, (c) = Cosigned By    Initials Name Provider Type    DO Bryson Atkinson, [place] Physical Therapist         Time Calculation:   PT Charges     Row Name 08/04/19 1114             Time Calculation    Start Time  1049  -DO      Stop Time  1103  -DO      Time Calculation (min)  14 min  -DO      PT Received On  08/04/19  -DO      PT - Next Appointment  08/05/19  -DO      PT Goal Re-Cert Due Date   08/11/19  -DO        User Key  (r) = Recorded By, (t) = Taken By, (c) = Cosigned By    Initials Name Provider Type    DO Bryson Atkinson, PT Physical Therapist        Therapy Charges for Today     Code Description Service Date Service Provider Modifiers Qty    34090055793 HC PT EVAL MOD COMPLEXITY 2 8/4/2019 Bryson Atkinson, PT GP 1    53691297267 HC PT THER PROC EA 15 MIN 8/4/2019 Bryson Atkinson, PT GP 1          PT G-Codes  Outcome Measure Options: AM-PAC 6 Clicks Basic Mobility (PT)  AM-PAC 6 Clicks Score (PT): 17      Bryson Atkinson, PT  8/4/2019

## 2019-08-04 NOTE — NURSING NOTE
Per pt request, Anitha Lorenzana was called for spiritual needs.  She is on call from [employer].  She will be in to see pt today.  Her phone number is [phone].

## 2019-08-04 NOTE — PLAN OF CARE
Problem: Patient Care Overview  Goal: Plan of Care Review  Outcome: Ongoing (interventions implemented as appropriate)   08/04/19 0527   Coping/Psychosocial   Plan of Care Reviewed With patient   Plan of Care Review   Progress no change   OTHER   Outcome Summary Patient medicated once for pain this shift. Resting quietly in bed. No complaint of nausea. Using urinal. Nusring will continue to monitor.     Goal: Individualization and Mutuality  Outcome: Ongoing (interventions implemented as appropriate)    Goal: Discharge Needs Assessment  Outcome: Ongoing (interventions implemented as appropriate)    Goal: Interprofessional Rounds/Family Conf  Outcome: Ongoing (interventions implemented as appropriate)      Problem: Pain, Chronic (Adult)  Goal: Identify Related Risk Factors and Signs and Symptoms  Outcome: Outcome(s) achieved Date Met: 08/04/19    Goal: Acceptable Pain/Comfort Level and Functional Ability  Outcome: Ongoing (interventions implemented as appropriate)      Problem: Cardiac Output Decreased (Adult)  Goal: Effective Tissue Perfusion  Outcome: Ongoing (interventions implemented as appropriate)      Problem: Fall Risk (Adult)  Goal: Identify Related Risk Factors and Signs and Symptoms  Outcome: Outcome(s) achieved Date Met: 08/04/19    Goal: Absence of Fall  Outcome: Ongoing (interventions implemented as appropriate)      Problem: Skin Injury Risk (Adult)  Goal: Skin Health and Integrity  Outcome: Ongoing (interventions implemented as appropriate)

## 2019-08-04 NOTE — PROGRESS NOTES
"Patient Care Team:  Anthony Gutierrez MD as PCP - General (Internal Medicine)  Anthony Gutierrez MD as PCP - Internal Medicine  Charisse Winters APRN as PCP - Claims Attributed  Bubba Hernandez MD as Consulting Physician (Cardiology)  Girma Olsen MD as Consulting Physician (Urology)  Nickolas Olsen MD as Consulting Physician (Dermatology)  Kirk Gross MD as Consulting Physician (Orthopedic Surgery)    Sub: Still complains of some tingling and numbness in the upper extremity.  Telemetry with sinus rhythm.  Sinus bradycardia around 45 beats a minute early in the morning while he was sleeping.      Objective   Vital Signs  Temp:  [97.7 °F (36.5 °C)-98.3 °F (36.8 °C)] 98.3 °F (36.8 °C)  Heart Rate:  [46-85] 83  Resp:  [14-17] 17  BP: (130-171)/(58-74) 163/74    Intake/Output Summary (Last 24 hours) at 8/4/2019 1144  Last data filed at 8/4/2019 0902  Gross per 24 hour   Intake 540 ml   Output 850 ml   Net -310 ml     Flowsheet Rows      First Filed Value   Admission Height  172.7 cm (68\") Documented at 08/02/2019 1040   Admission Weight  93.9 kg (207 lb) Documented at 08/02/2019 1040          Physical Exam:   General Appearance:    Alert, cooperative, in no acute distress   Lungs:     Clear to auscultation,respirations regular, even and                  unlabored    Heart:    Regular rhythm and normal rate, normal S1 and S2, no            murmur, no gallop, no rub, no click   Chest Wall:    No abnormalities observed   Abdomen:     Normal bowel sounds, no masses, no organomegaly, soft        non-tender, non-distended, no guarding, no rebound                tenderness   Extremities:   Moves all extremities well, no edema, no cyanosis, no             redness     Results Review:    Results from last 7 days   Lab Units 08/04/19  0828   SODIUM mmol/L 138   POTASSIUM mmol/L 5.1   CHLORIDE mmol/L 102   CO2 mmol/L 22.4   BUN mg/dL 41*   CREATININE mg/dL 1.38*   GLUCOSE mg/dL 266*   CALCIUM mg/dL " 10.1     Results from last 7 days   Lab Units 08/02/19  1122   TROPONIN T ng/mL <0.010     Results from last 7 days   Lab Units 08/03/19  0623 08/02/19  1122   WBC 10*3/mm3 5.93 6.19   HEMOGLOBIN g/dL 10.4* 10.1*   HEMATOCRIT % 33.1* 31.2*   PLATELETS 10*3/mm3 204 172                     allopurinol 300 mg Oral Q PM   amLODIPine 10 mg Oral Q PM   aspirin 81 mg Oral Daily   baclofen 10 mg Oral Q8H   carvedilol 3.125 mg Oral BID With Meals   docusate sodium 100 mg Oral Daily   insulin glargine 40 Units Subcutaneous QAM   insulin glargine 40 Units Subcutaneous Nightly   insulin lispro 0-9 Units Subcutaneous 4x Daily With Meals & Nightly   insulin lispro 10 Units Subcutaneous TID With Meals   isosorbide mononitrate 30 mg Oral Q PM   levothyroxine 25 mcg Oral Q AM   losartan 100 mg Oral Daily   melatonin 10 mg Oral Nightly   methylPREDNISolone sodium succinate 80 mg Intravenous Q8H   pantoprazole 40 mg Oral Q AM   pravastatin 20 mg Oral Nightly   sodium chloride 3 mL Intravenous Q12H            I reviewed the patient's new clinical results.  I personally viewed and interpreted the patient's EKG/Telemetry data      Assessment/Plan     1.  Weakness with upper extremity sensory disturbance/cervical spine stenosis.  2.  Hypertension  3.  Hyperlipidemia  4.  Coronary artery disease status post PCI in past  5.  Obstructive sleep apnea on CPAP/BiPAP  6.  CKD, creatinine improving.  1.35.  7.  Mild hyperkalemia.     No more pauses noted.  Patient appears stable.  No anginal symptoms.  No heart failure symptoms.  Optimize afterload reduction, increase Imdur to 60 mg p.o. daily.  He has been considered for cervical discectomy c3-c5.     No further cardiac work-up is needed at present.    He is at moderate risk for perioperative cardiovascular event.  Currently optimized.    Aspirin 81 mg p.o. daily can be stopped/interrupted if needed by neurosurgery for cervical surgery, and restarted once deemed safe from their  standpoint.               Lexy Alvarez MD  08/04/19  11:44 AM

## 2019-08-04 NOTE — NURSING NOTE
Per pt and wife's request, call was placed to their PCP, Dr. Gutierrez regarding pt admitted to hospital and spinal surgery scheduled by Dr. Singleton on 8/5/19.  Dr. Gutierrez wanted to go on record to say he does not agree with surgery.  He stated to this nurse that the pt has [place]unch[place].

## 2019-08-04 NOTE — PROGRESS NOTES
LOS: 2 days   Patient Care Team:  Anthony Gutierrez MD as PCP - General (Internal Medicine)  Anthony Gutierrez MD as PCP - Internal Medicine  Charisse Winters APRN as PCP - Claims Attributed  Bubba Hernandez MD as Consulting Physician (Cardiology)  Girma Olsen MD as Consulting Physician (Urology)  Nickolas Olsen MD as Consulting Physician (Dermatology)  Kirk Gross MD as Consulting Physician (Orthopedic Surgery)    Chief Complaint: Neck pain    Subjective     This patient continues with pain in his neck and difficulty using his hands.    Interval History:     History taken from: patient chart    Objective      He has decreased strength in both of his hands.    Vital Signs  Temp:  [97.7 °F (36.5 °C)-98.3 °F (36.8 °C)] 98.3 °F (36.8 °C)  Heart Rate:  [46-85] 83  Resp:  [14-17] 17  BP: (130-171)/(56-74) 163/74       Results Review:     I reviewed the patient's new clinical results.  I reviewed his MRI.  He has had a previous anterior cervical discectomy at C5-6 and I think that level is fairly well fused.  That level in the C6-7 level as well as C7-T1 look okay on the MRI.  C3-4 however is extremely narrow as is C4-5.  There is some change in the cord signal at C4-5 also.      Assessment/Plan       Spinal stenosis of cervical region    Chronic pain    Non-Hodgkin's lymphoma (CMS/HCC)    Diabetes (CMS/HCC)    Hypertension    Long term (current) use of opiate analgesic    JULIA treated with BiPAP 20/16    Waldenstrom macroglobulinemia (CMS/HCC)    Weakness generalized    CKD (chronic kidney disease) stage 3, GFR 30-59 ml/min (CMS/HCC)    Anemia    Bradycardia    Fall    Neck pain    DDD (degenerative disc disease), cervical      I had a very long discussion with the patient about his situation.  I told him that from my point of view I see little option here but to proceed with surgery at C3-4 and C4-5.  I think this should be done from the front.  He may ultimately require a surgery  from the back as well but with his history of chronic pain if we can stay away from the back he would be better off.  I told him that the primary purpose of surgery would be to keep him from becoming paralyzed.  I do not think it would help his neck pain much if at all.  There is a chance it will help the weakness in his hands but realistically it may not because of the change in the cord signal.  I told the patient about the surgery which is called an anterior cervical discectomy.   I also explained that the patient would still have neck pain.  Initially this will be quite severe however it will improve with healing from the surgery.  There is also a risk of infection, bleeding, paralysis, and anesthetic risk.  There is a risk of damage to the soft tissues of the neck such as the esophagus, carotids, and trachea.  All of these risks are about 2 or 3%.  There is about a 5% chance of nonunion or failure of the instrumentation.  There is also a about a 5% chance of hoarseness and trouble swallowing do to damage to the recurrent laryngeal nerve.  We discussed the postoperative hospital and home course as well.  The patient does ask to proceed.    He will need to be scheduled for a: C3-C5 anterior cervical discectomy, fusion and instrumentation      John Singleton MD  08/04/19  9:17 AM

## 2019-08-05 ENCOUNTER — APPOINTMENT (OUTPATIENT)
Dept: GENERAL RADIOLOGY | Facility: HOSPITAL | Age: 79
End: 2019-08-05

## 2019-08-05 ENCOUNTER — ANESTHESIA (OUTPATIENT)
Dept: PERIOP | Facility: HOSPITAL | Age: 79
End: 2019-08-05

## 2019-08-05 ENCOUNTER — ANESTHESIA EVENT (OUTPATIENT)
Dept: PERIOP | Facility: HOSPITAL | Age: 79
End: 2019-08-05

## 2019-08-05 LAB
GLUCOSE BLDC GLUCOMTR-MCNC: 158 MG/DL (ref 70–130)
GLUCOSE BLDC GLUCOMTR-MCNC: 178 MG/DL (ref 70–130)
GLUCOSE BLDC GLUCOMTR-MCNC: 223 MG/DL (ref 70–130)
GLUCOSE BLDC GLUCOMTR-MCNC: 224 MG/DL (ref 70–130)

## 2019-08-05 PROCEDURE — 63710000001 INSULIN GLARGINE PER 5 UNITS: Performed by: INTERNAL MEDICINE

## 2019-08-05 PROCEDURE — 72040 X-RAY EXAM NECK SPINE 2-3 VW: CPT

## 2019-08-05 PROCEDURE — 25010000002 PROPOFOL 10 MG/ML EMULSION: Performed by: NURSE ANESTHETIST, CERTIFIED REGISTERED

## 2019-08-05 PROCEDURE — 76000 FLUOROSCOPY <1 HR PHYS/QHP: CPT

## 2019-08-05 PROCEDURE — 25010000002 ONDANSETRON PER 1 MG: Performed by: NURSE ANESTHETIST, CERTIFIED REGISTERED

## 2019-08-05 PROCEDURE — 22845 INSERT SPINE FIXATION DEVICE: CPT | Performed by: NEUROLOGICAL SURGERY

## 2019-08-05 PROCEDURE — C1713 ANCHOR/SCREW BN/BN,TIS/BN: HCPCS | Performed by: NEUROLOGICAL SURGERY

## 2019-08-05 PROCEDURE — 22552 ARTHRD ANT NTRBD CERVICAL EA: CPT | Performed by: NEUROLOGICAL SURGERY

## 2019-08-05 PROCEDURE — 25010000003 CEFAZOLIN IN DEXTROSE 2-4 GM/100ML-% SOLUTION: Performed by: NEUROLOGICAL SURGERY

## 2019-08-05 PROCEDURE — 25010000002 FENTANYL CITRATE (PF) 100 MCG/2ML SOLUTION: Performed by: NURSE ANESTHETIST, CERTIFIED REGISTERED

## 2019-08-05 PROCEDURE — 22551 ARTHRD ANT NTRBDY CERVICAL: CPT | Performed by: NEUROLOGICAL SURGERY

## 2019-08-05 PROCEDURE — 22853 INSJ BIOMECHANICAL DEVICE: CPT | Performed by: NEUROLOGICAL SURGERY

## 2019-08-05 PROCEDURE — 0RG10A0 FUSION OF CERVICAL VERTEBRAL JOINT WITH INTERBODY FUSION DEVICE, ANTERIOR APPROACH, ANTERIOR COLUMN, OPEN APPROACH: ICD-10-PCS | Performed by: NEUROLOGICAL SURGERY

## 2019-08-05 PROCEDURE — 25010000002 HYDROMORPHONE PER 4 MG: Performed by: INTERNAL MEDICINE

## 2019-08-05 PROCEDURE — 63710000001 INSULIN LISPRO (HUMAN) PER 5 UNITS: Performed by: INTERNAL MEDICINE

## 2019-08-05 PROCEDURE — 25010000002 MIDAZOLAM PER 1 MG: Performed by: ANESTHESIOLOGY

## 2019-08-05 PROCEDURE — 25010000002 HYDROMORPHONE 1 MG/ML SOLUTION: Performed by: INTERNAL MEDICINE

## 2019-08-05 PROCEDURE — 25010000002 NEOSTIGMINE PER 0.5 MG: Performed by: NURSE ANESTHETIST, CERTIFIED REGISTERED

## 2019-08-05 PROCEDURE — 25010000002 HYDROMORPHONE PER 4 MG: Performed by: NURSE ANESTHETIST, CERTIFIED REGISTERED

## 2019-08-05 PROCEDURE — 82962 GLUCOSE BLOOD TEST: CPT

## 2019-08-05 PROCEDURE — L0120 CERV FLEX N/ADJ FOAM PRE OTS: HCPCS | Performed by: NEUROLOGICAL SURGERY

## 2019-08-05 PROCEDURE — 0RB30ZZ EXCISION OF CERVICAL VERTEBRAL DISC, OPEN APPROACH: ICD-10-PCS | Performed by: NEUROLOGICAL SURGERY

## 2019-08-05 PROCEDURE — 25010000002 DEXAMETHASONE PER 1 MG: Performed by: NURSE ANESTHETIST, CERTIFIED REGISTERED

## 2019-08-05 DEVICE — PUTTY DBF GRAFTON 3CC: Type: IMPLANTABLE DEVICE | Site: SPINE CERVICAL | Status: FUNCTIONAL

## 2019-08-05 DEVICE — PLATE 7200045 ATL VISION ELITE 45MM
Type: IMPLANTABLE DEVICE | Site: SPINE CERVICAL | Status: FUNCTIONAL
Brand: ATLANTIS® ANTERIOR CERVICAL PLATE SYSTEM

## 2019-08-05 DEVICE — IMPLANT 6240764 ANATOMIC 16X14X7MM
Type: IMPLANTABLE DEVICE | Site: SPINE CERVICAL | Status: FUNCTIONAL
Brand: VERTE-STACK® SPINAL SYSTEM

## 2019-08-05 RX ORDER — FAMOTIDINE 10 MG/ML
20 INJECTION, SOLUTION INTRAVENOUS ONCE
Status: COMPLETED | OUTPATIENT
Start: 2019-08-05 | End: 2019-08-05

## 2019-08-05 RX ORDER — CEFAZOLIN SODIUM 2 G/100ML
2 INJECTION, SOLUTION INTRAVENOUS ONCE
Status: COMPLETED | OUTPATIENT
Start: 2019-08-05 | End: 2019-08-05

## 2019-08-05 RX ORDER — SODIUM CHLORIDE 0.9 % (FLUSH) 0.9 %
3-10 SYRINGE (ML) INJECTION AS NEEDED
Status: DISCONTINUED | OUTPATIENT
Start: 2019-08-05 | End: 2019-08-09 | Stop reason: HOSPADM

## 2019-08-05 RX ORDER — SODIUM CHLORIDE, SODIUM LACTATE, POTASSIUM CHLORIDE, CALCIUM CHLORIDE 600; 310; 30; 20 MG/100ML; MG/100ML; MG/100ML; MG/100ML
9 INJECTION, SOLUTION INTRAVENOUS CONTINUOUS
Status: DISCONTINUED | OUTPATIENT
Start: 2019-08-05 | End: 2019-08-05

## 2019-08-05 RX ORDER — FLUMAZENIL 0.1 MG/ML
0.2 INJECTION INTRAVENOUS AS NEEDED
Status: DISCONTINUED | OUTPATIENT
Start: 2019-08-05 | End: 2019-08-05 | Stop reason: HOSPADM

## 2019-08-05 RX ORDER — SODIUM CHLORIDE 0.9 % (FLUSH) 0.9 %
3 SYRINGE (ML) INJECTION EVERY 12 HOURS SCHEDULED
Status: DISCONTINUED | OUTPATIENT
Start: 2019-08-05 | End: 2019-08-05 | Stop reason: HOSPADM

## 2019-08-05 RX ORDER — DEXAMETHASONE SODIUM PHOSPHATE 10 MG/ML
INJECTION INTRAMUSCULAR; INTRAVENOUS AS NEEDED
Status: DISCONTINUED | OUTPATIENT
Start: 2019-08-05 | End: 2019-08-05 | Stop reason: SURG

## 2019-08-05 RX ORDER — PROPOFOL 10 MG/ML
VIAL (ML) INTRAVENOUS AS NEEDED
Status: DISCONTINUED | OUTPATIENT
Start: 2019-08-05 | End: 2019-08-05 | Stop reason: SURG

## 2019-08-05 RX ORDER — OXYCODONE AND ACETAMINOPHEN 7.5; 325 MG/1; MG/1
1 TABLET ORAL ONCE AS NEEDED
Status: DISCONTINUED | OUTPATIENT
Start: 2019-08-05 | End: 2019-08-05 | Stop reason: HOSPADM

## 2019-08-05 RX ORDER — ONDANSETRON 2 MG/ML
INJECTION INTRAMUSCULAR; INTRAVENOUS AS NEEDED
Status: DISCONTINUED | OUTPATIENT
Start: 2019-08-05 | End: 2019-08-05 | Stop reason: SURG

## 2019-08-05 RX ORDER — LIDOCAINE HYDROCHLORIDE 20 MG/ML
INJECTION, SOLUTION INFILTRATION; PERINEURAL AS NEEDED
Status: DISCONTINUED | OUTPATIENT
Start: 2019-08-05 | End: 2019-08-05 | Stop reason: SURG

## 2019-08-05 RX ORDER — PROMETHAZINE HYDROCHLORIDE 25 MG/1
25 TABLET ORAL ONCE AS NEEDED
Status: DISCONTINUED | OUTPATIENT
Start: 2019-08-05 | End: 2019-08-05 | Stop reason: HOSPADM

## 2019-08-05 RX ORDER — SODIUM CHLORIDE AND POTASSIUM CHLORIDE 150; 900 MG/100ML; MG/100ML
100 INJECTION, SOLUTION INTRAVENOUS CONTINUOUS
Status: DISCONTINUED | OUTPATIENT
Start: 2019-08-05 | End: 2019-08-05

## 2019-08-05 RX ORDER — DIPHENHYDRAMINE HYDROCHLORIDE 50 MG/ML
12.5 INJECTION INTRAMUSCULAR; INTRAVENOUS
Status: DISCONTINUED | OUTPATIENT
Start: 2019-08-05 | End: 2019-08-05 | Stop reason: HOSPADM

## 2019-08-05 RX ORDER — PROMETHAZINE HYDROCHLORIDE 25 MG/1
25 SUPPOSITORY RECTAL ONCE AS NEEDED
Status: DISCONTINUED | OUTPATIENT
Start: 2019-08-05 | End: 2019-08-05 | Stop reason: HOSPADM

## 2019-08-05 RX ORDER — SODIUM CHLORIDE 9 MG/ML
50 INJECTION, SOLUTION INTRAVENOUS CONTINUOUS
Status: DISCONTINUED | OUTPATIENT
Start: 2019-08-05 | End: 2019-08-05

## 2019-08-05 RX ORDER — EPHEDRINE SULFATE 50 MG/ML
5 INJECTION, SOLUTION INTRAVENOUS ONCE AS NEEDED
Status: DISCONTINUED | OUTPATIENT
Start: 2019-08-05 | End: 2019-08-05 | Stop reason: HOSPADM

## 2019-08-05 RX ORDER — ACETAMINOPHEN 325 MG/1
650 TABLET ORAL ONCE AS NEEDED
Status: DISCONTINUED | OUTPATIENT
Start: 2019-08-05 | End: 2019-08-05 | Stop reason: HOSPADM

## 2019-08-05 RX ORDER — ROCURONIUM BROMIDE 10 MG/ML
INJECTION, SOLUTION INTRAVENOUS AS NEEDED
Status: DISCONTINUED | OUTPATIENT
Start: 2019-08-05 | End: 2019-08-05 | Stop reason: SURG

## 2019-08-05 RX ORDER — ONDANSETRON 2 MG/ML
4 INJECTION INTRAMUSCULAR; INTRAVENOUS EVERY 6 HOURS PRN
Status: DISCONTINUED | OUTPATIENT
Start: 2019-08-05 | End: 2019-08-09 | Stop reason: HOSPADM

## 2019-08-05 RX ORDER — SODIUM CHLORIDE 0.9 % (FLUSH) 0.9 %
3-10 SYRINGE (ML) INJECTION AS NEEDED
Status: DISCONTINUED | OUTPATIENT
Start: 2019-08-05 | End: 2019-08-05 | Stop reason: HOSPADM

## 2019-08-05 RX ORDER — MIDAZOLAM HYDROCHLORIDE 1 MG/ML
1 INJECTION INTRAMUSCULAR; INTRAVENOUS
Status: DISCONTINUED | OUTPATIENT
Start: 2019-08-05 | End: 2019-08-05 | Stop reason: HOSPADM

## 2019-08-05 RX ORDER — LABETALOL HYDROCHLORIDE 5 MG/ML
5 INJECTION, SOLUTION INTRAVENOUS
Status: DISCONTINUED | OUTPATIENT
Start: 2019-08-05 | End: 2019-08-05 | Stop reason: HOSPADM

## 2019-08-05 RX ORDER — CEFAZOLIN SODIUM 2 G/100ML
2 INJECTION, SOLUTION INTRAVENOUS EVERY 8 HOURS
Status: COMPLETED | OUTPATIENT
Start: 2019-08-05 | End: 2019-08-07

## 2019-08-05 RX ORDER — PROMETHAZINE HYDROCHLORIDE 25 MG/ML
6.25 INJECTION, SOLUTION INTRAMUSCULAR; INTRAVENOUS
Status: DISCONTINUED | OUTPATIENT
Start: 2019-08-05 | End: 2019-08-05 | Stop reason: HOSPADM

## 2019-08-05 RX ORDER — NALOXONE HCL 0.4 MG/ML
0.2 VIAL (ML) INJECTION AS NEEDED
Status: DISCONTINUED | OUTPATIENT
Start: 2019-08-05 | End: 2019-08-05 | Stop reason: HOSPADM

## 2019-08-05 RX ORDER — DIPHENHYDRAMINE HCL 25 MG
25 CAPSULE ORAL
Status: DISCONTINUED | OUTPATIENT
Start: 2019-08-05 | End: 2019-08-05 | Stop reason: HOSPADM

## 2019-08-05 RX ORDER — ONDANSETRON 2 MG/ML
4 INJECTION INTRAMUSCULAR; INTRAVENOUS ONCE AS NEEDED
Status: DISCONTINUED | OUTPATIENT
Start: 2019-08-05 | End: 2019-08-05 | Stop reason: HOSPADM

## 2019-08-05 RX ORDER — HYDROCODONE BITARTRATE AND ACETAMINOPHEN 7.5; 325 MG/1; MG/1
1 TABLET ORAL ONCE AS NEEDED
Status: DISCONTINUED | OUTPATIENT
Start: 2019-08-05 | End: 2019-08-05 | Stop reason: HOSPADM

## 2019-08-05 RX ORDER — FENTANYL CITRATE 50 UG/ML
INJECTION, SOLUTION INTRAMUSCULAR; INTRAVENOUS AS NEEDED
Status: DISCONTINUED | OUTPATIENT
Start: 2019-08-05 | End: 2019-08-05 | Stop reason: SURG

## 2019-08-05 RX ORDER — HYDRALAZINE HYDROCHLORIDE 20 MG/ML
5 INJECTION INTRAMUSCULAR; INTRAVENOUS
Status: DISCONTINUED | OUTPATIENT
Start: 2019-08-05 | End: 2019-08-05 | Stop reason: HOSPADM

## 2019-08-05 RX ORDER — HYDROMORPHONE HYDROCHLORIDE 1 MG/ML
0.5 INJECTION, SOLUTION INTRAMUSCULAR; INTRAVENOUS; SUBCUTANEOUS
Status: DISCONTINUED | OUTPATIENT
Start: 2019-08-05 | End: 2019-08-05 | Stop reason: HOSPADM

## 2019-08-05 RX ORDER — PROMETHAZINE HYDROCHLORIDE 12.5 MG/1
12.5 TABLET ORAL EVERY 6 HOURS PRN
Status: DISCONTINUED | OUTPATIENT
Start: 2019-08-05 | End: 2019-08-09 | Stop reason: HOSPADM

## 2019-08-05 RX ORDER — ONDANSETRON 4 MG/1
4 TABLET, FILM COATED ORAL EVERY 6 HOURS PRN
Status: DISCONTINUED | OUTPATIENT
Start: 2019-08-05 | End: 2019-08-09 | Stop reason: HOSPADM

## 2019-08-05 RX ORDER — FENTANYL CITRATE 50 UG/ML
50 INJECTION, SOLUTION INTRAMUSCULAR; INTRAVENOUS
Status: DISCONTINUED | OUTPATIENT
Start: 2019-08-05 | End: 2019-08-05 | Stop reason: HOSPADM

## 2019-08-05 RX ORDER — GLYCOPYRROLATE 0.2 MG/ML
INJECTION INTRAMUSCULAR; INTRAVENOUS AS NEEDED
Status: DISCONTINUED | OUTPATIENT
Start: 2019-08-05 | End: 2019-08-05 | Stop reason: SURG

## 2019-08-05 RX ORDER — SODIUM CHLORIDE 0.9 % (FLUSH) 0.9 %
3 SYRINGE (ML) INJECTION EVERY 12 HOURS SCHEDULED
Status: DISCONTINUED | OUTPATIENT
Start: 2019-08-05 | End: 2019-08-09 | Stop reason: HOSPADM

## 2019-08-05 RX ORDER — PROMETHAZINE HYDROCHLORIDE 25 MG/ML
12.5 INJECTION, SOLUTION INTRAMUSCULAR; INTRAVENOUS ONCE AS NEEDED
Status: DISCONTINUED | OUTPATIENT
Start: 2019-08-05 | End: 2019-08-05 | Stop reason: HOSPADM

## 2019-08-05 RX ORDER — EPHEDRINE SULFATE 50 MG/ML
INJECTION, SOLUTION INTRAVENOUS AS NEEDED
Status: DISCONTINUED | OUTPATIENT
Start: 2019-08-05 | End: 2019-08-05 | Stop reason: SURG

## 2019-08-05 RX ORDER — MIDAZOLAM HYDROCHLORIDE 1 MG/ML
2 INJECTION INTRAMUSCULAR; INTRAVENOUS
Status: DISCONTINUED | OUTPATIENT
Start: 2019-08-05 | End: 2019-08-05 | Stop reason: HOSPADM

## 2019-08-05 RX ADMIN — ROCURONIUM BROMIDE 50 MG: 10 INJECTION INTRAVENOUS at 08:57

## 2019-08-05 RX ADMIN — DEXAMETHASONE SODIUM PHOSPHATE 8 MG: 10 INJECTION INTRAMUSCULAR; INTRAVENOUS at 09:05

## 2019-08-05 RX ADMIN — HYDROCODONE BITARTRATE AND ACETAMINOPHEN 2 TABLET: 10; 325 TABLET ORAL at 20:03

## 2019-08-05 RX ADMIN — INSULIN LISPRO 4 UNITS: 100 INJECTION, SOLUTION INTRAVENOUS; SUBCUTANEOUS at 22:33

## 2019-08-05 RX ADMIN — FENTANYL CITRATE 50 MCG: 50 INJECTION INTRAMUSCULAR; INTRAVENOUS at 08:57

## 2019-08-05 RX ADMIN — GLYCOPYRROLATE 0.1 MG: 0.2 INJECTION INTRAMUSCULAR; INTRAVENOUS at 09:58

## 2019-08-05 RX ADMIN — PROPOFOL 50 MG: 10 INJECTION, EMULSION INTRAVENOUS at 10:26

## 2019-08-05 RX ADMIN — AMLODIPINE BESYLATE 10 MG: 10 TABLET ORAL at 20:02

## 2019-08-05 RX ADMIN — MIDAZOLAM 1 MG: 1 INJECTION INTRAMUSCULAR; INTRAVENOUS at 08:18

## 2019-08-05 RX ADMIN — FENTANYL CITRATE 50 MCG: 50 INJECTION INTRAMUSCULAR; INTRAVENOUS at 09:53

## 2019-08-05 RX ADMIN — NEOSTIGMINE METHYLSULFATE 3 MG: 1 INJECTION INTRAMUSCULAR; INTRAVENOUS; SUBCUTANEOUS at 11:01

## 2019-08-05 RX ADMIN — FENTANYL CITRATE 50 MCG: 50 INJECTION, SOLUTION INTRAMUSCULAR; INTRAVENOUS at 11:52

## 2019-08-05 RX ADMIN — SODIUM CHLORIDE, PRESERVATIVE FREE 3 ML: 5 INJECTION INTRAVENOUS at 20:11

## 2019-08-05 RX ADMIN — INSULIN GLARGINE 40 UNITS: 100 INJECTION, SOLUTION SUBCUTANEOUS at 22:33

## 2019-08-05 RX ADMIN — HYDROMORPHONE HYDROCHLORIDE 1 MG: 1 INJECTION, SOLUTION INTRAMUSCULAR; INTRAVENOUS; SUBCUTANEOUS at 22:41

## 2019-08-05 RX ADMIN — FENTANYL CITRATE 50 MCG: 50 INJECTION, SOLUTION INTRAMUSCULAR; INTRAVENOUS at 12:14

## 2019-08-05 RX ADMIN — SODIUM CHLORIDE, POTASSIUM CHLORIDE, SODIUM LACTATE AND CALCIUM CHLORIDE: 600; 310; 30; 20 INJECTION, SOLUTION INTRAVENOUS at 08:50

## 2019-08-05 RX ADMIN — CEFAZOLIN SODIUM 2 G: 2 INJECTION, SOLUTION INTRAVENOUS at 09:05

## 2019-08-05 RX ADMIN — HYDROMORPHONE HYDROCHLORIDE 0.5 MG: 1 INJECTION, SOLUTION INTRAMUSCULAR; INTRAVENOUS; SUBCUTANEOUS at 02:27

## 2019-08-05 RX ADMIN — INSULIN LISPRO 2 UNITS: 100 INJECTION, SOLUTION INTRAVENOUS; SUBCUTANEOUS at 18:16

## 2019-08-05 RX ADMIN — GLYCOPYRROLATE 0.1 MG: 0.2 INJECTION INTRAMUSCULAR; INTRAVENOUS at 09:29

## 2019-08-05 RX ADMIN — HYDROCODONE BITARTRATE AND ACETAMINOPHEN 2 TABLET: 10; 325 TABLET ORAL at 16:16

## 2019-08-05 RX ADMIN — ALLOPURINOL 300 MG: 300 TABLET ORAL at 20:02

## 2019-08-05 RX ADMIN — ONDANSETRON 4 MG: 2 INJECTION INTRAMUSCULAR; INTRAVENOUS at 10:58

## 2019-08-05 RX ADMIN — FAMOTIDINE 20 MG: 10 INJECTION INTRAVENOUS at 08:18

## 2019-08-05 RX ADMIN — SODIUM CHLORIDE, POTASSIUM CHLORIDE, SODIUM LACTATE AND CALCIUM CHLORIDE: 600; 310; 30; 20 INJECTION, SOLUTION INTRAVENOUS at 10:42

## 2019-08-05 RX ADMIN — CEFAZOLIN SODIUM 2 G: 2 INJECTION, SOLUTION INTRAVENOUS at 18:30

## 2019-08-05 RX ADMIN — SODIUM CHLORIDE, PRESERVATIVE FREE 3 ML: 5 INJECTION INTRAVENOUS at 20:10

## 2019-08-05 RX ADMIN — HYDROMORPHONE HYDROCHLORIDE 0.5 MG: 1 INJECTION, SOLUTION INTRAMUSCULAR; INTRAVENOUS; SUBCUTANEOUS at 12:02

## 2019-08-05 RX ADMIN — ISOSORBIDE MONONITRATE 60 MG: 60 TABLET ORAL at 20:03

## 2019-08-05 RX ADMIN — EPHEDRINE SULFATE 10 MG: 50 INJECTION INTRAMUSCULAR; INTRAVENOUS; SUBCUTANEOUS at 10:17

## 2019-08-05 RX ADMIN — CARVEDILOL 3.12 MG: 3.12 TABLET, FILM COATED ORAL at 07:46

## 2019-08-05 RX ADMIN — PROPOFOL 150 MG: 10 INJECTION, EMULSION INTRAVENOUS at 08:57

## 2019-08-05 RX ADMIN — INSULIN LISPRO 10 UNITS: 100 INJECTION, SOLUTION INTRAVENOUS; SUBCUTANEOUS at 18:17

## 2019-08-05 RX ADMIN — GLYCOPYRROLATE 0.6 MG: 0.2 INJECTION INTRAMUSCULAR; INTRAVENOUS at 11:01

## 2019-08-05 RX ADMIN — HYDROMORPHONE HYDROCHLORIDE 0.5 MG: 1 INJECTION, SOLUTION INTRAMUSCULAR; INTRAVENOUS; SUBCUTANEOUS at 06:13

## 2019-08-05 RX ADMIN — LIDOCAINE HYDROCHLORIDE 100 MG: 20 INJECTION, SOLUTION INFILTRATION; PERINEURAL at 08:57

## 2019-08-05 RX ADMIN — CARVEDILOL 3.12 MG: 3.12 TABLET, FILM COATED ORAL at 22:33

## 2019-08-05 NOTE — ANESTHESIA PREPROCEDURE EVALUATION
Anesthesia Evaluation                  Airway   Mallampati: II  TM distance: >3 FB  Neck ROM: full  Dental      Comment: Front incisor out of line    Pulmonary    (+) a smoker Current, sleep apnea on CPAP,   (-) shortness of breath, recent URI  Cardiovascular     (+) hypertension, past MI , CAD, cardiac stents (2005, angioplasties X 7) hyperlipidemia,   (-) CHF    ROS comment: EF67%  NSR    Neuro/Psych  (-) TIA, CVA, dizziness/light headedness, syncope  GI/Hepatic/Renal/Endo    (+)  GERD,  diabetes mellitus,     Musculoskeletal     Abdominal    Substance History      OB/GYN          Other                      Anesthesia Plan    ASA 3     general     intravenous induction   Anesthetic plan, all risks, benefits, and alternatives have been provided, discussed and informed consent has been obtained with: patient.

## 2019-08-05 NOTE — PROGRESS NOTES
"     LOS: 3 days   Primary Care Physician: Anthony Gutierrez MD     Subjective   Now in [facility].  Wife at bedside.    Vital Signs  Body mass index is 30.7 kg/m².  Temp:  [97.3 °F (36.3 °C)-98.4 °F (36.9 °C)] 97.8 °F (36.6 °C)  Heart Rate:  [58-78] 64  Resp:  [14-18] 16  BP: (141-176)/(62-80) 153/73      Objective:  General Appearance:  In no acute distress.    Vital signs: (most recent): Blood pressure 153/73, pulse 64, temperature 97.8 °F (36.6 °C), temperature source Oral, resp. rate 16, height 172.7 cm (68\"), weight 91.6 kg (201 lb 14.4 oz), SpO2 96 %.    HEENT: (Soft collar on  )    Lungs:  He is not in respiratory distress.  No stridor.  There are decreased breath sounds.  No rales, wheezes or rhonchi.    Heart: Normal rate.  Regular rhythm.  Positive for murmur.    Abdomen: Abdomen is soft.  (Obese).  Bowel sounds are normal.   There is no abdominal tenderness.   There is no mass.   Extremities: There is dependent edema.  (Trace)  Neurological: Patient is alert.          Results Review:    I reviewed the patient's new clinical results.    Results from last 7 days   Lab Units 08/03/19  0623 08/02/19  1122   WBC 10*3/mm3 5.93 6.19   HEMOGLOBIN g/dL 10.4* 10.1*   PLATELETS 10*3/mm3 204 172     Results from last 7 days   Lab Units 08/04/19  0828 08/03/19  1226   SODIUM mmol/L 138 142   POTASSIUM mmol/L 5.1 5.1   CHLORIDE mmol/L 102 105   CO2 mmol/L 22.4 22.8   BUN mg/dL 41* 36*   CREATININE mg/dL 1.38* 1.48*   CALCIUM mg/dL 10.1 9.9   GLUCOSE mg/dL 266* 283*         Hemoglobin A1C:  Lab Results   Component Value Date    HGBA1C 6.39 (H) 08/02/2019       Glucose Range:  Glucose   Date/Time Value Ref Range Status   08/05/2019 1722 178 (H) 70 - 130 mg/dL Final   08/05/2019 1141 158 (H) 70 - 130 mg/dL Final   08/05/2019 0607 223 (H) 70 - 130 mg/dL Final   08/04/2019 2037 315 (H) 70 - 130 mg/dL Final   08/04/2019 1607 350 (H) 70 - 130 mg/dL Final   08/04/2019 1108 342 (H) 70 - 130 mg/dL Final       Lab Results "   Component Value Date    PAFYPBMH50 1,662 (H) 08/03/2019       Lab Results   Component Value Date    TSH 1.190 08/03/2019       Assessment & Plan      Medication Review: Yes    Active Hospital Problems    Diagnosis  POA   • **Spinal stenosis of cervical region [M48.02]  Unknown   • Waldenstrom macroglobulinemia (CMS/HCC) [C88.0]  Unknown   • Weakness generalized [R53.1]  Yes   • CKD (chronic kidney disease) stage 3, GFR 30-59 ml/min (CMS/HCC) [N18.3]  Yes   • Anemia [D64.9]  Yes   • Bradycardia [R00.1]  Yes   • Fall [W19.XXXA]  Yes   • Neck pain [M54.2]  Yes   • DDD (degenerative disc disease), cervical [M50.30]  Unknown   • 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       Assessment/Plan  1.  Spinal stenosis with myelopathy, postop day 0 anterior cervical discectomy with fusion and instrumentation  2.  Hyperkalemia.  Change diet to low potassium.  Stop IV fluids with potassium.  Since he is going to be able to eat and drink, does not need fluids at this point anyway.  Recheck labs in a.m.  3.  Chronic kidney disease stage III.  Baseline creatinine between 1.4 and 1.6  4.  Hypertension.  Parameters written for which to hold losartan.  5.  Coronary artery disease, stable  6.  Diabetes mellitus type 2.  Sugars better.  Continue Lantus and sliding scale    Kendra Parker MD  08/05/19  6:07 PM

## 2019-08-05 NOTE — BRIEF OP NOTE
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

## 2019-08-05 NOTE — OP NOTE
Preoperative diagnosis: Cervical stenosis with cervical myelopathy C3-4 and C4-5    Postoperative diagnosis: Same    Procedure performed: Anterior cervical discectomy C3-4 and C4-5 with anterior cervical fusion and instrumentation    Surgeon: John Singleton M.D.    Assistant: Comfort Burdick CFA who was instrumental in helping with hemostasis, visualization of neural structures, and retraction of neural structures.    Indications for the procedure: This patient was having severe symptoms in the neck and arms.   Imaging showed significant neural compressionin the cervical spine at C3-4 and C4-5.    Operative summary: After induction of general anesthesia with intravenous agents patient was intubated and placed on the operating table in the supine position.  The head was hyperextended on donut roll and a roll of sheets was not placed under the shoulders.  The shoulders were taped down being careful not to stretch the brachial plexus too much.  All peripheral points of entrapment and pressure were padded and secured.   The neck was prepped with Chloraprep.    An incision was made in the right lower portion of the neck.  This was carried down to the platysma.  The platysma was opened in the direction of its fibers.  Dissection was carried down through the middle cervical fascia to the anterior aspect of the cervical spine.  A needle was placed in the first available disc space.  This did prove to be C3-4.  Attention was turned to that level in the next 1 down.  The longus colli muscles were elevated off both sides of the anterior cervical spine and then the Cloward retractors were locked under the longus colli muscles and used to hold the esophagus, trachea, and recurrent laryngeal bundle medially and the carotid bundle and its contents laterally.  The disc space at the C3-4 level was incised and disc material was removed with the 0 and 3-0 up-biting and straight curettes. The pituitary was also used to remove disc  material.  Following this the posterior lip of the vertebral body was drilled off in combination with the uncovertebral joints on each side.    The posterior longitudinal ligament was then opened and removed from foramen to foramen completely decompressing the spinal cord and the nerve roots.  The ligament was fairly adherent to the anterior dura.  Once the nerve roots were visualized in each neural foramen and found to be completely decompressed bleeding was controlled with a combination of the bipolar cautery, FloSeal, and thrombin and Gelfoam.  Once the bleeding was stopped the disc space was sized and a 7 mm Vertastack anatomic PEEK cage was placed into the disc space.  It was filled first with [place] putty. The compression at this level was primarily due to severe stenosis.    Following this attention was turned to the C4-5 level.  At that level the same thing was done.  The disc material was removed, the posterior lip of the vertebral body was removed as well as the uncovertebral joints.  The posterior longitudinal ligament was then opened and removed. Bleeding was again controlled with the bipolar cautery FloSeal, and thrombin and Gelfoam.  Finally another Vertastack anatomic peek cage was placed at this level along with [place] putty.  This cage measured 7 mm.  At this level the compression was mostly due to stenosis.    A 45 mm Atlantis plate was then attached to the front of the cervical spine using 6 14 mm screws.    The retractors were removed and final x-rays taken. Bleeding was controlled with the bipolar cautery and a drain was placed in the anterior cervical space and tunneled subcutaneously. It was then sewn into place and the incision was closed in layers, dressed and the patient was taken to the recovery room in stable condition.  Sponge instrument and needle counts were correct at the end of the procedure.

## 2019-08-05 NOTE — ANESTHESIA PROCEDURE NOTES
Airway  Urgency: elective    Date/Time: 8/5/2019 9:01 AM  Airway not difficult    General Information and Staff    Patient location during procedure: OR  Anesthesiologist: Matilda Olsen MD  CRNA: Sera Neil CRNA    Indications and Patient Condition  Indications for airway management: airway protection    Preoxygenated: yes  MILS maintained throughout  Mask difficulty assessment: 2 - vent by mask + OA or adjuvant +/- NMBA    Final Airway Details  Final airway type: endotracheal airway      Successful airway: ETT and reinforced tube  Cuffed: yes   Successful intubation technique: video laryngoscopy  Facilitating devices/methods: intubating stylet  Endotracheal tube insertion site: oral  Blade: CMAC  Blade size: D  ETT size (mm): 7.5  Cormack-Lehane Classification: grade I - full view of glottis  Placement verified by: chest auscultation and capnometry   Cuff volume (mL): 8  Measured from: lips  ETT to lips (cm): 23  Number of attempts at approach: 1    Additional Comments  Patient in OR. Monitors on. BLVS. Pre 02 100%. SIVI. DL x1 with cmac. DVVC. Atraumatic placement of ETT. Placement verified with ETC02 and BBS. ETT secured. Teeth/lips/caps in pre-op condition.

## 2019-08-05 NOTE — PLAN OF CARE
Problem: Patient Care Overview  Goal: Plan of Care Review  Outcome: Ongoing (interventions implemented as appropriate)   08/05/19 0512   Coping/Psychosocial   Plan of Care Reviewed With patient   Plan of Care Review   Progress no change   OTHER   Outcome Summary Has slept long intervals. Med for pain with good relief. Npo for OR today. VSS.No other changes. No distress       Problem: Pain, Chronic (Adult)  Goal: Acceptable Pain/Comfort Level and Functional Ability  Outcome: Ongoing (interventions implemented as appropriate)      Problem: Cardiac Output Decreased (Adult)  Goal: Effective Tissue Perfusion  Outcome: Ongoing (interventions implemented as appropriate)      Problem: Fall Risk (Adult)  Goal: Absence of Fall  Outcome: Ongoing (interventions implemented as appropriate)      Problem: Skin Injury Risk (Adult)  Goal: Skin Health and Integrity  Outcome: Ongoing (interventions implemented as appropriate)

## 2019-08-05 NOTE — SIGNIFICANT NOTE
08/05/19 0833   Rehab Treatment   Discipline physical therapist   Reason Treatment Not Performed unavailable for treatment  (pt off floor to OR for cervical surgery, will follow up tomorrow.)   Recommendation   PT - Next Appointment 08/06/19

## 2019-08-05 NOTE — PROGRESS NOTES
Chevy Goodwin   79 y.o.  male    LOS: 3 days   Patient Care Team:  Anthony Gutierrez MD as PCP - General (Internal Medicine)  Anthony Gutierrez MD as PCP - Internal Medicine  Charisse Winters APRN as PCP - Claims Attributed  Bubba Hernandez MD as Consulting Physician (Cardiology)  Girma Olsen MD as Consulting Physician (Urology)  Nickolas Olsen MD as Consulting Physician (Dermatology)  Kirk Gross MD as Consulting Physician (Orthopedic Surgery)      Subjective     Interval History:     Patient Complaints: none    Review of Systems:   The following systems were reviewed and negative;  respiratory and cardiovascular    Objective     Vital Sign Min/Max for last 24 hours  Temp  Min: 97.3 °F (36.3 °C)  Max: 98.6 °F (37 °C)   BP  Min: 138/58  Max: 171/74    Pulse  Min: 59  Max: 73       Intake/Output Summary (Last 24 hours) at 8/5/2019 0937  Last data filed at 8/5/2019 0851  Gross per 24 hour   Intake 660 ml   Output 375 ml   Net 285 ml       Daily Weights:  Wt Readings from Last 4 Encounters:   08/05/19 0608 91.6 kg (201 lb 14.4 oz)   08/04/19 0700 91.3 kg (201 lb 4.8 oz)   08/03/19 1107 111 kg (244 lb)   08/03/19 0651 111 kg (244 lb 11.4 oz)   08/02/19 1040 93.9 kg (207 lb)   06/26/19 1250 94.1 kg (207 lb 6 oz)   05/29/19 1129 95.1 kg (209 lb 9.6 oz)   04/29/19 1115 96.1 kg (211 lb 12.8 oz)       Physical Exam:      General Appearance:    Well developed and well nourished in no acute distress   Head:    Normocephalic, atraumatic   Neck:   supple, trachea midline, no thyromegaly, no   carotid bruit, no JVD   Lungs:    Clear to auscultation,respirations regular, even and                  unlabored    Heart:    Regular rhythm and normal rate, normal S1 and S2,                         No murmur, no gallop, no rub, no click   Chest Wall:    No abnormalities observed   Abdomen:     Normal bowel sounds, no masses, no organomegaly, soft        non-tender, non-distended, no guarding, no rebound                 tenderness   Rectal:     Deferred   Extremities:   No edema. Moves all extremities well, no cyanosis, no erythema   Pulses:   Pulses palpable and equal bilaterally   Skin:   No bleeding, bruising or rash   Neurologic:   awake alert and oriented x3, speech clear and approp, no facial drooping      Results Review:     I reviewed the patient's new clinical results.  Potassium Potassium   Date Value Ref Range Status   08/04/2019 5.1 3.5 - 5.2 mmol/L Final   08/03/2019 5.1 3.5 - 5.2 mmol/L Final   08/03/2019 5.3 (H) 3.5 - 5.2 mmol/L Final   08/02/2019 4.2 3.5 - 5.2 mmol/L Final          Creatinine Creatinine   Date Value Ref Range Status   08/04/2019 1.38 (H) 0.76 - 1.27 mg/dL Final   08/03/2019 1.48 (H) 0.76 - 1.27 mg/dL Final   08/03/2019 1.35 (H) 0.76 - 1.27 mg/dL Final   08/02/2019 1.56 (H) 0.76 - 1.27 mg/dL Final      Magnesium No results found for: MG     Results from last 7 days   Lab Units 08/04/19  0828 08/03/19  1226 08/03/19  0623 08/02/19  1122   POTASSIUM mmol/L 5.1 5.1 5.3* 4.2   CREATININE mg/dL 1.38* 1.48* 1.35* 1.56*   BILIRUBIN mg/dL  --   --   --  0.2       Results from last 7 days   Lab Units 08/03/19  0623   WBC 10*3/mm3 5.93   HEMOGLOBIN g/dL 10.4*   HEMATOCRIT % 33.1*   PLATELETS 10*3/mm3 204     Lab Results   Lab Value Date/Time    TROPONINT <0.010 08/02/2019 1122     No results found for: CHOL  No results found for: HDL  No results found for: LDL  No results found for: TRIG  No components found for: CHOLHDL  Lab Results   Component Value Date    TSH 1.190 08/03/2019     Lab Results   Component Value Date    INR 1.1 03/28/2018    INR 1.1 03/27/2018    PTT 31.1 03/28/2018    PTT 33.0 03/27/2018        Echo EF Estimated  No results found for: ECHOEFEST        Assessment/Plan       Spinal stenosis of cervical region    Chronic pain    Non-Hodgkin's lymphoma (CMS/HCC)    Diabetes (CMS/HCC)    Hypertension    Long term (current) use of opiate analgesic    JULIA treated with BiPAP 20/16     Waldenstrom macroglobulinemia (CMS/HCC)    Weakness generalized    CKD (chronic kidney disease) stage 3, GFR 30-59 ml/min (CMS/HCC)    Anemia    Bradycardia    Fall    Neck pain    DDD (degenerative disc disease), cervical    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 He is okay for surgery. I saw him just before surgery.      We will check troponin and ECG tomorrow morning.    John Leslie MD  08/05/19  9:37 AM      Time15    EMR Dragon/Transcription disclaimer:   Much of this encounter note is an electronic transcription/translation of spoken language to printed text. The electronic translation of spoken language may permit erroneous, or at times, nonsensical words or phrases to be inadvertently transcribed.  Although I have reviewed the note for such errors, some may still exist. Please contact me if needed for clarification or correction.

## 2019-08-05 NOTE — PROGRESS NOTES
Discharge Planning Assessment  [facility]     Patient Name: Chevy Goodwin  MRN: [id number]  Today's Date: 8/5/2019    Admit Date: 8/2/2019    Discharge Needs Assessment     Row Name 08/05/19 1654       Living Environment    Lives With  spouse    Name(s) of Who Lives With Patient  Cornelia Goodwin [phone]    Current Living Arrangements  home/apartment/condo    Primary Care Provided by  self    Family Caregiver if Needed  spouse    Family Caregiver Names  Cornelia Goodwin [phone]    Quality of Family Relationships  supportive;involved;helpful       Resource/Environmental Concerns    Resource/Environmental Concerns  none    Transportation Concerns  car, none       Transition Planning    Patient/Family Anticipates Transition to  home with family    Transportation Anticipated  family or friend will provide       Discharge Needs Assessment    Equipment Currently Used at Home  cane, straight        Discharge Plan     Row Name 08/05/19 0565       Plan    Plan  Home with spouse with  HH if needed    Plan Comments  Pt's IMM signed 8/2/19.  Spoke with pt and wife Cornelia Bland [phone] for screenign of DCP/needs.  Pt reports that he lives at home with his wife and hopes too return hoem upon d/C.   Pt has used  HH in the  past and is agreeable to use them again if needed.  referral called to Chey with  HH to follow for HH orders if needed.  Pt denies ever useing a SNU and stated that he would liek to return directly home.  Pt stated that he primarily uses a cane at home but also does have a walker if needed.  Inforemd pt that CCP would be follwoing pt's progress with PT for on going assessment of DC Needs.  Pt voiced understanding.  Pt did confim facesheet and pharmacy info as correct.              Destination      No service coordination in this encounter.      Durable Medical Equipment      No service coordination in this encounter.      Dialysis/Infusion      No service coordination in this encounter.      Home Medical  Care      Service Provider Request Status Selected Services Address Phone Number Fax Number    [facility] Pending - Request Sent N/A [address] [place] [id number] [phone]      Therapy      No service coordination in this encounter.      Community Resources      No service coordination in this encounter.          Demographic Summary     Row Name 08/05/19 1652       General Information    Admission Type  inpatient    Arrived From  home    Referral Source  admission list    Reason for Consult  discharge planning    Preferred Language  English        Functional Status     Row Name 08/05/19 1652       Functional Status    Usual Activity Tolerance  fair    Current Activity Tolerance  fair       Functional Status, IADL    Medications  assistive person    Meal Preparation  assistive person    Housekeeping  assistive person    Laundry  assistive person    Shopping  assistive person       Mental Status    General Appearance WDL  WDL       Mental Status Summary    Recent Changes in Mental Status/Cognitive Functioning  no changes       Employment/    Employment Status  disabled        Psychosocial    No documentation.       Abuse/Neglect    No documentation.       Legal    No documentation.       Substance Abuse    No documentation.       Patient Forms    No documentation.           SUNG Michael

## 2019-08-05 NOTE — ANESTHESIA POSTPROCEDURE EVALUATION
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 (CMS/HCC) [G95.89, M48.02])    Surgeon:  John Singleton MD Provider:  Matilda Olsen MD    Anesthesia Type:  general ASA Status:  3          Anesthesia Type: general  Last vitals  BP   158/72 (08/05/19 1245)   Temp   36.6 °C (97.9 °F) (08/05/19 1130)   Pulse   66 (08/05/19 1245)   Resp   14 (08/05/19 1245)     SpO2   94 % (08/05/19 1245)     Post Anesthesia Care and Evaluation    Patient location during evaluation: bedside  Patient participation: complete - patient participated  Level of consciousness: awake and alert  Pain management: adequate  Airway patency: patent  Anesthetic complications: No anesthetic complications    Cardiovascular status: acceptable  Respiratory status: acceptable  Hydration status: acceptable    Comments: /72   Pulse 66   Temp 36.6 °C (97.9 °F) (Oral)   Resp 14   Ht 172.7 cm (68\")   Wt 91.6 kg (201 lb 14.4 oz)   SpO2 94%   BMI 30.70 kg/m²         "

## 2019-08-05 NOTE — PLAN OF CARE
Problem: Patient Care Overview  Goal: Plan of Care Review  Outcome: Ongoing (interventions implemented as appropriate)   08/05/19 1940   Coping/Psychosocial   Plan of Care Reviewed With patient   Plan of Care Review   Progress no change   OTHER   Outcome Summary Surgery today. Pain controlled well with prn pain medications. Pt up to chair and ambulated to bathroom. VSS. Will continue to monitor.        Problem: Fall Risk (Adult)  Goal: Absence of Fall  Outcome: Ongoing (interventions implemented as appropriate)      Problem: Skin Injury Risk (Adult)  Goal: Skin Health and Integrity  Outcome: Ongoing (interventions implemented as appropriate)

## 2019-08-05 NOTE — DISCHARGE PLACEMENT REQUEST
"Wayne Brown (79 y.o. Male)     Date of Birth Social Security Number Address Home Phone MRN    1940  [id number] [address] [phone] [id number]    Jehovah's witness Marital Status          Yarsani        Admission Date Admission Type Admitting Provider Attending Provider Department, Room/Bed    8/2/19 Emergency Kendra Parker MD Hayden, Juliana, MD [address], 97/1    Discharge Date Discharge Disposition Discharge Destination                       Attending Provider:  Kendra Parker MD    Allergies:  Contrast Dye, Oxycodone, Adhesive Tape    Isolation:  None   Infection:  MRSA/History Only (01/10/19)   Code Status:  CPR    Ht:  172.7 cm (68\")   Wt:  91.6 kg (201 lb 14.4 oz)    Admission Cmt:  None   Principal Problem:  Spinal stenosis of cervical region [M48.02]                 Active Insurance as of 8/2/2019     Primary Coverage     Payor Plan Insurance Group Employer/Plan Group    MEDICARE MEDICARE A & B      Payor Plan Address Payor Plan Phone Number Payor Plan Fax Number Effective Dates    [address] [phone]  10/1/2004 - None Entered    [facility] [id number]       Subscriber Name Subscriber Birth Date Member ID       WAYNE BROWN 1940 [id number]           Secondary Coverage     Payor Plan Insurance Group Employer/Plan Group     FOR LIFE  FOR LIFE  SUPP      Payor Plan Address Payor Plan Phone Number Payor Plan Fax Number Effective Dates    [address] [phone]  8/1/2007 - None Entered    [facility] [address]       Subscriber Name Subscriber Birth Date Member ID       WAYNE BROWN 1940 [id number]                 Emergency Contacts      (Rel.) Home Phone Work Phone Mobile Phone    Cornelia Brown (Spouse) [phone] -- [phone]            "

## 2019-08-06 ENCOUNTER — APPOINTMENT (OUTPATIENT)
Dept: GENERAL RADIOLOGY | Facility: HOSPITAL | Age: 79
End: 2019-08-06

## 2019-08-06 VITALS
SYSTOLIC BLOOD PRESSURE: 163 MMHG | OXYGEN SATURATION: 92 % | HEART RATE: 70 BPM | RESPIRATION RATE: 18 BRPM | DIASTOLIC BLOOD PRESSURE: 69 MMHG

## 2019-08-06 LAB
ANION GAP SERPL CALCULATED.3IONS-SCNC: 13.6 MMOL/L (ref 5–15)
BUN BLD-MCNC: 34 MG/DL (ref 8–23)
BUN/CREAT SERPL: 29.1 (ref 7–25)
CALCIUM SPEC-SCNC: 9.6 MG/DL (ref 8.6–10.5)
CHLORIDE SERPL-SCNC: 99 MMOL/L (ref 98–107)
CO2 SERPL-SCNC: 24.4 MMOL/L (ref 22–29)
CREAT BLD-MCNC: 1.17 MG/DL (ref 0.76–1.27)
DEPRECATED RDW RBC AUTO: 51.3 FL (ref 37–54)
ERYTHROCYTE [DISTWIDTH] IN BLOOD BY AUTOMATED COUNT: 13.9 % (ref 12.3–15.4)
GFR SERPL CREATININE-BSD FRML MDRD: 60 ML/MIN/1.73
GLUCOSE BLD-MCNC: 169 MG/DL (ref 65–99)
GLUCOSE BLDC GLUCOMTR-MCNC: 113 MG/DL (ref 70–130)
GLUCOSE BLDC GLUCOMTR-MCNC: 122 MG/DL (ref 70–130)
GLUCOSE BLDC GLUCOMTR-MCNC: 162 MG/DL (ref 70–130)
GLUCOSE BLDC GLUCOMTR-MCNC: 208 MG/DL (ref 70–130)
HCT VFR BLD AUTO: 31.2 % (ref 37.5–51)
HGB BLD-MCNC: 10.1 G/DL (ref 13–17.7)
MCH RBC QN AUTO: 32.9 PG (ref 26.6–33)
MCHC RBC AUTO-ENTMCNC: 32.4 G/DL (ref 31.5–35.7)
MCV RBC AUTO: 101.6 FL (ref 79–97)
PLATELET # BLD AUTO: 224 10*3/MM3 (ref 140–450)
PMV BLD AUTO: 10.8 FL (ref 6–12)
POTASSIUM BLD-SCNC: 4.5 MMOL/L (ref 3.5–5.2)
RBC # BLD AUTO: 3.07 10*6/MM3 (ref 4.14–5.8)
SODIUM BLD-SCNC: 137 MMOL/L (ref 136–145)
TROPONIN T SERPL-MCNC: <0.01 NG/ML (ref 0–0.03)
WBC NRBC COR # BLD: 10.15 10*3/MM3 (ref 3.4–10.8)

## 2019-08-06 PROCEDURE — 84484 ASSAY OF TROPONIN QUANT: CPT | Performed by: INTERNAL MEDICINE

## 2019-08-06 PROCEDURE — 99024 POSTOP FOLLOW-UP VISIT: CPT | Performed by: NURSE PRACTITIONER

## 2019-08-06 PROCEDURE — 93005 ELECTROCARDIOGRAM TRACING: CPT | Performed by: INTERNAL MEDICINE

## 2019-08-06 PROCEDURE — 25010000002 HYDROMORPHONE 1 MG/ML SOLUTION: Performed by: INTERNAL MEDICINE

## 2019-08-06 PROCEDURE — 97110 THERAPEUTIC EXERCISES: CPT

## 2019-08-06 PROCEDURE — 63710000001 INSULIN LISPRO (HUMAN) PER 5 UNITS: Performed by: INTERNAL MEDICINE

## 2019-08-06 PROCEDURE — 80048 BASIC METABOLIC PNL TOTAL CA: CPT | Performed by: INTERNAL MEDICINE

## 2019-08-06 PROCEDURE — 63710000001 INSULIN GLARGINE PER 5 UNITS: Performed by: INTERNAL MEDICINE

## 2019-08-06 PROCEDURE — 82962 GLUCOSE BLOOD TEST: CPT

## 2019-08-06 PROCEDURE — 72040 X-RAY EXAM NECK SPINE 2-3 VW: CPT

## 2019-08-06 PROCEDURE — 25010000003 CEFAZOLIN IN DEXTROSE 2-4 GM/100ML-% SOLUTION: Performed by: NEUROLOGICAL SURGERY

## 2019-08-06 PROCEDURE — 85027 COMPLETE CBC AUTOMATED: CPT | Performed by: INTERNAL MEDICINE

## 2019-08-06 RX ADMIN — INSULIN LISPRO 10 UNITS: 100 INJECTION, SOLUTION INTRAVENOUS; SUBCUTANEOUS at 17:12

## 2019-08-06 RX ADMIN — HYDROMORPHONE HYDROCHLORIDE 1 MG: 1 INJECTION, SOLUTION INTRAMUSCULAR; INTRAVENOUS; SUBCUTANEOUS at 09:05

## 2019-08-06 RX ADMIN — INSULIN LISPRO 4 UNITS: 100 INJECTION, SOLUTION INTRAVENOUS; SUBCUTANEOUS at 11:57

## 2019-08-06 RX ADMIN — SODIUM CHLORIDE, PRESERVATIVE FREE 10 ML: 5 INJECTION INTRAVENOUS at 15:31

## 2019-08-06 RX ADMIN — INSULIN LISPRO 2 UNITS: 100 INJECTION, SOLUTION INTRAVENOUS; SUBCUTANEOUS at 10:22

## 2019-08-06 RX ADMIN — HYDROMORPHONE HYDROCHLORIDE 1 MG: 1 INJECTION, SOLUTION INTRAMUSCULAR; INTRAVENOUS; SUBCUTANEOUS at 21:56

## 2019-08-06 RX ADMIN — CEFAZOLIN SODIUM 2 G: 2 INJECTION, SOLUTION INTRAVENOUS at 10:22

## 2019-08-06 RX ADMIN — INSULIN LISPRO 10 UNITS: 100 INJECTION, SOLUTION INTRAVENOUS; SUBCUTANEOUS at 11:57

## 2019-08-06 RX ADMIN — HYDROMORPHONE HYDROCHLORIDE 1 MG: 1 INJECTION, SOLUTION INTRAMUSCULAR; INTRAVENOUS; SUBCUTANEOUS at 15:31

## 2019-08-06 RX ADMIN — CEFAZOLIN SODIUM 2 G: 2 INJECTION, SOLUTION INTRAVENOUS at 00:20

## 2019-08-06 RX ADMIN — INSULIN GLARGINE 40 UNITS: 100 INJECTION, SOLUTION SUBCUTANEOUS at 20:22

## 2019-08-06 RX ADMIN — BACLOFEN 10 MG: 10 TABLET ORAL at 07:04

## 2019-08-06 RX ADMIN — HYDROCODONE BITARTRATE AND ACETAMINOPHEN 2 TABLET: 10; 325 TABLET ORAL at 04:28

## 2019-08-06 RX ADMIN — HYDROCODONE BITARTRATE AND ACETAMINOPHEN 2 TABLET: 10; 325 TABLET ORAL at 14:45

## 2019-08-06 RX ADMIN — PRAVASTATIN SODIUM 20 MG: 20 TABLET ORAL at 20:23

## 2019-08-06 RX ADMIN — CARVEDILOL 3.12 MG: 3.12 TABLET, FILM COATED ORAL at 17:11

## 2019-08-06 RX ADMIN — DOCUSATE SODIUM 100 MG: 100 CAPSULE, LIQUID FILLED ORAL at 10:20

## 2019-08-06 RX ADMIN — PANTOPRAZOLE SODIUM 40 MG: 40 TABLET, DELAYED RELEASE ORAL at 07:04

## 2019-08-06 RX ADMIN — HYDROCODONE BITARTRATE AND ACETAMINOPHEN 2 TABLET: 10; 325 TABLET ORAL at 09:50

## 2019-08-06 RX ADMIN — ALLOPURINOL 300 MG: 300 TABLET ORAL at 17:11

## 2019-08-06 RX ADMIN — LEVOTHYROXINE SODIUM 25 MCG: 25 TABLET ORAL at 07:04

## 2019-08-06 RX ADMIN — SODIUM CHLORIDE, PRESERVATIVE FREE 3 ML: 5 INJECTION INTRAVENOUS at 10:23

## 2019-08-06 RX ADMIN — SODIUM CHLORIDE, PRESERVATIVE FREE 10 ML: 5 INJECTION INTRAVENOUS at 17:11

## 2019-08-06 RX ADMIN — HYDROMORPHONE HYDROCHLORIDE 1 MG: 1 INJECTION, SOLUTION INTRAMUSCULAR; INTRAVENOUS; SUBCUTANEOUS at 04:28

## 2019-08-06 RX ADMIN — ISOSORBIDE MONONITRATE 60 MG: 60 TABLET ORAL at 17:11

## 2019-08-06 RX ADMIN — ASPIRIN 81 MG: 81 TABLET, COATED ORAL at 10:21

## 2019-08-06 RX ADMIN — LOSARTAN POTASSIUM 100 MG: 100 TABLET, FILM COATED ORAL at 10:20

## 2019-08-06 RX ADMIN — CARVEDILOL 3.12 MG: 3.12 TABLET, FILM COATED ORAL at 10:21

## 2019-08-06 RX ADMIN — CEFAZOLIN SODIUM 2 G: 2 INJECTION, SOLUTION INTRAVENOUS at 17:11

## 2019-08-06 RX ADMIN — BACLOFEN 10 MG: 10 TABLET ORAL at 14:45

## 2019-08-06 RX ADMIN — SODIUM CHLORIDE, PRESERVATIVE FREE 3 ML: 5 INJECTION INTRAVENOUS at 10:22

## 2019-08-06 RX ADMIN — AMLODIPINE BESYLATE 10 MG: 10 TABLET ORAL at 17:11

## 2019-08-06 RX ADMIN — BACLOFEN 10 MG: 10 TABLET ORAL at 20:25

## 2019-08-06 RX ADMIN — INSULIN LISPRO 10 UNITS: 100 INJECTION, SOLUTION INTRAVENOUS; SUBCUTANEOUS at 10:21

## 2019-08-06 RX ADMIN — HYDROCODONE BITARTRATE AND ACETAMINOPHEN 2 TABLET: 10; 325 TABLET ORAL at 19:04

## 2019-08-06 RX ADMIN — Medication 10 MG: at 20:25

## 2019-08-06 RX ADMIN — BACLOFEN 10 MG: 10 TABLET ORAL at 00:20

## 2019-08-06 RX ADMIN — HYDROCODONE BITARTRATE AND ACETAMINOPHEN 2 TABLET: 10; 325 TABLET ORAL at 00:20

## 2019-08-06 RX ADMIN — SODIUM CHLORIDE, PRESERVATIVE FREE 3 ML: 5 INJECTION INTRAVENOUS at 20:29

## 2019-08-06 RX ADMIN — INSULIN GLARGINE 20 UNITS: 100 INJECTION, SOLUTION SUBCUTANEOUS at 10:21

## 2019-08-06 NOTE — THERAPY TREATMENT NOTE
Acute Care - Physical Therapy Treatment Note  [facility]     Patient Name: Chevy Goodwin  : 1940  MRN: [id number]  Today's Date: 2019     Date of Referral to PT: 19  Referring Physician: Delia    Admit Date: 2019    Visit Dx:    ICD-10-CM ICD-9-CM   1. Bradycardia R00.1 427.89   2. Weakness R53.1 780.79   3. Frequent falls R29.6 V15.88   4. Oropharyngeal dysphagia R13.12 787.22   5. 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 Impaired functional mobility, balance, gait, and endurance Z74.09 V49.89     Patient Active Problem List   Diagnosis   • Pain of metastatic malignancy   • Chronic pain   • Adhesive arachnoiditis   • Degeneration of intervertebral disc of lumbar region   • Low back pain   • Non-Hodgkin's lymphoma (CMS/HCC)   • Postlaminectomy syndrome of lumbar region   • Thoracic disc herniation T9-T10   • Diabetes (CMS/HCC)   • Hypertension   • Long term (current) use of opiate analgesic   • Pain in lateral portion of right knee   • JULIA treated with BiPAP    • Hypersomnia due to medical condition   • Chronic right shoulder pain   • Status post reverse total arthroplasty of right shoulder   • Postlaminectomy syndrome, cervical region   • Waldenstrom macroglobulinemia (CMS/HCC)   • Weakness generalized   • CKD (chronic kidney disease) stage 3, GFR 30-59 ml/min (CMS/HCC)   • Anemia   • Bradycardia   • Fall   • Neck pain   • DDD (degenerative disc disease), cervical   • Spinal stenosis of cervical region   • 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 (CMS/HCC)       Therapy Treatment    Rehabilitation Treatment Summary     Row Name 19 1646             Treatment Time/Intention    Discipline  physical therapy assistant  -      Document Type  therapy note (daily note)  -      Subjective Information  complains of;pain;fatigue;dizziness  -      Mode of Treatment  physical  therapy  -      Patient/Family Observations  pt supine in bed with HOB elevated, brother present in room   -      Care Plan Review  patient/other agree to care plan  -      Therapy Frequency (PT Clinical Impression)  daily  -      Patient Effort  good  -      Existing Precautions/Restrictions  fall  -      Recorded by [] Kinza Oquendo, [facility] 08/06/19 1648      Row Name 08/06/19 1646             Cognitive Assessment/Intervention- PT/OT    Orientation Status (Cognition)  oriented x 4  -      Follows Commands (Cognition)  WFL  -      Safety Deficit (Cognitive)  mild deficit  -      Recorded by [] Kinza Oquendo, [facility] [place] 08/06/19 1648      Row Name 08/06/19 1646             Bed Mobility Assessment/Treatment    Supine-Sit [place] (Bed Mobility)  minimum assist (75% patient effort)  -      Sit-Supine [place] (Bed Mobility)  minimum assist (75% patient effort)  -      Bed Mobility, Safety Issues  decreased use of arms for pushing/pulling;decreased use of legs for bridging/pushing  -      Assistive Device (Bed Mobility)  bed rails;head of bed elevated  -      Recorded by [] Kinza Oquendo, [facility] [place] 08/06/19 1648      Row Name 08/06/19 1646             Sit-Stand Transfer    Sit-Stand Gladwin (Transfers)  contact guard  -      Assistive Device (Sit-Stand Transfers)  walker, front-wheeled  -      Recorded by [] Kinza Oquendo [facility] [place] [facility] 08/06/19 1648      Row Name 08/06/19 1646             Stand-Sit Transfer    Stand-Sit [place] (Transfers)  contact guard  -      Assistive Device (Stand-Sit Transfers)  walker, front-wheeled  -      Recorded by [] Kinza Oquendo [facility] [place] 08/06/19 1648      Row Name 08/06/19 1646             Toilet Transfer    Type (Toilet Transfer)  sit-stand;stand-sit  -      Gladwin Level (Toilet Transfer)  contact guard  -      Assistive Device (Toilet Transfer)  grab bars/safety frame;walker, front-wheeled  -      Recorded by [] Kinza Oquendo, [facility] [place] 08/06/19  1648      Row Name 08/06/19 1646             Gait/Stairs Assessment/Training    [place] Level (Gait)  minimum assist (75% patient effort);verbal cues  -      Assistive Device (Gait)  walker, front-wheeled  -EH      Distance in Feet (Gait)  25  -EH      Pattern (Gait)  step-through  -EH      Deviations/Abnormal Patterns (Gait)  stephanie decreased;stride length decreased;gait speed decreased  -EH      Bilateral Gait Deviations  weight shift ability decreased  -EH      Comment (Gait/Stairs)  pt ambulation distance limited secondary to dizziness  -      Recorded by [] Kinza Oquendo PTA 08/06/19 1648      Row Name 08/06/19 1646             Positioning and Restraints    Pre-Treatment Position  in bed  -EH      Post Treatment Position  bed  -EH      In Bed  supine;call light within reach;encouraged to call for assist;exit alarm on;with family/caregiver  -EH      Recorded by [] Kinza Oquendo PTA 08/06/19 1648      Row Name 08/06/19 1646             Pain Scale: FACES Pre/Post-Treatment    Pain: FACES Scale, Pretreatment  4-->hurts little more  -EH      Recorded by [] Kinza Oquendo PTA 08/06/19 1648      Row Name                Wound 08/05/19 1110 Right neck incision    Wound - Properties Group Date first assessed: 08/05/19 [TL] Time first assessed: 1110 [TL] Side: Right [TL] Location: neck [TL] Type: incision [TL] Recorded by:  [TL] Carmen Her RN 08/05/19 1110      User Key  (r) = Recorded By, (t) = Taken By, (c) = Cosigned By    Initials Name Effective Dates Discipline    TL Carmen Her RN 06/16/16 -  Nurse     Kinza Oquendo PTA 08/19/18 -  PT          Wound 08/05/19 1110 Right neck incision (Active)   Dressing Appearance dry;intact;no drainage 8/6/2019 12:05 PM   Closure ZEINAB 8/6/2019 12:05 PM   Base dressing in place, unable to visualize 8/6/2019 12:05 PM   Drainage Amount none 8/6/2019 12:05 PM   Care, Wound other (see comments) 8/6/2019 12:05 PM   Dressing Care, Wound  border dressing 8/6/2019 12:05 PM           Physical Therapy Education     Title: PT OT SLP Therapies (Done)     Topic: Physical Therapy (Done)     Point: Mobility training (Done)     Learning Progress Summary           Patient Acceptance, E,D, VU,NR by  at 8/6/2019  4:46 PM    Acceptance, E, VU,DU by DO at 8/4/2019 11:12 AM                   Point: Home exercise program (Done)     Learning Progress Summary           Patient Acceptance, E,D, VU,NR by  at 8/6/2019  4:46 PM    Acceptance, E, VU,DU by DO at 8/4/2019 11:12 AM                   Point: Body mechanics (Done)     Learning Progress Summary           Patient Acceptance, E,D, VU,NR by  at 8/6/2019  4:46 PM    Acceptance, E, VU,DU by DO at 8/4/2019 11:12 AM                   Point: Precautions (Done)     Learning Progress Summary           Patient Acceptance, E,D, VU,NR by  at 8/6/2019  4:46 PM    Acceptance, E, VU,DU by DO at 8/4/2019 11:12 AM                               User Key     Initials Effective Dates Name Provider Type Discipline    DO 03/07/18 -  Bryson Atkinson, PT Physical Therapist PT     08/19/18 -  Kinza Oquendo, JACQUE Physical Therapy Assistant PT                PT Recommendation and Plan  Therapy Frequency (PT Clinical Impression): daily     Outcome Measures     Row Name 08/06/19 1600 08/04/19 1100          How much help from another person do you currently need...    Turning from your back to your side while in flat bed without using bedrails?  3  -EH  3  -DO     Moving from lying on back to sitting on the side of a flat bed without bedrails?  3  -EH  3  -DO     Moving to and from a bed to a chair (including a wheelchair)?  3  -EH  3  -DO     Standing up from a chair using your arms (e.g., wheelchair, bedside chair)?  3  -EH  3  -DO     Climbing 3-5 steps with a railing?  2  -EH  2  -DO     To walk in hospital room?  3  -EH  3  -DO     AM-PAC 6 Clicks Score (PT)  17  -EH  17  -DO        Functional Assessment    Outcome Measure  Options  --  AM-PAC 6 Clicks Basic Mobility (PT)  -DO       User Key  (r) = Recorded By, (t) = Taken By, (c) = Cosigned By    Initials Name Provider Type    DO Bryson Atkinson, PT Physical Therapist     Kinza Oquendo PTA Physical Therapy Assistant         Time Calculation:   PT Charges     Row Name 08/06/19 1645             Time Calculation    Start Time  1549  -      Stop Time  1606  -      Time Calculation (min)  17 min  -      PT Received On  08/06/19  -      PT - Next Appointment  08/07/19  -         Time Calculation- PT    Total Timed Code Minutes- PT  17 minute(s)  -        User Key  (r) = Recorded By, (t) = Taken By, (c) = Cosigned By    Initials Name Provider Type    Kinza Shaffer PTA Physical Therapy Assistant        Therapy Charges for Today     Code Description Service Date Service Provider Modifiers Qty    [id number] HC PT THER PROC EA 15 MIN 8/6/2019 Kinza Oquendo PTA GP 1          PT G-Codes  Outcome Measure Options: AM-PAC 6 Clicks Basic Mobility (PT)  AM-PAC 6 Clicks Score (PT): 17    Kinza Oquendo PTA  8/6/2019

## 2019-08-06 NOTE — PLAN OF CARE
Problem: Patient Care Overview  Goal: Plan of Care Review  Outcome: Ongoing (interventions implemented as appropriate)   08/06/19 5210   Coping/Psychosocial   Plan of Care Reviewed With patient   Plan of Care Review   Progress improving   OTHER   Outcome Summary pt tolerated treatment with c/o neck pain. Pt is progressing well with PT. pt is Deborah for bed mobility and CGA for sit/stand transfers. Pt ambulated 25 feet with rwx, CGA. Pt's ambulation distance limited secondary to increased dizziness. Will continue to progress pt as able.

## 2019-08-06 NOTE — PROGRESS NOTES
Chevy Goodwin   79 y.o.  male    LOS: 4 days   Patient Care Team:  Anthony Gutierrez MD as PCP - General (Internal Medicine)  Anthony Gutierrez MD as PCP - Internal Medicine  Charisse Winters APRN as PCP - Claims Attributed  Bubba Hernandez MD as Consulting Physician (Cardiology)  Girma Olsen MD as Consulting Physician (Urology)  Nickolas Olsen MD as Consulting Physician (Dermatology)  Kirk Gross MD as Consulting Physician (Orthopedic Surgery)      Subjective   No chest pain or dyspnea  Troponin less than 0.010  EKG shows no acute changes  Interval History:     Patient Complaints:     Review of Systems:       Medication Review:   Current Facility-Administered Medications:   •  acetaminophen (TYLENOL) tablet 650 mg, 650 mg, Oral, Q4H PRN, Corinne Lin APRN, 650 mg at 08/02/19 1726  •  allopurinol (ZYLOPRIM) tablet 300 mg, 300 mg, Oral, Q PM, Kendra Parker MD, 300 mg at 08/05/19 2002  •  amLODIPine (NORVASC) tablet 10 mg, 10 mg, Oral, Q PM, Kendra Parker MD, 10 mg at 08/05/19 2002  •  aspirin EC tablet 81 mg, 81 mg, Oral, Daily, Kendra Parker MD, 81 mg at 08/06/19 1021  •  azelastine (ASTELIN) nasal spray 2 spray, 2 spray, Each Nare, BID PRN, Kendra Parker MD  •  baclofen (LIORESAL) tablet 10 mg, 10 mg, Oral, Q8H, Kendra Parker MD, 10 mg at 08/06/19 0704  •  carvedilol (COREG) tablet 3.125 mg, 3.125 mg, Oral, BID With Meals, Kathie Rivera APRN, 3.125 mg at 08/06/19 1021  •  ceFAZolin in dextrose (ANCEF) IVPB solution 2 g, 2 g, Intravenous, Q8H, John Singleton MD, 2 g at 08/06/19 1022  •  dextrose (D50W) 25 g/ 50mL Intravenous Solution 25 g, 25 g, Intravenous, Q15 Min PRN, Kendra Parker MD  •  dextrose (GLUTOSE) oral gel 15 g, 15 g, Oral, Q15 Min PRN, Kendra Parker MD  •  docusate sodium (COLACE) capsule 100 mg, 100 mg, Oral, Daily, Kendra Parker MD, 100 mg at 08/06/19 1020  •  glucagon (human recombinant) (GLUCAGEN DIAGNOSTIC) injection 1 mg, 1 mg,  Subcutaneous, PRN, Kendra Parker MD  •  HYDROcodone-acetaminophen (NORCO)  MG per tablet 1 tablet, 1 tablet, Oral, Q4H PRN, 1 tablet at 08/03/19 0829 **OR** HYDROcodone-acetaminophen (NORCO)  MG per tablet 2 tablet, 2 tablet, Oral, Q4H PRN, Kendra Parker MD, 2 tablet at 08/06/19 0950  •  HYDROmorphone (DILAUDID) injection 0.5 mg, 0.5 mg, Intravenous, Q4H PRN, 0.5 mg at 08/05/19 0613 **OR** HYDROmorphone (DILAUDID) injection 1 mg, 1 mg, Intravenous, Q4H PRN, Kendra Parker MD, 1 mg at 08/06/19 0905  •  insulin glargine (LANTUS) injection 20 Units, 20 Units, Subcutaneous, QAM, Kendra Parker MD, 20 Units at 08/06/19 1021  •  insulin glargine (LANTUS) injection 40 Units, 40 Units, Subcutaneous, Nightly, Kendra Parker MD, 40 Units at 08/05/19 2233  •  insulin lispro (humaLOG) injection 0-9 Units, 0-9 Units, Subcutaneous, 4x Daily With Meals & Nightly, Kendra Parker MD, 4 Units at 08/06/19 1157  •  insulin lispro (humaLOG) injection 10 Units, 10 Units, Subcutaneous, TID With Meals, Kendra Parker MD, 10 Units at 08/06/19 1157  •  isosorbide mononitrate (IMDUR) 24 hr tablet 60 mg, 60 mg, Oral, Q PM, Lexy Alvarez MD, 60 mg at 08/05/19 2003  •  levothyroxine (SYNTHROID, LEVOTHROID) tablet 25 mcg, 25 mcg, Oral, Q AM, Kendra Parker MD, 25 mcg at 08/06/19 0704  •  losartan (COZAAR) tablet 100 mg, 100 mg, Oral, Daily, Kendra Parker MD, 100 mg at 08/06/19 1020  •  melatonin tablet 10 mg, 10 mg, Oral, Nightly, Kendra Parker MD, 10 mg at 08/04/19 2114  •  nitroglycerin (NITROSTAT) SL tablet 0.4 mg, 0.4 mg, Sublingual, Q5 Min PRN, Kendra Parker MD  •  ondansetron (ZOFRAN) tablet 4 mg, 4 mg, Oral, Q6H PRN **OR** ondansetron (ZOFRAN) injection 4 mg, 4 mg, Intravenous, Q6H PRN, John Singleton MD  •  pantoprazole (PROTONIX) EC tablet 40 mg, 40 mg, Oral, Q AM, Kendra Parker MD, 40 mg at 08/06/19 0704  •  pravastatin (PRAVACHOL) tablet 20 mg, 20 mg, Oral, Nightly, Kendra Parker MD,  20 mg at 08/04/19 2114  •  promethazine (PHENERGAN) tablet 12.5 mg, 12.5 mg, Oral, Q6H PRN, John Singleton MD  •  [COMPLETED] Insert peripheral IV, , , Once **AND** sodium chloride 0.9 % flush 10 mL, 10 mL, Intravenous, PRN, Jean-Pierre Hill PA  •  sodium chloride 0.9 % flush 3 mL, 3 mL, Intravenous, Q12H, Corinne Lin APRN, 3 mL at 08/06/19 1022  •  sodium chloride 0.9 % flush 3 mL, 3 mL, Intravenous, Q12H, John Singleton MD, 3 mL at 08/06/19 1023  •  sodium chloride 0.9 % flush 3-10 mL, 3-10 mL, Intravenous, PRN, Corinne Lin, APRN  •  sodium chloride 0.9 % flush 3-10 mL, 3-10 mL, Intravenous, PRN, John Singleton MD      Objective   Vital Sign Min/Max for last 24 hours  Temp  Min: 97.7 °F (36.5 °C)  Max: 98.3 °F (36.8 °C)   BP  Min: 150/73  Max: 176/73    Pulse  Min: 58  Max: 82     Wt Readings from Last 3 Encounters:   08/05/19 91.6 kg (201 lb 14.4 oz)   06/26/19 94.1 kg (207 lb 6 oz)   05/29/19 95.1 kg (209 lb 9.6 oz)        Intake/Output Summary (Last 24 hours) at 8/6/2019 1205  Last data filed at 8/6/2019 0158  Gross per 24 hour   Intake 200 ml   Output 510 ml   Net -310 ml     Physical Exam:      General Appearance:    Well developed and well nourished in no acute distress   Head:    Normocephalic, atraumatic   Eyes:            Conjunctivae normal, non icteric, no xanthelasma   Neck:   supple, trachea midline, no thyromegaly, no carotid bruit, no jvd, no elevated cvp   Lungs:     Clear to auscultation,respirations regular, even and                  unlabored    Heart:    Regular rhythm and normal rate, normal S1 and S2,            No murmur, no gallop, no rub, no click   Chest Wall:    No abnormalities observed   Abdomen:     Normal bowel sounds, no masses, no organomegaly, soft        non-tender, non-distended, no guarding, no rebound                tenderness   Rectal:     Deferred   Extremities:   No edema. Moves all extremities well, no cyanosis, no erythema   Pulses:   Pulses palpable and  equal bilaterally   Skin:   No bleeding, bruising or rash   Neurologic:   awake alert and oriented x3, speech clear and approp, no facial drooping     :    Monitor:      Results Review:         Sodium Sodium   Date Value Ref Range Status   08/06/2019 137 136 - 145 mmol/L Final   08/04/2019 138 136 - 145 mmol/L Final   08/03/2019 142 136 - 145 mmol/L Final      Potassium Potassium   Date Value Ref Range Status   08/06/2019 4.5 3.5 - 5.2 mmol/L Final   08/04/2019 5.1 3.5 - 5.2 mmol/L Final   08/03/2019 5.1 3.5 - 5.2 mmol/L Final      Chloride Chloride   Date Value Ref Range Status   08/06/2019 99 98 - 107 mmol/L Final   08/04/2019 102 98 - 107 mmol/L Final   08/03/2019 105 98 - 107 mmol/L Final      Bicarbonate No results found for: PLASMABICARB   BUN BUN   Date Value Ref Range Status   08/06/2019 34 (H) 8 - 23 mg/dL Final   08/04/2019 41 (H) 8 - 23 mg/dL Final   08/03/2019 36 (H) 8 - 23 mg/dL Final      Creatinine Creatinine   Date Value Ref Range Status   08/06/2019 1.17 0.76 - 1.27 mg/dL Final   08/04/2019 1.38 (H) 0.76 - 1.27 mg/dL Final   08/03/2019 1.48 (H) 0.76 - 1.27 mg/dL Final      Calcium Calcium   Date Value Ref Range Status   08/06/2019 9.6 8.6 - 10.5 mg/dL Final   08/04/2019 10.1 8.6 - 10.5 mg/dL Final   08/03/2019 9.9 8.6 - 10.5 mg/dL Final      Magnesium No results found for: MG     Results from last 7 days   Lab Units 08/06/19  0524   WBC 10*3/mm3 10.15   HEMOGLOBIN g/dL 10.1*   HEMATOCRIT % 31.2*   PLATELETS 10*3/mm3 224     Lab Results   Lab Value Date/Time    TROPONINT <0.010 08/06/2019 0524    TROPONINT <0.010 08/02/2019 1122     No results found for: CHOL  No results found for: HDL  No results found for: LDL  No results found for: TRIG  No components found for: CHOLHDL  No results found for: PTT  No components found for: PT/INR  Lab Results   Component Value Date    HGBA1C 6.39 (H) 08/02/2019      Lab Results   Component Value Date    TSH 1.190 08/03/2019        Echo EF Estimated  No results  found for: ECHOEFEST      Assessment/ Plan    Active Hospital Problems    Diagnosis POA   • **Spinal stenosis of cervical region [M48.02] Unknown   • Waldenstrom macroglobulinemia (CMS/HCC) [C88.0] Unknown     2014     • Weakness generalized [R53.1] Yes   • CKD (chronic kidney disease) stage 3, GFR 30-59 ml/min (CMS/HCC) [N18.3] Yes   • Anemia [D64.9] Yes   • Bradycardia [R00.1] Yes   • Fall [W19.XXXA] Yes   • Neck pain [M54.2] Yes   • DDD (degenerative disc disease), cervical [M50.30] Unknown   • 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          Wendy Anthony MD  08/06/19  12:05 PM

## 2019-08-06 NOTE — PROGRESS NOTES
"     LOS: 4 days   Primary Care Physician: Anthony Gutierrez MD     Subjective   I saw the patient this morning.  He was sitting up in the chair.  He had had plain films of the C-spine for neck pain.    Vital Signs  Body mass index is 30.7 kg/m².  Temp:  [97.7 °F (36.5 °C)-98.3 °F (36.8 °C)] 97.7 °F (36.5 °C)  Heart Rate:  [60-82] 63  Resp:  [16-20] 18  BP: (137-171)/(64-77) 137/64      Objective:  General Appearance:  In no acute distress.    Vital signs: (most recent): Blood pressure 137/64, pulse 63, temperature 97.7 °F (36.5 °C), temperature source Oral, resp. rate 18, height 172.7 cm (68\"), weight 91.6 kg (201 lb 14.4 oz), SpO2 90 %.    Lungs:  He is not in respiratory distress.  No stridor.  There are decreased breath sounds.  No rales, wheezes or rhonchi.    Heart: Normal rate.  Regular rhythm.  Positive for murmur.    Abdomen: Abdomen is soft and non-distended.  Bowel sounds are normal.   There is no abdominal tenderness.   There is no splenomegaly. There is no hepatomegaly.   Extremities: There is dependent edema.  (Trace)  Neurological: Patient is alert.    Skin:  Warm.          Results Review:    I reviewed the patient's new clinical results.    Results from last 7 days   Lab Units 08/06/19  0524 08/03/19  0623   WBC 10*3/mm3 10.15 5.93   HEMOGLOBIN g/dL 10.1* 10.4*   PLATELETS 10*3/mm3 224 204     Results from last 7 days   Lab Units 08/06/19  0524 08/04/19  0828   SODIUM mmol/L 137 138   POTASSIUM mmol/L 4.5 5.1   CHLORIDE mmol/L 99 102   CO2 mmol/L 24.4 22.4   BUN mg/dL 34* 41*   CREATININE mg/dL 1.17 1.38*   CALCIUM mg/dL 9.6 10.1   GLUCOSE mg/dL 169* 266*         Hemoglobin A1C:  Lab Results   Component Value Date    HGBA1C 6.39 (H) 08/02/2019       Glucose Range:  Glucose   Date/Time Value Ref Range Status   08/06/2019 1120 208 (H) 70 - 130 mg/dL Final   08/06/2019 0727 162 (H) 70 - 130 mg/dL Final   08/05/2019 2110 224 (H) 70 - 130 mg/dL Final   08/05/2019 1722 178 (H) 70 - 130 mg/dL Final "   08/05/2019 1141 158 (H) 70 - 130 mg/dL Final   08/05/2019 0607 223 (H) 70 - 130 mg/dL Final       Lab Results   Component Value Date    [id number] 1,662 (H) 08/03/2019       Lab Results   Component Value Date    TSH 1.190 08/03/2019       Assessment & Plan      Medication Review: Yes    Active Hospital Problems    Diagnosis  POA   • **Spinal stenosis of cervical region [M48.02]  Unknown   • Waldenstrom macroglobulinemia (CMS/HCC) [C88.0]  Unknown   • Weakness generalized [R53.1]  Yes   • CKD (chronic kidney disease) stage 3, GFR 30-59 ml/min (CMS/HCC) [N18.3]  Yes   • Anemia [D64.9]  Yes   • Bradycardia [R00.1]  Yes   • Fall [W19.XXXA]  Yes   • Neck pain [M54.2]  Yes   • DDD (degenerative disc disease), cervical [M50.30]  Unknown   • 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       Assessment/Plan  1.  Cervical stenosis, status post anterior cervical discectomy 8/5/2019.  Continue PT and OT.  Possible rehab  2.  Hypertension.  Numbers noted.  Expect them to drop postoperatively.  No changes.  Continue to follow.  Parameters written for which to hold Cozaar  3.  Hyperkalemia, improved.  On low potassium diet  4.  Chronic kidney disease stage III.  Baseline creatinine 1.4-1.6.  Lower than usual.  IV fluids were stopped yesterday  5.  Diabetes mellitus type 2.  Continue Lantus, mealtime insulin and sliding scale.  Adjust further tomorrow as needed    Kendra Parker MD  08/06/19  2:46 PM

## 2019-08-06 NOTE — PROGRESS NOTES
"Postoperative visit      Up in chair eating breakfast.  Swallowing okay.  Having expected neck pain.        Blood pressure 165/72, pulse 65, temperature 98.3 °F (36.8 °C), temperature source Oral, resp. rate 19, height 172.7 cm (68\"), weight 91.6 kg (201 lb 14.4 oz), SpO2 94 %.      Voice okay.  Anterior cervical incision approximated. No drainage.  ELIZA drain intact - 75 cc output last night; 25 cc currently in bulb - bloody drainage  No calf swelling or tenderness to palpation.      .  Results from last 7 days   Lab Units 08/06/19  0524   WBC 10*3/mm3 10.15   HEMOGLOBIN g/dL 10.1*   HEMATOCRIT % 31.2*   PLATELETS 10*3/mm3 224           POD 1 C3-C5 ACDF for cervical stenosis, cervical myelopathy C3-4, C4-5  CKD, HTN - internal medicine following      Keep drain  Mobilize  CBC in am  Continue PT  May need to consider subacute rehab; CCP following            "

## 2019-08-06 NOTE — PLAN OF CARE
Problem: Patient Care Overview  Goal: Plan of Care Review  Outcome: Ongoing (interventions implemented as appropriate)   08/06/19 4525   Coping/Psychosocial   Plan of Care Reviewed With patient   Plan of Care Review   Progress improving   OTHER   Outcome Summary VSS, pt has chronic pain & incisional pain managed by Norco x 2 doses & Dilaudid x 2 doses, xr cervical spine completed, worked c/ PT ambulated 25ft, ELIZA drain 25cc output today, possible dc 8/7, CTM       Problem: Pain, Chronic (Adult)  Goal: Acceptable Pain/Comfort Level and Functional Ability  Outcome: Ongoing (interventions implemented as appropriate)      Problem: Fall Risk (Adult)  Goal: Absence of Fall  Outcome: Ongoing (interventions implemented as appropriate)      Problem: Laminectomy/Foraminotomy/Discectomy (Adult)  Goal: Signs and Symptoms of Listed Potential Problems Will be Absent, Minimized or Managed (Laminectomy/Foraminotomy/Discectomy)  Outcome: Ongoing (interventions implemented as appropriate)

## 2019-08-06 NOTE — PLAN OF CARE
Problem: Patient Care Overview  Goal: Plan of Care Review  Outcome: Ongoing (interventions implemented as appropriate)   08/06/19 0559   Coping/Psychosocial   Plan of Care Reviewed With patient   Plan of Care Review   Progress no change   OTHER   Outcome Summary POD 1. Pain well controlled w/ PRN medications. Dressing remains C/D/I. ELIZA drain w/ serosanguinous output. Voiding without any difficulties. Denies numbness or tingling.        Problem: Laminectomy/Foraminotomy/Discectomy (Adult)  Goal: Signs and Symptoms of Listed Potential Problems Will be Absent, Minimized or Managed (Laminectomy/Foraminotomy/Discectomy)  Outcome: Ongoing (interventions implemented as appropriate)

## 2019-08-07 LAB
DEPRECATED RDW RBC AUTO: 50.8 FL (ref 37–54)
ERYTHROCYTE [DISTWIDTH] IN BLOOD BY AUTOMATED COUNT: 13.9 % (ref 12.3–15.4)
GLUCOSE BLDC GLUCOMTR-MCNC: 109 MG/DL (ref 70–130)
GLUCOSE BLDC GLUCOMTR-MCNC: 112 MG/DL (ref 70–130)
GLUCOSE BLDC GLUCOMTR-MCNC: 154 MG/DL (ref 70–130)
GLUCOSE BLDC GLUCOMTR-MCNC: 318 MG/DL (ref 70–130)
HCT VFR BLD AUTO: 30.4 % (ref 37.5–51)
HGB BLD-MCNC: 9.9 G/DL (ref 13–17.7)
MCH RBC QN AUTO: 33.2 PG (ref 26.6–33)
MCHC RBC AUTO-ENTMCNC: 32.6 G/DL (ref 31.5–35.7)
MCV RBC AUTO: 102 FL (ref 79–97)
PLATELET # BLD AUTO: 206 10*3/MM3 (ref 140–450)
PMV BLD AUTO: 10.7 FL (ref 6–12)
RBC # BLD AUTO: 2.98 10*6/MM3 (ref 4.14–5.8)
WBC NRBC COR # BLD: 8.14 10*3/MM3 (ref 3.4–10.8)

## 2019-08-07 PROCEDURE — 25010000003 CEFAZOLIN IN DEXTROSE 2-4 GM/100ML-% SOLUTION: Performed by: NEUROLOGICAL SURGERY

## 2019-08-07 PROCEDURE — 25010000003 HYDROCORTISONE SOD SUCCINATE PF 250 MG RECONSTITUTED SOLUTION: Performed by: PHYSICIAN ASSISTANT

## 2019-08-07 PROCEDURE — 25010000002 HYDROMORPHONE 1 MG/ML SOLUTION: Performed by: INTERNAL MEDICINE

## 2019-08-07 PROCEDURE — 63710000001 INSULIN GLARGINE PER 5 UNITS: Performed by: INTERNAL MEDICINE

## 2019-08-07 PROCEDURE — 82962 GLUCOSE BLOOD TEST: CPT

## 2019-08-07 PROCEDURE — 97535 SELF CARE MNGMENT TRAINING: CPT

## 2019-08-07 PROCEDURE — 99024 POSTOP FOLLOW-UP VISIT: CPT | Performed by: PHYSICIAN ASSISTANT

## 2019-08-07 PROCEDURE — 97110 THERAPEUTIC EXERCISES: CPT

## 2019-08-07 PROCEDURE — 25010000002 ONDANSETRON PER 1 MG: Performed by: NEUROLOGICAL SURGERY

## 2019-08-07 PROCEDURE — 85027 COMPLETE CBC AUTOMATED: CPT | Performed by: NURSE PRACTITIONER

## 2019-08-07 PROCEDURE — 97165 OT EVAL LOW COMPLEX 30 MIN: CPT

## 2019-08-07 PROCEDURE — 63710000001 INSULIN LISPRO (HUMAN) PER 5 UNITS: Performed by: INTERNAL MEDICINE

## 2019-08-07 RX ORDER — FAMOTIDINE 10 MG/ML
20 INJECTION, SOLUTION INTRAVENOUS EVERY 12 HOURS SCHEDULED
Status: DISCONTINUED | OUTPATIENT
Start: 2019-08-07 | End: 2019-08-09 | Stop reason: HOSPADM

## 2019-08-07 RX ADMIN — FAMOTIDINE 20 MG: 10 INJECTION INTRAVENOUS at 21:31

## 2019-08-07 RX ADMIN — CEFAZOLIN SODIUM 2 G: 2 INJECTION, SOLUTION INTRAVENOUS at 09:47

## 2019-08-07 RX ADMIN — HYDROMORPHONE HYDROCHLORIDE 1 MG: 1 INJECTION, SOLUTION INTRAMUSCULAR; INTRAVENOUS; SUBCUTANEOUS at 21:37

## 2019-08-07 RX ADMIN — ISOSORBIDE MONONITRATE 60 MG: 60 TABLET ORAL at 20:26

## 2019-08-07 RX ADMIN — HYDROCODONE BITARTRATE AND ACETAMINOPHEN 2 TABLET: 10; 325 TABLET ORAL at 20:25

## 2019-08-07 RX ADMIN — HYDROCORTISONE SODIUM SUCCINATE 250 MG: 250 INJECTION, POWDER, FOR SOLUTION INTRAMUSCULAR; INTRAVENOUS at 21:27

## 2019-08-07 RX ADMIN — HYDROMORPHONE HYDROCHLORIDE 1 MG: 1 INJECTION, SOLUTION INTRAMUSCULAR; INTRAVENOUS; SUBCUTANEOUS at 06:32

## 2019-08-07 RX ADMIN — BACLOFEN 10 MG: 10 TABLET ORAL at 06:12

## 2019-08-07 RX ADMIN — ASPIRIN 81 MG: 81 TABLET, COATED ORAL at 09:47

## 2019-08-07 RX ADMIN — BACLOFEN 10 MG: 10 TABLET ORAL at 20:26

## 2019-08-07 RX ADMIN — HYDROCODONE BITARTRATE AND ACETAMINOPHEN 2 TABLET: 10; 325 TABLET ORAL at 09:49

## 2019-08-07 RX ADMIN — ONDANSETRON 4 MG: 2 INJECTION INTRAMUSCULAR; INTRAVENOUS at 17:47

## 2019-08-07 RX ADMIN — AMLODIPINE BESYLATE 10 MG: 10 TABLET ORAL at 20:26

## 2019-08-07 RX ADMIN — BACLOFEN 10 MG: 10 TABLET ORAL at 16:02

## 2019-08-07 RX ADMIN — CEFAZOLIN SODIUM 2 G: 2 INJECTION, SOLUTION INTRAVENOUS at 00:13

## 2019-08-07 RX ADMIN — INSULIN LISPRO 7 UNITS: 100 INJECTION, SOLUTION INTRAVENOUS; SUBCUTANEOUS at 21:26

## 2019-08-07 RX ADMIN — HYDROCODONE BITARTRATE AND ACETAMINOPHEN 2 TABLET: 10; 325 TABLET ORAL at 06:12

## 2019-08-07 RX ADMIN — INSULIN GLARGINE 20 UNITS: 100 INJECTION, SOLUTION SUBCUTANEOUS at 09:35

## 2019-08-07 RX ADMIN — CARVEDILOL 3.12 MG: 3.12 TABLET, FILM COATED ORAL at 09:47

## 2019-08-07 RX ADMIN — LEVOTHYROXINE SODIUM 25 MCG: 25 TABLET ORAL at 06:12

## 2019-08-07 RX ADMIN — LOSARTAN POTASSIUM 100 MG: 100 TABLET, FILM COATED ORAL at 09:47

## 2019-08-07 RX ADMIN — INSULIN GLARGINE 40 UNITS: 100 INJECTION, SOLUTION SUBCUTANEOUS at 20:03

## 2019-08-07 RX ADMIN — HYDROMORPHONE HYDROCHLORIDE 1 MG: 1 INJECTION, SOLUTION INTRAMUSCULAR; INTRAVENOUS; SUBCUTANEOUS at 13:11

## 2019-08-07 RX ADMIN — HYDROCORTISONE SODIUM SUCCINATE 250 MG: 250 INJECTION, POWDER, FOR SOLUTION INTRAMUSCULAR; INTRAVENOUS at 16:02

## 2019-08-07 RX ADMIN — SODIUM CHLORIDE, PRESERVATIVE FREE 3 ML: 5 INJECTION INTRAVENOUS at 20:28

## 2019-08-07 RX ADMIN — INSULIN LISPRO 10 UNITS: 100 INJECTION, SOLUTION INTRAVENOUS; SUBCUTANEOUS at 09:47

## 2019-08-07 RX ADMIN — PANTOPRAZOLE SODIUM 40 MG: 40 TABLET, DELAYED RELEASE ORAL at 06:12

## 2019-08-07 RX ADMIN — DOCUSATE SODIUM 100 MG: 100 CAPSULE, LIQUID FILLED ORAL at 09:47

## 2019-08-07 RX ADMIN — SODIUM CHLORIDE, PRESERVATIVE FREE 3 ML: 5 INJECTION INTRAVENOUS at 09:48

## 2019-08-07 RX ADMIN — HYDROCODONE BITARTRATE AND ACETAMINOPHEN 2 TABLET: 10; 325 TABLET ORAL at 00:13

## 2019-08-07 RX ADMIN — Medication 10 MG: at 20:26

## 2019-08-07 RX ADMIN — CARVEDILOL 3.12 MG: 3.12 TABLET, FILM COATED ORAL at 20:25

## 2019-08-07 RX ADMIN — PRAVASTATIN SODIUM 20 MG: 20 TABLET ORAL at 20:26

## 2019-08-07 RX ADMIN — FAMOTIDINE 20 MG: 10 INJECTION INTRAVENOUS at 11:58

## 2019-08-07 RX ADMIN — HYDROCORTISONE SODIUM SUCCINATE 250 MG: 250 INJECTION, POWDER, FOR SOLUTION INTRAMUSCULAR; INTRAVENOUS at 11:58

## 2019-08-07 NOTE — THERAPY EVALUATION
Acute Care - Occupational Therapy Initial Evaluation  [facility]     Patient Name: Chevy Goodwin  : 1940  MRN: [id number]  Today's Date: 2019        Referring Physician: Delia    Admit Date: 2019       ICD-10-CM ICD-9-CM   1. Bradycardia R00.1 427.89   2. Weakness R53.1 780.79   3. Frequent falls R29.6 V15.88   4. Oropharyngeal dysphagia R13.12 787.22   5. 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 Impaired functional mobility, balance, gait, and endurance Z74.09 V49.89     Patient Active Problem List   Diagnosis   • Pain of metastatic malignancy   • Chronic pain   • Adhesive arachnoiditis   • Degeneration of intervertebral disc of lumbar region   • Low back pain   • Non-Hodgkin's lymphoma (CMS/HCC)   • Postlaminectomy syndrome of lumbar region   • Thoracic disc herniation T9-T10   • Diabetes (CMS/HCC)   • Hypertension   • Long term (current) use of opiate analgesic   • Pain in lateral portion of right knee   • JULIA treated with BiPAP    • Hypersomnia due to medical condition   • Chronic right shoulder pain   • Status post reverse total arthroplasty of right shoulder   • Postlaminectomy syndrome, cervical region   • Waldenstrom macroglobulinemia (CMS/HCC)   • Weakness generalized   • CKD (chronic kidney disease) stage 3, GFR 30-59 ml/min (CMS/HCC)   • Anemia   • Bradycardia   • Fall   • Neck pain   • DDD (degenerative disc disease), cervical   • Spinal stenosis of cervical region   • 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 (CMS/HCC)     Past Medical History:   Diagnosis Date   • Cancer (CMS/HCC)     PROSTATE AND SKIN   • Cataract    • Coronary artery disease    • Diabetes (CMS/HCC)     TYPE 2   • GERD (gastroesophageal reflux disease)    • Gout    • Heart attack (CMS/HCC)     X 7   • High cholesterol    • Hypersomnia    • Hypertension    • Kidney stones    • Low back pain    • MRSA  (methicillin resistant staph aureus) culture positive     BILATERAL ELBOWS, 2015   • Right shoulder pain    • Sleep apnea     C pap   • Waldenstrom macroglobulinemia (CMS/HCC)     2014     Past Surgical History:   Procedure Laterality Date   • ANGIOPLASTY      X 7   • BACK SURGERY      X4   • CARDIAC CATHETERIZATION     • CARPAL TUNNEL RELEASE Right    • CATARACT EXTRACTION WITH INTRAOCULAR LENS IMPLANT     • COLON RESECTION     • COLONOSCOPY     • ESOPHAGOSCOPY / EGD     • EXTRACORPOREAL SHOCK WAVE LITHOTRIPSY (ESWL)     • FINGER SURGERY Left    • GALLBLADDER SURGERY     • HERNIA REPAIR     • INCISION AND DRAINAGE ABSCESS      MULTIPLE   • LAMINECTOMY     • DC REMOVAL OF ELBOW BURSA Left 9/15/2016    Procedure: LT ELBOW I&D W/ BONE CURRETTAGE;  Surgeon: Kirk Gross MD;  Location: [facility];  Service: Orthopedics   • PROSTATE SURGERY     • ROTATOR CUFF REPAIR Right    • SKIN CANCER EXCISION      MULTIPLE   • TOTAL SHOULDER ARTHROPLASTY W/ DISTAL CLAVICLE EXCISION Right 1/10/2019    Procedure: RT TOTAL SHOULDER REVERSE ARTHROPLASTY;  Surgeon: Kirk Gross MD;  Location: [facility];  Service: Orthopedics   • TYMPANOPLASTY Right           OT ASSESSMENT FLOWSHEET (last 12 hours)      Occupational Therapy Evaluation     Row Name 08/07/19 1019                   OT Evaluation Time/Intention    Subjective Information  no complaints  -SG        Document Type  evaluation  -SG        Mode of Treatment  individual therapy;occupational therapy  -SG        Patient Effort  good  -SG           General Information    Patient Profile Reviewed?  yes  -SG        Patient Observations  alert;cooperative;agree to therapy  -SG        Prior Level of Function  independent:;ADL's  -SG        Existing Precautions/Restrictions  fall  -SG           Cognitive Assessment/Intervention- PT/OT    Orientation Status (Cognition)  oriented x 4  -SG        Follows Commands (Cognition)  WFL  -SG           Bed Mobility Assessment/Treatment  "   Sit-Supine [place] (Bed Mobility)  moderate assist (50% patient effort);2 person assist  -SG        Comment (Bed Mobility)  pt c/o feeling \"woozy\". Nsg with pt and assisted with returning pt to bed  -           Functional Mobility    Functional Mobility- Ind. Level  minimum assist (75% patient effort);2 person assist required requires cues and assit to guide walker into bathroom   -        Functional Mobility- Device  rolling walker  -        Functional Mobility- Safety Issues  -- pt pace is slow and c/o feeling woozy after being on commode  -        Functional Mobility- Comment  ambulated to bathroom.  assist of another due to IV pole  -           Sit-Stand Transfer    Sit-Stand [place] (Transfers)  contact guard;verbal cues;nonverbal cues (demo/gesture)  -        Assistive Device (Sit-Stand Transfers)  walker, front-wheeled  -SG           Stand-Sit Transfer    Stand-Sit [place] (Transfers)  minimum assist (75% patient effort);verbal cues;nonverbal cues (demo/gesture)  -           Toilet Transfer    Type (Toilet Transfer)  sit-stand;stand-sit  -        [place] Level (Toilet Transfer)  minimum assist (75% patient effort)  -        Assistive Device (Toilet Transfer)  walker, front-wheeled  -           ADL Assessment/Intervention    BADL Assessment/Intervention  toileting  -           Toileting Assessment/Training    [place] Level (Toileting)  toileting skills;supervision  -        Assistive Devices (Toileting)  commode  -        Toileting Position  supported sitting  -           BADL Safety/Performance    Impairments, BADL Safety/Performance  endurance/activity tolerance  -        Skilled BADL Treatment/Intervention  BADL process/adaptation training  -           General ROM    GENERAL ROM COMMENTS  BUE's WFL AROM,   -SG           Sensory Assessment/Intervention    Additional Documentation  Vision Assessment/Intervention (Group) pt states vision blurry  -SG   "         Wound 08/05/19 1110 Right neck incision    Wound - Properties Group Date first assessed: 08/05/19  -TL Time first assessed: 1110  -TL Side: Right  -TL Location: neck  -TL Type: incision  -TL       Clinical Impression (OT)    OT Diagnosis  need for assist with personal care  -SG        Patient/Family Goals Statement (OT Eval)  to go home  -SG        Therapy Frequency (OT Eval)  5 times/wk  -SG        Care Plan Review (OT)  evaluation/treatment results reviewed  -SG           Planned OT Interventions    Planned Therapy Interventions (OT Eval)  activity tolerance training;BADL retraining  -SG           OT Goals    Transfer Goal Selection (OT)  transfer, OT goal 1  -SG        Dressing Goal Selection (OT)  dressing, OT goal 1  -SG        Toileting Goal Selection (OT)  toileting, OT goal 1  -SG           Transfer Goal 1 (OT)    Activity/Assistive Device (Transfer Goal 1, OT)  toilet  -SG        Spray Level/Cues Needed (Transfer Goal 1, OT)  contact guard assist  -SG        Time Frame (Transfer Goal 1, OT)  1 week  -SG        Progress/Outcome (Transfer Goal 1, OT)  goal ongoing  -SG           Dressing Goal 1 (OT)    Activity/Assistive Device (Dressing Goal 1, OT)  dressing skills, all  -SG        Spray/Cues Needed (Dressing Goal 1, OT)  moderate assist (50-74% patient effort)  -SG        Time Frame (Dressing Goal 1, OT)  1 week  -SG        Progress/Outcome (Dressing Goal 1, OT)  goal ongoing  -SG           Toileting Goal 1 (OT)    Activity/Device (Toileting Goal 1, OT)  toileting skills, all  -SG        Spray Level/Cues Needed (Toileting Goal 1, OT)  contact guard assist  -SG        Time Frame (Toileting Goal 1, OT)  1 week  -SG        Progress/Outcome (Toileting Goal 1, OT)  goal ongoing  -SG          User Key  (r) = Recorded By, (t) = Taken By, (c) = Cosigned By    Initials Name Effective Dates    SG Carmen Lynne, OTANIBAL 06/08/18 -     TL Carmen Her, MAYCO 06/16/16 -         "  Occupational Therapy Education     Title: PT OT SLP Therapies (In Progress)     Topic: Occupational Therapy (In Progress)     Point: ADL training (Done)     Description: Instruct learner(s) on proper safety adaptation and remediation techniques during self care or transfers.   Instruct in proper use of assistive devices.    Learning Progress Summary           Patient Acceptance, E,TB,D, VU,NR by  at 8/7/2019 10:27 AM    Comment:  role of OT and POC , safety for adls                               User Key     Initials Effective Dates Name Provider Type Discipline     06/08/18 -  Carmen Lynne OTR Occupational Therapist OT                  OT Recommendation and Plan  Planned Therapy Interventions (OT Eval): activity tolerance training, BADL retraining  Therapy Frequency (OT Eval): 5 times/wk  Plan of Care Review  Plan of Care Reviewed With: patient  Plan of Care Reviewed With: patient  Outcome Summary: Pt presents to OT with decreased independence for adls and functional transfers. PT ambulated to bathroom and performed commode transfer with min A x2 today. Pt c/o feeling \"woozy\" after being on commode and assisted back to bed with assist of nurse.  Pt will continue to benefit from OT     Outcome Measures     Row Name 08/07/19 1029 08/07/19 0900 08/06/19 1600       How much help from another person do you currently need...    Turning from your back to your side while in flat bed without using bedrails?  --  4  -AR  3  -EH    Moving from lying on back to sitting on the side of a flat bed without bedrails?  --  3  -AR  3  -EH    Moving to and from a bed to a chair (including a wheelchair)?  --  3  -AR  3  -EH    Standing up from a chair using your arms (e.g., wheelchair, bedside chair)?  --  3  -AR  3  -EH    Climbing 3-5 steps with a railing?  --  3  -AR  2  -EH    To walk in hospital room?  --  3  -AR  3  -EH    AM-PAC 6 Clicks Score (PT)  --  19  -AR  17  -EH       How much help from another is currently " needed...    Putting on and taking off regular lower body clothing?  2  -SG  --  --    Bathing (including washing, rinsing, and drying)  2  -SG  --  --    Toileting (which includes using toilet bed pan or urinal)  3  -SG  --  --    Putting on and taking off regular upper body clothing  2  -SG  --  --    Taking care of personal grooming (such as brushing teeth)  3  -SG  --  --    Eating meals  3  -SG  --  --    AM-[facility] 6 Clicks Score (OT)  15  -SG  --  --       Functional Assessment    Outcome Measure Options  AM-PAC 6 Clicks Daily Activity (OT)  -  --  --    Row Name 08/04/19 1100             How much help from another person do you currently need...    Turning from your back to your side while in flat bed without using bedrails?  3  -DO      Moving from lying on back to sitting on the side of a flat bed without bedrails?  3  -DO      Moving to and from a bed to a chair (including a wheelchair)?  3  -DO      Standing up from a chair using your arms (e.g., wheelchair, bedside chair)?  3  -DO      Climbing 3-5 steps with a railing?  2  -DO      To walk in hospital room?  3  -DO      AM-[facility] 6 Clicks Score (PT)  17  -DO         Functional Assessment    Outcome Measure Options  AM-[facility] 6 Clicks Basic Mobility (PT)  -DO        User Key  (r) = Recorded By, (t) = Taken By, (c) = Cosigned By    Initials Name Provider Type     Carmen Lynne, OTR Occupational Therapist    Shraddha Whitaker, PT Physical Therapist    Bryson Hernandes, PT Physical Therapist    Kinza Shaffer, PTA Physical Therapy Assistant          Time Calculation:   Time Calculation- OT     Row Name 08/07/19 1030             Time Calculation- OT    OT Start Time  0958  -      OT Stop Time  1011  -      OT Time Calculation (min)  13 min  -      Total Timed Code Minutes- OT  8 minute(s)  -      OT Received On  08/07/19  -      OT Goal Re-Cert Due Date  08/14/19  -        User Key  (r) = Recorded By, (t) = Taken By, (c) = Cosigned By     Initials Name Provider Type     Carmen Lynne OTR Occupational Therapist        Therapy Charges for Today     Code Description Service Date Service Provider Modifiers Qty    69766587125 HC OT EVAL LOW COMPLEXITY 2 8/7/2019 Carmen Lynen OTR GO 1    [id number]  OT SELF CARE/MGMT/TRAIN EA 15 MIN 8/7/2019 Carmen Lynne OTR GO 1               JOAN Puckett  8/7/2019

## 2019-08-07 NOTE — PROGRESS NOTES
"Doing ok, c/o expected pain.  Walked well this am with PT, about 100ft.  However c/o increased swallowing difficulties this am, had coughing episode after just taking a bite of food.      Blood pressure 147/71, pulse 75, temperature 98 °F (36.7 °C), temperature source Oral, resp. rate 18, height 172.7 cm (68\"), weight 91.6 kg (201 lb 14.4 oz), SpO2 91 %.      AA, Ox3  PHAN well  Incision ok  Neck soft and flat, no hematoma   ELIZA: 25cc last shift, 5cc now      ..  Results from last 7 days   Lab Units 08/07/19  0446   WBC 10*3/mm3 8.14   HEMOGLOBIN g/dL 9.9*   HEMATOCRIT % 30.4*   PLATELETS 10*3/mm3 206       POD#2 s/p ACDF C3-5   Postop dysphagia-not unexpected after ACDF, especially as high as C3-4 and particularly in his age group. Reassured, will start 24 hr course of IV solucortef which will hopefully help.    CAD, PCI in past, on ASA  HTN-better  CKD  JULIA      As above, start steroids, cont with PT/OT.  CBC in am.     "

## 2019-08-07 NOTE — PLAN OF CARE
Problem: Patient Care Overview  Goal: Plan of Care Review   08/07/19 0903   Coping/Psychosocial   Plan of Care Reviewed With patient   OTHER   Outcome Summary Improving activity tolerance and independence w/ mobility during PT. Able to ambulate 100' w/ contact assist using RW, limited by fatigue. Pt would benefit from DC to SNU, pt declining w/ plan for DC to home w/ home health PT.

## 2019-08-07 NOTE — PROGRESS NOTES
"    Coronary artery disease status post PCI in the past, with obstructive sleep apnea, stable chronic kidney disease, hypertension and dyslipidemia     Status post cervical laminectomy.    ROS:  Feeling well, no SOA.     Medications  Allopurinol 300 mg  Amlodipine 10 mg  Aspirin 81 mg  Baclofen 10 mg  carvediolol 3.125 mg bid  Insulin glargine 20 U  Insulin glargine 40 U  Insulin lispro  Isosorbide mononitrate 60 mg  Levothyroxine 25 mcg  Losartan 100 mg  Melatonin  Pantoprazole 40 mg  Pravastatin 20 mg    Physical Exam:                 General Appearance:    Well developed and well nourished in no acute distress, up walking in hallway with PT       Neck:   ELIZA drain in place on right neck   Lungs:    Clear to auscultation,respirations regular, even and unlabored    Heart:    Regular rhythm and normal rate, normal S1 and S2, No murmur, no gallop, no rub, no click       Abdomen:     Normal bowel sounds, soft non-tender, non-distended       Extremities:   No edema.        Skin:   Warm and dry   Neurologic:   awake alert and oriented x3, speech clear and approp, no facial drooping     /59 (BP Location: Left arm, Patient Position: Lying)   Pulse 60   Temp 98.1 °F (36.7 °C) (Oral)   Resp 19   Ht 172.7 cm (68\")   Wt 91.6 kg (201 lb 14.4 oz)   SpO2 90%   BMI 30.70 kg/m²     Hgb = 9.9, creatinine 1.17    • **Spinal stenosis of cervical region [M48.02] Unknown   • Waldenstrom macroglobulinemia (CMS/HCC) [C88.0] Unknown       2014      • Weakness generalized [R53.1] Yes   • CKD (chronic kidney disease) stage 3, GFR 30-59 ml/min (CMS/HCC) [N18.3] Yes   • Anemia [D64.9] Yes   • Bradycardia [R00.1] Yes   • Fall [W19.XXXA] Yes   • Neck pain [M54.2] Yes   • DDD (degenerative disc disease), cervical [M50.30] Unknown   • 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 (CMS/HCC) [G95.89, M48.02] Unknown       Added automatically from request for surgery [id number]      • JULIA treated with BiPAP 20/16 " [G47.33] Yes   • Long term (current) use of opiate analgesic [Z79.891] Not Applicable   • Hypertension [I10] Yes   • Diabetes (CMS/HCC) [E11.9] Yes   • Non-Hodgkin's lymphoma (CMS/HCC) [C85.90] Yes   • Chronic pain [G89.29] Yes      Assessment/Plan     1.  Weakness with upper extremity sensory disturbance/cervical spine stenosis  2.  Spinal stenosis of cervical region           -Status post cervical laminectomy.  3.  Sinus Bradycardia: improved  4.  Coronary artery disease status post PCI in past  5.  Obstructive sleep apnea on CPAP/BiPAP  6. HTN, controlled today  7. CKD: stable    Plan:  BP improved from yesterday. Continue lower dose of Coreg at this time. HR improved. F/U with Dr. Hernandez in 4-6 weeks. Will see sign off.   S: no dyspnea  O: vs as above  Neck: no jvd  Chest: clear  Cor: no lift, normal s1 and s2, no murmur, no s3  Ab: soft, no tenderness  Ex: no edema    A: as above

## 2019-08-07 NOTE — PROGRESS NOTES
Name: Chevy Goodwin ADMIT: 2019   : 1940  PCP: Anthony Gutierrez MD    MRN: [id number] LOS: 5 days   AGE/SEX: 79 y.o. male  ROOM: [facility]     Subjective   Subjective   still with expected post op pain, has also developed dysphagia     denies chest pain, palpitations, SOA, nausea and vomiting      Objective   Objective   Vital Signs  Temp:  [97.6 °F (36.4 °C)-98.1 °F (36.7 °C)] 98 °F (36.7 °C)  Heart Rate:  [60-75] 66  Resp:  [15-19] 17  BP: (128-147)/(59-71) 137/66  SpO2:  [90 %-95 %] 95 %  on  Flow (L/min):  [2] 2;   Device (Oxygen Therapy): room air  Body mass index is 30.7 kg/m².  Physical Exam   Constitutional: He is oriented to person, place, and time. He appears well-developed and well-nourished. He has a sickly appearance.   Eyes: Conjunctivae and EOM are normal.   Cardiovascular: Normal rate and regular rhythm.   Pulmonary/Chest: Effort normal and breath sounds normal. No respiratory distress.   Abdominal: Soft. Bowel sounds are normal.   Musculoskeletal:   neck brace in place   Neurological: He is alert and oriented to person, place, and time.   Skin: Skin is warm and dry.       Results Review:       I reviewed the patient's new clinical results.  Results from last 7 days   Lab Units 19  0446 19  0524 19  0623 19  1122   WBC 10*3/mm3 8.14 10.15 5.93 6.19   HEMOGLOBIN g/dL 9.9* 10.1* 10.4* 10.1*   PLATELETS 10*3/mm3 206 224 204 172     Results from last 7 days   Lab Units 19  0524 19  0828 19  1226 19  0623   SODIUM mmol/L 137 138 142 143   POTASSIUM mmol/L 4.5 5.1 5.1 5.3*   CHLORIDE mmol/L 99 102 105 107   CO2 mmol/L 24.4 22.4 22.8 23.2   BUN mg/dL 34* 41* 36* 35*   CREATININE mg/dL 1.17 1.38* 1.48* 1.35*   GLUCOSE mg/dL 169* 266* 283* 204*   Estimated Creatinine Clearance: 58.1 mL/min (by C-G formula based on SCr of 1.17 mg/dL).  Results from last 7 days   Lab Units 19  1122   ALBUMIN g/dL 3.70   BILIRUBIN mg/dL 0.2   ALK PHOS U/L  152*   AST (SGOT) U/L 23   ALT (SGPT) U/L 21     Results from last 7 days   Lab Units 08/06/19  0524 08/04/19  0828 08/03/19  1226 08/03/19  0623 08/02/19  1122   CALCIUM mg/dL 9.6 10.1 9.9 9.6 9.7   ALBUMIN g/dL  --   --   --   --  3.70       Glucose   Date/Time Value Ref Range Status   08/07/2019 1114 112 70 - 130 mg/dL Final   08/07/2019 0712 109 70 - 130 mg/dL Final   08/06/2019 2142 122 70 - 130 mg/dL Final   08/06/2019 1630 113 70 - 130 mg/dL Final   08/06/2019 1120 208 (H) 70 - 130 mg/dL Final   08/06/2019 0727 162 (H) 70 - 130 mg/dL Final   08/05/2019 2110 224 (H) 70 - 130 mg/dL Final         allopurinol 300 mg Oral Q PM   amLODIPine 10 mg Oral Q PM   aspirin 81 mg Oral Daily   baclofen 10 mg Oral Q8H   carvedilol 3.125 mg Oral BID With Meals   docusate sodium 100 mg Oral Daily   famotidine 20 mg Intravenous Q12H   hydrocortisone sodium succinate 250 mg Intravenous Q6H   insulin glargine 20 Units Subcutaneous QAM   insulin glargine 40 Units Subcutaneous Nightly   insulin lispro 0-9 Units Subcutaneous 4x Daily With Meals & Nightly   insulin lispro 10 Units Subcutaneous TID With Meals   isosorbide mononitrate 60 mg Oral Q PM   levothyroxine 25 mcg Oral Q AM   losartan 100 mg Oral Daily   melatonin 10 mg Oral Nightly   pravastatin 20 mg Oral Nightly   sodium chloride 3 mL Intravenous Q12H   sodium chloride 3 mL Intravenous Q12H      Diet Regular; Consistent Carbohydrate, Low Potassium       Assessment/Plan     Active Hospital Problems    Diagnosis  POA   • **Spinal stenosis of cervical region [M48.02]  Unknown   • Waldenstrom macroglobulinemia (CMS/HCC) [C88.0]  Unknown   • Weakness generalized [R53.1]  Yes   • CKD (chronic kidney disease) stage 3, GFR 30-59 ml/min (CMS/HCC) [N18.3]  Yes   • Anemia [D64.9]  Yes   • Bradycardia [R00.1]  Yes   • Fall [W19.XXXA]  Yes   • Neck pain [M54.2]  Yes   • DDD (degenerative disc disease), cervical [M50.30]  Unknown   • Myelopathy concurrent with and due to spinal stenosis  of cervical region (CMS/[facility]) [G95.89, M48.02]  Unknown   • JULIA treated with BiPAP 20/16 [G47.33]  Yes   • Long term (current) use of opiate analgesic [Z79.891]  Not Applicable   • Hypertension [I10]  Yes   • Diabetes (CMS/[facility]) [E11.9]  Yes   • Non-Hodgkin's lymphoma (CMS/[facility]) [C85.90]  Yes   • Chronic pain [G89.29]  Yes      Resolved Hospital Problems   No resolved problems to display.   1.  Cervical stenosis, status post anterior cervical discectomy 8/5/2019.  Continue PT and OT.  Possible rehab. Unexpected postoperative dysphagia. MELANY has started on IV steroids and he states he has had some relief.   2.  Hypertension.  Numbers noted.  Expect them to drop postoperatively.  No changes.  Continue to follow.  Parameters written for which to hold Cozaar  3.  Hyperkalemia, improved.  On low potassium diet  4.  Chronic kidney disease stage III.  Baseline creatinine 1.4-1.6.  Lower than usual.  IV fluids stopped.  5.  Diabetes mellitus type 2.  Continue Lantus, mealtime insulin and sliding scale. Blood glucose has been acceptable, continue current regimen.     VTE Prophylaxis - SCDs   Code Status - Full code      Yessy Jean, [facility]  08/07/19  1:52 PM    Addendum:   I, Kendra Parker MD, personally performed the services described in this documentation as scribed by the above named individual in my presence, and it is both accurate and complete.     I personally interviewed and examined the patient.  I discussed his care with Padma Jean.  I agree with above.  Additional comments:   Patient's wife at bedside.  Patient sleeping but awakened easily.  He is using CPAP mask  Heart is regular without murmur.  Lungs clear anteriorly.  Abdomen soft and nontender.  Extremities trace edema.  Oropharynx no thrush  --Expect sugars to increase with steroids.  Will insulin as needed.  Continue sliding scale.  --Creatinine decreased.  Potassium is normal.  Will continue to follow.  May develop  some volume retention with high-dose IV steroids.  Currently volume status is fairly euvolemic    I d/w son who's an MD: [phone].  D/w pt and wife at bedside. Total time 35min with 20 min re mgmt of swallow issue, weakness, DM, pain    Kendra Parker MD   8/7/2019  5:08 PM

## 2019-08-07 NOTE — THERAPY TREATMENT NOTE
Acute Care - Physical Therapy Treatment Note  [facility]     Patient Name: Chevy Goodwin  : 1940  MRN: [id number]  Today's Date: 2019     Date of Referral to PT: 19  Referring Physician: Delia    Admit Date: 2019    Visit Dx:    ICD-10-CM ICD-9-CM   1. Bradycardia R00.1 427.89   2. Weakness R53.1 780.79   3. Frequent falls R29.6 V15.88   4. Oropharyngeal dysphagia R13.12 787.22   5. 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 Impaired functional mobility, balance, gait, and endurance Z74.09 V49.89     Patient Active Problem List   Diagnosis   • Pain of metastatic malignancy   • Chronic pain   • Adhesive arachnoiditis   • Degeneration of intervertebral disc of lumbar region   • Low back pain   • Non-Hodgkin's lymphoma (CMS/HCC)   • Postlaminectomy syndrome of lumbar region   • Thoracic disc herniation T9-T10   • Diabetes (CMS/HCC)   • Hypertension   • Long term (current) use of opiate analgesic   • Pain in lateral portion of right knee   • JULIA treated with BiPAP    • Hypersomnia due to medical condition   • Chronic right shoulder pain   • Status post reverse total arthroplasty of right shoulder   • Postlaminectomy syndrome, cervical region   • Waldenstrom macroglobulinemia (CMS/HCC)   • Weakness generalized   • CKD (chronic kidney disease) stage 3, GFR 30-59 ml/min (CMS/HCC)   • Anemia   • Bradycardia   • Fall   • Neck pain   • DDD (degenerative disc disease), cervical   • Spinal stenosis of cervical region   • 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 (CMS/HCC)       Therapy Treatment    Rehabilitation Treatment Summary     Row Name 19 0938             Treatment Time/Intention    Discipline  physical therapist  -AR      Document Type  therapy note (daily note)  -AR      Subjective Information  no complaints  -AR      Mode of Treatment  physical therapy  -AR      Therapy Frequency  (PT Clinical Impression)  daily  -AR      Patient Effort  good  -AR      Existing Precautions/Restrictions  fall  -AR      Recorded by [AR] Shraddha Parsons, PT 08/07/19 0940      Row Name 08/07/19 0938             Cognitive Assessment/Intervention- PT/OT    Orientation Status (Cognition)  oriented x 4  -AR      Follows Commands (Cognition)  WNL  -AR      Recorded by [AR] Shraddha Parsons, PT 08/07/19 0940      Row Name 08/07/19 0938             Bed Mobility Assessment/Treatment    Supine-Sit Ware (Bed Mobility)  supervision  -AR      Sit-Supine Ware (Bed Mobility)  not tested  -AR      Assistive Device (Bed Mobility)  bed rails;head of bed elevated  -AR      Recorded by [AR] Shraddha Parsons, PT 08/07/19 0940      Row Name 08/07/19 0938             Transfer Assessment/Treatment    Comment (Transfers)  VC for UE placement/safety  -AR      Recorded by [AR] Shraddha Parsons, PT 08/07/19 0940      Row Name 08/07/19 0938             Sit-Stand Transfer    Sit-Stand Ware (Transfers)  contact guard  -AR      Assistive Device (Sit-Stand Transfers)  walker, front-wheeled  -AR      Recorded by [AR] Shraddha Parsons, PT 08/07/19 0940      Row Name 08/07/19 0938             Stand-Sit Transfer    Stand-Sit Ware (Transfers)  contact guard  -AR      Assistive Device (Stand-Sit Transfers)  walker, front-wheeled  -AR      Recorded by [AR] Shraddha Parsons, PT 08/07/19 0940      Row Name 08/07/19 0938             Gait/Stairs Assessment/Training    Ware Level (Gait)  contact guard  -AR      Assistive Device (Gait)  walker, front-wheeled  -AR      Distance in Feet (Gait)  100  -AR      Pattern (Gait)  swing-through  -AR      Deviations/Abnormal Patterns (Gait)  gait speed decreased;festinating/shuffling  -AR      Bilateral Gait Deviations  forward flexed posture;heel strike decreased  -AR      Recorded by [AR] Shraddha Parsons, PT 08/07/19 0940      Row Name 08/07/19 0938             Positioning  and Restraints    Pre-Treatment Position  in bed  -AR      Post Treatment Position  chair  -AR      In Chair  sitting;call light within reach;encouraged to call for assist;exit alarm on;with nsg  -AR      Recorded by [AR] Shraddha Parsons, PT 08/07/19 0940      Row Name 08/07/19 0938             Pain Assessment    Additional Documentation  Pain Scale: Numbers Pre/Post-Treatment (Group)  -AR      Recorded by [AR] Shraddha Parsons, PT 08/07/19 0940      Row Name 08/07/19 0938             Pain Scale: Numbers Pre/Post-Treatment    Pain Scale: Numbers, Pretreatment  6/10  -AR      Pain Scale: Numbers, Post-Treatment  7/10  -AR      Pain Location  neck  -AR      Pain Intervention(s)  Repositioned;Medication (See MAR)  -AR      Recorded by [AR] Shraddha Parsons, PT 08/07/19 0940      Row Name                Wound 08/05/19 1110 Right neck incision    Wound - Properties Group Date first assessed: 08/05/19 [TL] Time first assessed: 1110 [TL] Side: Right [TL] Location: neck [TL] Type: incision [TL] Recorded by:  [TL] Carmen Her RN 08/05/19 1110    Row Name 08/07/19 0938             Outcome Summary/Treatment Plan (PT)    Anticipated Discharge Disposition (PT)  home with assist;home with home health;skilled nursing facility rec DC to SNU, pt declining plan for DC w/ home health PT  -AR      Recorded by [AR] Shraddha Parsons, PT 08/07/19 0940        User Key  (r) = Recorded By, (t) = Taken By, (c) = Cosigned By    Initials Name Effective Dates Discipline    TL Carmen Her RN 06/16/16 -  Nurse    AR Shraddha Parsons, PT 04/03/18 -  PT          Wound 08/05/19 1110 Right neck incision (Active)   Dressing Appearance dry;intact 8/6/2019  8:25 PM   Closure ZEINAB 8/6/2019  8:25 PM   Base dressing in place, unable to visualize 8/6/2019  5:10 PM   Drainage Amount none 8/6/2019  5:10 PM   Care, Wound other (see comments) 8/6/2019 12:05 PM   Dressing Care, Wound border dressing 8/6/2019 12:05 PM            Physical Therapy Education     Title: PT OT SLP Therapies (In Progress)     Topic: Physical Therapy (In Progress)     Point: Mobility training (In Progress)     Learning Progress Summary           Patient Acceptance, E, NR by AR at 8/7/2019  9:41 AM    Acceptance, E,D, VU,NR by  at 8/6/2019  4:46 PM    Acceptance, E, VU,DU by DO at 8/4/2019 11:12 AM                   Point: Home exercise program (In Progress)     Learning Progress Summary           Patient Acceptance, E, NR by AR at 8/7/2019  9:41 AM    Acceptance, E,D, VU,NR by  at 8/6/2019  4:46 PM    Acceptance, E, VU,DU by DO at 8/4/2019 11:12 AM                   Point: Body mechanics (In Progress)     Learning Progress Summary           Patient Acceptance, E, NR by AR at 8/7/2019  9:41 AM    Acceptance, E,D, VU,NR by  at 8/6/2019  4:46 PM    Acceptance, E, VU,DU by DO at 8/4/2019 11:12 AM                   Point: Precautions (In Progress)     Learning Progress Summary           Patient Acceptance, E, NR by AR at 8/7/2019  9:41 AM    Acceptance, E,D, VU,NR by  at 8/6/2019  4:46 PM    Acceptance, E, VU,DU by DO at 8/4/2019 11:12 AM                               User Key     Initials Effective Dates Name Provider Type Discipline    AR 04/03/18 -  Shraddha Parsons, PT Physical Therapist PT    DO 03/07/18 -  Bryson Atkinson, PT Physical Therapist PT     08/19/18 -  Kinza Oquendo, PTA Physical Therapy Assistant PT                PT Recommendation and Plan  Anticipated Discharge Disposition (PT): home with assist, home with home health, skilled nursing facility(rec DC to SNU, pt declining plan for DC w/ home health PT)  Therapy Frequency (PT Clinical Impression): daily  Outcome Summary/Treatment Plan (PT)  Anticipated Discharge Disposition (PT): home with assist, home with home health, skilled nursing facility(rec DC to SNU, pt declining plan for DC w/ home health PT)  Plan of Care Reviewed With: patient  Outcome Summary: Improving activity  tolerance and independence w/ mobility during PT.  Able to ambulate 100' w/ contact assist using RW, limited by fatigue.  Pt would benefit from DC to SNU, pt declining w/ plan for DC to home w/ home  health PT.    Outcome Measures     Row Name 08/07/19 0900 08/06/19 1600 08/04/19 1100       How much help from another person do you currently need...    Turning from your back to your side while in flat bed without using bedrails?  4  -AR  3  -EH  3  -DO    Moving from lying on back to sitting on the side of a flat bed without bedrails?  3  -AR  3  -EH  3  -DO    Moving to and from a bed to a chair (including a wheelchair)?  3  -AR  3  -EH  3  -DO    Standing up from a chair using your arms (e.g., wheelchair, bedside chair)?  3  -AR  3  -EH  3  -DO    Climbing 3-5 steps with a railing?  3  -AR  2  -EH  2  -DO    To walk in hospital room?  3  -AR  3  -EH  3  -DO    AM-PAC 6 Clicks Score (PT)  19  -AR  17  -EH  17  -DO       Functional Assessment    Outcome Measure Options  --  --  AM-PAC 6 Clicks Basic Mobility (PT)  -DO      User Key  (r) = Recorded By, (t) = Taken By, (c) = Cosigned By    Initials Name Provider Type    AR Shraddha Parsons PT Physical Therapist    DO Bryson Atkinson, PT Physical Therapist     Kinza Oquendo, PTA Physical Therapy Assistant         Time Calculation:   PT Charges     Row Name 08/07/19 0942             Time Calculation    Start Time  0928  -AR      Stop Time  0942  -AR      Time Calculation (min)  14 min  -AR      PT Received On  08/07/19  -AR      PT - Next Appointment  08/08/19  -AR        User Key  (r) = Recorded By, (t) = Taken By, (c) = Cosigned By    Initials Name Provider Type    Shraddha Whitaker, PT Physical Therapist        Therapy Charges for Today     Code Description Service Date Service Provider Modifiers Qty    45713001350 HC PT THER PROC EA 15 MIN 8/7/2019 Shraddha Parsons, PT GP 1          PT G-Codes  Outcome Measure Options: AM-PAC 6 Clicks Basic Mobility (PT)  AM-PAC  6 Clicks Score (PT): 19    Shraddha Parsons, PT  8/7/2019

## 2019-08-07 NOTE — PLAN OF CARE
"Problem: Patient Care Overview  Goal: Plan of Care Review  Outcome: Ongoing (interventions implemented as appropriate)   08/07/19 1028   Coping/Psychosocial   Plan of Care Reviewed With patient   OTHER   Outcome Summary Pt presents to OT with decreased independence for adls and functional transfers. PT ambulated to bathroom and performed commode transfer with min A x2 today. Pt c/o feeling \"woozy\" after being on commode and assisted back to bed with assist of nurse. Pt will continue to benefit from OT          "

## 2019-08-08 LAB
ANION GAP SERPL CALCULATED.3IONS-SCNC: 12.3 MMOL/L (ref 5–15)
BASOPHILS # BLD AUTO: 0.01 10*3/MM3 (ref 0–0.2)
BASOPHILS NFR BLD AUTO: 0.1 % (ref 0–1.5)
BUN BLD-MCNC: 27 MG/DL (ref 8–23)
BUN/CREAT SERPL: 27.8 (ref 7–25)
CALCIUM SPEC-SCNC: 9.5 MG/DL (ref 8.6–10.5)
CHLORIDE SERPL-SCNC: 98 MMOL/L (ref 98–107)
CO2 SERPL-SCNC: 24.7 MMOL/L (ref 22–29)
CREAT BLD-MCNC: 0.97 MG/DL (ref 0.76–1.27)
DEPRECATED RDW RBC AUTO: 49.9 FL (ref 37–54)
EOSINOPHIL # BLD AUTO: 0.01 10*3/MM3 (ref 0–0.4)
EOSINOPHIL NFR BLD AUTO: 0.1 % (ref 0.3–6.2)
ERYTHROCYTE [DISTWIDTH] IN BLOOD BY AUTOMATED COUNT: 13.6 % (ref 12.3–15.4)
GFR SERPL CREATININE-BSD FRML MDRD: 75 ML/MIN/1.73
GLUCOSE BLD-MCNC: 206 MG/DL (ref 65–99)
GLUCOSE BLDC GLUCOMTR-MCNC: 169 MG/DL (ref 70–130)
GLUCOSE BLDC GLUCOMTR-MCNC: 201 MG/DL (ref 70–130)
GLUCOSE BLDC GLUCOMTR-MCNC: 220 MG/DL (ref 70–130)
GLUCOSE BLDC GLUCOMTR-MCNC: 235 MG/DL (ref 70–130)
HCT VFR BLD AUTO: 32.7 % (ref 37.5–51)
HGB BLD-MCNC: 10.9 G/DL (ref 13–17.7)
IMM GRANULOCYTES # BLD AUTO: 0.11 10*3/MM3 (ref 0–0.05)
IMM GRANULOCYTES NFR BLD AUTO: 1.3 % (ref 0–0.5)
LYMPHOCYTES # BLD AUTO: 1.86 10*3/MM3 (ref 0.7–3.1)
LYMPHOCYTES NFR BLD AUTO: 21.5 % (ref 19.6–45.3)
MCH RBC QN AUTO: 33.1 PG (ref 26.6–33)
MCHC RBC AUTO-ENTMCNC: 33.3 G/DL (ref 31.5–35.7)
MCV RBC AUTO: 99.4 FL (ref 79–97)
MONOCYTES # BLD AUTO: 0.53 10*3/MM3 (ref 0.1–0.9)
MONOCYTES NFR BLD AUTO: 6.1 % (ref 5–12)
NEUTROPHILS # BLD AUTO: 6.12 10*3/MM3 (ref 1.7–7)
NEUTROPHILS NFR BLD AUTO: 70.9 % (ref 42.7–76)
NRBC BLD AUTO-RTO: 0 /100 WBC (ref 0–0.2)
PLATELET # BLD AUTO: 223 10*3/MM3 (ref 140–450)
PMV BLD AUTO: 10.6 FL (ref 6–12)
POTASSIUM BLD-SCNC: 4.4 MMOL/L (ref 3.5–5.2)
RBC # BLD AUTO: 3.29 10*6/MM3 (ref 4.14–5.8)
SODIUM BLD-SCNC: 135 MMOL/L (ref 136–145)
WBC NRBC COR # BLD: 8.64 10*3/MM3 (ref 3.4–10.8)

## 2019-08-08 PROCEDURE — 63710000001 INSULIN LISPRO (HUMAN) PER 5 UNITS: Performed by: INTERNAL MEDICINE

## 2019-08-08 PROCEDURE — 82962 GLUCOSE BLOOD TEST: CPT

## 2019-08-08 PROCEDURE — 85025 COMPLETE CBC W/AUTO DIFF WBC: CPT | Performed by: PHYSICIAN ASSISTANT

## 2019-08-08 PROCEDURE — 97110 THERAPEUTIC EXERCISES: CPT

## 2019-08-08 PROCEDURE — 25010000002 HYDROMORPHONE 1 MG/ML SOLUTION: Performed by: INTERNAL MEDICINE

## 2019-08-08 PROCEDURE — 63710000001 INSULIN GLARGINE PER 5 UNITS: Performed by: INTERNAL MEDICINE

## 2019-08-08 PROCEDURE — 25010000003 HYDROCORTISONE SOD SUCCINATE PF 250 MG RECONSTITUTED SOLUTION: Performed by: PHYSICIAN ASSISTANT

## 2019-08-08 PROCEDURE — 80048 BASIC METABOLIC PNL TOTAL CA: CPT | Performed by: INTERNAL MEDICINE

## 2019-08-08 PROCEDURE — 99024 POSTOP FOLLOW-UP VISIT: CPT | Performed by: NURSE PRACTITIONER

## 2019-08-08 RX ORDER — DEXAMETHASONE 1.5 MG/1
4.5 TABLET ORAL 2 TIMES DAILY WITH MEALS
Status: DISCONTINUED | OUTPATIENT
Start: 2019-08-08 | End: 2019-08-09 | Stop reason: HOSPADM

## 2019-08-08 RX ORDER — DEXAMETHASONE 1.5 MG/1
3 TABLET ORAL 2 TIMES DAILY WITH MEALS
Status: DISCONTINUED | OUTPATIENT
Start: 2019-08-10 | End: 2019-08-09 | Stop reason: HOSPADM

## 2019-08-08 RX ORDER — DEXAMETHASONE 1.5 MG/1
1.5 TABLET ORAL 2 TIMES DAILY WITH MEALS
Status: DISCONTINUED | OUTPATIENT
Start: 2019-08-12 | End: 2019-08-09 | Stop reason: HOSPADM

## 2019-08-08 RX ADMIN — INSULIN LISPRO 10 UNITS: 100 INJECTION, SOLUTION INTRAVENOUS; SUBCUTANEOUS at 12:13

## 2019-08-08 RX ADMIN — BACLOFEN 10 MG: 10 TABLET ORAL at 20:39

## 2019-08-08 RX ADMIN — ISOSORBIDE MONONITRATE 60 MG: 60 TABLET ORAL at 17:14

## 2019-08-08 RX ADMIN — INSULIN LISPRO 10 UNITS: 100 INJECTION, SOLUTION INTRAVENOUS; SUBCUTANEOUS at 08:39

## 2019-08-08 RX ADMIN — HYDROCODONE BITARTRATE AND ACETAMINOPHEN 2 TABLET: 10; 325 TABLET ORAL at 17:14

## 2019-08-08 RX ADMIN — INSULIN LISPRO 4 UNITS: 100 INJECTION, SOLUTION INTRAVENOUS; SUBCUTANEOUS at 12:12

## 2019-08-08 RX ADMIN — Medication 10 MG: at 20:39

## 2019-08-08 RX ADMIN — HYDROCODONE BITARTRATE AND ACETAMINOPHEN 2 TABLET: 10; 325 TABLET ORAL at 21:55

## 2019-08-08 RX ADMIN — HYDROCORTISONE SODIUM SUCCINATE 250 MG: 250 INJECTION, POWDER, FOR SOLUTION INTRAMUSCULAR; INTRAVENOUS at 05:23

## 2019-08-08 RX ADMIN — CARVEDILOL 3.12 MG: 3.12 TABLET, FILM COATED ORAL at 08:40

## 2019-08-08 RX ADMIN — INSULIN GLARGINE 20 UNITS: 100 INJECTION, SOLUTION SUBCUTANEOUS at 08:35

## 2019-08-08 RX ADMIN — INSULIN LISPRO 3 UNITS: 100 INJECTION, SOLUTION INTRAVENOUS; SUBCUTANEOUS at 17:12

## 2019-08-08 RX ADMIN — CARVEDILOL 3.12 MG: 3.12 TABLET, FILM COATED ORAL at 17:13

## 2019-08-08 RX ADMIN — HYDROCODONE BITARTRATE AND ACETAMINOPHEN 2 TABLET: 10; 325 TABLET ORAL at 05:22

## 2019-08-08 RX ADMIN — DOCUSATE SODIUM 100 MG: 100 CAPSULE, LIQUID FILLED ORAL at 08:39

## 2019-08-08 RX ADMIN — INSULIN GLARGINE 40 UNITS: 100 INJECTION, SOLUTION SUBCUTANEOUS at 20:39

## 2019-08-08 RX ADMIN — SODIUM CHLORIDE, PRESERVATIVE FREE 3 ML: 5 INJECTION INTRAVENOUS at 08:39

## 2019-08-08 RX ADMIN — LEVOTHYROXINE SODIUM 25 MCG: 25 TABLET ORAL at 05:22

## 2019-08-08 RX ADMIN — ASPIRIN 81 MG: 81 TABLET, COATED ORAL at 08:39

## 2019-08-08 RX ADMIN — LOSARTAN POTASSIUM 100 MG: 100 TABLET, FILM COATED ORAL at 08:39

## 2019-08-08 RX ADMIN — DEXAMETHASONE 4.5 MG: 1.5 TABLET ORAL at 17:13

## 2019-08-08 RX ADMIN — PRAVASTATIN SODIUM 20 MG: 20 TABLET ORAL at 20:39

## 2019-08-08 RX ADMIN — SODIUM CHLORIDE, PRESERVATIVE FREE 3 ML: 5 INJECTION INTRAVENOUS at 20:40

## 2019-08-08 RX ADMIN — INSULIN LISPRO 4 UNITS: 100 INJECTION, SOLUTION INTRAVENOUS; SUBCUTANEOUS at 08:38

## 2019-08-08 RX ADMIN — BACLOFEN 10 MG: 10 TABLET ORAL at 05:22

## 2019-08-08 RX ADMIN — ALLOPURINOL 300 MG: 300 TABLET ORAL at 17:15

## 2019-08-08 RX ADMIN — HYDROCODONE BITARTRATE AND ACETAMINOPHEN 2 TABLET: 10; 325 TABLET ORAL at 00:32

## 2019-08-08 RX ADMIN — FAMOTIDINE 20 MG: 10 INJECTION INTRAVENOUS at 08:39

## 2019-08-08 RX ADMIN — AMLODIPINE BESYLATE 10 MG: 10 TABLET ORAL at 17:14

## 2019-08-08 RX ADMIN — INSULIN LISPRO 10 UNITS: 100 INJECTION, SOLUTION INTRAVENOUS; SUBCUTANEOUS at 17:13

## 2019-08-08 RX ADMIN — HYDROMORPHONE HYDROCHLORIDE 1 MG: 1 INJECTION, SOLUTION INTRAMUSCULAR; INTRAVENOUS; SUBCUTANEOUS at 20:39

## 2019-08-08 RX ADMIN — INSULIN LISPRO 4 UNITS: 100 INJECTION, SOLUTION INTRAVENOUS; SUBCUTANEOUS at 21:55

## 2019-08-08 RX ADMIN — HYDROCODONE BITARTRATE AND ACETAMINOPHEN 2 TABLET: 10; 325 TABLET ORAL at 13:08

## 2019-08-08 RX ADMIN — BACLOFEN 10 MG: 10 TABLET ORAL at 13:03

## 2019-08-08 NOTE — PLAN OF CARE
Problem: Patient Care Overview  Goal: Plan of Care Review  Outcome: Ongoing (interventions implemented as appropriate)   08/08/19 7643   Coping/Psychosocial   Plan of Care Reviewed With patient   Plan of Care Review   Progress no change   OTHER   Outcome Summary Pt up with assist x1 and walker. c/o pain, treated with PRN meds. Encouraged to use CPAP.       Problem: Fall Risk (Adult)  Goal: Absence of Fall  Outcome: Ongoing (interventions implemented as appropriate)      Problem: Skin Injury Risk (Adult)  Goal: Skin Health and Integrity  Outcome: Ongoing (interventions implemented as appropriate)

## 2019-08-08 NOTE — PLAN OF CARE
Problem: Patient Care Overview  Goal: Plan of Care Review  Outcome: Ongoing (interventions implemented as appropriate)   08/08/19 1738   Coping/Psychosocial   Plan of Care Reviewed With patient   Plan of Care Review   Progress improving   OTHER   Outcome Summary vital signs stable. LOCX4. no C/O nausea or vomiting. pain well controlled with PRN pain medications. PT continue to report dimished sensation in BiLat Upper Extrem but is improving. Medrol dosePak started this shift no adverse reaction noted. Must encourage PT to use IS. will continue to monitor.        Problem: Pain, Chronic (Adult)  Goal: Acceptable Pain/Comfort Level and Functional Ability  Outcome: Ongoing (interventions implemented as appropriate)   08/08/19 1738   Pain, Chronic (Adult)   Acceptable Pain/Comfort Level and Functional Ability making progress toward outcome       Problem: Fall Risk (Adult)  Goal: Absence of Fall  Outcome: Ongoing (interventions implemented as appropriate)   08/08/19 1738   Fall Risk (Adult)   Absence of Fall making progress toward outcome       Problem: Skin Injury Risk (Adult)  Goal: Skin Health and Integrity  Outcome: Ongoing (interventions implemented as appropriate)   08/08/19 1738   Skin Injury Risk (Adult)   Skin Health and Integrity making progress toward outcome

## 2019-08-08 NOTE — PROGRESS NOTES
"Postoperative visit      Sitting up in chair.  States that he feels better than he did yesterday with the 24-hour course of steroids.  Eating well.  Swallowing is better.      Blood pressure 174/88, pulse 80, temperature 97.8 °F (36.6 °C), temperature source Oral, resp. rate 16, height 172.7 cm (68\"), weight 91.6 kg (201 lb 14.4 oz), SpO2 96 %.        Anterior cervical incision is flat and soft.  Edges well approximated.  No drainage.    Drain removed yesterday. Wearing soft collar.  Voice quality is normal.   Moving all extremities well.  No calf swelling or tenderness on palpation.       .  Results from last 7 days   Lab Units 08/08/19  0558 08/07/19  0446 08/06/19  0524   WBC 10*3/mm3 8.64 8.14 10.15   HEMOGLOBIN g/dL 10.9* 9.9* 10.1*   HEMATOCRIT % 32.7* 30.4* 31.2*   PLATELETS 10*3/mm3 223 206 224         POD 3 C3-5 ACDF for cervical stenosis, cervical myelopathy  Swallowing difficulty; improved with steroids      Continue to mobilize.  Continue po steroid taper  Patient requesting acute rehab eval          "

## 2019-08-08 NOTE — PROGRESS NOTES
Continued Stay Note  [facility]     Patient Name: Chevy Goodwin  MRN: [id number]  Today's Date: 8/8/2019    Admit Date: 8/2/2019    Discharge Plan     Row Name 08/08/19 1614       Plan    Plan Comments  [facility] is not taking patients.  Second choice is [facility] and referral called to [facility].  [place] is third choice.      Row Name 08/08/19 1602       Plan    Plan Comments  Met with patient again and wife.  They had their dgt on the phone who is a nurse  at [employer].  Discussed skilled care again and they requested a referral to [facility] and [facility].  Sent referral to [place] with [facility].    Row Name 08/08/19 1548       Plan    Plan  Home with [facility]    Patient/Family in Agreement with Plan  yes    Plan Comments  Met with patient and spouse.  Spoke with [facility] in acute rehab and patient does not meet their criteria.  Discussed skilled nursing with patient and he does not want to go to skilled care.          Discharge Codes    No documentation.             Soledad Briggs, RN

## 2019-08-08 NOTE — THERAPY TREATMENT NOTE
Patient Name: Chevy Goodwin  : 1940    MRN: [id number]                              Today's Date: 2019       Admit Date: 2019    Visit Dx:     ICD-10-CM ICD-9-CM   1. Bradycardia R00.1 427.89   2. Weakness R53.1 780.79   3. Frequent falls R29.6 V15.88   4. Oropharyngeal dysphagia R13.12 787.22   5. 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 Impaired functional mobility, balance, gait, and endurance Z74.09 V49.89     Patient Active Problem List   Diagnosis   • Pain of metastatic malignancy   • Chronic pain   • Adhesive arachnoiditis   • Degeneration of intervertebral disc of lumbar region   • Low back pain   • Non-Hodgkin's lymphoma (CMS/HCC)   • Postlaminectomy syndrome of lumbar region   • Thoracic disc herniation T9-T10   • Diabetes (CMS/HCC)   • Hypertension   • Long term (current) use of opiate analgesic   • Pain in lateral portion of right knee   • JULIA treated with BiPAP    • Hypersomnia due to medical condition   • Chronic right shoulder pain   • Status post reverse total arthroplasty of right shoulder   • Postlaminectomy syndrome, cervical region   • Waldenstrom macroglobulinemia (CMS/HCC)   • Weakness generalized   • CKD (chronic kidney disease) stage 3, GFR 30-59 ml/min (CMS/HCC)   • Anemia   • Bradycardia   • Fall   • Neck pain   • DDD (degenerative disc disease), cervical   • Spinal stenosis of cervical region   • 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 (CMS/HCC)     Past Medical History:   Diagnosis Date   • Cancer (CMS/HCC)     PROSTATE AND SKIN   • Cataract    • Coronary artery disease    • Diabetes (CMS/HCC)     TYPE 2   • GERD (gastroesophageal reflux disease)    • Gout    • Heart attack (CMS/HCC)     X 7   • High cholesterol    • Hypersomnia    • Hypertension    • Kidney stones    • Low back pain    • MRSA (methicillin resistant staph aureus) culture positive     BILATERAL  ELBOWS, 2015   • Right shoulder pain    • Sleep apnea     C pap   • Waldenstrom macroglobulinemia (CMS/HCC)     2014     Past Surgical History:   Procedure Laterality Date   • ANGIOPLASTY      X 7   • BACK SURGERY      X4   • CARDIAC CATHETERIZATION     • CARPAL TUNNEL RELEASE Right    • CATARACT EXTRACTION WITH INTRAOCULAR LENS IMPLANT     • COLON RESECTION     • COLONOSCOPY     • ESOPHAGOSCOPY / EGD     • EXTRACORPOREAL SHOCK WAVE LITHOTRIPSY (ESWL)     • FINGER SURGERY Left    • GALLBLADDER SURGERY     • HERNIA REPAIR     • INCISION AND DRAINAGE ABSCESS      MULTIPLE   • LAMINECTOMY     • NV REMOVAL OF ELBOW BURSA Left 9/15/2016    Procedure: LT ELBOW I&D W/ BONE CURRETTAGE;  Surgeon: Kirk Gross MD;  Location: [facility] OR [facility];  Service: Orthopedics   • PROSTATE SURGERY     • ROTATOR CUFF REPAIR Right    • SKIN CANCER EXCISION      MULTIPLE   • TOTAL SHOULDER ARTHROPLASTY W/ DISTAL CLAVICLE EXCISION Right 1/10/2019    Procedure: RT TOTAL SHOULDER REVERSE ARTHROPLASTY;  Surgeon: Kirk Gross MD;  Location: [facility] OR [facility];  Service: Orthopedics   • TYMPANOPLASTY Right      General Information     Row Name 08/08/19 1654          PT Evaluation Time/Intention    Subjective Information  no complaints  -     Document Type  therapy note (daily note)  -     Mode of Treatment  physical therapy  -     Patient Effort  good  -     Row Name 08/08/19 1654          General Information    Patient/Family Observations  pt supine in bed  -     Existing Precautions/Restrictions  fall  -     Row Name 08/08/19 1654          Cognitive Assessment/Intervention- PT/OT    Orientation Status (Cognition)  oriented x 4  -     Follows Commands (Cognition)  WFL  -     Safety Deficit (Cognitive)  mild deficit;awareness of need for assistance;insight into deficits/self awareness  -       User Key  (r) = Recorded By, (t) = Taken By, (c) = Cosigned By    Initials Name Provider Type     Kinza Oquendo PTA Physical Therapy  Assistant        Mobility     Row Name 08/08/19 1654          Bed Mobility Assessment/Treatment    Supine-Sit [place] (Bed Mobility)  minimum assist (75% patient effort);verbal cues  -     Sit-Supine [place] (Bed Mobility)  minimum assist (75% patient effort)  -     Bed Mobility, Safety Issues  decreased use of arms for pushing/pulling;decreased use of legs for bridging/pushing  -     Assistive Device (Bed Mobility)  bed rails;head of bed elevated  -     Row Name 08/08/19 1654          Transfer Assessment/Treatment    Stand-Sit [place] (Transfers)  contact guard  -     Row Name 08/08/19 1654          Sit-Stand Transfer    Sit-Stand [place] (Transfers)  contact guard  -     Assistive Device (Sit-Stand Transfers)  walker, front-wheeled  -     Row Name 08/08/19 1654          Gait/Stairs Assessment/Training    [place] Level (Gait)  contact guard  -     Assistive Device (Gait)  walker, front-wheeled  -EH     Distance in Feet (Gait)  80  -EH     Pattern (Gait)  step-through  -     Deviations/Abnormal Patterns (Gait)  gait speed decreased;stride length decreased;stephanie decreased  -     Bilateral Gait Deviations  weight shift ability decreased  -       User Key  (r) = Recorded By, (t) = Taken By, (c) = Cosigned By    Initials Name Provider Type     Kinza Oquendo PTA Physical Therapy Assistant        Obj/Interventions    No documentation.       Goals/Plan    No documentation.       Clinical Impression     Row Name 08/08/19 1655 08/08/19 0830       Plan of Care Review    Plan of Care Reviewed With  patient  -EH  patient  -EL    Row Name 08/08/19 1654          Positioning and Restraints    Pre-Treatment Position  in bed  -     Post Treatment Position  bed  -EH     In Bed  supine;call light within reach;encouraged to call for assist;exit alarm on;with family/caregiver  -       User Key  (r) = Recorded By, (t) = Taken By, (c) = Cosigned By    Initials Name Provider Type     Jordyn Mccormick RN Registered Nurse    Kinza Shaffer PTA Physical Therapy Assistant        Outcome Measures     Row Name 08/08/19 1600          How much help from another person do you currently need...    Turning from your back to your side while in flat bed without using bedrails?  3  -EH     Moving from lying on back to sitting on the side of a flat bed without bedrails?  3  -EH     Moving to and from a bed to a chair (including a wheelchair)?  3  -EH     Standing up from a chair using your arms (e.g., wheelchair, bedside chair)?  3  -EH     Climbing 3-5 steps with a railing?  2  -EH     To walk in hospital room?  3  -EH     AM-PAC 6 Clicks Score (PT)  17  -       User Key  (r) = Recorded By, (t) = Taken By, (c) = Cosigned By    Initials Name Provider Type    Kinza Shaffer PTA Physical Therapy Assistant        Physical Therapy Education     Title: PT OT SLP Therapies (In Progress)     Topic: Physical Therapy (Done)     Point: Mobility training (Done)     Learning Progress Summary           Patient Acceptance, E,D, VU,NR by  at 8/8/2019  4:54 PM    Acceptance, E, NR by AR at 8/7/2019  9:41 AM    Acceptance, E,D, VU,NR by  at 8/6/2019  4:46 PM    Acceptance, E, VU,DU by DO at 8/4/2019 11:12 AM                   Point: Home exercise program (Done)     Learning Progress Summary           Patient Acceptance, E,D, VU,NR by  at 8/8/2019  4:54 PM    Acceptance, E, NR by AR at 8/7/2019  9:41 AM    Acceptance, E,D, VU,NR by  at 8/6/2019  4:46 PM    Acceptance, E, VU,DU by DO at 8/4/2019 11:12 AM                   Point: Body mechanics (Done)     Learning Progress Summary           Patient Acceptance, E,D, VU,NR by  at 8/8/2019  4:54 PM    Acceptance, E, NR by AR at 8/7/2019  9:41 AM    Acceptance, E,D, VU,NR by  at 8/6/2019  4:46 PM    Acceptance, E, VU,DU by DO at 8/4/2019 11:12 AM                   Point: Precautions (Done)     Learning Progress Summary           Patient Acceptance, E,D,  ASHLYN,NR by  at 8/8/2019  4:54 PM    Acceptance, RA, NR by AR at 8/7/2019  9:41 AM    Acceptance, LIZBETH KNAPP VU,NR by  at 8/6/2019  4:46 PM    Acceptance, ASHLYN KNAPPDU by  at 8/4/2019 11:12 AM                               User Key     Initials Effective Dates Name Provider Type Discipline    AR 04/03/18 -  Shraddha Parsons, PT Physical Therapist PT     03/07/18 -  Bryson Atkinson, PT Physical Therapist PT     08/19/18 -  Kinza Oquendo PTA Physical Therapy Assistant PT              PT Recommendation and Plan     Plan of Care Reviewed With: patient  Progress: improving  Outcome Summary: pt tolerated treatment with no complaints. Pt is Deborah for bed mobility and CGA for sit/stand transfers. Pt ambulated 80 feet with rwx, CGA. Pt is at times unsteady on feet during ambulation especially around turns. pt will continue to benefit from skilled PT to improve strength/balance and overall mobility to decrease falls risk.      Time Calculation:   PT Charges     Row Name 08/08/19 1653             Time Calculation    Start Time  1405  -      Stop Time  1425  -      Time Calculation (min)  20 min  -      PT Received On  08/08/19  -      PT - Next Appointment  08/09/19  -         Time Calculation- PT    Total Timed Code Minutes- PT  20 minute(s)  -        User Key  (r) = Recorded By, (t) = Taken By, (c) = Cosigned By    Initials Name Provider Type     Kinza Oquendo PTA Physical Therapy Assistant        Therapy Charges for Today     Code Description Service Date Service Provider Modifiers Qty    [id number] HC PT THER PROC EA 15 MIN 8/8/2019 Kinza Oquendo PTA GP 1          PT G-Codes  Outcome Measure Options: AM-PAC 6 Clicks Daily Activity (OT)  AM-PAC 6 Clicks Score (PT): 17  AM-PAC 6 Clicks Score (OT): 15    Kinza Oquendo PTA  8/8/2019

## 2019-08-08 NOTE — NURSING NOTE
Initial eval started, chart reviewed and therapies noted. Patient swallow WFL per ST note. PT notes 8/7 indicate patient transfers withCGA and ambulates  ft. With a rwx. Currently patient is too high level for inpatient acute rehab. Would anticipate home with [facility] Vs. Subacute rehab stay at d/c. Discussed with CCP.    Mago Joseph RN  Rehab Admissions Nurse  [phone]

## 2019-08-08 NOTE — PROGRESS NOTES
"     LOS: 6 days   Primary Care Physician: Anthony Gutierrez MD     Subjective   Sleeping but awakened easily.  Swallowing is a little bit better.    Vital Signs  Body mass index is 30.7 kg/m².  Temp:  [97.8 °F (36.6 °C)-98.5 °F (36.9 °C)] 97.8 °F (36.6 °C)  Heart Rate:  [66-97] 80  Resp:  [16-18] 16  BP: (137-174)/(66-88) 174/88      Objective:  General Appearance:  In no acute distress.    Vital signs: (most recent): Blood pressure 174/88, pulse 80, temperature 97.8 °F (36.6 °C), temperature source Oral, resp. rate 16, height 172.7 cm (68\"), weight 91.6 kg (201 lb 14.4 oz), SpO2 96 %.    HEENT: (Soft neck collar in place.  Bandage anterior neck)    Lungs:  He is not in respiratory distress.  No stridor.  There are rales and decreased breath sounds.  No wheezes or rhonchi.  (A few crackles at bases)  Heart: Normal rate.  Regular rhythm.  Positive for murmur.  (Grade 3/6 systolic murmur right left sternal borders)  Abdomen: Abdomen is soft and non-distended.  Bowel sounds are normal.   There is no abdominal tenderness.   There is no splenomegaly. There is no hepatomegaly.   Extremities: There is dependent edema.  (Trace left ankle)  Neurological: Patient is alert.    Skin:  Warm.          Results Review:    I reviewed the patient's new clinical results.    Results from last 7 days   Lab Units 08/08/19  0558 08/07/19  0446   WBC 10*3/mm3 8.64 8.14   HEMOGLOBIN g/dL 10.9* 9.9*   PLATELETS 10*3/mm3 223 206     Results from last 7 days   Lab Units 08/08/19  0558 08/06/19  0524   SODIUM mmol/L 135* 137   POTASSIUM mmol/L 4.4 4.5   CHLORIDE mmol/L 98 99   CO2 mmol/L 24.7 24.4   BUN mg/dL 27* 34*   CREATININE mg/dL 0.97 1.17   CALCIUM mg/dL 9.5 9.6   GLUCOSE mg/dL 206* 169*         Hemoglobin A1C:  Lab Results   Component Value Date    HGBA1C 6.39 (H) 08/02/2019       Glucose Range:  Glucose   Date/Time Value Ref Range Status   08/08/2019 0727 201 (H) 70 - 130 mg/dL Final   08/07/2019 2108 318 (H) 70 - 130 mg/dL " Final   08/07/2019 1606 154 (H) 70 - 130 mg/dL Final   08/07/2019 1114 112 70 - 130 mg/dL Final   08/07/2019 0712 109 70 - 130 mg/dL Final   08/06/2019 2142 122 70 - 130 mg/dL Final       Lab Results   Component Value Date    AQZEGHRO23 1,662 (H) 08/03/2019       Lab Results   Component Value Date    TSH 1.190 08/03/2019       Assessment & Plan      Medication Review: Yes    Active Hospital Problems    Diagnosis  POA   • **Spinal stenosis of cervical region [M48.02]  Unknown   • Waldenstrom macroglobulinemia (CMS/HCC) [C88.0]  Unknown   • Weakness generalized [R53.1]  Yes   • CKD (chronic kidney disease) stage 3, GFR 30-59 ml/min (CMS/HCC) [N18.3]  Yes   • Anemia [D64.9]  Yes   • Bradycardia [R00.1]  Yes   • Fall [W19.XXXA]  Yes   • Neck pain [M54.2]  Yes   • DDD (degenerative disc disease), cervical [M50.30]  Unknown   • 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       Assessment/Plan  1.  Severe cervical stenosis, anterior cervical discectomy with fusion 8/5/2019.  Dysphagia yesterday.  On IV steroids x24 hours per neurosurgery.  Symptoms are better.  Continue PT and OT.  2.  Diabetes mellitus type 2 with hyperglycemia due to IV steroids.  Continue Lantus, mealtime insulin and sliding scale.  Adjust as needed  3.  Chronic kidney disease stage III although creatinine now normal.  He was on hydrochlorothiazide at home (as well as metformin).  Volume status looks okay though may start to retain fluid/sodium with the steroids.  Follow clinically  4.  Hypertension.  Blood pressures have been up and down.  Continue losartan and  Norvasc  5.  Weakness.  PT recommends subacute rehab but patient prefers home.  Will see how he does over the next day or so.    Kendra Parker MD  08/08/19  9:09 AM

## 2019-08-08 NOTE — PROGRESS NOTES
Continued Stay Note  [facility]     Patient Name: Chevy Goodwin  MRN: [id number]  Today's Date: 8/8/2019    Admit Date: 8/2/2019    Discharge Plan     Row Name 08/08/19 1545       Plan    Plan  Home with [facility]    Patient/Family in Agreement with Plan  yes    Plan Comments  Met with patient and spouse.  Spoke with Mago in acute rehab and patient does not meet their criteria.  Discussed skilled nursing with patient and he does not want to go to skilled care.  Patient ambulated 100 ft with contact guard assist and transfers with contact guard assist.  Wife is available to assist as needed.        Discharge Codes    No documentation.             Soledad Briggs RN

## 2019-08-08 NOTE — PROGRESS NOTES
Chevy Chapaming   79 y.o.  male    LOS: 6 days   Patient Care Team:  Anthony Gutierrez MD as PCP - General (Internal Medicine)  Anthony Gutierrez MD as PCP - Internal Medicine  Charisse Winters APRN as PCP - Claims Attributed  Bubba Hernandez MD as Consulting Physician (Cardiology)  Girma Olsen MD as Consulting Physician (Urology)  Nickolas Olsen MD as Consulting Physician (Dermatology)  Kirk Gross MD as Consulting Physician (Orthopedic Surgery)      Subjective   Complaints of dysphagia which has improved some with steroids.  Interval History:     Patient Complaints:     Review of Systems:       Medication Review:   Current Facility-Administered Medications:   •  acetaminophen (TYLENOL) tablet 650 mg, 650 mg, Oral, Q4H PRN, Corinne Lin APRN, 650 mg at 08/02/19 1726  •  allopurinol (ZYLOPRIM) tablet 300 mg, 300 mg, Oral, Q PM, Kendra Parker MD, 300 mg at 08/06/19 1711  •  amLODIPine (NORVASC) tablet 10 mg, 10 mg, Oral, Q PM, Kendra Parker MD, 10 mg at 08/07/19 2026  •  aspirin EC tablet 81 mg, 81 mg, Oral, Daily, Kendra Parker MD, 81 mg at 08/08/19 0839  •  azelastine (ASTELIN) nasal spray 2 spray, 2 spray, Each Nare, BID PRN, Kendra Parker MD  •  baclofen (LIORESAL) tablet 10 mg, 10 mg, Oral, Q8H, Kendra Parker MD, 10 mg at 08/08/19 0522  •  carvedilol (COREG) tablet 3.125 mg, 3.125 mg, Oral, BID With Meals, Kathie Rivera APRN, 3.125 mg at 08/08/19 0840  •  dextrose (D50W) 25 g/ 50mL Intravenous Solution 25 g, 25 g, Intravenous, Q15 Min PRN, Kendra Parker MD  •  dextrose (GLUTOSE) oral gel 15 g, 15 g, Oral, Q15 Min PRN, Kendra Parker MD  •  docusate sodium (COLACE) capsule 100 mg, 100 mg, Oral, Daily, Kendra Parker MD, 100 mg at 08/08/19 0839  •  famotidine (PEPCID) injection 20 mg, 20 mg, Intravenous, Q12H, Johanna Randhawa PA-C, 20 mg at 08/08/19 0839  •  glucagon (human recombinant) (GLUCAGEN DIAGNOSTIC) injection 1 mg, 1 mg, Subcutaneous, PRN,  Kendra Parker MD  •  HYDROcodone-acetaminophen (NORCO)  MG per tablet 1 tablet, 1 tablet, Oral, Q4H PRN, 1 tablet at 08/03/19 0829 **OR** HYDROcodone-acetaminophen (NORCO)  MG per tablet 2 tablet, 2 tablet, Oral, Q4H PRN, Kendra Parker MD, 2 tablet at 08/08/19 0522  •  HYDROmorphone (DILAUDID) injection 0.5 mg, 0.5 mg, Intravenous, Q4H PRN, 0.5 mg at 08/05/19 0613 **OR** HYDROmorphone (DILAUDID) injection 1 mg, 1 mg, Intravenous, Q4H PRN, Kendra Parker MD, 1 mg at 08/07/19 2137  •  insulin glargine (LANTUS) injection 20 Units, 20 Units, Subcutaneous, QAM, Kendra Parker MD, 20 Units at 08/08/19 0835  •  insulin glargine (LANTUS) injection 40 Units, 40 Units, Subcutaneous, Nightly, Kendra Parker MD, 40 Units at 08/07/19 2003  •  insulin lispro (humaLOG) injection 0-9 Units, 0-9 Units, Subcutaneous, 4x Daily With Meals & Nightly, Kendra Parker MD, 4 Units at 08/08/19 0838  •  insulin lispro (humaLOG) injection 10 Units, 10 Units, Subcutaneous, TID With Meals, Kendra Parker MD, 10 Units at 08/08/19 0839  •  isosorbide mononitrate (IMDUR) 24 hr tablet 60 mg, 60 mg, Oral, Q PM, Lexy Alvarez MD, 60 mg at 08/07/19 2026  •  levothyroxine (SYNTHROID, LEVOTHROID) tablet 25 mcg, 25 mcg, Oral, Q AM, Kendra Parker MD, 25 mcg at 08/08/19 0522  •  losartan (COZAAR) tablet 100 mg, 100 mg, Oral, Daily, Kendra Parker MD, 100 mg at 08/08/19 0839  •  melatonin tablet 10 mg, 10 mg, Oral, Nightly, Kendra Parker MD, 10 mg at 08/07/19 2026  •  nitroglycerin (NITROSTAT) SL tablet 0.4 mg, 0.4 mg, Sublingual, Q5 Min PRN, Kendra Parker MD  •  ondansetron (ZOFRAN) tablet 4 mg, 4 mg, Oral, Q6H PRN **OR** ondansetron (ZOFRAN) injection 4 mg, 4 mg, Intravenous, Q6H PRN, John Singleton MD, 4 mg at 08/07/19 1747  •  pravastatin (PRAVACHOL) tablet 20 mg, 20 mg, Oral, Nightly, Kendra Parker MD, 20 mg at 08/07/19 2026  •  promethazine (PHENERGAN) tablet 12.5 mg, 12.5 mg, Oral, Q6H PRN, John Singleton  MD MILO  •  [COMPLETED] Insert peripheral IV, , , Once **AND** sodium chloride 0.9 % flush 10 mL, 10 mL, Intravenous, PRN, Jean-Pierre Hill PA, 10 mL at 08/06/19 1711  •  sodium chloride 0.9 % flush 3 mL, 3 mL, Intravenous, Q12H, Corinne Lin APRN, 3 mL at 08/08/19 0839  •  sodium chloride 0.9 % flush 3 mL, 3 mL, Intravenous, Q12H, John Singleton MD, 3 mL at 08/08/19 0839  •  sodium chloride 0.9 % flush 3-10 mL, 3-10 mL, Intravenous, PRN, Corinne Lin APRN  •  sodium chloride 0.9 % flush 3-10 mL, 3-10 mL, Intravenous, PRN, John Singleton MD      Objective   Vital Sign Min/Max for last 24 hours  Temp  Min: 97.8 °F (36.6 °C)  Max: 98.5 °F (36.9 °C)   BP  Min: 137/66  Max: 174/88    Pulse  Min: 66  Max: 97     Wt Readings from Last 3 Encounters:   08/05/19 91.6 kg (201 lb 14.4 oz)   06/26/19 94.1 kg (207 lb 6 oz)   05/29/19 95.1 kg (209 lb 9.6 oz)        Intake/Output Summary (Last 24 hours) at 8/8/2019 1059  Last data filed at 8/7/2019 1941  Gross per 24 hour   Intake 100 ml   Output 550 ml   Net -450 ml     Physical Exam:      General Appearance:    Well developed and well nourished in no acute distress   Head:    Normocephalic, atraumatic   Eyes:            Conjunctivae normal, non icteric, no xanthelasma   Neck:   supple, trachea midline, no thyromegaly, no carotid bruit, no jvd, no elevated cvp   Lungs:     Clear to auscultation,respirations regular, even and                  unlabored    Heart:    Regular rhythm and normal rate, normal S1 and S2,            No murmur, no gallop, no rub, no click   Chest Wall:    No abnormalities observed   Abdomen:     Normal bowel sounds, no masses, no organomegaly, soft        non-tender, non-distended, no guarding, no rebound                tenderness   Rectal:     Deferred   Extremities:   No edema. Moves all extremities well, no cyanosis, no erythema   Pulses:   Pulses palpable and equal bilaterally   Skin:   No bleeding, bruising or rash   Neurologic:   awake alert  and oriented x3, speech clear and approp, no facial drooping     :    Monitor:      Results Review:         Sodium Sodium   Date Value Ref Range Status   08/08/2019 135 (L) 136 - 145 mmol/L Final   08/06/2019 137 136 - 145 mmol/L Final      Potassium Potassium   Date Value Ref Range Status   08/08/2019 4.4 3.5 - 5.2 mmol/L Final   08/06/2019 4.5 3.5 - 5.2 mmol/L Final      Chloride Chloride   Date Value Ref Range Status   08/08/2019 98 98 - 107 mmol/L Final   08/06/2019 99 98 - 107 mmol/L Final      Bicarbonate No results found for: PLASMABICARB   BUN BUN   Date Value Ref Range Status   08/08/2019 27 (H) 8 - 23 mg/dL Final   08/06/2019 34 (H) 8 - 23 mg/dL Final      Creatinine Creatinine   Date Value Ref Range Status   08/08/2019 0.97 0.76 - 1.27 mg/dL Final   08/06/2019 1.17 0.76 - 1.27 mg/dL Final      Calcium Calcium   Date Value Ref Range Status   08/08/2019 9.5 8.6 - 10.5 mg/dL Final   08/06/2019 9.6 8.6 - 10.5 mg/dL Final      Magnesium No results found for: MG     Results from last 7 days   Lab Units 08/08/19  0558   WBC 10*3/mm3 8.64   HEMOGLOBIN g/dL 10.9*   HEMATOCRIT % 32.7*   PLATELETS 10*3/mm3 223     Lab Results   Lab Value Date/Time    TROPONINT <0.010 08/06/2019 0524    TROPONINT <0.010 08/02/2019 1122     No results found for: CHOL  No results found for: HDL  No results found for: LDL  No results found for: TRIG  No components found for: CHOLHDL  No results found for: PTT  No components found for: PT/INR  Lab Results   Component Value Date    HGBA1C 6.39 (H) 08/02/2019      Lab Results   Component Value Date    TSH 1.190 08/03/2019        Echo EF Estimated  No results found for: ECHOEFEST      Assessment/ Plan    Active Hospital Problems    Diagnosis POA   • **Spinal stenosis of cervical region [M48.02] Unknown   • Waldenstrom macroglobulinemia (CMS/HCC) [C88.0] Unknown     2014     • Weakness generalized [R53.1] Yes   • CKD (chronic kidney disease) stage 3, GFR 30-59 ml/min (CMS/[facility]) [N18.3] Yes    • Anemia [D64.9] Yes   • Bradycardia [R00.1] Yes   • Fall [W19.XXXA] Yes   • Neck pain [M54.2] Yes   • DDD (degenerative disc disease), cervical [M50.30] Unknown   • 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  To rehab soon  Will follow up as needed          Wendy Anthony MD  08/08/19  10:59 AM

## 2019-08-08 NOTE — DISCHARGE PLACEMENT REQUEST
"Wayne Brown (79 y.o. Male)     Date of Birth Social Security Number Address Home Phone MRN    1940  [address] [address] [phone] [id number]    Sabianist Marital Status          Tenriism        Admission Date Admission Type Admitting Provider Attending Provider Department, Room/Bed    8/2/19 Emergency Kendra Parker MD Hayden, Juliana, MD [address], 97/1    Discharge Date Discharge Disposition Discharge Destination                       Attending Provider:  Kendra Parker MD    Allergies:  Contrast Dye, Oxycodone, Adhesive Tape    Isolation:  None   Infection:  MRSA/History Only (01/10/19)   Code Status:  CPR    Ht:  172.7 cm (68\")   Wt:  91.6 kg (201 lb 14.4 oz)    Admission Cmt:  None   Principal Problem:  Spinal stenosis of cervical region [M48.02]                 Active Insurance as of 8/2/2019     Primary Coverage     Payor Plan Insurance Group Employer/Plan Group    MEDICARE MEDICARE A & B      Payor Plan Address Payor Plan Phone Number Payor Plan Fax Number Effective Dates    [address] [phone]  10/1/2004 - None Entered    [facility] [id number]       Subscriber Name Subscriber Birth Date Member ID       WAYNE BROWN 1940 [id number]           Secondary Coverage     Payor Plan Insurance Group Employer/Plan Group     FOR LIFE  FOR LIFE  SUPP      Payor Plan Address Payor Plan Phone Number Payor Plan Fax Number Effective Dates    [address] [phone]  8/1/2007 - None Entered    [facility] [address]       Subscriber Name Subscriber Birth Date Member ID       WAYNE BROWN 1940 [id number]                 Emergency Contacts      (Rel.) Home Phone Work Phone Mobile Phone    Cornelia Brown (Spouse) [phone] -- [phone]              "

## 2019-08-08 NOTE — PLAN OF CARE
Problem: Patient Care Overview  Goal: Plan of Care Review  Outcome: Ongoing (interventions implemented as appropriate)   08/08/19 1427   Coping/Psychosocial   Plan of Care Reviewed With patient   Plan of Care Review   Progress improving   OTHER   Outcome Summary pt tolerated treatment with no complaints. Pt is Deborah for bed mobility and CGA for sit/stand transfers. Pt ambulated 80 feet with rwx, CGA. Pt is at times unsteady on feet during ambulation especially around turns. pt will continue to benefit from skilled PT to improve strength/balance and overall mobility to decrease falls risk.

## 2019-08-09 VITALS
OXYGEN SATURATION: 94 % | WEIGHT: 201.9 LBS | SYSTOLIC BLOOD PRESSURE: 159 MMHG | RESPIRATION RATE: 16 BRPM | HEIGHT: 68 IN | HEART RATE: 75 BPM | DIASTOLIC BLOOD PRESSURE: 78 MMHG | BODY MASS INDEX: 30.6 KG/M2 | TEMPERATURE: 98 F

## 2019-08-09 LAB
GLUCOSE BLDC GLUCOMTR-MCNC: 188 MG/DL (ref 70–130)
GLUCOSE BLDC GLUCOMTR-MCNC: 273 MG/DL (ref 70–130)

## 2019-08-09 PROCEDURE — 99024 POSTOP FOLLOW-UP VISIT: CPT | Performed by: PHYSICIAN ASSISTANT

## 2019-08-09 PROCEDURE — 82962 GLUCOSE BLOOD TEST: CPT

## 2019-08-09 PROCEDURE — 63710000001 INSULIN LISPRO (HUMAN) PER 5 UNITS: Performed by: INTERNAL MEDICINE

## 2019-08-09 PROCEDURE — 63710000001 INSULIN GLARGINE PER 5 UNITS: Performed by: INTERNAL MEDICINE

## 2019-08-09 PROCEDURE — 97110 THERAPEUTIC EXERCISES: CPT

## 2019-08-09 RX ORDER — INSULIN GLARGINE 100 [IU]/ML
40 INJECTION, SOLUTION SUBCUTANEOUS NIGHTLY
Refills: 12
Start: 2019-08-09 | End: 2020-05-22 | Stop reason: SDUPTHER

## 2019-08-09 RX ORDER — DEXAMETHASONE 1.5 MG/1
4.5 TABLET ORAL 2 TIMES DAILY WITH MEALS
Qty: 3 TABLET | Refills: 0
Start: 2019-08-09 | End: 2019-08-10

## 2019-08-09 RX ORDER — DEXAMETHASONE 1.5 MG/1
3 TABLET ORAL 2 TIMES DAILY WITH MEALS
Qty: 8 TABLET | Refills: 0
Start: 2019-08-10 | End: 2019-08-12

## 2019-08-09 RX ORDER — DEXAMETHASONE 1.5 MG/1
1.5 TABLET ORAL 2 TIMES DAILY WITH MEALS
Qty: 4 TABLET | Refills: 0
Start: 2019-08-12 | End: 2019-08-14

## 2019-08-09 RX ORDER — ONDANSETRON 4 MG/1
4 TABLET, FILM COATED ORAL EVERY 6 HOURS PRN
Start: 2019-08-09 | End: 2020-05-22

## 2019-08-09 RX ORDER — HYDROCODONE BITARTRATE AND ACETAMINOPHEN 10; 325 MG/1; MG/1
2 TABLET ORAL EVERY 4 HOURS PRN
Qty: 36 TABLET | Refills: 0 | Status: SHIPPED | OUTPATIENT
Start: 2019-08-09 | End: 2019-08-12

## 2019-08-09 RX ORDER — INSULIN GLARGINE 100 [IU]/ML
20 INJECTION, SOLUTION SUBCUTANEOUS EVERY MORNING
Refills: 12
Start: 2019-08-10 | End: 2020-05-22 | Stop reason: ALTCHOICE

## 2019-08-09 RX ORDER — HYDROCHLOROTHIAZIDE 25 MG/1
12.5 TABLET ORAL DAILY
Start: 2019-08-09 | End: 2020-05-22 | Stop reason: ALTCHOICE

## 2019-08-09 RX ORDER — CARVEDILOL 3.12 MG/1
3.12 TABLET ORAL 2 TIMES DAILY WITH MEALS
Start: 2019-08-09 | End: 2019-12-23

## 2019-08-09 RX ADMIN — SODIUM CHLORIDE, PRESERVATIVE FREE 3 ML: 5 INJECTION INTRAVENOUS at 08:41

## 2019-08-09 RX ADMIN — BACLOFEN 10 MG: 10 TABLET ORAL at 06:06

## 2019-08-09 RX ADMIN — DEXAMETHASONE 4.5 MG: 1.5 TABLET ORAL at 08:41

## 2019-08-09 RX ADMIN — INSULIN GLARGINE 20 UNITS: 100 INJECTION, SOLUTION SUBCUTANEOUS at 07:44

## 2019-08-09 RX ADMIN — LEVOTHYROXINE SODIUM 25 MCG: 25 TABLET ORAL at 06:06

## 2019-08-09 RX ADMIN — INSULIN LISPRO 10 UNITS: 100 INJECTION, SOLUTION INTRAVENOUS; SUBCUTANEOUS at 08:39

## 2019-08-09 RX ADMIN — INSULIN LISPRO 2 UNITS: 100 INJECTION, SOLUTION INTRAVENOUS; SUBCUTANEOUS at 08:39

## 2019-08-09 RX ADMIN — BACLOFEN 10 MG: 10 TABLET ORAL at 13:55

## 2019-08-09 RX ADMIN — CARVEDILOL 3.12 MG: 3.12 TABLET, FILM COATED ORAL at 08:42

## 2019-08-09 RX ADMIN — INSULIN LISPRO 10 UNITS: 100 INJECTION, SOLUTION INTRAVENOUS; SUBCUTANEOUS at 12:56

## 2019-08-09 RX ADMIN — ACETAMINOPHEN 325 MG: 325 TABLET, FILM COATED ORAL at 13:58

## 2019-08-09 RX ADMIN — DOCUSATE SODIUM 100 MG: 100 CAPSULE, LIQUID FILLED ORAL at 08:43

## 2019-08-09 RX ADMIN — SODIUM CHLORIDE, PRESERVATIVE FREE: 5 INJECTION INTRAVENOUS at 08:40

## 2019-08-09 RX ADMIN — ASPIRIN 81 MG: 81 TABLET, COATED ORAL at 08:42

## 2019-08-09 RX ADMIN — HYDROCODONE BITARTRATE AND ACETAMINOPHEN 2 TABLET: 10; 325 TABLET ORAL at 13:00

## 2019-08-09 RX ADMIN — INSULIN LISPRO 6 UNITS: 100 INJECTION, SOLUTION INTRAVENOUS; SUBCUTANEOUS at 12:57

## 2019-08-09 RX ADMIN — LOSARTAN POTASSIUM 100 MG: 100 TABLET, FILM COATED ORAL at 08:42

## 2019-08-09 RX ADMIN — FAMOTIDINE 20 MG: 10 INJECTION INTRAVENOUS at 08:43

## 2019-08-09 RX ADMIN — HYDROCODONE BITARTRATE AND ACETAMINOPHEN 2 TABLET: 10; 325 TABLET ORAL at 06:06

## 2019-08-09 NOTE — DISCHARGE SUMMARY
"                                                                   PHYSICIAN DISCHARGE SUMMARY                                                                        [facility]    Patient Identification:  Name: Chevy Goodwin  Age: 79 y.o.  Sex: male  :  1940  MRN: [id number]  Primary Care Physician: Anthony Gutierrez MD    Admit date: 2019  Discharge date and time: 2019     Discharged Condition: good    Discharge Diagnoses:      Spinal stenosis of cervical region:  S/p C3-C5 anterior cervical discectomy, fusion and instrumentation 19       Chronic pain    Long term (current) use of opiate analgesic    Diabetes     Hypertension    JULIA treated with BiPAP     Waldenstrom macroglobulinemia (CMS/HCC)    Weakness generalized    CKD (chronic kidney disease) stage 3, GFR 30-59 ml/min (CMS/HCC)    Anemia    Bradycardia    Fall    Neck pain    DDD (degenerative disc disease), cervical    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  Please H&P for full details.  Initial evaluation included CT scan of the neck which did show severe cervical spondylosis.  This was followed up with MRI which revealed the following:  \"Cord compression at C3-4 and severe stenosis at C4-5. Cord deformity is slightly less prominent at C4-5. There is increased signal intensity involving the cord at C3-4 on the sagittal T2 sequence. This may be secondary to myelomalacia although an element of cord edema could not be excluded.\"  Neurosurgical consultation was obtained.  In addition he was also noted to be bradycardic and his cardiologist was called in the case also.  He was on Coreg and his dose was decreased and he had a good response heart rate wise.  After clearance from cardiology he underwent C3-C5 anterior cervical discectomy, fusion and instrumentation on " August 5.  It appears that postop he did have some issues with dysphasia.  Speech therapy was consulted he did go on a course of steroids.  This is improved considerably he is denying any issues swallowing right now.  He does states he always has issues with chewing his food however he requests a soft diet.  He does have diabetes and with the steroids his blood sugars have been going up.  He is presently on a sliding scale which hopefully can be weaned off if the steroids are weaned off.  His diuretics have also been put on hold.  He is on a regime of taking Bumex during the weekdays and hydrochlorothiazide on weekends.  These have been on hold throughout the hospitalization.  His blood pressure is starting to drift up so on discharge I will put him back on low-dose hydrochlorothiazide and this will need to be followed up both a BMP next week which I have ordered as well as monitoring blood pressures.      Consults:     Consults     Date and Time Order Name Status Description    8/2/2019 1822 Inpatient Neurosurgery Consult Completed     8/2/2019 1629 Inpatient Cardiology Consult      8/2/2019 1429 custodial (on-call MD unless specified) Completed     8/2/2019 1305 LHA (on-call MD unless specified) Details Completed             Discharge Exam:  Physical Exam   Afebrile vital signs stable.  Well-developed well-nourished male no apparent distress.  Lungs clear to auscultation good air movement.  Heart regular rate and rhythm.  Extremities with no clubbing cyanosis edema.  Alert and oriented and conversant.  On entering the room he was ambulating from the bathroom to his bed with a walker without assistance.     Disposition:  Rehab    Patient Instructions:      Discharge Medications      New Medications      Instructions Start Date   dexamethasone 1.5 MG (21) tablet therapy pack  Commonly known as:  DECADRON   4.5 mg, Oral, 2 Times Daily With Meals      dexamethasone 1.5 MG (21) tablet therapy pack  Commonly known as:   DECADRON   3 mg, Oral, 2 Times Daily With Meals   Start Date:  8/10/2019     dexamethasone 1.5 MG (21) tablet therapy pack  Commonly known as:  DECADRON   1.5 mg, Oral, 2 Times Daily With Meals   Start Date:  8/12/2019     insulin glargine 100 UNIT/ML injection  Commonly known as:  LANTUS   40 Units, Subcutaneous, Nightly      insulin glargine 100 UNIT/ML injection  Commonly known as:  LANTUS  Replaces:  LANTUS SOLOSTAR 100 UNIT/ML injection pen   20 Units, Subcutaneous, Every Morning   Start Date:  8/10/2019     insulin lispro 100 UNIT/ML injection  Commonly known as:  humaLOG   10 Units, Subcutaneous, 3 Times Daily With Meals      insulin lispro 100 UNIT/ML injection  Commonly known as:  humaLOG   0-9 Units, Subcutaneous, 4 Times Daily With Meals & Nightly      ondansetron 4 MG tablet  Commonly known as:  ZOFRAN   4 mg, Oral, Every 6 Hours PRN         Changes to Medications      Instructions Start Date   carvedilol 3.125 MG tablet  Commonly known as:  COREG  What changed:    · medication strength  · how much to take   3.125 mg, Oral, 2 Times Daily With Meals      hydrochlorothiazide 25 MG tablet  Commonly known as:  HYDRODIURIL  What changed:    · how much to take  · when to take this  · additional instructions   12.5 mg, Oral, Daily      HYDROcodone-acetaminophen  MG per tablet  Commonly known as:  NORCO  What changed:    · how much to take  · reasons to take this  · additional instructions   2 tablets, Oral, Every 4 Hours PRN, Wean as tolerated.         Continue These Medications      Instructions Start Date   allopurinol 300 MG tablet  Commonly known as:  ZYLOPRIM   300 mg, Oral, Every Evening      amLODIPine 10 MG tablet  Commonly known as:  NORVASC   10 mg, Oral, Every Evening      aspirin 81 MG tablet   81 mg, Oral, Daily      azelastine 0.1 % nasal spray  Commonly known as:  ASTELIN   2 sprays, Nasal, 2 Times Daily PRN      baclofen 10 MG tablet  Commonly known as:  LIORESAL   10 mg, Oral, 4 Times  Daily      calcium carbonate-cholecalciferol 500-400 MG-UNIT tablet tablet   1 tablet, Oral, Daily      cetirizine 10 MG tablet  Commonly known as:  zyrTEC   10 mg, Oral, Daily      [employer] GLUCOSAMINE-CHONDROITIN tablet   1 tablet, Oral, Every Morning      docusate sodium 100 MG capsule  Commonly known as:  COLACE   100 mg, Oral, Daily      isosorbide mononitrate 30 MG 24 hr tablet  Commonly known as:  IMDUR   30 mg, Every Evening      levothyroxine 25 MCG tablet  Commonly known as:  SYNTHROID, LEVOTHROID   Oral, Daily      losartan 100 MG tablet  Commonly known as:  COZAAR   100 mg, Oral, Daily      melatonin 5 MG tablet tablet   10 mg, Oral, Nightly      metFORMIN 500 MG tablet  Commonly known as:  GLUCOPHAGE   No dose, route, or frequency recorded.      nitroglycerin 0.4 MG SL tablet  Commonly known as:  NITROSTAT   0.4 mg, Oral, Every 5 Minutes PRN      pantoprazole 40 MG EC tablet  Commonly known as:  PROTONIX   No dose, route, or frequency recorded.      pravastatin 40 MG tablet  Commonly known as:  PRAVACHOL   No dose, route, or frequency recorded.      vitamin B-12 100 MCG tablet  Commonly known as:  CYANOCOBALAMIN   150 mcg, Oral, Daily      Vitamin D2 2000 units tablet   2,000 Units, Oral      Vitamin D3 2000 units capsule   2,000 Units, Oral, Daily         Stop These Medications    bumetanide 1 MG tablet  Commonly known as:  BUMEX     celecoxib 200 MG capsule  Commonly known as:  CeleBREX     LANTUS SOLOSTAR 100 UNIT/ML injection pen  Generic drug:  Insulin Glargine  Replaced by:  insulin glargine 100 UNIT/ML injection     methylPREDNISolone 4 MG tablet  Commonly known as:  MEDROL ([place])          Diet Instructions     Diet: Consistent Carbohydrate, Soft Texture; Thin Liquids, No Restrictions; Whole      Discharge Diet:   Consistent Carbohydrate  Soft Texture       Fluid Consistency:  Thin Liquids, No Restrictions    Soft Options:  Whole        Future Appointments   Date Time Provider Department Center    2/20/2020 11:15 AM Mulugeta Odonnell MD [facility] None     Additional Instructions for the Follow-ups that You Need to Schedule     Discharge Follow-up with PCP   As directed       Currently Documented PCP:    Anthony Gutierrez MD    PCP Phone Number:    [phone]     Follow Up Details:  1 week         Discharge Follow-up with Specialty: Neurosurgery, Cardiology.   As directed      Specialty:  Neurosurgery, Cardiology.    Follow Up Details:  As directed.         Basic metabolic panel    Aug 14, 2019 (Approximate)         Contact information for follow-up providers     Bubba Hernadnez MD Follow up in 4 week(s).    Specialty:  Cardiology  Why:  Follow up with Dr. Hernandez in 4-6 weeks  Contact information:  [address]  [facility] [address]  [facility] [address]  [phone]             Anthony Gutierrez MD .    Specialty:  Internal Medicine  Why:  1 week  Contact information:  [address]  [address]  [facility] [address]  [phone]                   Contact information for after-discharge care     Destination     [facility] .    Service:  Skilled Nursing  Contact information:  [address] [facility] [address] [phone]                           Discharge Order (From admission, onward)    Start     Ordered    08/09/19 1246  Discharge patient  Once     Expected Discharge Date:  08/09/19    Discharge Disposition:  Rehab Facility or Unit (DC - External)    Physician of Record for Attribution - Please select from Treatment Team:  DOROTHY SANTIAGO [[id number]]    Review needed by CMO to determine Physician of Record:  No       Question Answer Comment   Physician of Record for Attribution - Please select from Treatment Team DOROTHY SANTIAGO    Review needed by CMO to determine Physician of Record No        08/09/19 1257            Total time spent discharging patient including evaluation,post hospitalization follow up,  medication and post hospitalization  instructions and education total time exceeds 30 minutes.    Signed:  Cristian Green MD  8/9/2019  12:59 PM    EMR Dragon/Transcription disclaimer:   Much of this encounter note is an electronic transcription/translation of spoken language to printed text. The electronic translation of spoken language may permit erroneous, or at times, nonsensical words or phrases to be inadvertently transcribed; Although I have reviewed the note for such errors, some may still exist.

## 2019-08-09 NOTE — PROGRESS NOTES
"Doing ok.  Walked well with PT.  Voiding ok.  Pain well controlled. Eating and drinking well. Says he feels tired. Anxious about leaving the hospital and going to rehab.     Has a bed at [facility]      Blood pressure 159/78, pulse 75, temperature 98 °F (36.7 °C), temperature source Oral, resp. rate 16, height 172.7 cm (68\"), weight 91.6 kg (201 lb 14.4 oz), SpO2 94 %.      AA, Ox3  Incision ok  Neck soft and flat  PHAN      ..  Results from last 7 days   Lab Units 08/08/19  0558   WBC 10*3/mm3 8.64   HEMOGLOBIN g/dL 10.9*   HEMATOCRIT % 32.7*   PLATELETS 10*3/mm3 223         POD#4 s/p ACDF C3-5 for spinal stenosis  Postop dysphagia-resolved    Cont on rest of steroid taper  Can go to rehab anytime  Office will schedule follow up in 10days, call wife with appt date and time      "

## 2019-08-09 NOTE — THERAPY TREATMENT NOTE
Patient Name: Chevy Goodwin  : 1940    MRN: [id number]                              Today's Date: 2019       Admit Date: 2019    Visit Dx:     ICD-10-CM ICD-9-CM   1. Bradycardia R00.1 427.89   2. Weakness R53.1 780.79   3. Frequent falls R29.6 V15.88   4. Oropharyngeal dysphagia R13.12 787.22   5. 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 Impaired functional mobility, balance, gait, and endurance Z74.09 V49.89   7. Essential hypertension I10 401.9     Patient Active Problem List   Diagnosis   • Pain of metastatic malignancy   • Chronic pain   • Adhesive arachnoiditis   • Degeneration of intervertebral disc of lumbar region   • Low back pain   • Non-Hodgkin's lymphoma (CMS/HCC)   • Postlaminectomy syndrome of lumbar region   • Thoracic disc herniation T9-T10   • Diabetes (CMS/[facility])   • Hypertension   • Long term (current) use of opiate analgesic   • Pain in lateral portion of right knee   • JULIA treated with BiPAP    • Hypersomnia due to medical condition   • Chronic right shoulder pain   • Status post reverse total arthroplasty of right shoulder   • Postlaminectomy syndrome, cervical region   • Waldenstrom macroglobulinemia (CMS/HCC)   • Weakness generalized   • CKD (chronic kidney disease) stage 3, GFR 30-59 ml/min (CMS/HCC)   • Anemia   • Bradycardia   • Fall   • Neck pain   • DDD (degenerative disc disease), cervical   • Spinal stenosis of cervical region   • 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 (CMS/HCC)     Past Medical History:   Diagnosis Date   • Cancer (CMS/HCC)     PROSTATE AND SKIN   • Cataract    • Coronary artery disease    • Diabetes (CMS/HCC)     TYPE 2   • GERD (gastroesophageal reflux disease)    • Gout    • Heart attack (CMS/HCC)     X 7   • High cholesterol    • Hypersomnia    • Hypertension    • Kidney stones    • Low back pain    • MRSA (methicillin resistant staph  aureus) culture positive     BILATERAL ELBOWS, 2015   • Right shoulder pain    • Sleep apnea     C pap   • Waldenstrom macroglobulinemia (CMS/HCC)     2014     Past Surgical History:   Procedure Laterality Date   • ANGIOPLASTY      X 7   • BACK SURGERY      X4   • CARDIAC CATHETERIZATION     • CARPAL TUNNEL RELEASE Right    • CATARACT EXTRACTION WITH INTRAOCULAR LENS IMPLANT     • COLON RESECTION     • COLONOSCOPY     • ESOPHAGOSCOPY / EGD     • EXTRACORPOREAL SHOCK WAVE LITHOTRIPSY (ESWL)     • FINGER SURGERY Left    • GALLBLADDER SURGERY     • HERNIA REPAIR     • INCISION AND DRAINAGE ABSCESS      MULTIPLE   • LAMINECTOMY     • RI REMOVAL OF ELBOW BURSA Left 9/15/2016    Procedure: LT ELBOW I&D W/ BONE CURRETTAGE;  Surgeon: Kirk Gross MD;  [place] [facility] [place] [facility]  Service: Orthopedics   • PROSTATE SURGERY     • ROTATOR CUFF REPAIR Right    • SKIN CANCER EXCISION      MULTIPLE   • TOTAL SHOULDER ARTHROPLASTY W/ DISTAL CLAVICLE EXCISION Right 1/10/2019    Procedure: RT TOTAL SHOULDER REVERSE ARTHROPLASTY;  Surgeon: Kirk Gross MD;  [place] [facility] [place] [facility] [place]  Service: Orthopedics   • TYMPANOPLASTY Right      General Information     Row Name 08/09/19 1312          PT Evaluation Time/Intention    Subjective Information  complains of;pain  -     Document Type  therapy note (daily note)  -     Mode of Treatment  physical therapy  -     Patient Effort  good  -     Row Name 08/09/19 1312          General Information    Patient/Family Observations  pt supine in bed, wife present  -     Existing Precautions/Restrictions  fall  -     Row Name 08/09/19 1312          Cognitive Assessment/Intervention- PT/OT    Orientation Status (Cognition)  oriented x 4  -     Follows Commands (Cognition)  WFL  -     Safety Deficit (Cognitive)  mild deficit;awareness of need for assistance;insight into deficits/self awareness  -       User Key  (r) = Recorded By, (t) = Taken By, (c) = Cosigned By    Initials Name  Provider Type     Kinza Oquendo PTA Physical Therapy Assistant        Mobility     Row Name 08/09/19 1312          Bed Mobility Assessment/Treatment    Supine-Sit [place] (Bed Mobility)  contact guard  -     Sit-Supine [place] (Bed Mobility)  not tested  -     Assistive Device (Bed Mobility)  bed rails;head of bed elevated  -     Row Name 08/09/19 1312          Transfer Assessment/Treatment    Stand-Sit [place] (Transfers)  contact guard  -     Row Name 08/09/19 1312          Sit-Stand Transfer    Sit-Stand [place] (Transfers)  contact guard  -     Assistive Device (Sit-Stand Transfers)  walker, front-wheeled  -[facility] Name 08/09/19 1312          Gait/Stairs Assessment/Training    [place] Level (Gait)  minimum assist (75% patient effort)  -     Assistive Device (Gait)  walker, front-wheeled  -     Distance in Feet (Gait)  70  -     Pattern (Gait)  step-through  -     Deviations/Abnormal Patterns (Gait)  gait speed decreased;stride length decreased;stephanie decreased  -     Bilateral Gait Deviations  weight shift ability decreased  -     Comment (Gait/Stairs)  VCs for bigger steps and to keep walker close around turns  -       User Key  (r) = Recorded By, (t) = Taken By, (c) = Cosigned By    Initials Name Provider Type     Kinza Oquendo PTA Physical Therapy Assistant        Obj/Interventions    No documentation.       Goals/Plan    No documentation.       Clinical Impression     [facility] Name 08/09/19 1312          Pain Scale: Numbers Pre/Post-Treatment    Pain Location  mouth (non-dental)  -[facility] Name 08/09/19 1312          Pain Scale: FACES Pre/Post-Treatment    Pain: FACES Scale, Pretreatment  4-->hurts little more  -     Row Name 08/09/19 0855          Plan of Care Review    Plan of Care Reviewed With  patient  -[facility] Name 08/09/19 1312          Positioning and Restraints    Pre-Treatment Position  in bed  -     Post Treatment Position  chair  -      In Chair  sitting;call light within reach;encouraged to call for assist;exit alarm on;with family/caregiver  -       User Key  (r) = Recorded By, (t) = Taken By, (c) = Cosigned By    Initials Name Provider Type    Fanta Cortez, RN Registered Nurse     Kinza Oquendo PTA Physical Therapy Assistant        Outcome Measures     Row Name 08/09/19 1300          How much help from another person do you currently need...    Turning from your back to your side while in flat bed without using bedrails?  3  -EH     Moving from lying on back to sitting on the side of a flat bed without bedrails?  3  -EH     Moving to and from a bed to a chair (including a wheelchair)?  3  -EH     Standing up from a chair using your arms (e.g., wheelchair, bedside chair)?  3  -EH     Climbing 3-5 steps with a railing?  2  -EH     To walk in hospital room?  3  -EH     AM-PAC 6 Clicks Score (PT)  17  -       User Key  (r) = Recorded By, (t) = Taken By, (c) = Cosigned By    Initials Name Provider Type    Kinza Shaffer PTA Physical Therapy Assistant        Physical Therapy Education     Title: PT OT SLP Therapies (In Progress)     Topic: Physical Therapy (Done)     Point: Mobility training (Done)     Learning Progress Summary           Patient Acceptance, E,D, VU,NR by  at 8/9/2019  1:12 PM    Acceptance, E,D, VU,NR by  at 8/8/2019  4:54 PM    Acceptance, E, NR by AR at 8/7/2019  9:41 AM    Acceptance, E,D, VU,NR by  at 8/6/2019  4:46 PM    Acceptance, E, VU,DU by DO at 8/4/2019 11:12 AM                   Point: Home exercise program (Done)     Learning Progress Summary           Patient Acceptance, E,D, VU,NR by  at 8/9/2019  1:12 PM    Acceptance, E,D, VU,NR by  at 8/8/2019  4:54 PM    Acceptance, E, NR by AR at 8/7/2019  9:41 AM    Acceptance, E,D, VU,NR by  at 8/6/2019  4:46 PM    Acceptance, E, VU,DU by DO at 8/4/2019 11:12 AM                   Point: Body mechanics (Done)     Learning Progress Summary            Patient Acceptance, E,D, VU,NR by  at 8/9/2019  1:12 PM    Acceptance, E,D, VU,NR by  at 8/8/2019  4:54 PM    Acceptance, E, NR by AR at 8/7/2019  9:41 AM    Acceptance, E,D, VU,NR by  at 8/6/2019  4:46 PM    Acceptance, E, VU,DU by DO at 8/4/2019 11:12 AM                   Point: Precautions (Done)     Learning Progress Summary           Patient Acceptance, E,D, VU,NR by  at 8/9/2019  1:12 PM    Acceptance, E,D, VU,NR by  at 8/8/2019  4:54 PM    Acceptance, E, NR by AR at 8/7/2019  9:41 AM    Acceptance, E,D, VU,NR by  at 8/6/2019  4:46 PM    Acceptance, E, VU,DU by DO at 8/4/2019 11:12 AM                               User Key     Initials Effective Dates Name Provider Type Discipline    AR 04/03/18 -  Shraddha Parsons, PT Physical Therapist PT     03/07/18 -  Bryson Atkinson, PT Physical Therapist PT     08/19/18 -  Kinza Oquendo PTA Physical Therapy Assistant PT              PT Recommendation and Plan     Plan of Care Reviewed With: patient  Progress: improving  Outcome Summary: pt tolerated treatment with no complaints. Pt is Deborah for bed mobility and CGA for sit/stand transfers. Pt ambulated 80 feet with rwx, CGA. Pt is at times unsteady on feet during ambulation especially around turns. pt will continue to benefit from skilled PT to improve strength/balance and overall mobility to decrease falls risk.      Time Calculation:   PT Charges     Row Name 08/09/19 1311             Time Calculation    Start Time  1042  -      Stop Time  1059  -      Time Calculation (min)  17 min  -      PT Received On  08/09/19  -      PT - Next Appointment  08/10/19  -         Time Calculation- PT    Total Timed Code Minutes- PT  17 minute(s)  -        User Key  (r) = Recorded By, (t) = Taken By, (c) = Cosigned By    Initials Name Provider Type     Kinza Oquendo PTA Physical Therapy Assistant        Therapy Charges for Today     Code Description Service Date Service Provider Modifiers Qty     37733702482 HC PT THER PROC EA 15 MIN 8/8/2019 Kinza Oquendo, JACQUE GP 1    68706410721 HC PT THER PROC EA 15 MIN 8/9/2019 Kinza Oquenod, JACQUE GP 1          PT G-Codes  Outcome Measure Options: AM-PAC 6 Clicks Daily Activity (OT)  AM-PAC 6 Clicks Score (PT): 17  AM-PAC 6 Clicks Score (OT): 15    Kinza Oquendo PTA  8/9/2019

## 2019-08-09 NOTE — PLAN OF CARE
Problem: Patient Care Overview  Goal: Plan of Care Review  Outcome: Ongoing (interventions implemented as appropriate)   08/09/19 7080   Coping/Psychosocial   Plan of Care Reviewed With patient   Plan of Care Review   Progress improving   OTHER   Outcome Summary pt tolerated treatment with c/o neck pain. Pt is CGA for bed mobility and sit/stand transfers. Pt ambulated 70 feet with rwx, Deborah. Pt required verbal cues for taking bigger steps as pt demo shuffling feet. Will continue to progress pt as able.

## 2019-08-09 NOTE — PROGRESS NOTES
Case Management Discharge Note    Final Note: Skilled care at [facility]    Destination - Selection Complete      Service Provider Request Status Selected Services Address Phone Number Fax Number    [facility] Selected Skilled Nursing [address] [id number] [phone]      Durable Medical Equipment      No service has been selected for the patient.      Dialysis/Infusion      No service has been selected for the patient.      Home Medical Care      No service has been selected for the patient.      Therapy      No service has been selected for the patient.      Community Resources      No service has been selected for the patient.        Transportation Services  Private: Car    Final Discharge Disposition Code: 03 - skilled nursing facility (SNF)

## 2019-08-09 NOTE — PROGRESS NOTES
Continued Stay Note  [facility]     Patient Name: Chevy Goodwin  MRN: [id number]  Today's Date: 8/9/2019    Admit Date: 8/2/2019    Discharge Plan     Row Name 08/09/19 1009       Plan    Plan  [facility]    Patient/Family in Agreement with Plan  yes    Plan Comments  Bed available at [facility] for discharge today. Family/patient in agreement. Wife will provide transportation. MAYCO Jewell and Soledad/NAREN updated.     Row Name 08/09/19 1004       Plan    Patient/Family in Agreement with Plan  yes        Discharge Codes    No documentation.             Carmen Willis RN

## 2019-08-19 ENCOUNTER — OFFICE VISIT (OUTPATIENT)
Dept: NEUROSURGERY | Facility: CLINIC | Age: 79
End: 2019-08-19

## 2019-08-19 VITALS
DIASTOLIC BLOOD PRESSURE: 60 MMHG | HEART RATE: 59 BPM | BODY MASS INDEX: 30.61 KG/M2 | HEIGHT: 68 IN | SYSTOLIC BLOOD PRESSURE: 149 MMHG | WEIGHT: 201.94 LBS

## 2019-08-19 DIAGNOSIS — G99.2 MYELOPATHY CONCURRENT WITH AND DUE TO SPINAL STENOSIS OF CERVICAL REGION (HCC): Primary | ICD-10-CM

## 2019-08-19 DIAGNOSIS — M48.02 MYELOPATHY CONCURRENT WITH AND DUE TO SPINAL STENOSIS OF CERVICAL REGION (HCC): Primary | ICD-10-CM

## 2019-08-19 PROCEDURE — 99024 POSTOP FOLLOW-UP VISIT: CPT | Performed by: NURSE PRACTITIONER

## 2019-08-19 NOTE — PROGRESS NOTES
"Subjective   Patient ID: Chevy Goodwin is a 79 y.o. male is here today for follow-up. He is 14 days out from a C3-4, C4-5 ACDF by Dr. Singleton done on 8/5/2019. Today patient reports improvement in pain but is having bilateral shoulder pain with numbness in fingers surgical site looks good no redness or drainage, patient is having some problem swallowing.    He has the typical postop pain in the posterior neck across the shoulders, but states he \"feels like a new man.\" He says the swallowing has gotten better since surgery but has hit a plateau the past few days. He denies choking but says he has to drink something with dry foods.         The following portions of the patient's history were reviewed and updated as appropriate: current medications, past surgical history and problem list.    Review of Systems   Respiratory: Negative for chest tightness and shortness of breath.    Musculoskeletal: Positive for arthralgias and neck pain.   Neurological: Positive for numbness.   Psychiatric/Behavioral: Positive for sleep disturbance.   All other systems reviewed and are negative.      Objective     Vitals:    08/19/19 1330   BP: 149/60   Pulse: 59   Weight: 91.6 kg (201 lb 15.1 oz)   Height: 172.7 cm (67.99\")     Body mass index is 30.71 kg/m².      Physical Exam   Constitutional: He is oriented to person, place, and time. He appears well-developed and well-nourished. No distress.   Eyes: EOM are normal.   Pulmonary/Chest: Effort normal.   Neurological: He is alert and oriented to person, place, and time. Gait normal.   Reflex Scores:       Tricep reflexes are 2+ on the right side and 2+ on the left side.       Bicep reflexes are 2+ on the right side and 2+ on the left side.       Brachioradialis reflexes are 1+ on the right side and 1+ on the left side.  No Griffin's   Skin: Skin is warm and dry.   Anterior neck incision healed without drainage or dehiscence.     Neurologic Exam     Mental Status   Oriented to person, " place, and time.     Cranial Nerves     CN III, IV, VI   Extraocular motions are normal.     Motor Exam   Interosseous muscle atrophy bilaterally     Gait, Coordination, and Reflexes     Gait  Gait: normal    Reflexes   Right brachioradialis: 1+  Left brachioradialis: 1+  Right biceps: 2+  Left biceps: 2+  Right triceps: 2+  Left triceps: 2+  Tingling sensation to the tips of all fingers but sensation fully intact on confrontational exam.  Normal voice, no hoarseness.     Assessment/Plan   Independent Review of Radiographic Studies:      I personally reviewed the images from the following studies.  No images since discharge from the hospital.     Medical Decision Making:      He is quite happy with the results of surgery so far. The swallowing sensation should continue to improve. He is down to taking only two pain pills per day which he states he had been taking 8-10 daily for at least 10 years. He expresses some sleeping difficulty which may be multifactorial related to relative inactivity, recent anesthesia and significant decrease in narcotic use. He has tried Melatonin and started trying Benadryl last night. His wife states he has been irritable lately, which may be related to the medications and anesthesia and lack of sleep. We will initiate therapy which should help get him more tired. He was discouraged from napping during the day. We will get a cervical Xray prior to his return in 6 weeks to make sure there is satisfactory bony union. He was advised not to drive while taking narcotics.     Chevy was seen today for post-op.    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

## 2019-08-28 ENCOUNTER — TELEPHONE (OUTPATIENT)
Dept: NEUROSURGERY | Facility: CLINIC | Age: 79
End: 2019-08-28

## 2019-09-16 ENCOUNTER — HOSPITAL ENCOUNTER (OUTPATIENT)
Dept: GENERAL RADIOLOGY | Facility: HOSPITAL | Age: 79
Discharge: HOME | End: 2019-09-16
Admitting: NURSE PRACTITIONER

## 2019-09-16 DIAGNOSIS — M48.02 MYELOPATHY CONCURRENT WITH AND DUE TO SPINAL STENOSIS OF CERVICAL REGION (HCC): ICD-10-CM

## 2019-09-16 DIAGNOSIS — G99.2 MYELOPATHY CONCURRENT WITH AND DUE TO SPINAL STENOSIS OF CERVICAL REGION (HCC): ICD-10-CM

## 2019-09-16 PROCEDURE — 72050 X-RAY EXAM NECK SPINE 4/5VWS: CPT

## 2019-09-27 NOTE — PROGRESS NOTES
Subjective   Patient ID: Chevy Goodwin is a 79 y.o. male is here today for follow-up after cervical xray and [facility].     Pt had C3-4, C4-5 ACDF by Dr. Singleton done on 8/5/2019. Pt is c/o difficulty swallowing.  No neck or arm pain.  Currently takes Hydrocodone 10/325mg 1/2 tab tid.  Is c/o insomnia. History of Present Illness    The following portions of the patient's history were reviewed and updated as appropriate: allergies, current medications, past surgical history and problem list.    Review of Systems   HENT: Positive for trouble swallowing.    Musculoskeletal: Negative for neck pain.   Neurological: Negative for weakness and numbness.   Psychiatric/Behavioral: Positive for sleep disturbance (insomnia).   All other systems reviewed and are negative.      Objective   Physical Exam   Constitutional: He is oriented to person, place, and time. He appears well-developed and well-nourished.   HENT:   Head: Normocephalic and atraumatic.   Eyes: Conjunctivae are normal.   Cardiovascular: Normal pulses.   Pulmonary/Chest: Effort normal. No stridor. No respiratory distress. He has no wheezes.   Neurological: He is alert and oriented to person, place, and time. Gait normal.   Reflex Scores:       Tricep reflexes are 0 on the right side and 0 on the left side.       Bicep reflexes are 0 on the right side and 0 on the left side.       Brachioradialis reflexes are 0 on the right side and 0 on the left side.       Patellar reflexes are 0 on the right side and 0 on the left side.       Achilles reflexes are 0 on the right side and 0 on the left side.  Skin: Skin is warm and dry.   Psychiatric: He has a normal mood and affect.     Neurologic Exam     Mental Status   Oriented to person, place, and time.     Motor Exam   Muscle bulk: normal  Overall muscle tone: normal    Strength   Right deltoid: 5/5  Left deltoid: 5/5  Right biceps: 5/5  Left biceps: 5/5  Right triceps: 5/5  Left triceps: 5/5    Sensory Exam   Light touch  normal.     Gait, Coordination, and Reflexes     Gait  Gait: normal    Reflexes   Right brachioradialis: 0  Left brachioradialis: 0  Right biceps: 0  Left biceps: 0  Right triceps: 0  Left triceps: 0  Right patellar: 0  Left patellar: 0  Right achilles: 0  Left achilles: 0  Right Griffin: absent  Left Griffin: absent  Anterior neck incision well healed, small protruded, firm area to the far medial edge of the incision about 1cm.     Assessment/Plan   Independent Review of Radiographic Studies:    Cervical x-ray 9/16/2019 was reviewed and reveals the anterior cervical plate C3-C5, no evidence of hardware malfunction.    Medical Decision Making:    The swallowing has improved, but he still has a feeling of a lump when swallowing. He does not have hoarseness of voice, stridor or difficulty breathing. We will try oral steroids to see if that will help. He still has the insomnia and was encouraged to stay active during the day. His wife states he still naps much of the day. He is also still trying to come off of the hydrocodone completely and he was encouraged to continue to take less. He has already cut down to 1/2 tablet TID which is far better than he was used to for the past several years. He is going to start taking just 1/2 tables twice daily then continue to wean off from there. For now, since he no longer has neck or arm pain we will only plan on seeing him back as needed. If the swallowing sensation persists, he will call in 6 weeks and we may refer him to ENT or get a neck CT.  Chevy was seen today for f/u after xray/pt.    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      Return if symptoms worsen or fail to improve.

## 2019-09-30 ENCOUNTER — OFFICE VISIT (OUTPATIENT)
Dept: NEUROSURGERY | Facility: CLINIC | Age: 79
End: 2019-09-30

## 2019-09-30 VITALS
HEART RATE: 60 BPM | BODY MASS INDEX: 30.72 KG/M2 | DIASTOLIC BLOOD PRESSURE: 50 MMHG | SYSTOLIC BLOOD PRESSURE: 144 MMHG | WEIGHT: 202 LBS

## 2019-09-30 DIAGNOSIS — G99.2 MYELOPATHY CONCURRENT WITH AND DUE TO SPINAL STENOSIS OF CERVICAL REGION (HCC): Primary | ICD-10-CM

## 2019-09-30 DIAGNOSIS — M48.02 MYELOPATHY CONCURRENT WITH AND DUE TO SPINAL STENOSIS OF CERVICAL REGION (HCC): Primary | ICD-10-CM

## 2019-09-30 PROCEDURE — 99024 POSTOP FOLLOW-UP VISIT: CPT | Performed by: NURSE PRACTITIONER

## 2019-09-30 RX ORDER — HYDROCODONE BITARTRATE AND ACETAMINOPHEN 10; 325 MG/1; MG/1
1 TABLET ORAL EVERY 6 HOURS PRN
COMMUNITY
End: 2019-12-23

## 2019-09-30 RX ORDER — CELECOXIB 200 MG/1
200 CAPSULE ORAL 2 TIMES DAILY
COMMUNITY
Start: 2019-09-26 | End: 2020-10-16

## 2019-09-30 RX ORDER — BUMETANIDE 1 MG/1
TABLET ORAL DAILY
COMMUNITY
End: 2021-01-01 | Stop reason: SDUPTHER

## 2019-09-30 RX ORDER — KETOCONAZOLE 20 MG/G
CREAM TOPICAL
COMMUNITY
Start: 2019-09-18 | End: 2020-05-22

## 2019-09-30 RX ORDER — METHYLPREDNISOLONE 4 MG/1
TABLET ORAL
Qty: 1 EACH | Refills: 0
Start: 2019-09-30 | End: 2019-12-23

## 2019-09-30 RX ORDER — CETIRIZINE HYDROCHLORIDE 10 MG/1
TABLET ORAL EVERY 12 HOURS SCHEDULED
COMMUNITY
End: 2021-01-01 | Stop reason: SDUPTHER

## 2019-10-02 ENCOUNTER — TELEPHONE (OUTPATIENT)
Dept: NEUROSURGERY | Facility: CLINIC | Age: 79
End: 2019-10-02

## 2019-10-02 NOTE — TELEPHONE ENCOUNTER
Pt got a call from a company that stated he was to get a bone growth stimulator.  Is this something you wanted him to have? I saw no mention of it in his last note. Surgery was  C3-4, C4-5 ACDF by Dr. Singleton done on 8/5/2019.

## 2019-12-23 ENCOUNTER — OFFICE VISIT (OUTPATIENT)
Dept: PAIN MEDICINE | Facility: CLINIC | Age: 79
End: 2019-12-23

## 2019-12-23 VITALS
SYSTOLIC BLOOD PRESSURE: 138 MMHG | TEMPERATURE: 98.6 F | OXYGEN SATURATION: 92 % | DIASTOLIC BLOOD PRESSURE: 66 MMHG | HEIGHT: 68 IN | HEART RATE: 60 BPM | WEIGHT: 215.4 LBS | BODY MASS INDEX: 32.64 KG/M2 | RESPIRATION RATE: 18 BRPM

## 2019-12-23 DIAGNOSIS — M96.1 POSTLAMINECTOMY SYNDROME, CERVICAL REGION: ICD-10-CM

## 2019-12-23 DIAGNOSIS — Z79.899 ENCOUNTER FOR LONG-TERM CURRENT USE OF HIGH RISK MEDICATION: ICD-10-CM

## 2019-12-23 DIAGNOSIS — M96.1 POSTLAMINECTOMY SYNDROME OF LUMBAR REGION: ICD-10-CM

## 2019-12-23 DIAGNOSIS — G89.29 OTHER CHRONIC PAIN: Primary | ICD-10-CM

## 2019-12-23 PROCEDURE — 99214 OFFICE O/P EST MOD 30 MIN: CPT | Performed by: NURSE PRACTITIONER

## 2019-12-23 PROCEDURE — 80305 DRUG TEST PRSMV DIR OPT OBS: CPT | Performed by: NURSE PRACTITIONER

## 2019-12-23 RX ORDER — CARVEDILOL 6.25 MG/1
6.25 TABLET ORAL 2 TIMES DAILY WITH MEALS
COMMUNITY
Start: 2019-12-22 | End: 2021-01-01 | Stop reason: SDUPTHER

## 2019-12-23 RX ORDER — HYDROCODONE BITARTRATE AND ACETAMINOPHEN 5; 325 MG/1; MG/1
1 TABLET ORAL 2 TIMES DAILY PRN
Qty: 60 TABLET | Refills: 0 | Status: SHIPPED | OUTPATIENT
Start: 2019-12-23 | End: 2020-01-22 | Stop reason: SDUPTHER

## 2019-12-23 NOTE — PROGRESS NOTES
CHIEF COMPLAINT  Follow-up for back pain. Mr. Goodwin states that his back pain has gotten worse in the last month.    Subjective   Chevy Goodwin is a 79 y.o. male  who presents to the office for follow-up.He has a history of back and neck pain.    Patient was last seen in our office 6 months ago.  At that point in time was reporting worsening neck pain.  We see him for chronic back and neck problems.  At his last office visit he was maintained on a regimen of hydrocodone 10/325 5/day.  Has had little benefit with injections in the past.      Patient underwent anterior cervical discectomy and fusion with Dr. Singleton on 8/5/2019.  We have not seen him in the office since surgery.  Today he reports that his back pain is worse.    Reviewed his last office visit dated 9/30/2019 with neurosurgery.  He was seen by DANY Solitario.  Reporting that his neck pain had improved.  Trying to come off hydrocodone completely.  At that point in time had cut down to half a tablet 3 times daily, discussed weaning protocol.  Was not complaining of neck or arm pain.  We will plan to see him back as needed.    He has previously been considered for SCS therapy and was sent to Dr. Sheppard for evaluation.  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     Today he states that he thought he would be able to wean all the way off pain medication, was down to half tablet daily but over the past 6 weeks pain has worsened and he has started needing more pain medication. Pain is across the lumbosacral area, no radiating into the legs, reports the same pain pattern as usual.  Says he is out of all of his medication from after surgery.       Back Pain   This is a chronic (history of lumbar fusion with   [facility] at [facility], was done in 2005) problem. The current episode started more than 1 year ago. The problem occurs constantly. The problem has been waxing and waning (reports as unchanged since last office visit) since onset. The pain is present in the lumbar spine and thoracic spine. The quality of the pain is described as aching and burning. The pain radiates to the right foot and left foot. The pain is at a severity of 6/10. The pain is moderate. The pain is worse during the day. The symptoms are aggravated by bending and twisting (walking, physical activity). Stiffness is present in the morning. Pertinent negatives include no bladder incontinence, bowel incontinence, chest pain, dysuria, fever, headaches, numbness, tingling or weakness. Risk factors include lack of exercise. He has tried NSAIDs (Hydrocodone) for the symptoms. The treatment provided moderate relief.   Neck Pain    This is a chronic problem. The problem occurs daily. The problem has been gradually improving. The pain is at a severity of 6/10. Pertinent negatives include no chest pain, fever, headaches, numbness, tingling or weakness.      PEG Assessment   What number best describes your pain on average in the past week?5  What number best describes how, during the past week, pain has interfered with your enjoyment of life?5  What number best describes how, during the past week, pain has interfered with your general activity?  5    The following portions of the patient's history were reviewed and updated as appropriate: allergies, current medications, past family history, past medical history, past social history, past surgical history and problem list.    Review of Systems   Constitutional: Positive for fatigue. Negative for fever.   HENT: Negative for congestion.    Eyes: Negative for visual disturbance.   Respiratory: Negative for cough, shortness of breath and wheezing.    Cardiovascular: Negative.  Negative for chest pain.  "  Gastrointestinal: Negative for bowel incontinence, constipation and diarrhea.   Genitourinary: Negative for bladder incontinence, difficulty urinating and dysuria.   Musculoskeletal: Positive for back pain, joint swelling (bilateral ankles) and neck pain.   Neurological: Negative for tingling, weakness, numbness and headaches.   Psychiatric/Behavioral: Positive for sleep disturbance. Negative for suicidal ideas. The patient is not nervous/anxious.      Vitals:    12/23/19 0930   BP: 138/66   Pulse: 60   Resp: 18   Temp: 98.6 °F (37 °C)   SpO2: 92%   Weight: 97.7 kg (215 lb 6.4 oz)   Height: 172.7 cm (67.99\")   PainSc:   6   PainLoc: Back     Objective   Physical Exam   Constitutional: He is oriented to person, place, and time. He appears well-developed and well-nourished.   HENT:   Head: Normocephalic and atraumatic.   Eyes: Pupils are equal, round, and reactive to light. EOM are normal.   Cardiovascular: Normal rate and regular rhythm.   Pulmonary/Chest: Effort normal. No respiratory distress.   Musculoskeletal:        Thoracic back: He exhibits tenderness.        Lumbar back: He exhibits tenderness.   Neurological: He is oriented to person, place, and time. Gait (ambulating with cane) abnormal.   Skin: Skin is warm and dry.   Psychiatric: He has a normal mood and affect. His behavior is normal.   Nursing note and vitals reviewed.    Assessment/Plan   Chevy was seen today for back pain.    Diagnoses and all orders for this visit:    Other chronic pain    Postlaminectomy syndrome of lumbar region    Postlaminectomy syndrome, cervical region    Encounter for long-term current use of high risk medication    Other orders  -     HYDROcodone-acetaminophen (NORCO) 5-325 MG per tablet; Take 1 tablet by mouth 2 (Two) Times a Day As Needed for Severe Pain .      --- Routine UDS in office today as part of monitoring requirements for controlled substances.  The specimen was viewed and the immunoassay result reviewed and is " +OPI.  This specimen will be sent to [facility] laboratory for confirmation.     --- Call [facility] to determine last refill.  He will refill all pain medication from this practice at a local pharmacy in the future so that MARIOLA can be appropriately reviewed.    --- Resume prescribing pain medication but will begin with Hydrocodone 5/325 bid prn pain.  This is a substantial dose decrease from previous regimen. I commend him on his ability to reduce medication dose.    --- Update prescribing agreement next visit   --- Follow-up 1 month        MARIOLA REPORT    As part of the patient's treatment plan, I am prescribing controlled substances. The patient has been made aware of appropriate use of such medications, including potential risk of somnolence, limited ability to drive and/or work safely, and the potential for dependence or overdose. It has also bee made clear that these medications are for use by this patient only, without concomitant use of alcohol or other substances unless prescribed.     Patient has completed prescribing agreement detailing terms of continued prescribing of controlled substances, including monitoring MARIOLA reports, urine drug screening, and pill counts if necessary. The patient is aware that inappropriate use will results in cessation of prescribing such medications.    MARIOLA report has been reviewed and scanned into the patient's chart.    As the clinician, I personally reviewed the MARIOLA from 12/23/19 while the patient was in the office today.    History and physical exam exhibit continued safe and appropriate use of controlled substances.    EMR Dragon/Transcription disclaimer:   Much of this encounter note is an electronic transcription/translation of spoken language to printed text. The electronic translation of spoken language may permit erroneous, or at times, nonsensical words or phrases to be inadvertently transcribed; Although I have reviewed the note for such errors,  some may still exist.

## 2019-12-28 ENCOUNTER — RESULTS ENCOUNTER (OUTPATIENT)
Dept: PAIN MEDICINE | Facility: CLINIC | Age: 79
End: 2019-12-28

## 2019-12-28 DIAGNOSIS — G89.29 OTHER CHRONIC PAIN: ICD-10-CM

## 2019-12-28 DIAGNOSIS — M96.1 POSTLAMINECTOMY SYNDROME OF LUMBAR REGION: ICD-10-CM

## 2019-12-28 DIAGNOSIS — Z79.899 ENCOUNTER FOR LONG-TERM CURRENT USE OF HIGH RISK MEDICATION: ICD-10-CM

## 2019-12-28 DIAGNOSIS — M96.1 POSTLAMINECTOMY SYNDROME, CERVICAL REGION: ICD-10-CM

## 2020-01-22 ENCOUNTER — OFFICE VISIT (OUTPATIENT)
Dept: PAIN MEDICINE | Facility: CLINIC | Age: 80
End: 2020-01-22

## 2020-01-22 VITALS
RESPIRATION RATE: 20 BRPM | TEMPERATURE: 99.1 F | DIASTOLIC BLOOD PRESSURE: 74 MMHG | BODY MASS INDEX: 32.58 KG/M2 | HEART RATE: 70 BPM | SYSTOLIC BLOOD PRESSURE: 153 MMHG | OXYGEN SATURATION: 95 % | HEIGHT: 68 IN | WEIGHT: 215 LBS

## 2020-01-22 DIAGNOSIS — M51.36 DEGENERATION OF INTERVERTEBRAL DISC OF LUMBAR REGION: ICD-10-CM

## 2020-01-22 DIAGNOSIS — M96.1 POSTLAMINECTOMY SYNDROME OF LUMBAR REGION: ICD-10-CM

## 2020-01-22 DIAGNOSIS — Z79.899 ENCOUNTER FOR LONG-TERM CURRENT USE OF HIGH RISK MEDICATION: ICD-10-CM

## 2020-01-22 DIAGNOSIS — G89.29 OTHER CHRONIC PAIN: Primary | ICD-10-CM

## 2020-01-22 DIAGNOSIS — M50.30 DDD (DEGENERATIVE DISC DISEASE), CERVICAL: ICD-10-CM

## 2020-01-22 PROCEDURE — 99214 OFFICE O/P EST MOD 30 MIN: CPT | Performed by: NURSE PRACTITIONER

## 2020-01-22 RX ORDER — HYDROCODONE BITARTRATE AND ACETAMINOPHEN 5; 325 MG/1; MG/1
1 TABLET ORAL 2 TIMES DAILY PRN
Qty: 60 TABLET | Refills: 0 | Status: SHIPPED | OUTPATIENT
Start: 2020-01-22 | End: 2020-03-13 | Stop reason: SDUPTHER

## 2020-01-22 RX ORDER — PRAVASTATIN SODIUM 40 MG
40 TABLET ORAL 2 TIMES DAILY
COMMUNITY
End: 2021-01-01

## 2020-01-22 RX ORDER — PANTOPRAZOLE SODIUM 40 MG/1
TABLET, DELAYED RELEASE ORAL
COMMUNITY
End: 2020-10-16

## 2020-01-22 RX ORDER — ISOPROPYL ALCOHOL 70 ML/100ML
SWAB TOPICAL
COMMUNITY
Start: 2019-12-27 | End: 2020-05-22

## 2020-01-22 RX ORDER — HYDROCODONE BITARTRATE AND ACETAMINOPHEN 5; 325 MG/1; MG/1
TABLET ORAL
COMMUNITY
End: 2020-01-22 | Stop reason: SDUPTHER

## 2020-01-22 NOTE — PROGRESS NOTES
CHIEF COMPLAINT  F/u back pain. Pt sts pain has worsened since last ov. Pt sts he has been to [place] x a week since last ov, he thinks activity may have exacerbated his lumbar pain. Pt sts needs norco refill today.     Subjective   Chevy Goodwin is a 79 y.o. male  who presents to the office for follow-up.He has a history of chronic neck and back pain. Reports his pain is actually worse since last office visit.    Patient was last evaluated the office by DANY Vo on 12/23/2019.  He has a history of back and neck pain.  It has been sometime since the patient had been doing to the patient requiring cervical discectomy and fusion with Dr. Agustin on 8/5/2019.  He was followed by neurosurgery after that until 12/23/2019.  The patient was trying to be weaned off of hydrocodone.  This was per neurosurgery.  Previously he had been considered for a spinal cord stimulator.  However he had a negative psychological evaluation with Dr. Sheppard who is concerned about underlying depression anxiety as well as possible conversion 5 disorder.  Patient then decided he was not interested in consideration of this therapy.  The patient thought he would be able to wean all the way off his pain medication, was down to half tablet daily over the past 6 weeks but then his pain worsened.  Has been out of pain medication from surgery.  Urine drug screen was obtained.  Obtain refill date from [facility].  Plan to refill hydrocodone but changed to hydrocodone 5-3 25 twice daily as needed.  This is a significant decrease from his previous regimen prior to surgery.  Plan to update prescribing agreement at the next office visit.  Follow-up in 1 month.    Complains of pain in his neck and low back. Today his pain is 5/10VAS.  Describes the pain as nearly continuous aching and throbbing. Pain increases with activity, walking and standing; pain decreases with medication and rest. Currently is taking Hydrocodone 5/325 BID, Celebrex  200 mg daily. Denies any side effects from the regimen. The regimen helps decrease his pain by 50%. ADL's by self. Denies any bowel or bladder changes.     Just returned from a mission trip to [place]. Enjoyed this.  Was very active.    Back Pain   This is a chronic (history of lumbar fusion with Dr. Yuan at [facility], was done in 2005) problem. The current episode started more than 1 year ago. The problem occurs constantly. The problem has been waxing and waning (worse since last office visit) since onset. The pain is present in the lumbar spine and thoracic spine. The quality of the pain is described as aching and burning. The pain radiates to the right foot and left foot. The pain is at a severity of 5/10. The pain is moderate. The pain is worse during the day. The symptoms are aggravated by bending and twisting (walking, any physical activity). Stiffness is present in the morning. Pertinent negatives include no abdominal pain, bladder incontinence, bowel incontinence, chest pain, dysuria, fever, headaches, numbness, tingling or weakness. Risk factors include lack of exercise. He has tried NSAIDs (previous TESI.  Hydrocodone) for the symptoms. The treatment provided moderate relief.   Neck Pain    This is a chronic problem. The current episode started more than 1 year ago. The problem occurs 2 to 4 times per day. The problem has been waxing and waning (unchanged since last office visit. ). The pain is present in the midline, right side and left side. The quality of the pain is described as aching. The pain is moderate. The symptoms are aggravated by bending, position, stress and twisting. The pain is worse during the day. Stiffness is present in the morning and all day. Pertinent negatives include no chest pain, fever, headaches, numbness, tingling or weakness. He has tried acetaminophen, bed rest, heat, home exercises, ice, neck support, oral narcotics and NSAIDs for the symptoms. The treatment provided  "mild relief.      PEG Assessment   What number best describes your pain on average in the past week?5  What number best describes how, during the past week, pain has interfered with your enjoyment of life?6  What number best describes how, during the past week, pain has interfered with your general activity?  6    The following portions of the patient's history were reviewed and updated as appropriate: allergies, current medications, past family history, past medical history, past social history, past surgical history and problem list.    Review of Systems   Constitutional: Positive for fatigue. Negative for activity change and fever.   HENT: Negative for congestion.    Eyes: Negative for visual disturbance.   Respiratory: Negative for cough, shortness of breath and wheezing.    Cardiovascular: Negative.  Negative for chest pain.   Gastrointestinal: Negative for abdominal pain, bowel incontinence, constipation and diarrhea.   Genitourinary: Negative for bladder incontinence, difficulty urinating and dysuria.   Musculoskeletal: Positive for back pain, gait problem (cane), joint swelling (bilateral ankles) and neck pain. Negative for arthralgias and neck stiffness.   Allergic/Immunologic: Negative for immunocompromised state.   Neurological: Negative for dizziness, tingling, weakness, numbness and headaches.   Hematological: Negative for adenopathy.   Psychiatric/Behavioral: Positive for sleep disturbance. Negative for agitation and suicidal ideas. The patient is not nervous/anxious.        Vitals:    01/22/20 1257   BP: 153/74   Pulse: 70   Resp: 20   Temp: 99.1 °F (37.3 °C)   SpO2: 95%   Weight: 97.5 kg (215 lb)   Height: 172.7 cm (67.99\")   PainSc:   5   PainLoc: Back     Objective   Physical Exam   Constitutional: He is oriented to person, place, and time. He appears well-developed and well-nourished. He is cooperative.   HENT:   Head: Normocephalic and atraumatic.   Nose: Nose normal.   Eyes: Conjunctivae and " lids are normal.   Neck: Trachea normal. Neck supple.   Cardiovascular: Normal rate.   Pulmonary/Chest: Effort normal.   Musculoskeletal:        Cervical back: He exhibits decreased range of motion, tenderness and bony tenderness.        Thoracic back: He exhibits tenderness and pain.        Lumbar back: He exhibits tenderness and pain.   Minimal lumbar facet tenderness     Neurological: He is alert and oriented to person, place, and time. Gait (ambulates with cane) abnormal.   Skin: Skin is intact.   Psychiatric: He has a normal mood and affect. His speech is normal and behavior is normal. Cognition and memory are normal.   Nursing note and vitals reviewed.      Assessment/Plan   Chevy was seen today for back pain.    Diagnoses and all orders for this visit:    Other chronic pain    Degeneration of intervertebral disc of lumbar region    DDD (degenerative disc disease), cervical    Postlaminectomy syndrome of lumbar region    Encounter for long-term current use of high risk medication    Other orders  -     HYDROcodone-acetaminophen (NORCO) 5-325 MG per tablet; Take 1 tablet by mouth 2 (Two) Times a Day As Needed for Severe Pain .      --- The urine drug screen confirmation from 12-23-19 has been reviewed and the result is APPROPRIATE based on patient history and MARIOLA report  --- Refill Hydrocodone. Patient appears stable with current regimen. No adverse effects. Regarding continuation of opioids, there is no evidence of aberrant behavior or any red flags.  The patient continues with appropriate response to opioid therapy. ADL's remain intact by self.   --- Sign medication agreement. A medication management agreement is signed by the patient and the provider today.  Reviewed with the patient that this contract is specific to controlled substances, specifically pain medication.  Reviewed core of pain medicine is to decrease pain and increase functional level.  Reviewed the medication must be picked up as a written  prescription from this office and will not be called into any pharmacy.  Reviewed the use of Shawn within the office setting.  Reviewed causes for potential of discontinuation of opiates,  including but not limited to consideration for diversion, obtaining other controlled substances from other license practitioners, or  inappropriate office behavior.  Patient understands to use a controlled substance as prescribed by physician and to avoid improper use of controlled substances.  Patient will also verbalized understanding that prescriptions to be filled at the same pharmacy  unless office staff is made aware of this.  Patient understands they can be submitted to random urine drug screens and/or random pill counts on any request.  Patient understands they are not to receive early refills.  The patient must produce an official police report for any effort to replace controlled substances that are lost or stolen.  No use of illegal street drugs while receiving controlled substances from this prescribing provider.  The patient is to take medication as prescribed  and not deviate from the normal schedule without consultation from the provider.  Patient is not to share the medication with others or to take medication with alcohol or other sedatives.  Use caution when driving or operating machinery.  Alert office if the patient becomes pregnant or begins nursing a child.  Reviewed the use of controlled substances recreates a risk for respiratory depression, which may result in serious harm or even death.  Must always keep the prescription in the original container.  Patient is to store controlled substances in a locked cabinet or other secure storage unit that is cool, dry and out of sunlight.  Patient must immediately notify office if any controlled substances is stolen or improperly taken by another individual.  Reviewed risk for physical dependence, tolerance and addiction.  --- PHARMACY WILL BE [facility].  --- Follow-up  2 months or sooner if needed.     MARIOLA REPORT    As part of the patient's treatment plan, I am prescribing controlled substances. The patient has been made aware of appropriate use of such medications, including potential risk of somnolence, limited ability to drive and/or work safely, and the potential for dependence or overdose. It has also bee made clear that these medications are for use by this patient only, without concomitant use of alcohol or other substances unless prescribed.     Patient has completed prescribing agreement detailing terms of continued prescribing of controlled substances, including monitoring MARIOLA reports, urine drug screening, and pill counts if necessary. The patient is aware that inappropriate use will results in cessation of prescribing such medications.    MARIOLA report has been reviewed and scanned into the patient's chart.    As the clinician, I personally reviewed the MARIOLA from 1-20-20 while the patient was in the office today.    History and physical exam exhibit continued safe and appropriate use of controlled substances.      EMR Dragon/Transcription disclaimer:   Much of this encounter note is an electronic transcription/translation of spoken language to printed text. The electronic translation of spoken language may permit erroneous, or at times, nonsensical words or phrases to be inadvertently transcribed; Although I have reviewed the note for such errors, some may still exist.

## 2020-02-20 ENCOUNTER — OFFICE VISIT (OUTPATIENT)
Dept: SLEEP MEDICINE | Facility: HOSPITAL | Age: 80
End: 2020-02-20

## 2020-02-20 VITALS — BODY MASS INDEX: 32.89 KG/M2 | WEIGHT: 217 LBS | HEIGHT: 68 IN

## 2020-02-20 DIAGNOSIS — G47.14 HYPERSOMNIA DUE TO MEDICAL CONDITION: ICD-10-CM

## 2020-02-20 DIAGNOSIS — G47.33 OSA TREATED WITH BIPAP: Primary | ICD-10-CM

## 2020-02-20 PROCEDURE — 99213 OFFICE O/P EST LOW 20 MIN: CPT | Performed by: INTERNAL MEDICINE

## 2020-02-20 PROCEDURE — G0463 HOSPITAL OUTPT CLINIC VISIT: HCPCS

## 2020-02-20 NOTE — PROGRESS NOTES
"Follow Up [facility] Note     Chief Complaint:  JULIA     Primary Care Physician: Anthony Gutierrez MD    Interval History:   I last saw the patient 1 year ago.  He is unchanged.  He goes to bed at 11 PM and awakens at 10 AM.  He will use the bathroom during the nighttime.  [place] Sleepiness Scale is abnormal at 13    In August, the patient required neck surgery for protruding discs    Review of Systems:    A complete review of systems was done and all were negative with the exception of the above    Social History:    Social History     Socioeconomic History   • Marital status:      Spouse name: Not on file   • Number of children: Not on file   • Years of education: Not on file   • Highest education level: Not on file   Tobacco Use   • Smoking status: Current Every Day Smoker     Years: 60.00     Types: Cigars   • Smokeless tobacco: Never Used   • Tobacco comment: QUIT 1999 CIGARETTES/STILL SMOKES CIGARS   Substance and Sexual Activity   • Alcohol use: Yes     Comment: VERY RARE       Allergies:  Contrast dye; Oxycodone; and Adhesive tape     Medication Review:  Reviewed.      Vital Signs:    Vitals:    02/20/20 1121   Weight: 98.4 kg (217 lb)   Height: 172.7 cm (68\")     Body mass index is 32.99 kg/m².    Physical Exam:    Constitutional:  Well developed 79 y.o. male that appears in no apparent distress.  Awake & oriented times 3.  Normal mood with normal recent and remote memory and normal judgement.  Eyes:  Conjunctivae normal.  Oropharynx:  moist mucous membranes without exudate and a large tongue and class III-4 Mallampati airway      Results Review:  DME is [facility] and he uses a fullface mask.  Downloads between 11/22 and 2/19/2020 compliance 100%.  Average usage is 8 hours and 53 minutes.  Average AHI is normal without leak.  BiPAP pressure is IPAP of 20 and EPAP of 16.       Impression:   Obstructive sleep apnea adequately treated with BiPAP 20/16 with good compliance and usage and " some persistent complaints of hypersomnolence.    Plan:  Good sleep hygiene measures should be maintained.  Weight loss would be beneficial in this patient who is obese by BMI.  The patient is benefiting from the treatment being provided.     The patient will continue BiPAP as prescribed.  A new prescription will be sent to his DME    The patient will call for any problems and will follow up in 1 year.      Mulugeta Odonnell MD  Sleep Medicine  02/20/20  11:30 AM

## 2020-03-13 NOTE — TELEPHONE ENCOUNTER
Medication Refill Request    Date of phone call: 3/13/20    Medication being requested: norco 5/325mg si tab po bid prn   Qty: 60    Date of last visit: 20    Date of last refill: 20    MARIOLA up to date?: yes    Next Follow up?: 3/24/20    Any new pertinent information? (i.e, new medication allergies, new use of medications, change in patient's health or condition, non-compliance or inconsistency with prescribing agreement?): pt was exposed to corona virus.

## 2020-03-16 RX ORDER — HYDROCODONE BITARTRATE AND ACETAMINOPHEN 5; 325 MG/1; MG/1
1 TABLET ORAL 2 TIMES DAILY PRN
Qty: 60 TABLET | Refills: 0 | Status: SHIPPED | OUTPATIENT
Start: 2020-03-16 | End: 2020-04-28 | Stop reason: SDUPTHER

## 2020-04-28 RX ORDER — HYDROCODONE BITARTRATE AND ACETAMINOPHEN 5; 325 MG/1; MG/1
1 TABLET ORAL 2 TIMES DAILY PRN
Qty: 60 TABLET | Refills: 0 | Status: SHIPPED | OUTPATIENT
Start: 2020-04-28 | End: 2020-05-20 | Stop reason: SDUPTHER

## 2020-04-28 NOTE — TELEPHONE ENCOUNTER
Medication Refill Request    Date of phone call: 20    Medication being requested: Norco 5-325 mg    si tab po BID PRN  Qty: 60    Date of last visit: 20     Date of last refill: 3/22/20    MARIOLA up to date?: yes    Next Follow up?: 20    Any new pertinent information? (i.e, new medication allergies, new use of medications, change in patient's health or condition, non-compliance or inconsistency with prescribing agreement?):

## 2020-05-15 NOTE — PROGRESS NOTES
Subjective   History of Present Illness: Chevy Goodwin is a 79 y.o. male is here today to follow-up for problems with his balance and gait and neck pain. He has not had recent imaging.  He is in pain management with Dr Brambila at [facility].  Patient had surgery 8.5.19 with Dr Singleton, C3/4 C4/5 ACDF    He was last seen 9.30.19 for post op follow up and was released prn. Today Mr. Goodwin c/o constant burning neck pain that radiates bilaterally to his shoulders. He denies arm pain but reports tingling in arms. He has trouble grasping onto things. He c/o constant lower back pain that radiates to BLE. He reports N/T in BLE. He has some occasional edema in ankles. Patient denies having any issues with bowel or bladder control. He has issues with balance/gait.     Neck Pain    This is a chronic problem. The current episode started more than 1 year ago. The problem occurs constantly. The problem has been gradually worsening. The pain is associated with nothing. The pain is at a severity of 5/10. The pain is moderate. Associated symptoms include leg pain, numbness and weakness. Pertinent negatives include no headaches or tingling.   Back Pain   This is a chronic problem. The pain is present in the lumbar spine. The pain radiates to the left thigh and right thigh. The pain is at a severity of 5/10. The pain is moderate. Associated symptoms include leg pain, numbness and weakness. Pertinent negatives include no abdominal pain, bladder incontinence, bowel incontinence, headaches, pelvic pain, perianal numbness or tingling.       The following portions of the patient's history were reviewed and updated as appropriate: allergies, current medications, past family history, past medical history, past social history, past surgical history and problem list.    Review of Systems   Constitutional: Positive for activity change.   Eyes: Negative for visual disturbance.   Gastrointestinal: Negative for abdominal pain and bowel incontinence.    Genitourinary: Positive for frequency and urgency. Negative for bladder incontinence, difficulty urinating and pelvic pain.   Musculoskeletal: Positive for back pain, gait problem, neck pain and neck stiffness.   Neurological: Positive for dizziness, weakness and numbness. Negative for tingling, light-headedness and headaches.   Psychiatric/Behavioral: Negative for behavioral problems and confusion.       Objective             Physical Exam   Constitutional: He is oriented to person, place, and time. He appears well-developed and well-nourished.   HENT:   Head: Normocephalic and atraumatic.   Right Ear: External ear normal.   Left Ear: External ear normal.   Eyes: Pupils are equal, round, and reactive to light. Conjunctivae and EOM are normal. Right eye exhibits no discharge. Left eye exhibits no discharge.   Neck: Normal range of motion. Neck supple. No tracheal deviation present.   Cardiovascular: Intact distal pulses.   Pulmonary/Chest: Effort normal. No stridor. No respiratory distress.   Musculoskeletal: Normal range of motion. He exhibits no edema, tenderness or deformity.   Neurological: He is alert and oriented to person, place, and time. He has normal strength and normal reflexes. He displays no atrophy, no tremor and normal reflexes. No cranial nerve deficit or sensory deficit. He exhibits normal muscle tone. He displays a negative Romberg sign. He displays no seizure activity. Coordination and gait normal.   No long tract signs    Motor intact other than mild L bicep and tricep weakness 4/5   Skin: Skin is warm and dry.   Psychiatric: He has a normal mood and affect. His behavior is normal. Judgment and thought content normal.   Nursing note and vitals reviewed.    Neurologic Exam     Mental Status   Oriented to person, place, and time.     Cranial Nerves     CN III, IV, VI   Pupils are equal, round, and reactive to light.  Extraocular motions are normal.     Motor Exam     Strength   Strength 5/5  throughout.           Assessment/Plan   Independent Review of Radiographic Studies:          Medical Decision Making:    Mr. Goodwin had a previous ACDF at C3 C5 by Dr. Singleton less than a year ago.  He has also had 4 previous lumbar surgeries, the last 2 of which were done by Dr. Yuan.  He estimates that the last lumbar surgery was about 15 years ago.  He is always had chronic back pain since that time and is followed by Dr. Brambila.    The patient states that his neck pain and lumbar pain actually improved after his last cervical surgery but in the last few months have gotten progressively worse.  He has always had some gait and balance issues related to many different medical factors as well as his previous cord compression but he does feel that the gait issues have been getting progressively worse as well.  He complains of constant neck and low back pain with some radiation into the shoulders and hips bilaterally.  He will occasionally have arm and leg pain but the axial pain is most bothersome.  He also admits to mid back pain which can be quite severe.  He has multiple medical issues including Waldenstrom's macroglobulinemia, iron deficiency anemia, history of a GI bleed secondary to a colonic AVM which was surgically resected, history of prostate cancer, chronic kidney disease with a GFR of 36 on March 9, 2020.    His exam does reveal mild left bicep and tricep weakness which he states is new.  He is right-handed.  No hyperreflexia or long tract signs.  His gait is wide stepped and he does use a cane.    Given his 4 previous lumbar surgeries and previous ACDF I am afraid that MRIs will not give us the most definitive images.  He wants to know if there is anything surgical to offer for his increasing neck and back pain as well as difficulty walking and I suggested that we proceed with a complete spine myelogram.  However given his current kidney function I do think we need medical clearance first.  I called  Dr. Gutierrez's office and spoke with his nurse.  She relayed the message to him and he did get back to us very quickly and stated that he did not feel comfortable clearing him until he checked another set of labs which he is planning on doing the first week of June.  They will notify us thereafter.  Once cleared we will order a total spine myelogram and follow-up with Dr. Singleton.     Chevy was seen today for back pain and neck pain.    Diagnoses and all orders for this visit:    Degeneration of intervertebral disc of lumbar region    DDD (degenerative disc disease), cervical    CKD (chronic kidney disease) stage 3, GFR 30-59 ml/min (CMS/[facility])      Return for will schedule myelogram if we get clearance from Dr. Gutierrez.

## 2020-05-20 ENCOUNTER — OFFICE VISIT (OUTPATIENT)
Dept: PAIN MEDICINE | Facility: CLINIC | Age: 80
End: 2020-05-20

## 2020-05-20 DIAGNOSIS — G89.29 OTHER CHRONIC PAIN: Primary | ICD-10-CM

## 2020-05-20 DIAGNOSIS — M96.1 POSTLAMINECTOMY SYNDROME OF LUMBAR REGION: ICD-10-CM

## 2020-05-20 DIAGNOSIS — M51.24 THORACIC DISC HERNIATION: ICD-10-CM

## 2020-05-20 DIAGNOSIS — Z79.899 ENCOUNTER FOR LONG-TERM CURRENT USE OF HIGH RISK MEDICATION: ICD-10-CM

## 2020-05-20 DIAGNOSIS — M50.30 DDD (DEGENERATIVE DISC DISEASE), CERVICAL: ICD-10-CM

## 2020-05-20 DIAGNOSIS — M51.36 DEGENERATION OF INTERVERTEBRAL DISC OF LUMBAR REGION: ICD-10-CM

## 2020-05-20 DIAGNOSIS — M54.50 LOW BACK PAIN, UNSPECIFIED BACK PAIN LATERALITY, UNSPECIFIED CHRONICITY, UNSPECIFIED WHETHER SCIATICA PRESENT: ICD-10-CM

## 2020-05-20 PROCEDURE — 99442 PR PHYS/QHP TELEPHONE EVALUATION 11-20 MIN: CPT | Performed by: NURSE PRACTITIONER

## 2020-05-20 RX ORDER — HYDROCODONE BITARTRATE AND ACETAMINOPHEN 5; 325 MG/1; MG/1
1 TABLET ORAL EVERY 8 HOURS PRN
Qty: 90 TABLET | Refills: 0 | Status: SHIPPED | OUTPATIENT
Start: 2020-05-20 | End: 2020-06-26 | Stop reason: SDUPTHER

## 2020-05-20 NOTE — PROGRESS NOTES
"TELEPHONE VISIT    You have chosen to receive care through a telephone visit. Do you consent to use a telephone visit for your medical care today? Yes    CHIEF COMPLAINT  Back, neck and shoulder pain    Subjective   Chevy Goodwin is a 79 y.o. male  who presents for a telephonic follow-up.He has a history of chronic back, neck and shoulder pain. Reports his pain pattern is WORSE since last office visit.    Complains of pain in his low back, neck and shoulders.  Today his pain is 6/10VAS. \"it's getting up to 8.\" Describes his pain as continuous burning. Pain increases with standing, walking, activity, \"antyhing physical.\"; pain decreases with medication, rest and recliner.  Continues with Hydrocodone 5/325 2-3/day, as well as Tylenol OTC, and Celebrex 200 mg BID (\"Dr Gutierrez wants me to only take this once a day\"). Reports when he decreases Celebrex, his hands start to ache. The pain medication regimen helps decrease his pain by 25-40%. Denies any side effects from the regimen. ADL's by self. Denies any bowel or bladder changes.     Seeing Dr Morgan later this week. \"I think I have another bone spur and compression of my cervical cord.\"  Reports he had an xray post-operatively and had more compression.     Back Pain   This is a chronic (history of lumbar fusion with Dr. Yuan at [facility], was done in 2005) problem. The current episode started more than 1 year ago. The problem occurs constantly. The problem has been waxing and waning since onset. The pain is present in the lumbar spine and thoracic spine. The quality of the pain is described as aching and burning. The pain radiates to the right foot and left foot. The pain is moderate. The pain is worse during the day. The symptoms are aggravated by bending and twisting (walking, any physical activity). Stiffness is present in the morning. Associated symptoms include numbness (both legs and arms) and weakness (both legs and arms). Pertinent negatives " include no bladder incontinence, bowel incontinence, chest pain, dysuria, fever, headaches or tingling. Risk factors include lack of exercise. He has tried NSAIDs (previous TESI.  Hydrocodone) for the symptoms. The treatment provided moderate relief.   Neck Pain    This is a chronic problem. The current episode started more than 1 year ago. The problem occurs 2 to 4 times per day. The problem has been waxing and waning. The pain is present in the midline, right side and left side. The quality of the pain is described as aching. The pain is moderate. The symptoms are aggravated by bending, position, stress and twisting. The pain is worse during the day. Stiffness is present in the morning and all day. Associated symptoms include numbness (both legs and arms) and weakness (both legs and arms). Pertinent negatives include no chest pain, fever, headaches or tingling. He has tried acetaminophen, bed rest, heat, home exercises, ice, neck support, oral narcotics and NSAIDs for the symptoms. The treatment provided mild relief.      The following portions of the patient's history were reviewed and updated as appropriate: allergies, current medications, past family history, past medical history, past social history, past surgical history and problem list.    Review of Systems   Constitutional: Negative for chills and fever.   Respiratory: Negative for chest tightness and shortness of breath.    Cardiovascular: Negative for chest pain.   Gastrointestinal: Negative for bowel incontinence, constipation, diarrhea, nausea and vomiting.   Genitourinary: Negative for bladder incontinence, difficulty urinating and dysuria.   Musculoskeletal: Positive for arthralgias, back pain and neck pain.   Neurological: Positive for weakness (both legs and arms) and numbness (both legs and arms). Negative for tingling and headaches.   Psychiatric/Behavioral: Positive for sleep disturbance. Negative for self-injury and suicidal ideas. The patient  is nervous/anxious.      There were no vitals filed for this visit.    Objective   Physical Exam  As this is a telephone check-in, the ability to perform a routine physical exam is extremely limited. The patient seems alert and is oriented appropriately.   On this phone call there is not any evidence of respiratory distress.   The patient seems of normal mood.   The remainder of a routine physical exam is deferred.      Assessment/Plan   Diagnoses and all orders for this visit:    Other chronic pain    Low back pain, unspecified back pain laterality, unspecified chronicity, unspecified whether sciatica present    Thoracic disc herniation T9-T10    Degeneration of intervertebral disc of lumbar region    DDD (degenerative disc disease), cervical    Postlaminectomy syndrome of lumbar region    Encounter for long-term current use of high risk medication    Other orders  -     HYDROcodone-acetaminophen (NORCO) 5-325 MG per tablet; Take 1 tablet by mouth Every 8 (Eight) Hours As Needed for Severe Pain .      ----------------    Our practice is offering alternative &/or electronic methods to continue to follow our patients while at the same time further the efforts toward social distancing, in accordance with our organizational policies, professional societies' guidance, and gubernatorial mandates.  I support the Healthy at Home campaign and in this visit I have counseled the patient on our needs to limit in-person office visits and physical encounters with medical facilities whenever possible.  I have also educated the patient on the medical necessities of maintaining social distancing while we continue to function during this crisis period.      The patient was counseled on the need to consider telehealth options. The patient was offered the opportunity for a Video Visit using [employer]. The patient had obstacles which preclude consideration for a Video Visit. As a Video Visit was not practical, the patient was offered the  option for a Telephone Check-In. The patient agreed to a Telephone Check-In. The patient was counseled on the need for a check-in visit. The patient was educated about our efforts to comply with monitoring standards when prescribing potent medications.    TIME:  Total Time:  12 minutes. Topics discussed are outlined in the Assessment/Plan section of the note.      ----------------    --- The urine drug screen confirmation from 12-23-19 has been reviewed and the result is APPROPRIATE based on patient history and MARIOLA report  --- Increase Hydrocodone 5/325 to q8hrs PRN due to increased pain. Patient appears stable with current regimen. No adverse effects. Regarding continuation of opioids, there is no evidence of aberrant behavior or any red flags.  The patient continues with appropriate response to opioid therapy. ADL's remain intact by self. Discussed medication with the patient.  Included in this discussion was the potential for side effects and adverse events.  Patient verbalized understanding and wished to proceed.  Prescription will be sent to pharmacy.  --- consider cervical epidural. Will await neurosurgical evaluation.  --- Follow-up 1 months or sooner if needed.     MARIOLA REPORT    As part of the patient's treatment plan, I am prescribing controlled substances. The patient has been made aware of appropriate use of such medications, including potential risk of somnolence, limited ability to drive and/or work safely, and the potential for dependence or overdose. It has also been made clear that these medications are for use by this patient only, without concomitant use of alcohol or other substances unless prescribed.     Patient has completed prescribing agreement detailing terms of continued prescribing of controlled substances, including monitoring MARIOLA reports, urine drug screening, and pill counts if necessary. The patient is aware that inappropriate use will results in cessation of prescribing such  medications.    MARIOLA report has been reviewed and scanned into the patient's chart.    As the clinician, I personally reviewed the MARIOLA from 5-19-20 while the patient was on the telephonic visit today.    History and physical exam exhibit continued safe and appropriate use of controlled substances.    -------    EMR Dragon/Transcription disclaimer:   Much of this encounter note is an electronic transcription/translation of spoken language to printed text. The electronic translation of spoken language may permit erroneous, or at times, nonsensical words or phrases to be inadvertently transcribed; Although I have reviewed the note for such errors, some may still exist.

## 2020-05-22 ENCOUNTER — TELEPHONE (OUTPATIENT)
Dept: NEUROSURGERY | Facility: CLINIC | Age: 80
End: 2020-05-22

## 2020-05-22 ENCOUNTER — OFFICE VISIT (OUTPATIENT)
Dept: NEUROSURGERY | Facility: CLINIC | Age: 80
End: 2020-05-22

## 2020-05-22 VITALS
BODY MASS INDEX: 31.83 KG/M2 | DIASTOLIC BLOOD PRESSURE: 63 MMHG | SYSTOLIC BLOOD PRESSURE: 144 MMHG | HEIGHT: 68 IN | WEIGHT: 210 LBS | TEMPERATURE: 96.8 F | HEART RATE: 62 BPM

## 2020-05-22 DIAGNOSIS — M51.36 DEGENERATION OF INTERVERTEBRAL DISC OF LUMBAR REGION: Primary | ICD-10-CM

## 2020-05-22 DIAGNOSIS — M51.24 THORACIC DISC HERNIATION: ICD-10-CM

## 2020-05-22 DIAGNOSIS — M51.34 DDD (DEGENERATIVE DISC DISEASE), THORACIC: ICD-10-CM

## 2020-05-22 DIAGNOSIS — M50.30 DDD (DEGENERATIVE DISC DISEASE), CERVICAL: ICD-10-CM

## 2020-05-22 DIAGNOSIS — N18.30 CKD (CHRONIC KIDNEY DISEASE) STAGE 3, GFR 30-59 ML/MIN (HCC): ICD-10-CM

## 2020-05-22 PROBLEM — M48.02 MYELOPATHY CONCURRENT WITH AND DUE TO SPINAL STENOSIS OF CERVICAL REGION (HCC): Status: RESOLVED | Noted: 2019-08-02 | Resolved: 2020-05-22

## 2020-05-22 PROBLEM — M48.02 SPINAL STENOSIS OF CERVICAL REGION: Status: RESOLVED | Noted: 2019-08-03 | Resolved: 2020-05-22

## 2020-05-22 PROBLEM — G99.2 MYELOPATHY CONCURRENT WITH AND DUE TO SPINAL STENOSIS OF CERVICAL REGION (HCC): Status: RESOLVED | Noted: 2019-08-02 | Resolved: 2020-05-22

## 2020-05-22 PROCEDURE — 99214 OFFICE O/P EST MOD 30 MIN: CPT | Performed by: PHYSICIAN ASSISTANT

## 2020-05-22 RX ORDER — FLURBIPROFEN SODIUM 0.3 MG/ML
SOLUTION/ DROPS OPHTHALMIC
COMMUNITY
Start: 2020-03-26 | End: 2021-01-01 | Stop reason: SDUPTHER

## 2020-05-22 RX ORDER — ASPIRIN 81 MG/1
1 TABLET, COATED ORAL DAILY
COMMUNITY
Start: 2020-03-26 | End: 2020-05-22 | Stop reason: SDUPTHER

## 2020-05-22 RX ORDER — LANCETS 28 GAUGE
EACH MISCELLANEOUS
COMMUNITY
Start: 2020-03-27 | End: 2021-01-01 | Stop reason: SDUPTHER

## 2020-05-22 RX ORDER — BLOOD-GLUCOSE METER
KIT MISCELLANEOUS
COMMUNITY
Start: 2020-03-26 | End: 2021-01-01 | Stop reason: SDUPTHER

## 2020-05-22 RX ORDER — ISOPROPYL ALCOHOL 70 ML/100ML
SWAB TOPICAL
COMMUNITY

## 2020-05-22 RX ORDER — ACETAMINOPHEN 500 MG
500 TABLET ORAL EVERY 6 HOURS PRN
COMMUNITY

## 2020-05-22 NOTE — TELEPHONE ENCOUNTER
PATIENT WOULD LIKE KIRAN MARTINEZ TO CALL HIM TO DISCUSS HIS THOUGHTS ON THE MRI.  PLEASE CALL AND ADVISE.    [phone]

## 2020-05-22 NOTE — TELEPHONE ENCOUNTER
Dr. Vince Gutierrez's office called as the patient called them about getting clearance to have his Myelogram. He saw Johanna today. Their office is not comfortable clearing him given his current kidney function. They would like to re-check him in early June and then possibly clear him at that point.     Called the patient back as Johanna wanted me to pass the message from Dr. Vince Gutierrez's office along to him. I explained to him the above conversation with Dr. Gutierrez's office. He said I guarantee your office does hundred's of these tests a week without getting clearance, I told him that is not true we obtain clearance for these test very frequently. I told him we get clearance from renal, cardiology and PCP office quite often. We ensure it is safe for our patient's to have the test if they have any diagnose or reason our providers might feel is questionable. He said if his doctor does not clear him in early June to have our test he will be finding a new doctor to see about his pain or symptoms. I told him that is his right as a patient however we want to ensure he is safe while caring for him. He will call us back once he see's Dr. Gutierrez.

## 2020-06-18 ENCOUNTER — OFFICE VISIT (OUTPATIENT)
Dept: PAIN MEDICINE | Facility: CLINIC | Age: 80
End: 2020-06-18

## 2020-06-18 VITALS
WEIGHT: 210.2 LBS | DIASTOLIC BLOOD PRESSURE: 70 MMHG | OXYGEN SATURATION: 94 % | HEIGHT: 68 IN | RESPIRATION RATE: 18 BRPM | HEART RATE: 62 BPM | BODY MASS INDEX: 31.86 KG/M2 | SYSTOLIC BLOOD PRESSURE: 152 MMHG | TEMPERATURE: 97.3 F

## 2020-06-18 DIAGNOSIS — Z79.899 ENCOUNTER FOR LONG-TERM CURRENT USE OF HIGH RISK MEDICATION: ICD-10-CM

## 2020-06-18 DIAGNOSIS — M51.36 DEGENERATION OF INTERVERTEBRAL DISC OF LUMBAR REGION: ICD-10-CM

## 2020-06-18 DIAGNOSIS — M50.30 DDD (DEGENERATIVE DISC DISEASE), CERVICAL: ICD-10-CM

## 2020-06-18 DIAGNOSIS — M96.1 POSTLAMINECTOMY SYNDROME, CERVICAL REGION: ICD-10-CM

## 2020-06-18 DIAGNOSIS — G89.29 OTHER CHRONIC PAIN: Primary | ICD-10-CM

## 2020-06-18 DIAGNOSIS — M96.1 POSTLAMINECTOMY SYNDROME OF LUMBAR REGION: ICD-10-CM

## 2020-06-18 PROCEDURE — 80305 DRUG TEST PRSMV DIR OPT OBS: CPT | Performed by: NURSE PRACTITIONER

## 2020-06-18 PROCEDURE — 99214 OFFICE O/P EST MOD 30 MIN: CPT | Performed by: NURSE PRACTITIONER

## 2020-06-18 NOTE — PROGRESS NOTES
CHIEF COMPLAINT  Follow-up for back pain. Pain has gotten worse.    Subjective   Chevy Goodwin is a 79 y.o. male  who presents for follow-up.He has a history of neck and back pain.    Patient saw WILLIAM Woodard with neurosurgery 5/22/2020 regarding worsening neck pain.  I reviewed this office note.  Patient had an ACDF at C3 and C5 with Dr. Singleton 8/5/2019.  And initially was reporting improvement in his neck pain however the last couple of months his neck and back pain have become progressively worse.  Recently has reported worsening gait issues.  Occasional arm and leg pain but axial is most bothersome.  He is waiting on renal clearance in order to proceed with total spine myelogram.  Once this has been complete the patient will follow up with Dr. Singleton.    Complains of pain in his low back, neck and shoulders.  Pain worsening in neck and back, neck is the most severe.  Today his pain is 7/10VAS.  Continues with Hydrocodone 5/325 2-3/day (increased quantity last visit), as well as Tylenol OTC, and Celebrex 200 mg BID. Reports when he decreases Celebrex, his hands start to ache (pcp only recommends 1/day due to renal function). The pain medication regimen helps decrease his pain by 30-40%. Denies any side effects from the regimen. No constipation or somnolence.      Back Pain   This is a chronic (history of lumbar fusion with Dr. Yuan at [facility], was done in 2005) problem. The current episode started more than 1 year ago. The problem occurs constantly. The problem has been waxing and waning (reports as unchanged since last office visit) since onset. The pain is present in the lumbar spine and thoracic spine. The quality of the pain is described as aching and burning. The pain radiates to the right foot and left foot. The pain is at a severity of 7/10. The pain is moderate. The pain is worse during the day. The symptoms are aggravated by bending and twisting (walking, physical activity). Stiffness is  present in the morning. Associated symptoms include numbness (bilateral feet). Pertinent negatives include no bladder incontinence, bowel incontinence, chest pain, dysuria, fever, headaches, tingling or weakness. Risk factors include lack of exercise. He has tried NSAIDs (Hydrocodone) for the symptoms. The treatment provided moderate relief.   Neck Pain    This is a chronic (surgery 8/2019 Dr. Singleton) problem. The problem occurs daily. The problem has been gradually worsening. The pain is at a severity of 6/10. The pain is moderate. Associated symptoms include numbness (bilateral feet). Pertinent negatives include no chest pain, fever, headaches, tingling or weakness. He has tried NSAIDs and oral narcotics for the symptoms. The treatment provided moderate relief.     PEG Assessment   What number best describes your pain on average in the past week?7  What number best describes how, during the past week, pain has interfered with your enjoyment of life?8  What number best describes how, during the past week, pain has interfered with your general activity?  8    The following portions of the patient's history were reviewed and updated as appropriate: allergies, current medications, past family history, past medical history, past social history, past surgical history and problem list.    Review of Systems   Constitutional: Positive for fatigue. Negative for fever.   HENT: Negative for congestion.    Eyes: Negative for visual disturbance.   Respiratory: Negative for cough, shortness of breath and wheezing.    Cardiovascular: Negative.  Negative for chest pain.   Gastrointestinal: Negative for bowel incontinence, constipation and diarrhea.   Genitourinary: Negative for bladder incontinence, difficulty urinating and dysuria.   Musculoskeletal: Positive for back pain, joint swelling (bilateral ankles) and neck pain.   Neurological: Positive for numbness (bilateral feet). Negative for tingling, weakness and headaches.  "  Psychiatric/Behavioral: Positive for sleep disturbance. Negative for suicidal ideas. The patient is not nervous/anxious.      Vitals:    06/18/20 1513   BP: 152/70   Pulse: 62   Resp: 18   Temp: 97.3 °F (36.3 °C)   SpO2: 94%   Weight: 95.3 kg (210 lb 3.2 oz)   Height: 172.7 cm (68\")   PainSc:   7   PainLoc: Back     Objective   Physical Exam   Constitutional: He is oriented to person, place, and time. He appears well-developed and well-nourished. No distress.   HENT:   Head: Normocephalic and atraumatic.   Eyes: Pupils are equal, round, and reactive to light. EOM are normal.   Cardiovascular: Normal rate and regular rhythm.   Pulmonary/Chest: Effort normal. No respiratory distress.   Musculoskeletal:        Thoracic back: He exhibits tenderness.        Lumbar back: He exhibits tenderness.   Neurological: He is oriented to person, place, and time. Gait (ambulating with cane) abnormal.   Skin: Skin is warm and dry. He is not diaphoretic.   Psychiatric: He has a normal mood and affect. His behavior is normal.   Nursing note and vitals reviewed.    Assessment/Plan   Chevy was seen today for back pain.    Diagnoses and all orders for this visit:    Other chronic pain    DDD (degenerative disc disease), cervical    Degeneration of intervertebral disc of lumbar region    Postlaminectomy syndrome, cervical region    Postlaminectomy syndrome of lumbar region    Encounter for long-term current use of high risk medication      --- Continue to f/u with neurosurgery. Notify the office if he is scheduled for any type of surgery.   --- Continue hydrocodone. Patient appears stable with current regimen. No adverse effects. Regarding continuation of opioids, there is no evidence of aberrant behavior or any red flags.  The patient continues with appropriate response to opioid therapy. ADL's remain intact by self.   --- Routine UDS in office today as part of monitoring requirements for controlled substances.  The specimen was viewed " and the immunoassay result reviewed and is +OPI.  [facility].     --- Follow-up 2 months          MARIOLA REPORT  As part of the patient's treatment plan, I am prescribing controlled substances. The patient has been made aware of appropriate use of such medications, including potential risk of somnolence, limited ability to drive and/or work safely, and the potential for dependence or overdose. It has also bee made clear that these medications are for use by this patient only, without concomitant use of alcohol or other substances unless prescribed.     Patient has completed prescribing agreement detailing terms of continued prescribing of controlled substances, including monitoring MARIOLA reports, urine drug screening, and pill counts if necessary. The patient is aware that inappropriate use will results in cessation of prescribing such medications.    MARIOLA report has been reviewed and scanned into the patient's chart.    As the clinician, I personally reviewed the MARIOLA from 6/18/20 .    History and physical exam exhibit continued safe and appropriate use of controlled substances.    EMR Dragon/Transcription disclaimer:   Much of this encounter note is an electronic transcription/translation of spoken language to printed text. The electronic translation of spoken language may permit erroneous, or at times, nonsensical words or phrases to be inadvertently transcribed; Although I have reviewed the note for such errors, some may still exist.

## 2020-06-23 ENCOUNTER — RESULTS ENCOUNTER (OUTPATIENT)
Dept: PAIN MEDICINE | Facility: CLINIC | Age: 80
End: 2020-06-23

## 2020-06-23 DIAGNOSIS — Z79.899 ENCOUNTER FOR LONG-TERM CURRENT USE OF HIGH RISK MEDICATION: ICD-10-CM

## 2020-06-23 DIAGNOSIS — M50.30 DDD (DEGENERATIVE DISC DISEASE), CERVICAL: ICD-10-CM

## 2020-06-23 DIAGNOSIS — M96.1 POSTLAMINECTOMY SYNDROME OF LUMBAR REGION: ICD-10-CM

## 2020-06-23 DIAGNOSIS — M51.36 DEGENERATION OF INTERVERTEBRAL DISC OF LUMBAR REGION: ICD-10-CM

## 2020-06-23 DIAGNOSIS — G89.29 OTHER CHRONIC PAIN: ICD-10-CM

## 2020-06-23 DIAGNOSIS — M96.1 POSTLAMINECTOMY SYNDROME, CERVICAL REGION: ICD-10-CM

## 2020-06-26 RX ORDER — HYDROCODONE BITARTRATE AND ACETAMINOPHEN 5; 325 MG/1; MG/1
1 TABLET ORAL EVERY 8 HOURS PRN
Qty: 90 TABLET | Refills: 0 | Status: SHIPPED | OUTPATIENT
Start: 2020-06-26 | End: 2020-08-05 | Stop reason: SDUPTHER

## 2020-06-26 NOTE — TELEPHONE ENCOUNTER
Medication Refill Request    Date of phone call: 6/26/20    Medication being requested: norco 5/325mg sig:    Qty:     Date of last visit: 6/18/20    Date of last refill: 5/21/20    MARIOLA up to date?: yes    Next Follow up?: 8/18/20    Any new pertinent information? (i.e, new medication allergies, new use of medications, change in patient's health or condition, non-compliance or inconsistency with prescribing agreement?): left sig and qty blank for you, saw pt taking differently.

## 2020-07-29 ENCOUNTER — APPOINTMENT (OUTPATIENT)
Dept: GENERAL RADIOLOGY | Facility: HOSPITAL | Age: 80
End: 2020-07-29

## 2020-07-29 ENCOUNTER — HOSPITAL ENCOUNTER (EMERGENCY)
Facility: HOSPITAL | Age: 80
Discharge: HOME | End: 2020-07-29
Attending: EMERGENCY MEDICINE | Admitting: EMERGENCY MEDICINE

## 2020-07-29 VITALS
DIASTOLIC BLOOD PRESSURE: 72 MMHG | HEART RATE: 91 BPM | OXYGEN SATURATION: 96 % | TEMPERATURE: 98.4 F | SYSTOLIC BLOOD PRESSURE: 162 MMHG | RESPIRATION RATE: 16 BRPM

## 2020-07-29 DIAGNOSIS — S46.911A RIGHT SHOULDER STRAIN, INITIAL ENCOUNTER: Primary | ICD-10-CM

## 2020-07-29 PROCEDURE — 73030 X-RAY EXAM OF SHOULDER: CPT

## 2020-07-29 PROCEDURE — 99282 EMERGENCY DEPT VISIT SF MDM: CPT

## 2020-08-05 RX ORDER — HYDROCODONE BITARTRATE AND ACETAMINOPHEN 5; 325 MG/1; MG/1
1 TABLET ORAL EVERY 8 HOURS PRN
Qty: 90 TABLET | Refills: 0 | Status: SHIPPED | OUTPATIENT
Start: 2020-08-05 | End: 2020-09-08 | Stop reason: SDUPTHER

## 2020-08-05 NOTE — TELEPHONE ENCOUNTER
Medication Refill Request    Date of phone call: 20    Medication being requested: hydro/apap 5-325 mg si tab q 8 hrs prn  Qty: 90    Date of last visit: 2020    Date of last refill: 2020    MARIOLA up to date?: yes    Next Follow up?: 2020    Any new pertinent information? (i.e, new medication allergies, new use of medications, change in patient's health or condition, non-compliance or inconsistency with prescribing agreement?):

## 2020-08-07 ENCOUNTER — TELEPHONE (OUTPATIENT)
Dept: NEUROSURGERY | Facility: CLINIC | Age: 80
End: 2020-08-07

## 2020-08-07 NOTE — TELEPHONE ENCOUNTER
He was seen in the ER 7/29/20 for shoulder pain after laying on th ground fixing a . He was instructed to contact orthopedic.     ACDF at C3-C4, C4-C5  8/5/19 by Dr. Singleton.     Patient called with increased neck, arm back and leg pain. He said it is not related to the shoulder.  He states last visit in office he states he discussed getting a total spine myelogram.

## 2020-08-07 NOTE — TELEPHONE ENCOUNTER
Patient called and is having neck pain, the pain is shooting to both shoulders, numbness & tingling in both hands and fingers.  The patient mentioned that he can only sleep in his recliner with a ice pack on his neck.

## 2020-08-10 ENCOUNTER — TELEPHONE (OUTPATIENT)
Dept: SLEEP MEDICINE | Facility: HOSPITAL | Age: 80
End: 2020-08-10

## 2020-08-10 NOTE — TELEPHONE ENCOUNTER
Spoke with patient, scheduled face to face visit to get an order for a new BiPAP, the parts for his current machine are no longer available @ [place].

## 2020-08-11 NOTE — TELEPHONE ENCOUNTER
I think that is fine.  Without further testing I do not have much else to offer him so I would suggest he follow-up with his PCP.

## 2020-08-11 NOTE — TELEPHONE ENCOUNTER
Called the patient to let him know what Dr. Singleton said, He verbalized understanding he mentioned getting into see a renal doctor. I told him if he gets clearance he will call us back and we can schedule him to be seen by Dr. Singleton.

## 2020-08-11 NOTE — TELEPHONE ENCOUNTER
PATIENT CALLED REQUESTING TO SPEAK TO REBA HARRELL REGARDING CLEARANCE FOR MYELO. PATIENT STATES THAT PCP REFUSES TO GIVE AUTH FOR MYELO BECAUSE HE BELIEVES IF MYELO IS COMPLETED THAN PATIENT WILL WANT TO MOVE FORWARD WITH SURGERY & PCP DO NOT THINK PATIENT NEEDS ANYMORE SURGERIES DUE TO HIS AGE. PATIENT STATES HE IS OPEN TO SIGNING ANY LIABILITY FORMS ACKNOWLEDGING THE RISK ASSOCIATED WITH HIS KIDNEYS TO MOVE FORWARD WITH MYELO CAUSE HE SAYS HE IS PROGRESSIVELY GETTING WORSE WHERE HE IS LOOSING HIS  AND DROPPING THINGS AND EXPERIENCING ALOT OF PAIN. PLEASE ADVISE.    PCP: DR. BRIAN ANGEL  PH: [phone]

## 2020-08-11 NOTE — TELEPHONE ENCOUNTER
Called pt to see if he has clearance from renal doctor to get myelogram. If so we do need a copy. He will need to scheduled with Dr. Singleton.

## 2020-08-18 ENCOUNTER — OFFICE VISIT (OUTPATIENT)
Dept: PAIN MEDICINE | Facility: CLINIC | Age: 80
End: 2020-08-18

## 2020-08-18 VITALS
HEART RATE: 64 BPM | HEIGHT: 68 IN | OXYGEN SATURATION: 95 % | TEMPERATURE: 99.3 F | SYSTOLIC BLOOD PRESSURE: 156 MMHG | WEIGHT: 213.8 LBS | RESPIRATION RATE: 18 BRPM | DIASTOLIC BLOOD PRESSURE: 54 MMHG | BODY MASS INDEX: 32.4 KG/M2

## 2020-08-18 DIAGNOSIS — M51.34 DDD (DEGENERATIVE DISC DISEASE), THORACIC: ICD-10-CM

## 2020-08-18 DIAGNOSIS — G89.29 OTHER CHRONIC PAIN: Primary | ICD-10-CM

## 2020-08-18 DIAGNOSIS — Z79.899 ENCOUNTER FOR LONG-TERM CURRENT USE OF HIGH RISK MEDICATION: ICD-10-CM

## 2020-08-18 DIAGNOSIS — M96.1 POSTLAMINECTOMY SYNDROME, CERVICAL REGION: ICD-10-CM

## 2020-08-18 DIAGNOSIS — M96.1 POSTLAMINECTOMY SYNDROME OF LUMBAR REGION: ICD-10-CM

## 2020-08-18 DIAGNOSIS — M50.30 DDD (DEGENERATIVE DISC DISEASE), CERVICAL: ICD-10-CM

## 2020-08-18 DIAGNOSIS — N18.9 CHRONIC KIDNEY DISEASE, UNSPECIFIED CKD STAGE: ICD-10-CM

## 2020-08-18 PROCEDURE — 99214 OFFICE O/P EST MOD 30 MIN: CPT | Performed by: NURSE PRACTITIONER

## 2020-08-18 NOTE — PROGRESS NOTES
CHIEF COMPLAINT  Follow-up for back and neck pain.    Subjective   Chevy Goodwin is a 80 y.o. male  who presents for follow-up.  He has a history of neck pain, back pain.    Patient saw WILLIAM Woodard with neurosurgery 5/22/2020 regarding worsening neck pain.  I reviewed this office note.  Patient had an ACDF at C3 and C5 with Dr. Singleton 8/5/2019.  And initially was reporting improvement in his neck pain however the last couple of months his neck and back pain have become progressively worse.  Recently has reported worsening gait issues.  Occasional arm and leg pain but axial is most bothersome.  He is waiting on renal clearance in order to proceed with total spine myelogram.  Once this has been complete the patient will follow up with Dr. Singleton. Patient states that his pcp will not clear him because he does not want him to have any more surgery. Asks for referral to nephrologist, his is no longer practicing.        Complains of pain in his low back, neck and shoulders.  Pain worsening in neck and back, neck is the most severe.  Today his pain is 8/10VAS.  Continues with Hydrocodone 5/325 2-3/day (increased quantity a few months ago), as well as Tylenol OTC, and Celebrex 200 mg BID. Reports when he decreases Celebrex, his hands start to ache (pcp only recommends 1/day due to renal function). The pain medication regimen helps decrease his pain by 30-40%. Denies any side effects from the regimen. No constipation or somnolence.      Back Pain   This is a chronic (history of lumbar fusion with Dr. Yuan at [facility], was done in 2005) problem. The current episode started more than 1 year ago. The problem occurs constantly. The problem has been waxing and waning (reports as unchanged since last office visit) since onset. The pain is present in the lumbar spine and thoracic spine. The quality of the pain is described as aching and burning. The pain radiates to the right foot and left foot. The pain is at a  severity of 8/10. The pain is moderate. The pain is worse during the day. The symptoms are aggravated by bending and twisting (walking, physical activity). Stiffness is present in the morning. Associated symptoms include numbness (bilateral arms) and weakness (bilateral arms and legs). Pertinent negatives include no bladder incontinence, bowel incontinence, chest pain, dysuria, fever, headaches or tingling. Risk factors include lack of exercise. He has tried NSAIDs (Hydrocodone) for the symptoms. The treatment provided moderate relief.   Neck Pain    This is a chronic (surgery 8/2019 Dr. Singleton) problem. The problem occurs daily. The problem has been gradually worsening. The pain is at a severity of 6/10. The pain is moderate. Associated symptoms include numbness (bilateral arms) and weakness (bilateral arms and legs). Pertinent negatives include no chest pain, fever, headaches or tingling. He has tried NSAIDs and oral narcotics for the symptoms. The treatment provided moderate relief.     PEG Assessment   What number best describes your pain on average in the past week?8  What number best describes how, during the past week, pain has interfered with your enjoyment of life?7  What number best describes how, during the past week, pain has interfered with your general activity?  8    The following portions of the patient's history were reviewed and updated as appropriate: allergies, current medications, past family history, past medical history, past social history, past surgical history and problem list.    Review of Systems   Constitutional: Positive for fatigue. Negative for fever.   HENT: Positive for hearing loss. Negative for congestion.    Eyes: Negative for visual disturbance.   Respiratory: Negative for cough, shortness of breath and wheezing.    Cardiovascular: Negative.  Negative for chest pain.   Gastrointestinal: Negative for bowel incontinence, constipation and diarrhea.   Genitourinary: Negative for  "bladder incontinence, difficulty urinating and dysuria.   Musculoskeletal: Positive for back pain, joint swelling (bilateral ankles) and neck pain.   Neurological: Positive for weakness (bilateral arms and legs) and numbness (bilateral arms). Negative for tingling and headaches.   Psychiatric/Behavioral: Positive for sleep disturbance. Negative for suicidal ideas. The patient is not nervous/anxious.      I have reviewed and confirmed the accuracy of the ROS as documented by the MA/LPN/RN DANY Vo    Vitals:    08/18/20 1503   BP: 156/54   Pulse: 64   Resp: 18   Temp: 99.3 °F (37.4 °C)   SpO2: 95%   Weight: 97 kg (213 lb 12.8 oz)   Height: 172.7 cm (68\")   PainSc:   8   PainLoc: Neck     Objective   Physical Exam   Constitutional: He is oriented to person, place, and time. He appears well-developed and well-nourished. No distress.   HENT:   Head: Normocephalic and atraumatic.   Eyes: Pupils are equal, round, and reactive to light. EOM are normal.   Cardiovascular: Normal rate and regular rhythm.   Pulmonary/Chest: Effort normal. No respiratory distress.   Musculoskeletal:        Thoracic back: He exhibits tenderness.        Lumbar back: He exhibits tenderness.   Neurological: He is oriented to person, place, and time. Gait (ambulating with cane) abnormal.   Skin: Skin is warm and dry. He is not diaphoretic.   Psychiatric: He has a normal mood and affect. His behavior is normal.   Nursing note and vitals reviewed.    Assessment/Plan   Chevy was seen today for back pain and neck pain.    Diagnoses and all orders for this visit:    Other chronic pain    DDD (degenerative disc disease), cervical    DDD (degenerative disc disease), thoracic    Postlaminectomy syndrome, cervical region    Postlaminectomy syndrome of lumbar region    Encounter for long-term current use of high risk medication    Chronic kidney disease, unspecified CKD stage  -     Ambulatory Referral to Nephrology      --- Continue " Hydrocodone. Patient appears stable with current regimen. No adverse effects. Regarding continuation of opioids, there is no evidence of aberrant behavior or any red flags.  The patient continues with appropriate response to opioid therapy. ADL's remain intact by self.   --- The urine drug screen confirmation from 6/18/2020 has been reviewed and the result is appropriate based on patient history and MARIOLA report  --- Follow-up 2 months        MARIOLA REPORT  As part of the patient's treatment plan, I am prescribing controlled substances. The patient has been made aware of appropriate use of such medications, including potential risk of somnolence, limited ability to drive and/or work safely, and the potential for dependence or overdose. It has also bee made clear that these medications are for use by this patient only, without concomitant use of alcohol or other substances unless prescribed.     Patient has completed prescribing agreement detailing terms of continued prescribing of controlled substances, including monitoring MARIOLA reports, urine drug screening, and pill counts if necessary. The patient is aware that inappropriate use will results in cessation of prescribing such medications.    MARIOLA report has been reviewed and scanned into the patient's chart.    As the clinician, I personally reviewed the MARIOLA from 8/17/2020 while the patient was in the office today.    History and physical exam exhibit continued safe and appropriate use of controlled substances.    EMR Dragon/Transcription disclaimer:   Much of this encounter note is an electronic transcription/translation of spoken language to printed text. The electronic translation of spoken language may permit erroneous, or at times, nonsensical words or phrases to be inadvertently transcribed; Although I have reviewed the note for such errors, some may still exist.

## 2020-09-02 ENCOUNTER — OFFICE VISIT (OUTPATIENT)
Dept: SLEEP MEDICINE | Facility: HOSPITAL | Age: 80
End: 2020-09-02

## 2020-09-02 VITALS — HEART RATE: 72 BPM | HEIGHT: 68 IN | OXYGEN SATURATION: 95 % | BODY MASS INDEX: 32.58 KG/M2 | WEIGHT: 215 LBS

## 2020-09-02 DIAGNOSIS — G47.33 OSA TREATED WITH BIPAP: Primary | ICD-10-CM

## 2020-09-02 PROCEDURE — G0463 HOSPITAL OUTPT CLINIC VISIT: HCPCS

## 2020-09-02 PROCEDURE — 99213 OFFICE O/P EST LOW 20 MIN: CPT | Performed by: INTERNAL MEDICINE

## 2020-09-02 NOTE — PROGRESS NOTES
"Follow Up [facility] Note     Chief Complaint:  JULIA     Primary Care Physician: Anthony Gutierrez MD    Interval History:   The patient is a 80 y.o. male who I last saw in February of this year.  The patient states his device is making more noises.  He goes to bed at 11 PM and awakens at 10 AM.  He awakens due to nightmares and he needs to go to the bathroom.  Fraser Sleepiness Scale is abnormal at 13.    Review of Systems:    A complete review of systems was done and all were negative with the exception of the above    Social History:    Social History     Socioeconomic History   • Marital status:      Spouse name: Not on file   • Number of children: Not on file   • Years of education: Not on file   • Highest education level: Not on file   Tobacco Use   • Smoking status: Current Every Day Smoker     Years: 60.00     Types: Cigars   • Smokeless tobacco: Never Used   • Tobacco comment: QUIT 1999 CIGARETTES/STILL SMOKES CIGARS   Substance and Sexual Activity   • Alcohol use: Yes     Comment: VERY RARE   • Drug use: Never   • Sexual activity: Defer       Allergies:  Contrast dye; Oxycodone; and Adhesive tape     Medication Review:  Reviewed.      Vital Signs:    Vitals:    09/02/20 1147   Pulse: 72   SpO2: 95%   Weight: 97.5 kg (215 lb)   Height: 172.7 cm (68\")     Body mass index is 32.69 kg/m².    Physical Exam:    Constitutional:  Well developed 80 y.o. male that appears in no apparent distress.  Awake & oriented times 3.  Normal mood with normal recent and remote memory and normal judgement.  Eyes:  Conjunctivae normal.  Oropharynx: Previously, moist mucous membranes without exudate and a large tongue and class III-4 Mallampati airway, patient is wearing a facemask.     Results Review:  DME is [facility] and he uses a fullface mask.  Downloads between 6/4 and 9/1/2020 compliance 99%.  Average usage is 6 hours and 5 minutes.  Average AHI is normal with a leak of 22 minutes.  BiPAP pressure is " IPAP of 20 and EPAP of 16.       Impression:   Obstructive sleep apnea adequately treated with BiPAP 20/16 with good compliance and usage and some persistent complaints of hypersomnolence.    Plan:  Good sleep hygiene measures should be maintained.  Weight loss would be beneficial in this patient who is obese by BMI.  The patient is benefiting from the treatment being provided.     The patient will continue BiPAP.  A new order for auto BiPAP with IPAP max of 25 and EPAP min of 12 and max pressure support of 6 and minimum pressure support of 2 will be sent to his DME.  Additionally, the patient wishes medium Air Touch F 20.    The patient will call for any problems and will follow up in 3 months.      Mulugeta Odonnell MD  Sleep Medicine  09/02/20  12:04

## 2020-09-08 ENCOUNTER — TELEPHONE (OUTPATIENT)
Dept: SLEEP MEDICINE | Facility: HOSPITAL | Age: 80
End: 2020-09-08

## 2020-09-08 RX ORDER — HYDROCODONE BITARTRATE AND ACETAMINOPHEN 5; 325 MG/1; MG/1
1 TABLET ORAL EVERY 8 HOURS PRN
Qty: 90 TABLET | Refills: 0 | Status: SHIPPED | OUTPATIENT
Start: 2020-09-08 | End: 2020-10-07 | Stop reason: SDUPTHER

## 2020-09-08 NOTE — TELEPHONE ENCOUNTER
Medication Refill Request    Date of phone call: 20    Medication being requested: Norco 5-325 mg   si tab po q 8 hrs prn  Qty: 90    Date of last visit: 20    Date of last refill: 20    MARIOLA up to date?: yes    Next Follow up?: 10/6/20    Any new pertinent information? (i.e, new medication allergies, new use of medications, change in patient's health or condition, non-compliance or inconsistency with prescribing agreement?):

## 2020-10-07 NOTE — TELEPHONE ENCOUNTER
Medication Refill Request    Date of phone call: 10/7/20    Medication being requested: norco 5/325mg si tab po q 8 hours prn   Qty: 90    Date of last visit: 20    Date of last refill:     MARIOLA up to date?:     Next Follow up?: 10/16/20    Any new pertinent information? (i.e, new medication allergies, new use of medications, change in patient's health or condition, non-compliance or inconsistency with prescribing agreement?):

## 2020-10-08 RX ORDER — HYDROCODONE BITARTRATE AND ACETAMINOPHEN 5; 325 MG/1; MG/1
1 TABLET ORAL EVERY 8 HOURS PRN
Qty: 9 TABLET | Refills: 0 | Status: SHIPPED | OUTPATIENT
Start: 2020-10-08 | End: 2020-10-16 | Stop reason: SDUPTHER

## 2020-10-08 NOTE — TELEPHONE ENCOUNTER
Called and spoke with pt. He said he has 6 days of pills left and he needs refill before his appt. Please advise. Thank you.

## 2020-10-16 ENCOUNTER — OFFICE VISIT (OUTPATIENT)
Dept: PAIN MEDICINE | Facility: CLINIC | Age: 80
End: 2020-10-16

## 2020-10-16 ENCOUNTER — TELEPHONE (OUTPATIENT)
Dept: NEUROSURGERY | Facility: CLINIC | Age: 80
End: 2020-10-16

## 2020-10-16 VITALS
BODY MASS INDEX: 32.22 KG/M2 | SYSTOLIC BLOOD PRESSURE: 147 MMHG | WEIGHT: 212.6 LBS | RESPIRATION RATE: 18 BRPM | OXYGEN SATURATION: 98 % | HEART RATE: 73 BPM | DIASTOLIC BLOOD PRESSURE: 68 MMHG | TEMPERATURE: 97.1 F | HEIGHT: 68 IN

## 2020-10-16 DIAGNOSIS — M51.36 DEGENERATION OF INTERVERTEBRAL DISC OF LUMBAR REGION: ICD-10-CM

## 2020-10-16 DIAGNOSIS — M51.34 DDD (DEGENERATIVE DISC DISEASE), THORACIC: ICD-10-CM

## 2020-10-16 DIAGNOSIS — G89.29 OTHER CHRONIC PAIN: Primary | ICD-10-CM

## 2020-10-16 DIAGNOSIS — M50.30 DDD (DEGENERATIVE DISC DISEASE), CERVICAL: ICD-10-CM

## 2020-10-16 PROCEDURE — 99214 OFFICE O/P EST MOD 30 MIN: CPT | Performed by: NURSE PRACTITIONER

## 2020-10-16 RX ORDER — HYDROCODONE BITARTRATE AND ACETAMINOPHEN 5; 325 MG/1; MG/1
1 TABLET ORAL EVERY 8 HOURS PRN
Qty: 90 TABLET | Refills: 0 | Status: SHIPPED | OUTPATIENT
Start: 2020-10-16 | End: 2020-11-11 | Stop reason: SDUPTHER

## 2020-10-16 NOTE — TELEPHONE ENCOUNTER
THE PATIENT CALLED REGARDING OBTAINING APPROVAL TO HAVE A MYELOGRAM. HE STATED THAT HIS NEPHROLOGIST DR RYAN CARMONA ([phone]) FAXED HIS APPROVAL TO THE [facility] ON 10/6.     THE PATIENT WANTED TO CONFIRM THAT THE DOCUMENT HAD BEEN RECEIVED AND TO SEE IF HE COULD BE SCHEDULED FOR THE MYELOGRAM. HE HAS A HARD COPY OF THE APPROVAL FROM DR. CARMONA, AND HE CAN REFAX IT OR BRING A COPY TO THE OFFICE IF NEEDED.    PLEASE ADVISE. THE PATIENT CAN BE REACHED AT: [phone]    THANK YOU!

## 2020-10-16 NOTE — PROGRESS NOTES
CHIEF COMPLAINT  Follow-up for neck and back pain.    Subjective   Chevy Goodwin is a 80 y.o. male  who presents for follow-up.  He has a history of chronic neck and back pain.    Patient saw WILLIAM Woodard with neurosurgery 5/22/2020 regarding worsening neck pain.  Patient had an ACDF at C3 and C5 with Dr. Singleton 8/5/2019.  And initially was reporting improvement in his neck pain however the last couple of months his neck and back pain have become progressively worse.  Recently has reported worsening gait issues.  Occasional arm and leg pain but axial is most bothersome.  He is waiting on renal clearance in order to proceed with total spine myelogram.  Once this has been complete the patient will follow up with Dr. Singleton. Patient states that his pcp will not clear him because he does not want him to have any more surgery. referred to nephrology last visit who gave clearance for myelogram which he hopes to schedule next week.       Complains of pain in his low back, neck and shoulders.  Pain worsening in neck and back, neck is the most severe.  Today his pain is 7/10VAS.  Continues with Hydrocodone 5/325 2-3/day (increased quantity a few months ago), as well as Tylenol OTC. Reports when he decreases Celebrex, his hands start to ache (pcp only recommends 1/day due to renal function). The pain medication regimen helps decrease his pain by 30-40%. Denies any side effects from the regimen. No constipation or somnolence.      Patient remained masked during entire encounter. No cough present. I donned a mask and eye protection throughout entire visit. Prior to donning mask and eye protection, hand hygiene was performed, as well as when it was doffed.  I was closer than 6 feet, but not for an extended period of time. No obvious exposure to any bodily fluids.    Back Pain  This is a chronic (history of lumbar fusion with Dr. Yuan at [facility], was done in 2005) problem. The current episode started more than 1  year ago. The problem occurs constantly. The problem has been waxing and waning (reports as unchanged since last office visit) since onset. The pain is present in the lumbar spine and thoracic spine. The quality of the pain is described as aching and burning. The pain radiates to the right foot and left foot. The pain is at a severity of 7/10. The pain is moderate. The pain is worse during the day. The symptoms are aggravated by bending and twisting (walking, physical activity). Stiffness is present in the morning. Associated symptoms include numbness. Pertinent negatives include no bladder incontinence, bowel incontinence, chest pain, dysuria, fever, headaches, tingling or weakness. Risk factors include lack of exercise. He has tried NSAIDs (Hydrocodone) for the symptoms. The treatment provided moderate relief.   Neck Pain   This is a chronic (surgery 8/2019 Dr. Singleton) problem. The problem occurs daily. The problem has been gradually worsening. The pain is at a severity of 6/10. The pain is moderate. Associated symptoms include numbness. Pertinent negatives include no chest pain, fever, headaches, tingling or weakness. He has tried NSAIDs and oral narcotics for the symptoms. The treatment provided moderate relief.     PEG Assessment   What number best describes your pain on average in the past week?7  What number best describes how, during the past week, pain has interfered with your enjoyment of life?7  What number best describes how, during the past week, pain has interfered with your general activity?  7    The following portions of the patient's history were reviewed and updated as appropriate: allergies, current medications, past family history, past medical history, past social history, past surgical history and problem list.    Review of Systems   Constitutional: Positive for fatigue. Negative for fever.   HENT: Negative for congestion.    Eyes: Negative for visual disturbance.   Respiratory: Negative for  "cough, shortness of breath and wheezing.    Cardiovascular: Positive for leg swelling. Negative for chest pain and palpitations.   Gastrointestinal: Negative for bowel incontinence, constipation and diarrhea.   Genitourinary: Negative for bladder incontinence, difficulty urinating and dysuria.   Musculoskeletal: Positive for back pain and neck pain.   Neurological: Positive for numbness. Negative for tingling, weakness and headaches.   Psychiatric/Behavioral: Positive for sleep disturbance. Negative for suicidal ideas. The patient is not nervous/anxious.      I have reviewed and confirmed the accuracy of the ROS as documented by the MA/LPN/RN DANY Vo    Vitals:    10/16/20 1459   BP: 147/68   Pulse: 73   Resp: 18   Temp: 97.1 °F (36.2 °C)   SpO2: 98%   Weight: 96.4 kg (212 lb 9.6 oz)   Height: 172.7 cm (68\")   PainSc:   7   PainLoc: Neck     Objective   Physical Exam  Vitals signs and nursing note reviewed.   Constitutional:       General: He is not in acute distress.     Appearance: Normal appearance. He is not ill-appearing.   HENT:      Head: Atraumatic.   Eyes:      Conjunctiva/sclera: Conjunctivae normal.   Cardiovascular:      Rate and Rhythm: Normal rate.   Pulmonary:      Effort: Pulmonary effort is normal. No respiratory distress.   Musculoskeletal:      Cervical back: He exhibits tenderness and bony tenderness.      Lumbar back: He exhibits tenderness and bony tenderness.   Skin:     General: Skin is warm and dry.   Neurological:      Mental Status: He is alert and oriented to person, place, and time.      Motor: Motor function is intact. No weakness.      Gait: Gait abnormal (ambulates with cane).   Psychiatric:         Mood and Affect: Mood normal.         Behavior: Behavior normal.       Assessment/Plan   Diagnoses and all orders for this visit:    1. Other chronic pain (Primary)    2. DDD (degenerative disc disease), cervical    3. DDD (degenerative disc disease), thoracic    4. " Degeneration of intervertebral disc of lumbar region    Other orders  -     HYDROcodone-acetaminophen (NORCO) 5-325 MG per tablet; Take 1 tablet by mouth Every 8 (Eight) Hours As Needed for Severe Pain . dnf 10/13/2020  Dispense: 90 tablet; Refill: 0    --- Refill Hydrocodone. Patient appears stable with current regimen. No adverse effects. Regarding continuation of opioids, there is no evidence of aberrant behavior or any red flags.  The patient continues with appropriate response to opioid therapy. ADL's remain intact by self.   --- The urine drug screen confirmation from 6/18/2020 has been reviewed and the result is appropriate based on patient history and MARIOLA report  --- The patient signed an updated copy of the controlled substance agreement on 10/16/2020  --- Follow-up 2 month      MARIOLA REPORT  As part of the patient's treatment plan, I am prescribing controlled substances. The patient has been made aware of appropriate use of such medications, including potential risk of somnolence, limited ability to drive and/or work safely, and the potential for dependence or overdose. It has also bee made clear that these medications are for use by this patient only, without concomitant use of alcohol or other substances unless prescribed.     Patient has completed prescribing agreement detailing terms of continued prescribing of controlled substances, including monitoring MARIOLA reports, urine drug screening, and pill counts if necessary. The patient is aware that inappropriate use will results in cessation of prescribing such medications.    MARIOLA report has been reviewed and scanned into the patient's chart.    As the clinician, I personally reviewed the MARIOLA from 10/16/2020 while the patient was in the office today.    History and physical exam exhibit continued safe and appropriate use of controlled substances.    EMR Dragon/Transcription disclaimer:   Much of this encounter note is an electronic  transcription/translation of spoken language to printed text. The electronic translation of spoken language may permit erroneous, or at times, nonsensical words or phrases to be inadvertently transcribed; Although I have reviewed the note for such errors, some may still exist.

## 2020-10-29 ENCOUNTER — OFFICE VISIT (OUTPATIENT)
Dept: NEUROSURGERY | Facility: CLINIC | Age: 80
End: 2020-10-29

## 2020-10-29 DIAGNOSIS — M51.36 DEGENERATION OF INTERVERTEBRAL DISC OF LUMBAR REGION: ICD-10-CM

## 2020-10-29 DIAGNOSIS — M50.30 DDD (DEGENERATIVE DISC DISEASE), CERVICAL: Primary | ICD-10-CM

## 2020-10-29 DIAGNOSIS — M51.34 DDD (DEGENERATIVE DISC DISEASE), THORACIC: ICD-10-CM

## 2020-10-29 PROCEDURE — 99441 PR PHYS/QHP TELEPHONE EVALUATION 5-10 MIN: CPT | Performed by: NEUROLOGICAL SURGERY

## 2020-10-29 RX ORDER — DIPHENHYDRAMINE HCL 25 MG
TABLET ORAL
Qty: 1 TABLET | Refills: 0 | Status: SHIPPED | OUTPATIENT
Start: 2020-10-29 | End: 2020-11-25

## 2020-10-29 RX ORDER — DEXAMETHASONE 4 MG/1
4 TABLET ORAL TAKE AS DIRECTED
Qty: 5 TABLET | Refills: 0 | Status: SHIPPED | OUTPATIENT
Start: 2020-10-29 | End: 2020-11-10 | Stop reason: HOSPADM

## 2020-10-29 RX ORDER — DEXTROSE AND SODIUM CHLORIDE 5; .45 G/100ML; G/100ML
125 INJECTION, SOLUTION INTRAVENOUS CONTINUOUS
Qty: 500 ML | Refills: 0 | Status: SHIPPED | OUTPATIENT
Start: 2020-10-29 | End: 2020-11-25

## 2020-10-29 NOTE — PROGRESS NOTES
Subjective   History of Present Illness: Chevy Goodwin is a 80 y.o. male is here today for follow-up. Mr. Goodwin was last seen 5/22/2020 for neck and back pain with numbness and tingling. Johanna discussed total spine myelogram but due to his kidney function Dr. Gutierrez's office would not clear him for the contrast.    You have chosen to receive care through a telephone visit. Do you consent to use a telephone visit for your medical care today? Yes    We had a telephone visit today.  The patient was at home and I was in the office.  We talked for 5 minutes.    History of Present Illness     This patient continues with severe pain in his back and his neck.  He has radiation into his arms but his biggest problem is difficulty with gait and balance.  He has pain in his shoulders and hips as well.  He has renal failure but has seen a renal doctor who feels that as long as he receives fluids before and after he should be able to tolerate the diet from a myelogram.  He is apparently also allergic to that dye.    The following portions of the patient's history were reviewed and updated as appropriate: allergies, current medications, past family history, past medical history, past social history, past surgical history and problem list.    Review of Systems   Respiratory: Negative for chest tightness and shortness of breath.    Cardiovascular: Negative for chest pain.   Genitourinary: Positive for frequency and urgency.   Musculoskeletal: Positive for back pain, gait problem, neck pain and neck stiffness.   Neurological: Positive for dizziness, weakness and numbness.       I have reviewed the review of systems as documented by my MA.      Objective         Physical Exam  Neurological:      Mental Status: He is oriented to person, place, and time.       Neurologic Exam     Mental Status   Oriented to person, place, and time.           Assessment/Plan   Independent Review of Radiographic Studies:      I personally reviewed the  images from the following studies.    There are no new images to review.  The patient has had previous anterior cervical discectomy by me and for previous lumbar surgeries by the [facility] spine clinic.    Medical Decision Making:      I told the patient I thought it was okay to go ahead with the myelogram at this point.  I told the patient what a myelogram involves.  I explained that there is a 50% chance of developing a bad headache and nausea as a result of the test.  I explained that there is also a very small chance of infection, seizures, and bleeding.  I explained how we would treat a post myelogram headache including bedrest, caffeinated fluids, steroids, and blood patch.  The patient does ask to proceed.    Diagnoses and all orders for this visit:    1. DDD (degenerative disc disease), cervical (Primary)  -     Obtain Informed Consent; Standing  -     IR Myelogram 2 or More Areas; Future  -     CT Cervical Spine With Intrathecal Contrast; Future  -     XR Spine Cervical Complete With Flex Ext; Future  -     No Lab Testing Needed; Standing  -     dexamethasone (DECADRON) 4 MG tablet; Take 1 tablet by mouth Take As Directed. Take 1 tablet by mouth every 6 hours beginning 24 hours before myelogram  Dispense: 5 tablet; Refill: 0    2. DDD (degenerative disc disease), thoracic    3. Degeneration of intervertebral disc of lumbar region  -     Obtain Informed Consent; Standing  -     IR Myelogram 2 or More Areas; Future  -     Ct Thoracic Spine With Intrathecal Contrast; Future  -     CT Lumbar Spine With Intrathecal Contrast; Future  -     XR Spine Lumbar Complete With Flex & Ext; Future  -     No Lab Testing Needed; Standing  -     dexamethasone (DECADRON) 4 MG tablet; Take 1 tablet by mouth Take As Directed. Take 1 tablet by mouth every 6 hours beginning 24 hours before myelogram  Dispense: 5 tablet; Refill: 0    Other orders  -     Dextrose-Sodium Chloride (dextrose 5 % and sodium chloride 0.45 %) 5-0.45 %  infusion; Infuse 125 mL/hr into a venous catheter Continuous. Indications: Please begin 1 hour prior to myelogram  Dispense: 500 mL; Refill: 0  -     diphenhydrAMINE (Benadryl Allergy) 25 MG tablet; Take this tab 2 hours before myelogram  Dispense: 1 tablet; Refill: 0      Return for After radiology test.

## 2020-11-03 ENCOUNTER — TELEPHONE (OUTPATIENT)
Dept: NEUROSURGERY | Facility: CLINIC | Age: 80
End: 2020-11-03

## 2020-11-03 NOTE — TELEPHONE ENCOUNTER
PT IS CALLING TO FIND OUT WHEN HIS MYELOGRAM IS SCHEDULED FOR.  AND ANY DIRECTIONS THAT HE WILL NEED TO KNOW BEFORE HAND.  PT NUMBER [phone]  PLEASE ADVISE  THANK YOU

## 2020-11-08 NOTE — PROGRESS NOTES
11/10/20 0000   Pre-Procedure Phone Call   Procedure Time Verified Yes   Arrival Time 0630  (11/10/2020)   Procedure Location Verified Yes   Medical History Reviewed No   NPO Status Reinforced Yes   Ride and Caregiver Arranged Yes   Phone Number for Ride/Caregiver Cornelia; spouse   Patient Knows to Bring Current Medications Yes  (Pt will get IVF's before and after procedure - order in home meds. Pt will take contrast allergy prep as well. Pt DC from [place] by Renal doctor. Pt told by his renal doctor to stop Coreg day of and day after exam.)   Bring Outside Films Requested No  (Areli pt: MRI Cspine 8/3/2019 & XR: Cspine 9/16/2019 in [facility].)

## 2020-11-10 ENCOUNTER — HOSPITAL ENCOUNTER (OUTPATIENT)
Dept: CT IMAGING | Facility: HOSPITAL | Age: 80
Discharge: HOME | End: 2020-11-10

## 2020-11-10 ENCOUNTER — HOSPITAL ENCOUNTER (OUTPATIENT)
Dept: GENERAL RADIOLOGY | Facility: HOSPITAL | Age: 80
Discharge: HOME | End: 2020-11-10

## 2020-11-10 VITALS
TEMPERATURE: 97.7 F | DIASTOLIC BLOOD PRESSURE: 64 MMHG | HEIGHT: 68 IN | HEART RATE: 84 BPM | WEIGHT: 210 LBS | RESPIRATION RATE: 16 BRPM | SYSTOLIC BLOOD PRESSURE: 148 MMHG | OXYGEN SATURATION: 96 % | BODY MASS INDEX: 31.83 KG/M2

## 2020-11-10 DIAGNOSIS — M51.36 DEGENERATION OF INTERVERTEBRAL DISC OF LUMBAR REGION: ICD-10-CM

## 2020-11-10 DIAGNOSIS — M50.30 DDD (DEGENERATIVE DISC DISEASE), CERVICAL: ICD-10-CM

## 2020-11-10 PROCEDURE — 25010000003 LIDOCAINE 1 % SOLUTION: Performed by: NEUROLOGICAL SURGERY

## 2020-11-10 PROCEDURE — 72126 CT NECK SPINE W/DYE: CPT

## 2020-11-10 PROCEDURE — 72052 X-RAY EXAM NECK SPINE 6/>VWS: CPT

## 2020-11-10 PROCEDURE — 62305 MYELOGRAPHY LUMBAR INJECTION: CPT

## 2020-11-10 PROCEDURE — 63710000001 HYDROCODONE-ACETAMINOPHEN 5-325 MG TABLET: Performed by: NEUROLOGICAL SURGERY

## 2020-11-10 PROCEDURE — 72132 CT LUMBAR SPINE W/DYE: CPT

## 2020-11-10 PROCEDURE — A9270 NON-COVERED ITEM OR SERVICE: HCPCS | Performed by: NEUROLOGICAL SURGERY

## 2020-11-10 PROCEDURE — 62284 INJECTION FOR MYELOGRAM: CPT

## 2020-11-10 PROCEDURE — 72114 X-RAY EXAM L-S SPINE BENDING: CPT

## 2020-11-10 PROCEDURE — 96361 HYDRATE IV INFUSION ADD-ON: CPT

## 2020-11-10 PROCEDURE — 72240 MYELOGRAPHY NECK SPINE: CPT

## 2020-11-10 PROCEDURE — 25010000002 IOPAMIDOL 61 % SOLUTION: Performed by: NEUROLOGICAL SURGERY

## 2020-11-10 PROCEDURE — 72129 CT CHEST SPINE W/DYE: CPT

## 2020-11-10 PROCEDURE — 96360 HYDRATION IV INFUSION INIT: CPT

## 2020-11-10 RX ORDER — DEXTROSE AND SODIUM CHLORIDE 5; .45 G/100ML; G/100ML
125 INJECTION, SOLUTION INTRAVENOUS CONTINUOUS
Status: DISCONTINUED | OUTPATIENT
Start: 2020-11-10 | End: 2020-11-11 | Stop reason: HOSPADM

## 2020-11-10 RX ORDER — DEXTROSE AND SODIUM CHLORIDE 5; .45 G/100ML; G/100ML
125 INJECTION, SOLUTION INTRAVENOUS CONTINUOUS
Status: ACTIVE | OUTPATIENT
Start: 2020-11-10 | End: 2020-11-10

## 2020-11-10 RX ORDER — HYDROCODONE BITARTRATE AND ACETAMINOPHEN 5; 325 MG/1; MG/1
1 TABLET ORAL EVERY 4 HOURS PRN
Status: DISCONTINUED | OUTPATIENT
Start: 2020-11-10 | End: 2020-11-11 | Stop reason: HOSPADM

## 2020-11-10 RX ORDER — ACETAMINOPHEN 325 MG/1
650 TABLET ORAL EVERY 4 HOURS PRN
Status: DISCONTINUED | OUTPATIENT
Start: 2020-11-10 | End: 2020-11-11 | Stop reason: HOSPADM

## 2020-11-10 RX ORDER — LIDOCAINE HYDROCHLORIDE 10 MG/ML
10 INJECTION, SOLUTION INFILTRATION; PERINEURAL ONCE
Status: COMPLETED | OUTPATIENT
Start: 2020-11-10 | End: 2020-11-10

## 2020-11-10 RX ADMIN — DEXTROSE AND SODIUM CHLORIDE 125 ML/HR: 5; .45 INJECTION, SOLUTION INTRAVENOUS at 06:50

## 2020-11-10 RX ADMIN — IOPAMIDOL 15 ML: 612 INJECTION, SOLUTION INTRATHECAL at 07:30

## 2020-11-10 RX ADMIN — DEXTROSE AND SODIUM CHLORIDE 125 ML/HR: 5; .45 INJECTION, SOLUTION INTRAVENOUS at 08:10

## 2020-11-10 RX ADMIN — LIDOCAINE HYDROCHLORIDE 2 ML: 10 INJECTION, SOLUTION INFILTRATION; PERINEURAL at 07:26

## 2020-11-10 RX ADMIN — DEXTROSE AND SODIUM CHLORIDE 125 ML/HR: 5; .45 INJECTION, SOLUTION INTRAVENOUS at 09:03

## 2020-11-10 RX ADMIN — HYDROCODONE BITARTRATE AND ACETAMINOPHEN 1 TABLET: 5; 325 TABLET ORAL at 08:16

## 2020-11-10 NOTE — NURSING NOTE
Pt assisted to private vehicle via wheelchair with RONEY Mercedes to leave with spouse. NAD noted.

## 2020-11-10 NOTE — DISCHARGE INSTRUCTIONS
EDUCATION /DISCHARGE INSTRUCTIONS:    A myelogram is a special radiology procedure of the spinal cord, spinal nerves and other related structures.  You will be awake during the examination.  An area of your lower back will be cleansed with an antiseptic solution.  The physician will inject a numbing medication in your lower back.  While your back is numb, a needle will be placed in the lower back area.  A small amount of spinal fluid may be withdrawn and sent to the lab if ordered by your physician. While the needle is in the back, an injection of a contrast material (xray dye) will be given through the needle.  The contrast material will allow the physician to see the spinal cord and spinal nerves.  Once injected, the needle will be removed and a band aid will be placed over the injection site.  The table will be tilted during the process to allow the contrast material to flow to particular areas in the spine.  Following the injection and xrays, you will be taken to the CT scan where more pictures will be taken. After the procedure is finished, the contrast material will be absorbed by your body and eliminated through your kidneys.  The radiologist will study and interpret your myelogram and send the results to your physician.  Procedure risks of a myelogram include, but are not limited to:  *  Bleeding   *  seizure  *  Infection   *  Headache, possibly severe requiring  *  Contrast reaction      a blood patch  *  Nerve or cord injury  *  Paralysis and death  Benefits of the procedure:  *  Best examination for delineating pathology related to spinal cord compression from a    disc and/or nerve root compression  Alternatives to the procedure:  MRI - a non invasive procedure requiring intravenous contrast injection.  Cannot be done on patients with certain pacemakers or metal in the body.  MRI risks include possible reaction to the contrast material, movement of metal located in the body.Benefit to MRI:  Non-invasive  and usually painless procedure.  THIS EDUCATION INFORMATION WAS REVIEWED PRIOR TO PROCEDURE AND CONSENT. Patient initials______JF__________Time____0640______________    24 hour rest period ends tomorrow, November 11th after 11 am.  Important information following your myelogram:  * ACTIVITY:   *  Lie down with your head elevated no more than 2 pillows high today & tonight  *  Sit up to eat your meals and use the restroom, otherwise, lie down.  *  Remain less active for two to three days.  *  Do not drive for 48 hours following a myelogram  *  You may remove the bandage and shower in the morning  *  Increase your fluids for the next 24 hours.  Caffeinated drinks are encouraged.  Resume taking aspirin today    CALL YOUR PHYSICIAN FOR THE FOLLOWING:  * Pain at the injection site  * Reddness, swelling, bruising or drainage at the injection site.  * A fever by mouth of 101.0 or any new symptoms  Headaches are a common side effect after a myelogram.  If you get a headache, you should stay flat in bed and drink plenty of fluids. If the headache persist and does not go away with rest/medication, CALL Dr. Singleton at [phone]

## 2020-11-11 ENCOUNTER — TELEPHONE (OUTPATIENT)
Dept: INTERVENTIONAL RADIOLOGY/VASCULAR | Facility: HOSPITAL | Age: 80
End: 2020-11-11

## 2020-11-11 NOTE — TELEPHONE ENCOUNTER
Medication Refill Request    Date of phone call: 20    Medication being requested: Norco 5-325 mg  si tab po q 8 hrs prn  Qty: 90    Date of last visit: 10/16/20    Date of last refill: DNF 10/13/20    MARIOLA up to date?: No    Next Follow up?: 12/15/20    Any new pertinent information? (i.e, new medication allergies, new use of medications, change in patient's health or condition, non-compliance or inconsistency with prescribing agreement?): Patient asked if you would increase his medication from TID to QID because he is having increased pain.

## 2020-11-12 RX ORDER — HYDROCODONE BITARTRATE AND ACETAMINOPHEN 5; 325 MG/1; MG/1
1 TABLET ORAL EVERY 8 HOURS PRN
Qty: 90 TABLET | Refills: 0 | Status: SHIPPED | OUTPATIENT
Start: 2020-11-12 | End: 2020-12-04 | Stop reason: HOSPADM

## 2020-11-12 NOTE — TELEPHONE ENCOUNTER
He asked if you can just refill as normal. He had his myelogram on Monday so he will wait for the results and see what Dr. Singleton says and then discuss with you at next appt.

## 2020-11-13 ENCOUNTER — TELEPHONE (OUTPATIENT)
Dept: NEUROSURGERY | Facility: CLINIC | Age: 80
End: 2020-11-13

## 2020-11-13 NOTE — TELEPHONE ENCOUNTER
Patient called and wanted his mri results, it looks like the patient had a myelogram.  He is not scheduled for a follow up, was Dr Singleton going to call him with the results.  The patient also stated he is in pain.

## 2020-11-14 ENCOUNTER — OFFICE VISIT (OUTPATIENT)
Dept: NEUROSURGERY | Facility: CLINIC | Age: 80
End: 2020-11-14

## 2020-11-14 ENCOUNTER — PREP FOR SURGERY (OUTPATIENT)
Dept: OTHER | Facility: HOSPITAL | Age: 80
End: 2020-11-14

## 2020-11-14 VITALS — SYSTOLIC BLOOD PRESSURE: 139 MMHG | HEART RATE: 67 BPM | DIASTOLIC BLOOD PRESSURE: 62 MMHG | TEMPERATURE: 98.4 F

## 2020-11-14 DIAGNOSIS — M50.30 DDD (DEGENERATIVE DISC DISEASE), CERVICAL: Primary | ICD-10-CM

## 2020-11-14 PROCEDURE — 99213 OFFICE O/P EST LOW 20 MIN: CPT | Performed by: NEUROLOGICAL SURGERY

## 2020-11-14 RX ORDER — CEFAZOLIN SODIUM 2 G/100ML
2 INJECTION, SOLUTION INTRAVENOUS ONCE
Status: CANCELLED | OUTPATIENT
Start: 2020-12-16 | End: 2020-11-14

## 2020-11-14 NOTE — PROGRESS NOTES
Contacted pt. She states she was taking Crestor 20 mg daily but she ran out of it about 1 month ago so she has not been taking it. She scheduled an appt for 2/24/17. Subjective   History of Present Illness: Chevy Goodwin is a 80 y.o. male is here today for follow-up with a new Thoracic and Lumbar Myelogram that was ordered at his last visit 10/29/2020 for neck and back pain with numbness and tingling.    Patient symptoms are unchanged from his previous visit. They are only worse in intensity. He had C3-4, C4-5 ACDF done on 8/5/2019.    Treatment: [place]    Patient and his wife is wearing a mask in our office today.      History of Present Illness     This patient continues with pain in his neck and in his lower back.  He says that the pain in his neck is worse than the pain in his lower back.  In the lower back the pain radiates on both sides of his back and then into the anterior aspect of his thighs.  The pain is fairly severe.  At times it gets to be so bad he cannot walk very well.  The pain in his neck radiates into both of his shoulders and a little bit into the top of his arms.  He does not have any pain going all the way down his arms.    The following portions of the patient's history were reviewed and updated as appropriate: allergies, current medications, past family history, past medical history, past social history, past surgical history and problem list.    Review of Systems   Respiratory: Negative for chest tightness and shortness of breath.    Cardiovascular: Negative for chest pain.   Musculoskeletal: Positive for back pain, myalgias and neck pain.   Neurological: Positive for dizziness, weakness and numbness.        Positive for tingling       I have reviewed the review of systems as documented by my MA.      Objective     Vitals:    11/14/20 1222   BP: 139/62   Pulse: 67   Temp: 98.4 °F (36.9 °C)     There is no height or weight on file to calculate BMI.      Physical Exam  Neurologic Exam        Assessment/Plan   Independent Review of Radiographic Studies:      I personally reviewed the images from the following studies.    I reviewed plain films, myelogram,  and CT scan and the patient's entire spine myself.  The plain films in the lumbar spine show previous surgery at L3-4.  There is marked degenerative changes several levels of the lumbar spine but particularly marked at L2-3 and L1 to.  In the cervical spine the patient has had previous surgery at C3-4 and C4-5.  It looks like he has also had a previous fusion at C5-6.  He has a solid fusion at C5-6 but the 2 levels above which are newer are not yet solidly fused.  On the myelogram itself the operated level looks okay in the lumbar spine.  There is some left-sided lateral recess narrowing at L4-5 and bilateral narrowing at L5-S1.  There is also fairly significant stenosis at L1-2 and L2-3.  On the standing films there is definite nerve root compression bilaterally at L5-S1 and the lateral recess stenosis at L4-5.  On the lateral film you can see severe stenosis at L1-2 and L2-3.  The thoracic spine generally looks okay and the cervical spine does not show any posterior disc bulging on the lateral film.  The nerve roots themselves are difficult to see partly because of the plate.  On the post myelographic CT scan of the cervical spine C2-3 looks okay.  C3-4 looks okay as well and I think there is a solid fusion there.  C4-5 is also open but I am not sure there is a solid fusion there.  C5-6 is widely open and is solidly fused.  C6-7 shows some bilateral foraminal stenosis with some mild central stenosis.  C7-T1 looks okay.  In the thoracic spine I do not see any evidence of significant nerve root compression until we get to T9-10 where there is a large left-sided calcified disc which is compressing the thecal sac and pushing the spinal cord to the right although it is not causing severe pressure on the spinal cord.  The levels below that look okay.  L1 to show some mild central stenosis due to a synovial cyst formation on the left side.  L2-3 shows severe central stenosis.  L3-4 is the fused level and that looks okay  although the screw on the right side does cross into the spinal canal.  This is the L4 screw.  And not only breaks the cortex on the inferior aspect of the pedicle but it is somewhat medial as well.  L4-5 shows some lateral recess stenosis although not severe.  There is more soft tissue on left side than the right consistent with the myelogram.  L5-S1 mostly looks okay.  The patient has had previous surgery at both L4-5 and L5-S1 with fusion.    Medical Decision Making:      I told the patient and his wife about the imaging.  I told him that from my point of view I would hold off on surgery in the lumbar spine as long as he possibly can.  I think he is solidly fused from L3 to the sacrum but I think his current symptoms are coming more from the stenosis at L1-2 and L2-3.  I explained that any further surgery on his lumbar spine would result in a fusion into the lower thoracic spine.  Therefore he should avoid this is much as he can.  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  There is some collapse of the graft at that level.  I think he would be a candidate for a posterior cervical laminectomy at C4-5 with a posterior cervical fusion from C3 down to C6.  I told the patient about a cervical laminectomy and fusion.  I explained that there was a chance of getting rid of the trouble in the shoulders but it is certainly not 100%.  I explained that there would still be pain in the neck afterwards and initially this will be quite severe.  There is a 2 or 3% chance of infection, bleeding, paralysis, damage to the nerve as a result of surgery, CSF leak, instability and anesthetic risk.  He is a smoker and as long as he stopped smoking the chances of fusion will be reduced but with this procedure it should increase the chances of the fusion taking at both C3-4 and C4-5 to over 90%.  We discussed the postoperative hospital and home course.  He would like to proceed.    He  will need to be scheduled for a: Cervical 4 5 laminectomy with a cervical 3 to cervical 6 fusion and instrumentation using O-arm    Diagnoses and all orders for this visit:    1. DDD (degenerative disc disease), cervical (Primary)      Return for 2-3 week post op.

## 2020-11-17 ENCOUNTER — TELEPHONE (OUTPATIENT)
Dept: NEUROSURGERY | Facility: CLINIC | Age: 80
End: 2020-11-17

## 2020-11-19 ENCOUNTER — TRANSCRIBE ORDERS (OUTPATIENT)
Dept: PREADMISSION TESTING | Facility: HOSPITAL | Age: 80
End: 2020-11-19

## 2020-11-19 DIAGNOSIS — Z01.818 OTHER SPECIFIED PRE-OPERATIVE EXAMINATION: Primary | ICD-10-CM

## 2020-11-24 ENCOUNTER — APPOINTMENT (OUTPATIENT)
Dept: PREADMISSION TESTING | Facility: HOSPITAL | Age: 80
End: 2020-11-24

## 2020-11-25 ENCOUNTER — APPOINTMENT (OUTPATIENT)
Dept: PREADMISSION TESTING | Facility: HOSPITAL | Age: 80
End: 2020-11-25

## 2020-11-25 VITALS
TEMPERATURE: 98.6 F | WEIGHT: 211 LBS | RESPIRATION RATE: 20 BRPM | HEIGHT: 68 IN | BODY MASS INDEX: 31.98 KG/M2 | SYSTOLIC BLOOD PRESSURE: 145 MMHG | HEART RATE: 81 BPM | OXYGEN SATURATION: 94 % | DIASTOLIC BLOOD PRESSURE: 62 MMHG

## 2020-11-25 LAB
ANION GAP SERPL CALCULATED.3IONS-SCNC: 18.7 MMOL/L (ref 5–15)
BUN SERPL-MCNC: 40 MG/DL (ref 8–23)
BUN/CREAT SERPL: 27.8 (ref 7–25)
CALCIUM SPEC-SCNC: 9.9 MG/DL (ref 8.6–10.5)
CHLORIDE SERPL-SCNC: 107 MMOL/L (ref 98–107)
CO2 SERPL-SCNC: 16.3 MMOL/L (ref 22–29)
CREAT SERPL-MCNC: 1.44 MG/DL (ref 0.76–1.27)
DEPRECATED RDW RBC AUTO: 53.9 FL (ref 37–54)
ERYTHROCYTE [DISTWIDTH] IN BLOOD BY AUTOMATED COUNT: 14.5 % (ref 12.3–15.4)
GFR SERPL CREATININE-BSD FRML MDRD: 47 ML/MIN/1.73
GLUCOSE SERPL-MCNC: 160 MG/DL (ref 65–99)
HCT VFR BLD AUTO: 29.9 % (ref 37.5–51)
HGB BLD-MCNC: 9.8 G/DL (ref 13–17.7)
MCH RBC QN AUTO: 33.1 PG (ref 26.6–33)
MCHC RBC AUTO-ENTMCNC: 32.8 G/DL (ref 31.5–35.7)
MCV RBC AUTO: 101 FL (ref 79–97)
PLATELET # BLD AUTO: 225 10*3/MM3 (ref 140–450)
PMV BLD AUTO: 9.8 FL (ref 6–12)
POTASSIUM SERPL-SCNC: 4.9 MMOL/L (ref 3.5–5.2)
RBC # BLD AUTO: 2.96 10*6/MM3 (ref 4.14–5.8)
SODIUM SERPL-SCNC: 142 MMOL/L (ref 136–145)
WBC # BLD AUTO: 6.84 10*3/MM3 (ref 3.4–10.8)

## 2020-11-25 PROCEDURE — 80048 BASIC METABOLIC PNL TOTAL CA: CPT

## 2020-11-25 PROCEDURE — 85027 COMPLETE CBC AUTOMATED: CPT

## 2020-11-25 PROCEDURE — 36415 COLL VENOUS BLD VENIPUNCTURE: CPT

## 2020-11-25 RX ORDER — PANTOPRAZOLE SODIUM 40 MG/1
40 TABLET, DELAYED RELEASE ORAL DAILY
COMMUNITY
End: 2021-01-01 | Stop reason: SDUPTHER

## 2020-11-27 ENCOUNTER — LAB (OUTPATIENT)
Dept: LAB | Facility: HOSPITAL | Age: 80
End: 2020-11-27

## 2020-11-27 DIAGNOSIS — Z01.818 OTHER SPECIFIED PRE-OPERATIVE EXAMINATION: ICD-10-CM

## 2020-11-27 PROCEDURE — C9803 HOPD COVID-19 SPEC COLLECT: HCPCS

## 2020-11-27 PROCEDURE — U0004 COV-19 TEST NON-CDC HGH THRU: HCPCS

## 2020-11-28 LAB — SARS-COV-2 RNA RESP QL NAA+PROBE: NOT DETECTED

## 2020-11-30 ENCOUNTER — ANESTHESIA EVENT (OUTPATIENT)
Dept: PERIOP | Facility: HOSPITAL | Age: 80
End: 2020-11-30

## 2020-11-30 ENCOUNTER — ANESTHESIA (OUTPATIENT)
Dept: PERIOP | Facility: HOSPITAL | Age: 80
End: 2020-11-30

## 2020-11-30 ENCOUNTER — APPOINTMENT (OUTPATIENT)
Dept: GENERAL RADIOLOGY | Facility: HOSPITAL | Age: 80
End: 2020-11-30

## 2020-11-30 ENCOUNTER — HOSPITAL ENCOUNTER (OUTPATIENT)
Facility: HOSPITAL | Age: 80
Discharge: HOME | End: 2020-12-04
Attending: NEUROLOGICAL SURGERY | Admitting: NEUROLOGICAL SURGERY

## 2020-11-30 DIAGNOSIS — M50.30 DDD (DEGENERATIVE DISC DISEASE), CERVICAL: ICD-10-CM

## 2020-11-30 DIAGNOSIS — M54.2 NECK PAIN: ICD-10-CM

## 2020-11-30 DIAGNOSIS — M96.1 POSTLAMINECTOMY SYNDROME, CERVICAL REGION: Primary | ICD-10-CM

## 2020-11-30 PROBLEM — E66.9 OBESITY (BMI 30-39.9): Status: ACTIVE | Noted: 2020-11-30

## 2020-11-30 LAB
ABO GROUP BLD: NORMAL
BLD GP AB SCN SERPL QL: NEGATIVE
GLUCOSE BLDC GLUCOMTR-MCNC: 122 MG/DL (ref 70–130)
GLUCOSE BLDC GLUCOMTR-MCNC: 133 MG/DL (ref 70–130)
GLUCOSE BLDC GLUCOMTR-MCNC: 170 MG/DL (ref 70–130)
GLUCOSE BLDC GLUCOMTR-MCNC: 241 MG/DL (ref 70–130)
RH BLD: POSITIVE
T&S EXPIRATION DATE: NORMAL

## 2020-11-30 PROCEDURE — 86900 BLOOD TYPING SEROLOGIC ABO: CPT | Performed by: ANESTHESIOLOGY

## 2020-11-30 PROCEDURE — A9270 NON-COVERED ITEM OR SERVICE: HCPCS | Performed by: NEUROLOGICAL SURGERY

## 2020-11-30 PROCEDURE — 25010000002 VANCOMYCIN 1 G RECONSTITUTED SOLUTION 1 EACH VIAL: Performed by: NEUROLOGICAL SURGERY

## 2020-11-30 PROCEDURE — 72050 X-RAY EXAM NECK SPINE 4/5VWS: CPT

## 2020-11-30 PROCEDURE — 63710000001 ASPIRIN 81 MG TABLET DELAYED-RELEASE: Performed by: NEUROLOGICAL SURGERY

## 2020-11-30 PROCEDURE — 25010000002 SUCCINYLCHOLINE PER 20 MG: Performed by: NURSE ANESTHETIST, CERTIFIED REGISTERED

## 2020-11-30 PROCEDURE — 63045 LAM FACETEC & FORAMOT CRV: CPT | Performed by: NEUROLOGICAL SURGERY

## 2020-11-30 PROCEDURE — 86901 BLOOD TYPING SEROLOGIC RH(D): CPT | Performed by: ANESTHESIOLOGY

## 2020-11-30 PROCEDURE — 76000 FLUOROSCOPY <1 HR PHYS/QHP: CPT

## 2020-11-30 PROCEDURE — 25010000002 HYDROMORPHONE PER 4 MG: Performed by: NURSE ANESTHETIST, CERTIFIED REGISTERED

## 2020-11-30 PROCEDURE — 25010000002 NEOSTIGMINE PER 0.5 MG: Performed by: NURSE ANESTHETIST, CERTIFIED REGISTERED

## 2020-11-30 PROCEDURE — 86850 RBC ANTIBODY SCREEN: CPT | Performed by: ANESTHESIOLOGY

## 2020-11-30 PROCEDURE — 63710000001 LOSARTAN 100 MG TABLET: Performed by: NEUROLOGICAL SURGERY

## 2020-11-30 PROCEDURE — 22614 ARTHRD PST TQ 1NTRSPC EA ADD: CPT | Performed by: NEUROLOGICAL SURGERY

## 2020-11-30 PROCEDURE — 25810000003 SODIUM CHLORIDE 0.9 % WITH KCL 20 MEQ 20-0.9 MEQ/L-% SOLUTION: Performed by: NEUROLOGICAL SURGERY

## 2020-11-30 PROCEDURE — 63710000001 PANTOPRAZOLE 40 MG TABLET DELAYED-RELEASE: Performed by: NEUROLOGICAL SURGERY

## 2020-11-30 PROCEDURE — 82962 GLUCOSE BLOOD TEST: CPT

## 2020-11-30 PROCEDURE — C1713 ANCHOR/SCREW BN/BN,TIS/BN: HCPCS | Performed by: NEUROLOGICAL SURGERY

## 2020-11-30 PROCEDURE — 61783 SCAN PROC SPINAL: CPT | Performed by: NEUROLOGICAL SURGERY

## 2020-11-30 PROCEDURE — 25010000002 ONDANSETRON PER 1 MG: Performed by: NURSE ANESTHETIST, CERTIFIED REGISTERED

## 2020-11-30 PROCEDURE — 63710000001 ISOSORBIDE MONONITRATE 30 MG TABLET SUSTAINED-RELEASE 24 HOUR: Performed by: NEUROLOGICAL SURGERY

## 2020-11-30 PROCEDURE — 25010000002 PHENYLEPHRINE PER 1 ML: Performed by: NURSE ANESTHETIST, CERTIFIED REGISTERED

## 2020-11-30 PROCEDURE — 22600 ARTHRD PST TQ 1NTRSPC CRV: CPT | Performed by: NEUROLOGICAL SURGERY

## 2020-11-30 PROCEDURE — 63710000001 CARVEDILOL 6.25 MG TABLET: Performed by: NEUROLOGICAL SURGERY

## 2020-11-30 PROCEDURE — 63710000001 HYDROCODONE-ACETAMINOPHEN 7.5-325 MG TABLET: Performed by: NURSE ANESTHETIST, CERTIFIED REGISTERED

## 2020-11-30 PROCEDURE — A9270 NON-COVERED ITEM OR SERVICE: HCPCS | Performed by: NURSE ANESTHETIST, CERTIFIED REGISTERED

## 2020-11-30 PROCEDURE — 63710000001 METFORMIN 500 MG TABLET: Performed by: NEUROLOGICAL SURGERY

## 2020-11-30 PROCEDURE — 25010000002 FENTANYL CITRATE (PF) 100 MCG/2ML SOLUTION: Performed by: NURSE ANESTHETIST, CERTIFIED REGISTERED

## 2020-11-30 PROCEDURE — 63710000001 AMLODIPINE 10 MG TABLET: Performed by: NEUROLOGICAL SURGERY

## 2020-11-30 PROCEDURE — 63710000001 ALLOPURINOL 300 MG TABLET: Performed by: NEUROLOGICAL SURGERY

## 2020-11-30 PROCEDURE — 63710000001 PRAVASTATIN 40 MG TABLET: Performed by: NEUROLOGICAL SURGERY

## 2020-11-30 PROCEDURE — 25010000003 CEFAZOLIN IN DEXTROSE 2-4 GM/100ML-% SOLUTION: Performed by: NEUROLOGICAL SURGERY

## 2020-11-30 PROCEDURE — 63710000001 HYDROCODONE-ACETAMINOPHEN 5-325 MG TABLET: Performed by: NEUROLOGICAL SURGERY

## 2020-11-30 PROCEDURE — 25010000002 DEXAMETHASONE PER 1 MG: Performed by: NURSE ANESTHETIST, CERTIFIED REGISTERED

## 2020-11-30 PROCEDURE — 22842 INSERT SPINE FIXATION DEVICE: CPT | Performed by: NEUROLOGICAL SURGERY

## 2020-11-30 PROCEDURE — 25010000002 PROPOFOL 10 MG/ML EMULSION: Performed by: NURSE ANESTHETIST, CERTIFIED REGISTERED

## 2020-11-30 PROCEDURE — 63710000001 BUMETANIDE 0.5 MG TABLET: Performed by: NEUROLOGICAL SURGERY

## 2020-11-30 DEVICE — FLOSEAL HEMOSTATIC MATRIX, 5ML
Type: IMPLANTABLE DEVICE | Site: SPINE CERVICAL | Status: FUNCTIONAL
Brand: FLOSEAL HEMOSTATIC MATRIX

## 2020-11-30 DEVICE — OD 3603760 PRE-CUT 3.5MM X 60MM
Type: IMPLANTABLE DEVICE | Site: SPINE CERVICAL | Status: FUNCTIONAL
Brand: INFINITY™ OCCIPITOCERVICAL UPPER THORACIC SYSTEM

## 2020-11-30 DEVICE — GRFT BONE MAGNIFUSE PC 1X10CM: Type: IMPLANTABLE DEVICE | Site: SPINE CERVICAL | Status: FUNCTIONAL

## 2020-11-30 DEVICE — BONE WAX
Type: IMPLANTABLE DEVICE | Site: SPINE CERVICAL | Status: FUNCTIONAL
Brand: ETHICON

## 2020-11-30 RX ORDER — EPHEDRINE SULFATE 50 MG/ML
INJECTION, SOLUTION INTRAVENOUS AS NEEDED
Status: DISCONTINUED | OUTPATIENT
Start: 2020-11-30 | End: 2020-11-30 | Stop reason: SURG

## 2020-11-30 RX ORDER — OXYCODONE AND ACETAMINOPHEN 7.5; 325 MG/1; MG/1
1 TABLET ORAL ONCE AS NEEDED
Status: DISCONTINUED | OUTPATIENT
Start: 2020-11-30 | End: 2020-11-30 | Stop reason: HOSPADM

## 2020-11-30 RX ORDER — SODIUM CHLORIDE AND POTASSIUM CHLORIDE 150; 900 MG/100ML; MG/100ML
100 INJECTION, SOLUTION INTRAVENOUS CONTINUOUS
Status: DISCONTINUED | OUTPATIENT
Start: 2020-11-30 | End: 2020-12-01

## 2020-11-30 RX ORDER — FENTANYL CITRATE 50 UG/ML
50 INJECTION, SOLUTION INTRAMUSCULAR; INTRAVENOUS
Status: DISCONTINUED | OUTPATIENT
Start: 2020-11-30 | End: 2020-11-30 | Stop reason: HOSPADM

## 2020-11-30 RX ORDER — ONDANSETRON 2 MG/ML
4 INJECTION INTRAMUSCULAR; INTRAVENOUS EVERY 6 HOURS PRN
Status: DISCONTINUED | OUTPATIENT
Start: 2020-11-30 | End: 2020-12-04 | Stop reason: HOSPADM

## 2020-11-30 RX ORDER — FLUMAZENIL 0.1 MG/ML
0.2 INJECTION INTRAVENOUS AS NEEDED
Status: DISCONTINUED | OUTPATIENT
Start: 2020-11-30 | End: 2020-11-30 | Stop reason: HOSPADM

## 2020-11-30 RX ORDER — PROPOFOL 10 MG/ML
VIAL (ML) INTRAVENOUS AS NEEDED
Status: DISCONTINUED | OUTPATIENT
Start: 2020-11-30 | End: 2020-11-30 | Stop reason: SURG

## 2020-11-30 RX ORDER — NICOTINE POLACRILEX 4 MG
15 LOZENGE BUCCAL
Status: DISCONTINUED | OUTPATIENT
Start: 2020-11-30 | End: 2020-12-04 | Stop reason: HOSPADM

## 2020-11-30 RX ORDER — DEXTROSE MONOHYDRATE 25 G/50ML
25 INJECTION, SOLUTION INTRAVENOUS
Status: DISCONTINUED | OUTPATIENT
Start: 2020-11-30 | End: 2020-12-04 | Stop reason: HOSPADM

## 2020-11-30 RX ORDER — DEXAMETHASONE SODIUM PHOSPHATE 10 MG/ML
INJECTION INTRAMUSCULAR; INTRAVENOUS AS NEEDED
Status: DISCONTINUED | OUTPATIENT
Start: 2020-11-30 | End: 2020-11-30 | Stop reason: SURG

## 2020-11-30 RX ORDER — PANTOPRAZOLE SODIUM 40 MG/1
40 TABLET, DELAYED RELEASE ORAL DAILY
Status: DISCONTINUED | OUTPATIENT
Start: 2020-11-30 | End: 2020-12-04 | Stop reason: HOSPADM

## 2020-11-30 RX ORDER — PROMETHAZINE HYDROCHLORIDE 25 MG/1
25 TABLET ORAL ONCE AS NEEDED
Status: DISCONTINUED | OUTPATIENT
Start: 2020-11-30 | End: 2020-11-30 | Stop reason: HOSPADM

## 2020-11-30 RX ORDER — SODIUM CHLORIDE 0.9 % (FLUSH) 0.9 %
3 SYRINGE (ML) INJECTION EVERY 12 HOURS SCHEDULED
Status: DISCONTINUED | OUTPATIENT
Start: 2020-11-30 | End: 2020-12-04 | Stop reason: HOSPADM

## 2020-11-30 RX ORDER — SODIUM CHLORIDE 9 MG/ML
INJECTION, SOLUTION INTRAVENOUS AS NEEDED
Status: DISCONTINUED | OUTPATIENT
Start: 2020-11-30 | End: 2020-11-30 | Stop reason: HOSPADM

## 2020-11-30 RX ORDER — LOSARTAN POTASSIUM 100 MG/1
100 TABLET ORAL DAILY
Status: DISCONTINUED | OUTPATIENT
Start: 2020-11-30 | End: 2020-11-30

## 2020-11-30 RX ORDER — LIDOCAINE HYDROCHLORIDE 20 MG/ML
INJECTION, SOLUTION INFILTRATION; PERINEURAL AS NEEDED
Status: DISCONTINUED | OUTPATIENT
Start: 2020-11-30 | End: 2020-11-30 | Stop reason: SURG

## 2020-11-30 RX ORDER — DIPHENHYDRAMINE HCL 25 MG
25 CAPSULE ORAL
Status: DISCONTINUED | OUTPATIENT
Start: 2020-11-30 | End: 2020-11-30 | Stop reason: HOSPADM

## 2020-11-30 RX ORDER — HYDROCODONE BITARTRATE AND ACETAMINOPHEN 5; 325 MG/1; MG/1
1 TABLET ORAL EVERY 4 HOURS PRN
Status: DISCONTINUED | OUTPATIENT
Start: 2020-11-30 | End: 2020-12-04 | Stop reason: HOSPADM

## 2020-11-30 RX ORDER — EPHEDRINE SULFATE 50 MG/ML
5 INJECTION, SOLUTION INTRAVENOUS ONCE AS NEEDED
Status: DISCONTINUED | OUTPATIENT
Start: 2020-11-30 | End: 2020-11-30 | Stop reason: HOSPADM

## 2020-11-30 RX ORDER — ALLOPURINOL 300 MG/1
300 TABLET ORAL EVERY EVENING
Status: DISCONTINUED | OUTPATIENT
Start: 2020-11-30 | End: 2020-12-04 | Stop reason: HOSPADM

## 2020-11-30 RX ORDER — SUCCINYLCHOLINE CHLORIDE 20 MG/ML
INJECTION INTRAMUSCULAR; INTRAVENOUS AS NEEDED
Status: DISCONTINUED | OUTPATIENT
Start: 2020-11-30 | End: 2020-11-30 | Stop reason: SURG

## 2020-11-30 RX ORDER — NALOXONE HCL 0.4 MG/ML
0.2 VIAL (ML) INJECTION AS NEEDED
Status: DISCONTINUED | OUTPATIENT
Start: 2020-11-30 | End: 2020-11-30 | Stop reason: HOSPADM

## 2020-11-30 RX ORDER — CEFAZOLIN SODIUM 2 G/100ML
2 INJECTION, SOLUTION INTRAVENOUS ONCE
Status: COMPLETED | OUTPATIENT
Start: 2020-11-30 | End: 2020-11-30

## 2020-11-30 RX ORDER — NITROGLYCERIN 0.4 MG/1
0.4 TABLET SUBLINGUAL
Status: DISCONTINUED | OUTPATIENT
Start: 2020-11-30 | End: 2020-12-04 | Stop reason: HOSPADM

## 2020-11-30 RX ORDER — NALOXONE HCL 0.4 MG/ML
0.4 VIAL (ML) INJECTION
Status: DISCONTINUED | OUTPATIENT
Start: 2020-11-30 | End: 2020-12-04 | Stop reason: HOSPADM

## 2020-11-30 RX ORDER — LIDOCAINE HYDROCHLORIDE 40 MG/ML
SOLUTION TOPICAL AS NEEDED
Status: DISCONTINUED | OUTPATIENT
Start: 2020-11-30 | End: 2020-11-30 | Stop reason: SURG

## 2020-11-30 RX ORDER — ISOSORBIDE MONONITRATE 30 MG/1
30 TABLET, EXTENDED RELEASE ORAL 2 TIMES DAILY
Status: DISCONTINUED | OUTPATIENT
Start: 2020-11-30 | End: 2020-12-04 | Stop reason: HOSPADM

## 2020-11-30 RX ORDER — MAGNESIUM HYDROXIDE 1200 MG/15ML
LIQUID ORAL AS NEEDED
Status: DISCONTINUED | OUTPATIENT
Start: 2020-11-30 | End: 2020-11-30 | Stop reason: HOSPADM

## 2020-11-30 RX ORDER — SODIUM CHLORIDE, SODIUM LACTATE, POTASSIUM CHLORIDE, CALCIUM CHLORIDE 600; 310; 30; 20 MG/100ML; MG/100ML; MG/100ML; MG/100ML
9 INJECTION, SOLUTION INTRAVENOUS CONTINUOUS PRN
Status: DISCONTINUED | OUTPATIENT
Start: 2020-11-30 | End: 2020-11-30 | Stop reason: HOSPADM

## 2020-11-30 RX ORDER — SODIUM CHLORIDE, SODIUM LACTATE, POTASSIUM CHLORIDE, CALCIUM CHLORIDE 600; 310; 30; 20 MG/100ML; MG/100ML; MG/100ML; MG/100ML
INJECTION, SOLUTION INTRAVENOUS CONTINUOUS PRN
Status: DISCONTINUED | OUTPATIENT
Start: 2020-11-30 | End: 2020-11-30 | Stop reason: SURG

## 2020-11-30 RX ORDER — HYDROCODONE BITARTRATE AND ACETAMINOPHEN 7.5; 325 MG/1; MG/1
1 TABLET ORAL ONCE AS NEEDED
Status: COMPLETED | OUTPATIENT
Start: 2020-11-30 | End: 2020-11-30

## 2020-11-30 RX ORDER — PROMETHAZINE HYDROCHLORIDE 12.5 MG/1
12.5 TABLET ORAL EVERY 6 HOURS PRN
Status: DISCONTINUED | OUTPATIENT
Start: 2020-11-30 | End: 2020-12-04 | Stop reason: HOSPADM

## 2020-11-30 RX ORDER — HYDROMORPHONE HCL 110MG/55ML
PATIENT CONTROLLED ANALGESIA SYRINGE INTRAVENOUS AS NEEDED
Status: DISCONTINUED | OUTPATIENT
Start: 2020-11-30 | End: 2020-11-30 | Stop reason: SURG

## 2020-11-30 RX ORDER — PRAVASTATIN SODIUM 40 MG
40 TABLET ORAL 2 TIMES DAILY
Status: DISCONTINUED | OUTPATIENT
Start: 2020-11-30 | End: 2020-12-04 | Stop reason: HOSPADM

## 2020-11-30 RX ORDER — HYDROMORPHONE HYDROCHLORIDE 1 MG/ML
0.5 INJECTION, SOLUTION INTRAMUSCULAR; INTRAVENOUS; SUBCUTANEOUS
Status: DISCONTINUED | OUTPATIENT
Start: 2020-11-30 | End: 2020-11-30 | Stop reason: HOSPADM

## 2020-11-30 RX ORDER — ONDANSETRON 4 MG/1
4 TABLET, FILM COATED ORAL EVERY 6 HOURS PRN
Status: DISCONTINUED | OUTPATIENT
Start: 2020-11-30 | End: 2020-12-04 | Stop reason: HOSPADM

## 2020-11-30 RX ORDER — MORPHINE SULFATE 2 MG/ML
2 INJECTION, SOLUTION INTRAMUSCULAR; INTRAVENOUS EVERY 4 HOURS PRN
Status: DISCONTINUED | OUTPATIENT
Start: 2020-11-30 | End: 2020-12-04 | Stop reason: HOSPADM

## 2020-11-30 RX ORDER — AMLODIPINE BESYLATE 10 MG/1
10 TABLET ORAL EVERY EVENING
Status: DISCONTINUED | OUTPATIENT
Start: 2020-11-30 | End: 2020-11-30

## 2020-11-30 RX ORDER — ASPIRIN 81 MG/1
81 TABLET ORAL DAILY
Status: DISCONTINUED | OUTPATIENT
Start: 2020-11-30 | End: 2020-12-04 | Stop reason: HOSPADM

## 2020-11-30 RX ORDER — SODIUM CHLORIDE 0.9 % (FLUSH) 0.9 %
10 SYRINGE (ML) INJECTION AS NEEDED
Status: DISCONTINUED | OUTPATIENT
Start: 2020-11-30 | End: 2020-12-04 | Stop reason: HOSPADM

## 2020-11-30 RX ORDER — LABETALOL HYDROCHLORIDE 5 MG/ML
5 INJECTION, SOLUTION INTRAVENOUS
Status: DISCONTINUED | OUTPATIENT
Start: 2020-11-30 | End: 2020-11-30 | Stop reason: HOSPADM

## 2020-11-30 RX ORDER — FAMOTIDINE 10 MG/ML
20 INJECTION, SOLUTION INTRAVENOUS
Status: COMPLETED | OUTPATIENT
Start: 2020-11-30 | End: 2020-11-30

## 2020-11-30 RX ORDER — DIPHENHYDRAMINE HYDROCHLORIDE 50 MG/ML
12.5 INJECTION INTRAMUSCULAR; INTRAVENOUS
Status: DISCONTINUED | OUTPATIENT
Start: 2020-11-30 | End: 2020-11-30 | Stop reason: HOSPADM

## 2020-11-30 RX ORDER — ONDANSETRON 2 MG/ML
4 INJECTION INTRAMUSCULAR; INTRAVENOUS ONCE AS NEEDED
Status: DISCONTINUED | OUTPATIENT
Start: 2020-11-30 | End: 2020-11-30 | Stop reason: HOSPADM

## 2020-11-30 RX ORDER — BUMETANIDE 0.5 MG/1
0.5 TABLET ORAL DAILY
Status: DISCONTINUED | OUTPATIENT
Start: 2020-11-30 | End: 2020-11-30

## 2020-11-30 RX ORDER — PROMETHAZINE HYDROCHLORIDE 25 MG/1
25 SUPPOSITORY RECTAL ONCE AS NEEDED
Status: DISCONTINUED | OUTPATIENT
Start: 2020-11-30 | End: 2020-11-30 | Stop reason: HOSPADM

## 2020-11-30 RX ORDER — LEVOTHYROXINE SODIUM 0.05 MG/1
50 TABLET ORAL DAILY
Status: DISCONTINUED | OUTPATIENT
Start: 2020-12-01 | End: 2020-12-04 | Stop reason: HOSPADM

## 2020-11-30 RX ORDER — FENTANYL CITRATE 50 UG/ML
INJECTION, SOLUTION INTRAMUSCULAR; INTRAVENOUS AS NEEDED
Status: DISCONTINUED | OUTPATIENT
Start: 2020-11-30 | End: 2020-11-30 | Stop reason: SURG

## 2020-11-30 RX ORDER — MIDAZOLAM HYDROCHLORIDE 1 MG/ML
1 INJECTION INTRAMUSCULAR; INTRAVENOUS
Status: DISCONTINUED | OUTPATIENT
Start: 2020-11-30 | End: 2020-11-30 | Stop reason: HOSPADM

## 2020-11-30 RX ORDER — GLYCOPYRROLATE 0.2 MG/ML
INJECTION INTRAMUSCULAR; INTRAVENOUS AS NEEDED
Status: DISCONTINUED | OUTPATIENT
Start: 2020-11-30 | End: 2020-11-30 | Stop reason: SURG

## 2020-11-30 RX ORDER — SODIUM CHLORIDE 0.9 % (FLUSH) 0.9 %
10 SYRINGE (ML) INJECTION EVERY 12 HOURS SCHEDULED
Status: DISCONTINUED | OUTPATIENT
Start: 2020-11-30 | End: 2020-11-30 | Stop reason: HOSPADM

## 2020-11-30 RX ORDER — CARVEDILOL 6.25 MG/1
6.25 TABLET ORAL 2 TIMES DAILY WITH MEALS
Status: DISCONTINUED | OUTPATIENT
Start: 2020-11-30 | End: 2020-12-04 | Stop reason: HOSPADM

## 2020-11-30 RX ORDER — SODIUM CHLORIDE 0.9 % (FLUSH) 0.9 %
10 SYRINGE (ML) INJECTION AS NEEDED
Status: DISCONTINUED | OUTPATIENT
Start: 2020-11-30 | End: 2020-11-30 | Stop reason: HOSPADM

## 2020-11-30 RX ORDER — ROCURONIUM BROMIDE 10 MG/ML
INJECTION, SOLUTION INTRAVENOUS AS NEEDED
Status: DISCONTINUED | OUTPATIENT
Start: 2020-11-30 | End: 2020-11-30 | Stop reason: SURG

## 2020-11-30 RX ORDER — ONDANSETRON 2 MG/ML
INJECTION INTRAMUSCULAR; INTRAVENOUS AS NEEDED
Status: DISCONTINUED | OUTPATIENT
Start: 2020-11-30 | End: 2020-11-30 | Stop reason: SURG

## 2020-11-30 RX ADMIN — ISOSORBIDE MONONITRATE 30 MG: 30 TABLET ORAL at 20:41

## 2020-11-30 RX ADMIN — FENTANYL CITRATE 50 MCG: 50 INJECTION, SOLUTION INTRAMUSCULAR; INTRAVENOUS at 12:39

## 2020-11-30 RX ADMIN — PHENYLEPHRINE HYDROCHLORIDE 100 MCG: 10 INJECTION INTRAVENOUS at 09:03

## 2020-11-30 RX ADMIN — SODIUM CHLORIDE, POTASSIUM CHLORIDE, SODIUM LACTATE AND CALCIUM CHLORIDE 9 ML/HR: 600; 310; 30; 20 INJECTION, SOLUTION INTRAVENOUS at 07:16

## 2020-11-30 RX ADMIN — FENTANYL CITRATE 50 MCG: 50 INJECTION, SOLUTION INTRAMUSCULAR; INTRAVENOUS at 11:26

## 2020-11-30 RX ADMIN — CEFAZOLIN SODIUM 1 G: 2 INJECTION, SOLUTION INTRAVENOUS at 10:11

## 2020-11-30 RX ADMIN — LIDOCAINE HYDROCHLORIDE 1 EACH: 40 SOLUTION TOPICAL at 07:37

## 2020-11-30 RX ADMIN — SODIUM CHLORIDE, POTASSIUM CHLORIDE, SODIUM LACTATE AND CALCIUM CHLORIDE 9 ML/HR: 600; 310; 30; 20 INJECTION, SOLUTION INTRAVENOUS at 13:59

## 2020-11-30 RX ADMIN — HYDROMORPHONE HYDROCHLORIDE 0.5 MG: 1 INJECTION, SOLUTION INTRAMUSCULAR; INTRAVENOUS; SUBCUTANEOUS at 11:50

## 2020-11-30 RX ADMIN — HYDROMORPHONE HYDROCHLORIDE 0.5 MG: 1 INJECTION, SOLUTION INTRAMUSCULAR; INTRAVENOUS; SUBCUTANEOUS at 13:18

## 2020-11-30 RX ADMIN — FENTANYL CITRATE 50 MCG: 50 INJECTION INTRAMUSCULAR; INTRAVENOUS at 08:09

## 2020-11-30 RX ADMIN — METFORMIN HYDROCHLORIDE 500 MG: 500 TABLET ORAL at 17:01

## 2020-11-30 RX ADMIN — PROPOFOL 50 MG: 10 INJECTION, EMULSION INTRAVENOUS at 07:40

## 2020-11-30 RX ADMIN — ASPIRIN 81 MG: 81 TABLET, COATED ORAL at 17:05

## 2020-11-30 RX ADMIN — PHENYLEPHRINE HYDROCHLORIDE 100 MCG: 10 INJECTION INTRAVENOUS at 10:50

## 2020-11-30 RX ADMIN — CARVEDILOL 6.25 MG: 6.25 TABLET, FILM COATED ORAL at 17:01

## 2020-11-30 RX ADMIN — ONDANSETRON HYDROCHLORIDE 4 MG: 2 SOLUTION INTRAMUSCULAR; INTRAVENOUS at 10:18

## 2020-11-30 RX ADMIN — HYDROMORPHONE HYDROCHLORIDE 0.5 MG: 1 INJECTION, SOLUTION INTRAMUSCULAR; INTRAVENOUS; SUBCUTANEOUS at 13:00

## 2020-11-30 RX ADMIN — ROCURONIUM BROMIDE 10 MG: 10 INJECTION INTRAVENOUS at 07:34

## 2020-11-30 RX ADMIN — PANTOPRAZOLE SODIUM 40 MG: 40 TABLET, DELAYED RELEASE ORAL at 17:05

## 2020-11-30 RX ADMIN — AMLODIPINE BESYLATE 10 MG: 10 TABLET ORAL at 20:41

## 2020-11-30 RX ADMIN — PRAVASTATIN SODIUM 40 MG: 40 TABLET ORAL at 20:40

## 2020-11-30 RX ADMIN — SODIUM CHLORIDE, POTASSIUM CHLORIDE, SODIUM LACTATE AND CALCIUM CHLORIDE: 600; 310; 30; 20 INJECTION, SOLUTION INTRAVENOUS at 09:40

## 2020-11-30 RX ADMIN — HYDROMORPHONE HYDROCHLORIDE 0.25 MG: 2 INJECTION, SOLUTION INTRAMUSCULAR; INTRAVENOUS; SUBCUTANEOUS at 10:13

## 2020-11-30 RX ADMIN — HYDROMORPHONE HYDROCHLORIDE 0.25 MG: 2 INJECTION, SOLUTION INTRAMUSCULAR; INTRAVENOUS; SUBCUTANEOUS at 09:20

## 2020-11-30 RX ADMIN — EPHEDRINE SULFATE 10 MG: 50 INJECTION INTRAVENOUS at 08:24

## 2020-11-30 RX ADMIN — ALLOPURINOL 300 MG: 300 TABLET ORAL at 20:41

## 2020-11-30 RX ADMIN — PROPOFOL 150 MG: 10 INJECTION, EMULSION INTRAVENOUS at 07:34

## 2020-11-30 RX ADMIN — LIDOCAINE HYDROCHLORIDE 80 MG: 20 INJECTION, SOLUTION INFILTRATION; PERINEURAL at 07:34

## 2020-11-30 RX ADMIN — GLYCOPYRROLATE 0.3 MG: 0.2 INJECTION INTRAMUSCULAR; INTRAVENOUS at 10:18

## 2020-11-30 RX ADMIN — HYDROCODONE BITARTRATE AND ACETAMINOPHEN 1 TABLET: 5; 325 TABLET ORAL at 17:06

## 2020-11-30 RX ADMIN — FAMOTIDINE 20 MG: 10 INJECTION INTRAVENOUS at 07:16

## 2020-11-30 RX ADMIN — FENTANYL CITRATE 50 MCG: 50 INJECTION INTRAMUSCULAR; INTRAVENOUS at 07:34

## 2020-11-30 RX ADMIN — NEOSTIGMINE METHYLSULFATE 2 MG: 1 INJECTION INTRAMUSCULAR; INTRAVENOUS; SUBCUTANEOUS at 10:18

## 2020-11-30 RX ADMIN — EPHEDRINE SULFATE 10 MG: 50 INJECTION INTRAVENOUS at 08:27

## 2020-11-30 RX ADMIN — SODIUM CHLORIDE, POTASSIUM CHLORIDE, SODIUM LACTATE AND CALCIUM CHLORIDE: 600; 310; 30; 20 INJECTION, SOLUTION INTRAVENOUS at 06:46

## 2020-11-30 RX ADMIN — LOSARTAN POTASSIUM 100 MG: 100 TABLET, FILM COATED ORAL at 21:22

## 2020-11-30 RX ADMIN — HYDROCODONE BITARTRATE AND ACETAMINOPHEN 1 TABLET: 5; 325 TABLET ORAL at 21:22

## 2020-11-30 RX ADMIN — POTASSIUM CHLORIDE AND SODIUM CHLORIDE 100 ML/HR: 900; 150 INJECTION, SOLUTION INTRAVENOUS at 17:07

## 2020-11-30 RX ADMIN — ROCURONIUM BROMIDE 20 MG: 10 INJECTION INTRAVENOUS at 07:50

## 2020-11-30 RX ADMIN — DEXAMETHASONE SODIUM PHOSPHATE 8 MG: 10 INJECTION INTRAMUSCULAR; INTRAVENOUS at 07:58

## 2020-11-30 RX ADMIN — CEFAZOLIN SODIUM 2 G: 2 INJECTION, SOLUTION INTRAVENOUS at 07:30

## 2020-11-30 RX ADMIN — SUCCINYLCHOLINE CHLORIDE 140 MG: 20 INJECTION, SOLUTION INTRAMUSCULAR; INTRAVENOUS; PARENTERAL at 07:34

## 2020-11-30 RX ADMIN — HYDROMORPHONE HYDROCHLORIDE 0.5 MG: 1 INJECTION, SOLUTION INTRAMUSCULAR; INTRAVENOUS; SUBCUTANEOUS at 11:31

## 2020-11-30 RX ADMIN — FENTANYL CITRATE 50 MCG: 50 INJECTION, SOLUTION INTRAMUSCULAR; INTRAVENOUS at 13:06

## 2020-11-30 RX ADMIN — HYDROCODONE BITARTRATE AND ACETAMINOPHEN 1 TABLET: 7.5; 325 TABLET ORAL at 13:06

## 2020-11-30 RX ADMIN — FENTANYL CITRATE 50 MCG: 50 INJECTION, SOLUTION INTRAMUSCULAR; INTRAVENOUS at 11:45

## 2020-11-30 RX ADMIN — BUMETANIDE 0.5 MG: 0.5 TABLET ORAL at 17:01

## 2020-11-30 NOTE — ANESTHESIA PREPROCEDURE EVALUATION
Anesthesia Evaluation     Patient summary reviewed                Airway   Mallampati: II  Neck ROM: limited  Possible difficult intubation  Dental      Pulmonary    (+) sleep apnea,   Cardiovascular     ECG reviewed  Rhythm: regular    (+) hypertension, CAD, cardiac stents       Neuro/Psych  GI/Hepatic/Renal/Endo    (+)   diabetes mellitus,     Musculoskeletal     Abdominal    Substance History      OB/GYN          Other   arthritis,        Other Comment: Hb 9.8                  Anesthesia Plan    ASA 3     general       Anesthetic plan, all risks, benefits, and alternatives have been provided, discussed and informed consent has been obtained with: patient.  Use of blood products discussed with patient .

## 2020-11-30 NOTE — ANESTHESIA PROCEDURE NOTES
Airway  Urgency: elective    Date/Time: 11/30/2020 7:37 AM  Airway not difficult    General Information and Staff    Patient location during procedure: OR  Anesthesiologist: Luis E Lerma MD  CRNA: Pankaj Burkett CRNA    Indications and Patient Condition  Indications for airway management: airway protection    Preoxygenated: yes  MILS not maintained throughout  Mask difficulty assessment: 1 - vent by mask    Final Airway Details  Final airway type: endotracheal airway      Successful airway: ETT  Cuffed: yes   Successful intubation technique: direct laryngoscopy  Facilitating devices/methods: anterior pressure/BURP  Endotracheal tube insertion site: oral  Blade: Marcos  Blade size: 3  ETT size (mm): 7.5  Cormack-Lehane Classification: grade IIa - partial view of glottis  Placement verified by: chest auscultation   Cuff volume (mL): 7  Measured from: lips  ETT/EBT  to lips (cm): 21  Number of attempts at approach: 1  Assessment: lips, teeth, and gum same as pre-op and atraumatic intubation    Additional Comments  Pre O2, SIAI

## 2020-12-01 ENCOUNTER — APPOINTMENT (OUTPATIENT)
Dept: GENERAL RADIOLOGY | Facility: HOSPITAL | Age: 80
End: 2020-12-01

## 2020-12-01 PROBLEM — N18.31 STAGE 3A CHRONIC KIDNEY DISEASE (HCC): Status: ACTIVE | Noted: 2019-08-02

## 2020-12-01 LAB
ANION GAP SERPL CALCULATED.3IONS-SCNC: 8 MMOL/L (ref 5–15)
BASOPHILS # BLD AUTO: 0.01 10*3/MM3 (ref 0–0.2)
BASOPHILS NFR BLD AUTO: 0.1 % (ref 0–1.5)
BUN SERPL-MCNC: 31 MG/DL (ref 8–23)
BUN/CREAT SERPL: 25 (ref 7–25)
CALCIUM SPEC-SCNC: 9.1 MG/DL (ref 8.6–10.5)
CHLORIDE SERPL-SCNC: 108 MMOL/L (ref 98–107)
CO2 SERPL-SCNC: 24 MMOL/L (ref 22–29)
CREAT SERPL-MCNC: 1.24 MG/DL (ref 0.76–1.27)
DEPRECATED RDW RBC AUTO: 51 FL (ref 37–54)
EOSINOPHIL # BLD AUTO: 0.01 10*3/MM3 (ref 0–0.4)
EOSINOPHIL NFR BLD AUTO: 0.1 % (ref 0.3–6.2)
ERYTHROCYTE [DISTWIDTH] IN BLOOD BY AUTOMATED COUNT: 14.4 % (ref 12.3–15.4)
GFR SERPL CREATININE-BSD FRML MDRD: 56 ML/MIN/1.73
GLUCOSE BLDC GLUCOMTR-MCNC: 162 MG/DL (ref 70–130)
GLUCOSE BLDC GLUCOMTR-MCNC: 268 MG/DL (ref 70–130)
GLUCOSE BLDC GLUCOMTR-MCNC: 269 MG/DL (ref 70–130)
GLUCOSE BLDC GLUCOMTR-MCNC: 297 MG/DL (ref 70–130)
GLUCOSE SERPL-MCNC: 163 MG/DL (ref 65–99)
HBA1C MFR BLD: 6.8 % (ref 4.8–5.6)
HCT VFR BLD AUTO: 25.4 % (ref 37.5–51)
HGB BLD-MCNC: 8.4 G/DL (ref 13–17.7)
IMM GRANULOCYTES # BLD AUTO: 0.05 10*3/MM3 (ref 0–0.05)
IMM GRANULOCYTES NFR BLD AUTO: 0.6 % (ref 0–0.5)
LYMPHOCYTES # BLD AUTO: 1.23 10*3/MM3 (ref 0.7–3.1)
LYMPHOCYTES NFR BLD AUTO: 14.3 % (ref 19.6–45.3)
MCH RBC QN AUTO: 32.4 PG (ref 26.6–33)
MCHC RBC AUTO-ENTMCNC: 33.1 G/DL (ref 31.5–35.7)
MCV RBC AUTO: 98.1 FL (ref 79–97)
MONOCYTES # BLD AUTO: 0.57 10*3/MM3 (ref 0.1–0.9)
MONOCYTES NFR BLD AUTO: 6.6 % (ref 5–12)
NEUTROPHILS NFR BLD AUTO: 6.75 10*3/MM3 (ref 1.7–7)
NEUTROPHILS NFR BLD AUTO: 78.3 % (ref 42.7–76)
NRBC BLD AUTO-RTO: 0 /100 WBC (ref 0–0.2)
PLATELET # BLD AUTO: 188 10*3/MM3 (ref 140–450)
PMV BLD AUTO: 9.7 FL (ref 6–12)
POTASSIUM SERPL-SCNC: 5.2 MMOL/L (ref 3.5–5.2)
RBC # BLD AUTO: 2.59 10*6/MM3 (ref 4.14–5.8)
SODIUM SERPL-SCNC: 140 MMOL/L (ref 136–145)
WBC # BLD AUTO: 8.62 10*3/MM3 (ref 3.4–10.8)

## 2020-12-01 PROCEDURE — 72040 X-RAY EXAM NECK SPINE 2-3 VW: CPT

## 2020-12-01 PROCEDURE — 63710000001 HYDROCODONE-ACETAMINOPHEN 5-325 MG TABLET: Performed by: NEUROLOGICAL SURGERY

## 2020-12-01 PROCEDURE — 63710000001 PANTOPRAZOLE 40 MG TABLET DELAYED-RELEASE: Performed by: NEUROLOGICAL SURGERY

## 2020-12-01 PROCEDURE — A9270 NON-COVERED ITEM OR SERVICE: HCPCS | Performed by: NEUROLOGICAL SURGERY

## 2020-12-01 PROCEDURE — 97110 THERAPEUTIC EXERCISES: CPT

## 2020-12-01 PROCEDURE — A9270 NON-COVERED ITEM OR SERVICE: HCPCS | Performed by: HOSPITALIST

## 2020-12-01 PROCEDURE — 63710000001 ISOSORBIDE MONONITRATE 30 MG TABLET SUSTAINED-RELEASE 24 HOUR: Performed by: NEUROLOGICAL SURGERY

## 2020-12-01 PROCEDURE — 99024 POSTOP FOLLOW-UP VISIT: CPT | Performed by: NURSE PRACTITIONER

## 2020-12-01 PROCEDURE — 97161 PT EVAL LOW COMPLEX 20 MIN: CPT

## 2020-12-01 PROCEDURE — A9270 NON-COVERED ITEM OR SERVICE: HCPCS | Performed by: NURSE PRACTITIONER

## 2020-12-01 PROCEDURE — 63710000001 INSULIN GLARGINE PER 5 UNITS: Performed by: HOSPITALIST

## 2020-12-01 PROCEDURE — 63710000001 SENNOSIDES-DOCUSATE 8.6-50 MG TABLET: Performed by: NURSE PRACTITIONER

## 2020-12-01 PROCEDURE — 63710000001 LEVOTHYROXINE 50 MCG TABLET: Performed by: NEUROLOGICAL SURGERY

## 2020-12-01 PROCEDURE — 63710000001 ALLOPURINOL 300 MG TABLET: Performed by: NEUROLOGICAL SURGERY

## 2020-12-01 PROCEDURE — 82962 GLUCOSE BLOOD TEST: CPT

## 2020-12-01 PROCEDURE — 63710000001 CYCLOBENZAPRINE 10 MG TABLET: Performed by: NURSE PRACTITIONER

## 2020-12-01 PROCEDURE — 97530 THERAPEUTIC ACTIVITIES: CPT

## 2020-12-01 PROCEDURE — 85025 COMPLETE CBC W/AUTO DIFF WBC: CPT | Performed by: NEUROLOGICAL SURGERY

## 2020-12-01 PROCEDURE — 80048 BASIC METABOLIC PNL TOTAL CA: CPT | Performed by: HOSPITALIST

## 2020-12-01 PROCEDURE — 63710000001 INSULIN LISPRO (HUMAN) PER 5 UNITS: Performed by: HOSPITALIST

## 2020-12-01 PROCEDURE — 63710000001 ASPIRIN 81 MG TABLET DELAYED-RELEASE: Performed by: NEUROLOGICAL SURGERY

## 2020-12-01 PROCEDURE — 63710000001 PRAVASTATIN 40 MG TABLET: Performed by: NEUROLOGICAL SURGERY

## 2020-12-01 PROCEDURE — 83036 HEMOGLOBIN GLYCOSYLATED A1C: CPT | Performed by: HOSPITALIST

## 2020-12-01 PROCEDURE — 63710000001 CARVEDILOL 6.25 MG TABLET: Performed by: NEUROLOGICAL SURGERY

## 2020-12-01 PROCEDURE — 25810000003 SODIUM CHLORIDE 0.9 % WITH KCL 20 MEQ 20-0.9 MEQ/L-% SOLUTION: Performed by: NEUROLOGICAL SURGERY

## 2020-12-01 RX ORDER — AMOXICILLIN 250 MG
2 CAPSULE ORAL NIGHTLY
Status: DISCONTINUED | OUTPATIENT
Start: 2020-12-01 | End: 2020-12-04 | Stop reason: HOSPADM

## 2020-12-01 RX ORDER — CYCLOBENZAPRINE HCL 10 MG
10 TABLET ORAL 3 TIMES DAILY
Status: DISCONTINUED | OUTPATIENT
Start: 2020-12-01 | End: 2020-12-04 | Stop reason: HOSPADM

## 2020-12-01 RX ORDER — INSULIN GLARGINE 100 [IU]/ML
60 INJECTION, SOLUTION SUBCUTANEOUS EVERY MORNING
Status: DISCONTINUED | OUTPATIENT
Start: 2020-12-01 | End: 2020-12-04 | Stop reason: HOSPADM

## 2020-12-01 RX ORDER — INSULIN GLARGINE 100 [IU]/ML
40 INJECTION, SOLUTION SUBCUTANEOUS NIGHTLY
Status: DISCONTINUED | OUTPATIENT
Start: 2020-12-01 | End: 2020-12-04 | Stop reason: HOSPADM

## 2020-12-01 RX ADMIN — INSULIN GLARGINE 60 UNITS: 100 INJECTION, SOLUTION SUBCUTANEOUS at 08:30

## 2020-12-01 RX ADMIN — INSULIN LISPRO 2 UNITS: 100 INJECTION, SOLUTION INTRAVENOUS; SUBCUTANEOUS at 08:30

## 2020-12-01 RX ADMIN — ASPIRIN 81 MG: 81 TABLET, COATED ORAL at 08:31

## 2020-12-01 RX ADMIN — ISOSORBIDE MONONITRATE 30 MG: 30 TABLET ORAL at 21:18

## 2020-12-01 RX ADMIN — ALLOPURINOL 300 MG: 300 TABLET ORAL at 17:41

## 2020-12-01 RX ADMIN — CYCLOBENZAPRINE 10 MG: 10 TABLET, FILM COATED ORAL at 16:53

## 2020-12-01 RX ADMIN — HYDROCODONE BITARTRATE AND ACETAMINOPHEN 1 TABLET: 5; 325 TABLET ORAL at 14:08

## 2020-12-01 RX ADMIN — INSULIN LISPRO 6 UNITS: 100 INJECTION, SOLUTION INTRAVENOUS; SUBCUTANEOUS at 11:52

## 2020-12-01 RX ADMIN — DOCUSATE SODIUM 50MG AND SENNOSIDES 8.6MG 2 TABLET: 8.6; 5 TABLET, FILM COATED ORAL at 21:18

## 2020-12-01 RX ADMIN — INSULIN GLARGINE 40 UNITS: 100 INJECTION, SOLUTION SUBCUTANEOUS at 21:21

## 2020-12-01 RX ADMIN — CYCLOBENZAPRINE 10 MG: 10 TABLET, FILM COATED ORAL at 21:18

## 2020-12-01 RX ADMIN — LEVOTHYROXINE SODIUM 50 MCG: 50 TABLET ORAL at 06:07

## 2020-12-01 RX ADMIN — ISOSORBIDE MONONITRATE 30 MG: 30 TABLET ORAL at 08:31

## 2020-12-01 RX ADMIN — CYCLOBENZAPRINE 10 MG: 10 TABLET, FILM COATED ORAL at 10:04

## 2020-12-01 RX ADMIN — HYDROCODONE BITARTRATE AND ACETAMINOPHEN 1 TABLET: 5; 325 TABLET ORAL at 10:04

## 2020-12-01 RX ADMIN — PRAVASTATIN SODIUM 40 MG: 40 TABLET ORAL at 21:18

## 2020-12-01 RX ADMIN — SODIUM CHLORIDE, PRESERVATIVE FREE 3 ML: 5 INJECTION INTRAVENOUS at 21:24

## 2020-12-01 RX ADMIN — POTASSIUM CHLORIDE AND SODIUM CHLORIDE 100 ML/HR: 900; 150 INJECTION, SOLUTION INTRAVENOUS at 06:08

## 2020-12-01 RX ADMIN — CARVEDILOL 6.25 MG: 6.25 TABLET, FILM COATED ORAL at 08:31

## 2020-12-01 RX ADMIN — CARVEDILOL 6.25 MG: 6.25 TABLET, FILM COATED ORAL at 17:41

## 2020-12-01 RX ADMIN — HYDROCODONE BITARTRATE AND ACETAMINOPHEN 1 TABLET: 5; 325 TABLET ORAL at 06:07

## 2020-12-01 RX ADMIN — PANTOPRAZOLE SODIUM 40 MG: 40 TABLET, DELAYED RELEASE ORAL at 08:31

## 2020-12-01 RX ADMIN — INSULIN LISPRO 6 UNITS: 100 INJECTION, SOLUTION INTRAVENOUS; SUBCUTANEOUS at 16:53

## 2020-12-01 RX ADMIN — HYDROCODONE BITARTRATE AND ACETAMINOPHEN 1 TABLET: 5; 325 TABLET ORAL at 21:18

## 2020-12-01 RX ADMIN — HYDROCODONE BITARTRATE AND ACETAMINOPHEN 1 TABLET: 5; 325 TABLET ORAL at 17:41

## 2020-12-01 RX ADMIN — PRAVASTATIN SODIUM 40 MG: 40 TABLET ORAL at 08:31

## 2020-12-01 NOTE — PLAN OF CARE
Goal Outcome Evaluation:  Plan of Care Reviewed With: patient      Pt POD0 C4-5 lami with C3-C6 fusion with instrumentation. VSS, afebrile, pain controlled with po pain medication, F/C in place draining clear yellow urine, ELIZA drain in place. Pt has bilat pinpoint spots above ear/temple area with small amount of rried blood.

## 2020-12-01 NOTE — PLAN OF CARE
Goal Outcome Evaluation:  Plan of Care Reviewed With: patient  Progress: improving  Outcome Summary: Pt is a 81 yo male POD 1 C4-5 laminectomy with C3-6 fusion with instrumentation. Pt reports use of cane at baseline and has had several trips/stumbles over last 6 months. Pt lives with wife, with several steps to enter home and owns RW. Today, pt requires SBA for bed mobility, CGA for transfers, and CGA/SBA for gait with RW. Pt may benefit from skilled PT acutely to address balance and endurance deficits. Anticipate d/c home with assist at this time.    Patient was intermittently wearing a face mask during this therapy encounter. Therapist used appropriate personal protective equipment including eye protection, mask, and gloves.  Mask used was standard procedure mask. Appropriate PPE was worn during the entire therapy session. Hand hygiene was completed before and after therapy session. Patient is not in enhanced droplet precautions.

## 2020-12-01 NOTE — PROGRESS NOTES
[facility] NEUROSURGERY PROGRESS NOTE      CC: POD 1 C4-5 laminectomy foraminotomy with posterior C3-6 instrumented fusion      Subjective     Interval History: Had cervical x-rays.  Reports improvement in arm pain and shoulder pain.  Neck is sore as expected.  He states that ice helps.    ROS:  Neck: neck pain  GI: No nausea, vomiting, no swallow difficulties  Neuro: Arm pain better  : Has Lopez    Objective     Vital signs in last 24 hours:  Temp:  [97.1 °F (36.2 °C)-98.6 °F (37 °C)] 98.6 °F (37 °C)  Heart Rate:  [62-93] 78  Resp:  [14-18] 16  BP: (128-155)/(56-81) 128/56    Intake/Output this shift:  I/O this shift:  In: 155 [I.V.:155]  Out: 175 [Urine:150; Drains:25]     ELIZA-135 cc/since placement    LABS:  Results from last 7 days   Lab Units 12/01/20  0449 11/25/20  1530   WBC 10*3/mm3 8.62 6.84   HEMOGLOBIN g/dL 8.4* 9.8*   HEMATOCRIT % 25.4* 29.9*   PLATELETS 10*3/mm3 188 225     Results from last 7 days   Lab Units 12/01/20  0449 11/25/20  1530   SODIUM mmol/L 140 142   POTASSIUM mmol/L 5.2 4.9   CHLORIDE mmol/L 108* 107   CO2 mmol/L 24.0 16.3*   BUN mg/dL 31* 40*   CREATININE mg/dL 1.24 1.44*   CALCIUM mg/dL 9.1 9.9   GLUCOSE mg/dL 163* 160*       IMAGING STUDIES:  Cervical x-rays reveal postoperative changes from prior anterior fusion C3-5, C5/6.  New instrumentation C3-6 posterior well-positioned with no complicating features.    I personally viewed and interpreted the patient's cervical x-rays.     Meds reviewed/changed: Yes    Current Facility-Administered Medications:   •  allopurinol (ZYLOPRIM) tablet 300 mg, 300 mg, Oral, Q PM, John Singleton MD, 300 mg at 11/30/20 2041  •  aspirin EC tablet 81 mg, 81 mg, Oral, Daily, John Singleton MD, 81 mg at 12/01/20 0831  •  carvedilol (COREG) tablet 6.25 mg, 6.25 mg, Oral, BID With Meals, John Singleton MD, 6.25 mg at 12/01/20 0831  •  cyclobenzaprine (FLEXERIL) tablet 10 mg, 10 mg, Oral, TID, Alaina Cortes APRN, 10 mg at 12/01/20 1004  •  dextrose  (D50W) 25 g/ 50mL Intravenous Solution 25 g, 25 g, Intravenous, Q15 Min PRN, Junior Gleason MD  •  dextrose (GLUTOSE) oral gel 15 g, 15 g, Oral, Q15 Min PRN, Junior Gleason MD  •  glucagon (human recombinant) (GLUCAGEN DIAGNOSTIC) injection 1 mg, 1 mg, Subcutaneous, Q15 Min PRN, Junior Gleason MD  •  HYDROcodone-acetaminophen (NORCO) 5-325 MG per tablet 1 tablet, 1 tablet, Oral, Q4H PRN, John Singleton MD, 1 tablet at 12/01/20 1004  •  insulin glargine (LANTUS) injection 40 Units, 40 Units, Subcutaneous, Nightly, Junior Gleason MD  •  insulin glargine (LANTUS) injection 60 Units, 60 Units, Subcutaneous, QAM, Junior Gleason MD, 60 Units at 12/01/20 0830  •  insulin lispro (humaLOG) injection 0-9 Units, 0-9 Units, Subcutaneous, TID With Meals, Junior Gleason MD, 6 Units at 12/01/20 1152  •  isosorbide mononitrate (IMDUR) 24 hr tablet 30 mg, 30 mg, Oral, BID, John Singleton MD, 30 mg at 12/01/20 0831  •  levothyroxine (SYNTHROID, LEVOTHROID) tablet 50 mcg, 50 mcg, Oral, Daily, John Singleton MD, 50 mcg at 12/01/20 0607  •  morphine injection 2 mg, 2 mg, Intravenous, Q4H PRN **AND** naloxone (NARCAN) injection 0.4 mg, 0.4 mg, Intravenous, Q5 Min PRN, John Singleton MD  •  nitroglycerin (NITROSTAT) SL tablet 0.4 mg, 0.4 mg, Sublingual, Q5 Min PRN, John Singleton MD  •  ondansetron (ZOFRAN) tablet 4 mg, 4 mg, Oral, Q6H PRN **OR** ondansetron (ZOFRAN) injection 4 mg, 4 mg, Intravenous, Q6H PRN, John Singleton MD  •  pantoprazole (PROTONIX) EC tablet 40 mg, 40 mg, Oral, Daily, John Singleton MD, 40 mg at 12/01/20 0831  •  pravastatin (PRAVACHOL) tablet 40 mg, 40 mg, Oral, BID, John Singleton MD, 40 mg at 12/01/20 0831  •  promethazine (PHENERGAN) tablet 12.5 mg, 12.5 mg, Oral, Q6H PRN, John Singleton MD  •  sennosides-docusate (PERICOLACE) 8.6-50 MG per tablet 2 tablet, 2 tablet, Oral, Nightly, Alaina Cortes, APRN  •  sodium chloride 0.9 % flush 10 mL, 10 mL, Intravenous, PRN, John Singleton  "MD MILO  •  sodium chloride 0.9 % flush 3 mL, 3 mL, Intravenous, Q12H, John Snigleton MD      Physical Exam:    General:   Awake, alert, oriented x3. Speech clear with no aphasia; somewhat uncomfortable moving about in the bed, but is able to sit up on the side with minimal assist  Neck:    No collar; limited ROM; swallow/trachea midline;      incision right anterior well-healed.  Midline posterior incision well approximated with no redness drainage or swelling.  ELIZA to bulb suction with small amount of sanguinous output  Motor: Normal muscle strength, bulk and tone in upper and lower extremities.  No fasciculations, rigidity, spasticity, or abnormal movements.  Sensation: Normal to light touch  Extremities:   SCD in place    Assessment/Plan     ASSESSMENT:      DDD (degenerative disc disease), cervical    Non-Hodgkin's lymphoma (CMS/HCC)    Type 2 diabetes mellitus with hyperglycemia, with long-term current use of insulin (CMS/HCC)    Hypertension    JULIA treated with BiPAP 20/16    Waldenstrom macroglobulinemia (CMS/HCC)    Stage 3a chronic kidney disease    Obesity (BMI 30-39.9)      PLAN: Patient doing fairly well postop day 1.  Neck pain as expected but arm pain better.  States that neck discomfort tolerable with pain medication and ice.  No weakness.  Will have PT evaluate.  DC Lopez catheter.  Keep ELIZA for today due to modest output.  A lot of tightness in his neck.  Will add scheduled muscle relaxant.  Possible discharge home tomorrow.    I discussed the patient's findings and my recommendations with patient       LOS: 0 days       Alaina Cortes, APRN  12/1/2020  09:41 EST    \"Dictated utilizing Dragon dictation\".      "

## 2020-12-01 NOTE — PLAN OF CARE
Goal Outcome Evaluation:  Plan of Care Reviewed With: patient  Progress: improving  Outcome Summary: patient ambulating with hallway with assistance, pain controlled at this time, f/c in place, educated on CAUTI prevention and b/p monitoring

## 2020-12-01 NOTE — PLAN OF CARE
Goal Outcome Evaluation:  Plan of Care Reviewed With: patient  Progress: improving  Outcome Summary: C$-C% lami with C3-C6 fusion. Dressing CDI and ELIZA drain in place, draining to bulb suction. Pt pain has been well controlled with PO pain meds and muscle relaxers. VSS. Pt educated on blood sugar monitoring and insulin. Plan is to discharge home tomorrow afternoon.

## 2020-12-01 NOTE — PROGRESS NOTES
Discharge Planning Assessment  [facility]     Patient Name: Chevy Goodwin  MRN: [id number]  Today's Date: 12/1/2020    Admit Date: 11/30/2020    Discharge Needs Assessment     Row Name 12/01/20 1154       Living Environment    Lives With  spouse    Current Living Arrangements  home/apartment/condo    Primary Care Provided by  self    Provides Primary Care For  no one    Family Caregiver if Needed  spouse    Quality of Family Relationships  helpful;involved;supportive    Able to Return to Prior Arrangements  yes       Resource/Environmental Concerns    Transportation Concerns  car, none       Transition Planning    Patient/Family Anticipates Transition to  home with family    Patient/Family Anticipated Services at Transition  none    Transportation Anticipated  family or friend will provide       Discharge Needs Assessment    Readmission Within the Last 30 Days  no previous admission in last 30 days    Equipment Currently Used at Home  none    Provided Post Acute Provider List?  Refused    Refused Provider List Comment  Pt denies needs.        Discharge Plan     Row Name 12/01/20 1154       Plan    Plan  Home with family support.    Patient/Family in Agreement with Plan  yes    Plan Comments  Spoke with the patient, verified current information and CCP role explained. Patient lives with his wife/Cornelia and states he has family support at home. He's IADL and does not use DME regularly. He's been to [facility] and has worked with [employer]  in the past. Patient plans to d/c home with family support. Pt denies needs for DME/RH/HH at this time. Plan is for the patient to d/c home with family support. Pt states his wife/Cornelia will transport him home by car at d/c. CCP following.        Continued Care and Services - Admitted Since 11/30/2020    Coordination has not been started for this encounter.         Demographic Summary     Row Name 12/01/20 1154       General Information    Reason for Consult   discharge planning    Preferred Language  English     Used During This Interaction  no       Contact Information    Permission Granted to Share Info With  ;family/designee        Functional Status     Row Name 12/01/20 1154       Functional Status    Usual Activity Tolerance  good    Current Activity Tolerance  good       Functional Status, IADL    Medications  independent    Meal Preparation  independent    Housekeeping  independent    Laundry  independent    Shopping  independent       Mental Status Summary    Recent Changes in Mental Status/Cognitive Functioning  no changes        Psychosocial     Row Name 12/01/20 1154       Intellectual Performance WDL    Level of Consciousness  Alert       Coping/Stress    Patient Personal Strengths  able to adapt    Sources of Support  spouse    Reaction to Health Status  accepting    Understanding of Condition and Treatment  adequate understanding of medical condition       Developmental Stage (Eriksson's)    Developmental Stage  Stage 8 (67 years-death/Late Adulthood) Integrity vs. Despair        Abuse/Neglect    No documentation.       Legal    No documentation.       Substance Abuse    No documentation.       Patient Forms    No documentation.           Lisa Macedo RN

## 2020-12-01 NOTE — THERAPY EVALUATION
Patient Name: Chevy Goodwin  : 1940    MRN: [id number]                              Today's Date: 2020       Admit Date: 2020    Visit Dx:     ICD-10-CM ICD-9-CM   1. DDD (degenerative disc disease), cervical  M50.30 722.4     Patient Active Problem List   Diagnosis   • Pain of metastatic malignancy   • Chronic pain   • Adhesive arachnoiditis   • Degeneration of intervertebral disc of lumbar region   • Low back pain   • Non-Hodgkin's lymphoma (CMS/HCC)   • Postlaminectomy syndrome of lumbar region   • Thoracic disc herniation T9-T10   • Type 2 diabetes mellitus with hyperglycemia, with long-term current use of insulin (CMS/HCC)   • Hypertension   • Long term (current) use of opiate analgesic   • Encounter for long-term current use of high risk medication   • Pain in lateral portion of right knee   • JULIA treated with BiPAP    • Hypersomnia due to medical condition   • Chronic right shoulder pain   • Status post reverse total arthroplasty of right shoulder   • Postlaminectomy syndrome, cervical region   • Waldenstrom macroglobulinemia (CMS/HCC)   • Weakness generalized   • Stage 3a chronic kidney disease   • Anemia   • Bradycardia   • Fall   • Neck pain   • DDD (degenerative disc disease), cervical   • DDD (degenerative disc disease), thoracic   • Obesity (BMI 30-39.9)     Past Medical History:   Diagnosis Date   • Anesthesia complication     HARD TO AWAKEN   • Cancer (CMS/HCC)     PROSTATE AND SKIN   • Cataract    • Coronary artery disease    • Diabetes (CMS/HCC)     TYPE 2   • GERD (gastroesophageal reflux disease)    • Gout    • Heart attack (CMS/HCC)     X 7   • Herniated cervical disc    • High cholesterol    • Hypersomnia    • Hypertension    • Kidney stones    • Low back pain    • MRSA (methicillin resistant staph aureus) culture positive     BILATERAL ELBOWS, 2015   • Right shoulder pain    • Sleep apnea     C pap   • Waldenstrom macroglobulinemia (CMS/HCC)     2014     Past Surgical  History:   Procedure Laterality Date   • ANGIOPLASTY      X 7   • ANTERIOR CERVICAL DISCECTOMY W/ FUSION N/A 8/5/2019    Procedure: C3-C5 anterior cervical discectomy, fusion and instrumentation;  Surgeon: John Singleton MD;  Location: [facility] OR;  Service: Neurosurgery   • BACK SURGERY      X4   • CARDIAC CATHETERIZATION     • CARPAL TUNNEL RELEASE Right    • CATARACT EXTRACTION WITH INTRAOCULAR LENS IMPLANT     • CERVICAL DISCECTOMY POSTERIOR FUSION WITH BRAIN LAB N/A 11/30/2020    Procedure: Cervical 4 5 laminectomy with a cervical 3 to cervical 6 fusion and instrumentation using O-arm;  Surgeon: John Singleton MD;  Location: [facility] MAIN OR;  Service: Neurosurgery;  Laterality: N/A;   • COLON RESECTION     • COLONOSCOPY     • ESOPHAGOSCOPY / EGD     • EXTRACORPOREAL SHOCK WAVE LITHOTRIPSY (ESWL)     • FINGER SURGERY Left    • GALLBLADDER SURGERY     • HERNIA REPAIR     • INCISION AND DRAINAGE ABSCESS      MULTIPLE   • LAMINECTOMY     • NECK SURGERY  08/05/2019   • GA REMOVAL OF ELBOW BURSA Left 9/15/2016    Procedure: LT ELBOW I&D W/ BONE CURRETTAGE;  Surgeon: Kirk Gross MD;  Location: [facility] OR [facility];  Service: Orthopedics   • PROSTATE SURGERY     • ROTATOR CUFF REPAIR Right    • SKIN CANCER EXCISION      MULTIPLE   • TOTAL SHOULDER ARTHROPLASTY W/ DISTAL CLAVICLE EXCISION Right 1/10/2019    Procedure: RT TOTAL SHOULDER REVERSE ARTHROPLASTY;  Surgeon: Kirk Gross MD;  Location: [facility];  Service: Orthopedics   • TYMPANOPLASTY Right      General Information     Row Name 12/01/20 1543          Physical Therapy Time and Intention    Document Type  evaluation  -CF     Mode of Treatment  physical therapy  -CF     Row Name 12/01/20 1543          General Information    Patient Profile Reviewed  yes  -CF     Prior Level of Function  independent: uses cane, owns walker. reports a few trips/stumbles over last 6 months  -CF     Existing Precautions/Restrictions  fall  -CF     Barriers to Rehab  none  identified  -CF     Row Name 12/01/20 1543          Living Environment    Lives With  spouse  -CF     Row Name 12/01/20 1543          Home Main Entrance    Number of Stairs, Main Entrance  one  -CF     Stair Railings, Main Entrance  none  -CF     Row Name 12/01/20 1543          Stairs Within Home, Primary    Number of Stairs, Within Home, Primary  none does not need to get to basement  -CF     Row Name 12/01/20 1543          Cognition    Orientation Status (Cognition)  oriented x 4  -CF     Row Name 12/01/20 1543          Safety Issues, Functional Mobility    Impairments Affecting Function (Mobility)  balance;endurance/activity tolerance;strength  -CF       User Key  (r) = Recorded By, (t) = Taken By, (c) = Cosigned By    Initials Name Provider Type    CF Tameka Daniel PT Physical Therapist        Mobility     Row Name 12/01/20 1624          Bed Mobility    Bed Mobility  supine-sit  -CF     Supine-Sit [place] (Bed Mobility)  verbal cues;standby assist  -CF     Assistive Device (Bed Mobility)  bed rails;head of bed elevated  -CF     Row Name 12/01/20 1624          Sit-Stand Transfer    Sit-Stand [place] (Transfers)  verbal cues;contact guard;1 person assist  -CF     Assistive Device (Sit-Stand Transfers)  walker, front-wheeled  -CF     Row Name 12/01/20 1624          Gait/Stairs (Locomotion)    [place] Level (Gait)  verbal cues;contact guard;standby assist  -CF     Assistive Device (Gait)  walker, front-wheeled  -CF     Distance in Feet (Gait)  450'  -CF     Deviations/Abnormal Patterns (Gait)  stephanie decreased;gait speed decreased  -CF     Bilateral Gait Deviations  forward flexed posture  -CF       User Key  (r) = Recorded By, (t) = Taken By, (c) = Cosigned By    Initials Name Provider Type    CF Tameka Daniel PT Physical Therapist        Obj/Interventions     Row Name 12/01/20 1624          Range of Motion Comprehensive    General Range of Motion  bilateral lower extremity ROM WFL  -CF      Row Name 12/01/20 1624          Strength Comprehensive (MMT)    Comment, General Manual Muscle Testing (MMT) Assessment  BLE grossly 3/5 at least  -CF     Row Name 12/01/20 1624          Motor Skills    Therapeutic Exercise  other (see comments) LAQ, marches, AP, SLR, hip abd/add, quad sets x10 BLE  -CF     Row Name 12/01/20 1624          Balance    Balance Assessment  sitting static balance;sitting dynamic balance;standing static balance;standing dynamic balance  -CF     Static Sitting Balance  WFL;sitting, edge of bed  -CF     Dynamic Sitting Balance  WFL;sitting, edge of bed  -CF     Static Standing Balance  mild impairment;supported  -CF     Dynamic Standing Balance  mild impairment;supported  -CF     Row Name 12/01/20 1624          Sensory Assessment (Somatosensory)    Sensory Assessment (Somatosensory)  not tested  -CF       User Key  (r) = Recorded By, (t) = Taken By, (c) = Cosigned By    Initials Name Provider Type    CF Tameka Daniel, PT Physical Therapist        Goals/Plan     Row Name 12/01/20 1628          Bed Mobility Goal 1 (PT)    Activity/Assistive Device (Bed Mobility Goal 1, PT)  bed mobility activities, all  -CF     [place] Level/Cues Needed (Bed Mobility Goal 1, PT)  modified independence  -CF     Time Frame (Bed Mobility Goal 1, PT)  3 days  -CF     Row Name 12/01/20 1628          Transfer Goal 1 (PT)    Activity/Assistive Device (Transfer Goal 1, PT)  sit-to-stand/stand-to-sit;bed-to-chair/chair-to-bed;walker, rolling  -CF     [place] Level/Cues Needed (Transfer Goal 1, PT)  modified independence  -CF     Time Frame (Transfer Goal 1, PT)  3 days  -CF     Row Name 12/01/20 1628          Gait Training Goal 1 (PT)    Activity/Assistive Device (Gait Training Goal 1, PT)  gait (walking locomotion);walker, rolling  -CF     [place] Level (Gait Training Goal 1, PT)  modified independence  -CF     Distance (Gait Training Goal 1, PT)  500'  -CF     Time Frame (Gait Training Goal 1,  PT)  3 days  -     Row Name 12/01/20 1628          Stairs Goal 1 (PT)    Activity/Assistive Device (Stairs Goal 1, PT)  ascending stairs;descending stairs;walker, rolling  -CF     [place] Level/Cues Needed (Stairs Goal 1, PT)  standby assist  -CF     Number of Stairs (Stairs Goal 1, PT)  3  -CF     Time Frame (Stairs Goal 1, PT)  3 days  -CF       User Key  (r) = Recorded By, (t) = Taken By, (c) = Cosigned By    Initials Name Provider Type    CF Tameka Daniel, PT Physical Therapist        Clinical Impression     Row Name 12/01/20 1621          Pain    Additional Documentation  Pain Scale: Numbers Pre/Post-Treatment (Group)  -     Row Name 12/01/20 9475          Pain Scale: Numbers Pre/Post-Treatment    Pretreatment Pain Rating  5/10  -CF     Posttreatment Pain Rating  5/10  -CF     Pain Location - Orientation  incisional  -CF     Pain Location  neck  -CF     Pain Intervention(s)  Repositioned;Ambulation/increased activity;Rest  -     Row Name 12/01/20 1627          Plan of Care Review    Plan of Care Reviewed With  patient  -CF     Outcome Summary  Pt is a 79 yo male POD 1 C4-5 laminectomy with C3-6 fusion with instrumentation. Pt reports use of cane at baseline and has had several trips/stumbles over last 6 months. Pt lives with wife, with several steps to enter home and owns RW. Today, pt requires SBA for bed mobility, CGA for transfers, and CGA/SBA for gait with RW. Pt may benefit from skilled PT acutely to address balance and endurance deficits. Anticipate d/c home with assist at this time.  -     Row Name 12/01/20 1622          Therapy Assessment/Plan (PT)    Rehab Potential (PT)  good, to achieve stated therapy goals  -     Criteria for Skilled Interventions Met (PT)  yes  -CF     Predicted Duration of Therapy Intervention (PT)  3 days  -     Row Name 12/01/20 1624          Vital Signs    O2 Delivery Pre Treatment  room air  -     Row Name 12/01/20 1623          Positioning and  Restraints    Pre-Treatment Position  in bed  -CF     Post Treatment Position  chair  -CF     In Chair  reclined;call light within reach;encouraged to call for assist;exit alarm on ice pack in place  -CF       User Key  (r) = Recorded By, (t) = Taken By, (c) = Cosigned By    Initials Name Provider Type    Tameka Rivera, [place] Physical Therapist        Outcome Measures     Row Name 12/01/20 1629          How much help from another person do you currently need...    Turning from your back to your side while in flat bed without using bedrails?  3  -CF     Moving from lying on back to sitting on the side of a flat bed without bedrails?  3  -CF     Moving to and from a bed to a chair (including a wheelchair)?  3  -CF     Standing up from a chair using your arms (e.g., wheelchair, bedside chair)?  3  -CF     Climbing 3-5 steps with a railing?  3  -CF     To walk in hospital room?  3  -CF     AM-PAC 6 Clicks Score (PT)  18  -CF     Row Name 12/01/20 1629          Functional Assessment    Outcome Measure Options  AM-PAC 6 Clicks Basic Mobility (PT)  -CF       User Key  (r) = Recorded By, (t) = Taken By, (c) = Cosigned By    Initials Name Provider Type    Tameka Rivera PT Physical Therapist        Physical Therapy Education                 Title: PT OT SLP Therapies (In Progress)     Topic: Physical Therapy (Done)     Point: Mobility training (Done)     Learning Progress Summary           Patient Acceptance, E, VU,DU by CF at 12/1/2020 1629                   Point: Home exercise program (Done)     Learning Progress Summary           Patient Acceptance, E, VU,DU by CF at 12/1/2020 1629                   Point: Body mechanics (Done)     Learning Progress Summary           Patient Acceptance, E, VU,DU by CF at 12/1/2020 1629                   Point: Precautions (Done)     Learning Progress Summary           Patient Acceptance, E, VU,DU by CF at 12/1/2020 1629                               User Key     Initials  Effective Dates Name Provider Type Discipline    CF 09/02/20 -  Tameka Daniel PT Physical Therapist PT              PT Recommendation and Plan  Planned Therapy Interventions (PT): balance training, bed mobility training, gait training, home exercise program, stair training, ROM (range of motion), strengthening, stretching, patient/family education, transfer training  Plan of Care Reviewed With: patient  Outcome Summary: Pt is a 79 yo male POD 1 C4-5 laminectomy with C3-6 fusion with instrumentation. Pt reports use of cane at baseline and has had several trips/stumbles over last 6 months. Pt lives with wife, with several steps to enter home and owns RW. Today, pt requires SBA for bed mobility, CGA for transfers, and CGA/SBA for gait with RW. Pt may benefit from skilled PT acutely to address balance and endurance deficits. Anticipate d/c home with assist at this time.     Time Calculation:   PT Charges     Row Name 12/01/20 1622             Time Calculation    Start Time  1539  -CF      Stop Time  1606  -CF      Time Calculation (min)  27 min  -CF      PT Received On  12/01/20  -CF      PT - Next Appointment  12/02/20  -      PT Goal Re-Cert Due Date  12/04/20  -CF         Time Calculation- PT    Total Timed Code Minutes- PT  23 minute(s)  -CF        User Key  (r) = Recorded By, (t) = Taken By, (c) = Cosigned By    Initials Name Provider Type    CF Tameka Daniel PT Physical Therapist        Therapy Charges for Today     Code Description Service Date Service Provider Modifiers Qty    59043149465 HC PT EVAL LOW COMPLEXITY 2 12/1/2020 Tameka Daniel, PT GP 1    80005455293 HC PT THER PROC EA 15 MIN 12/1/2020 Tameka Daniel, PT GP 1    88711433504 HC PT THERAPEUTIC ACT EA 15 MIN 12/1/2020 Tameka Daniel, PT GP 1          PT G-Codes  Outcome Measure Options: AM-PAC 6 Clicks Basic Mobility (PT)  AM-PAC 6 Clicks Score (PT): 18    Tameka Daniel PT  12/1/2020

## 2020-12-01 NOTE — PROGRESS NOTES
Name: Chevy Goodwin ADMIT: 2020   : 1940  PCP: Anthony Gutierrez MD    MRN: [id number] LOS: 0 days   AGE/SEX: 80 y.o. male  ROOM: 80/     Subjective   Subjective   Better today.  No new issues overnight.  States takes insulin at home.       Objective   Objective   Vital Signs  Temp:  [97.1 °F (36.2 °C)-98.6 °F (37 °C)] 98.6 °F (37 °C)  Heart Rate:  [62-93] 78  Resp:  [14-18] 16  BP: (128-155)/(56-81) 128/56  SpO2:  [90 %-100 %] 92 %  on  Flow (L/min):  [2-4] 2;   Device (Oxygen Therapy): room air  Body mass index is 32.3 kg/m².  Physical Exam  Constitutional:       General: He is not in acute distress.     Appearance: He is not diaphoretic.   Cardiovascular:      Rate and Rhythm: Normal rate and regular rhythm.      Heart sounds: No murmur.   Pulmonary:      Effort: Pulmonary effort is normal.      Breath sounds: Normal breath sounds.   Abdominal:      General: Bowel sounds are normal. There is no distension.      Palpations: Abdomen is soft.      Tenderness: There is no abdominal tenderness.   Musculoskeletal: Normal range of motion.   Skin:     General: Skin is warm and dry.   Neurological:      Mental Status: He is alert and oriented to person, place, and time.         Results Review:       I reviewed the patient's new clinical results.  Results from last 7 days   Lab Units 20  1530   WBC 10*3/mm3 8.62 6.84   HEMOGLOBIN g/dL 8.4* 9.8*   PLATELETS 10*3/mm3 188 225     Results from last 7 days   Lab Units 20  0449 20  1530   SODIUM mmol/L 140 142   POTASSIUM mmol/L 5.2 4.9   CHLORIDE mmol/L 108* 107   CO2 mmol/L 24.0 16.3*   BUN mg/dL 31* 40*   CREATININE mg/dL 1.24 1.44*   GLUCOSE mg/dL 163* 160*   Estimated Creatinine Clearance: 55.3 mL/min (by C-G formula based on SCr of 1.24 mg/dL).    Results from last 7 days   Lab Units 2025/20  1530   CALCIUM mg/dL 9.1 9.9       Hemoglobin A1C   Date/Time Value Ref Range Status   2020 0449  6.80 (H) 4.80 - 5.60 % Final     Glucose   Date/Time Value Ref Range Status   12/01/2020 0606 162 (H) 70 - 130 mg/dL Final   11/30/2020 2058 241 (H) 70 - 130 mg/dL Final   11/30/2020 1600 170 (H) 70 - 130 mg/dL Final   11/30/2020 1114 133 (H) 70 - 130 mg/dL Final   11/30/2020 0557 122 70 - 130 mg/dL Final       allopurinol, 300 mg, Oral, Q PM  aspirin, 81 mg, Oral, Daily  carvedilol, 6.25 mg, Oral, BID With Meals  insulin lispro, 0-9 Units, Subcutaneous, TID With Meals  isosorbide mononitrate, 30 mg, Oral, BID  levothyroxine, 50 mcg, Oral, Daily  pantoprazole, 40 mg, Oral, Daily  pravastatin, 40 mg, Oral, BID  sodium chloride, 3 mL, Intravenous, Q12H      sodium chloride 0.9 % with KCl 20 mEq, 100 mL/hr, Last Rate: 100 mL/hr (12/01/20 0608)    Diet Soft Texture; Whole Foods; Consistent Carbohydrate       Assessment/Plan     Active Hospital Problems    Diagnosis  POA   • **DDD (degenerative disc disease), cervical [M50.30]  Yes   • Obesity (BMI 30-39.9) [E66.9]  Yes   • Waldenstrom macroglobulinemia (CMS/HCC) [C88.0]  Yes   • Stage 3a chronic kidney disease [N18.31]  Yes   • JULIA treated with BiPAP 20/16 [G47.33]  Yes   • Hypertension [I10]  Yes   • Type 2 diabetes mellitus with hyperglycemia, with long-term current use of insulin (CMS/HCC) [E11.65, Z79.4]  Not Applicable   • Non-Hodgkin's lymphoma (CMS/HCC) [C85.90]  Yes      Resolved Hospital Problems   No resolved problems to display.       Mr. Goodwin is a 80 y.o. male who is s/p Cervical 4 5 laminectomy with a cervical 3 to cervical 6 fusion and instrumentation using O-arm.    · /56 this morning.  Continue current regimen.  Continue to hold amlodipine, Bumex and losartan.  · .  A1c suggests good glycemic control prior to admission.  He is eating well and will restart basal insulin this am.    · Renal function stable.  Given h/o CHF and the fact he is eating well plan to DC IVFs.       Junior Gleason MD  [facility]  12/01/20  07:30 EST

## 2020-12-02 LAB
DEPRECATED RDW RBC AUTO: 53.2 FL (ref 37–54)
ERYTHROCYTE [DISTWIDTH] IN BLOOD BY AUTOMATED COUNT: 14.4 % (ref 12.3–15.4)
GLUCOSE BLDC GLUCOMTR-MCNC: 131 MG/DL (ref 70–130)
GLUCOSE BLDC GLUCOMTR-MCNC: 160 MG/DL (ref 70–130)
GLUCOSE BLDC GLUCOMTR-MCNC: 182 MG/DL (ref 70–130)
GLUCOSE BLDC GLUCOMTR-MCNC: 191 MG/DL (ref 70–130)
HCT VFR BLD AUTO: 25 % (ref 37.5–51)
HGB BLD-MCNC: 8.1 G/DL (ref 13–17.7)
MCH RBC QN AUTO: 32.8 PG (ref 26.6–33)
MCHC RBC AUTO-ENTMCNC: 32.4 G/DL (ref 31.5–35.7)
MCV RBC AUTO: 101.2 FL (ref 79–97)
PLATELET # BLD AUTO: 191 10*3/MM3 (ref 140–450)
PMV BLD AUTO: 10.2 FL (ref 6–12)
RBC # BLD AUTO: 2.47 10*6/MM3 (ref 4.14–5.8)
WBC # BLD AUTO: 7.87 10*3/MM3 (ref 3.4–10.8)

## 2020-12-02 PROCEDURE — 63710000001 ASPIRIN 81 MG TABLET DELAYED-RELEASE: Performed by: NEUROLOGICAL SURGERY

## 2020-12-02 PROCEDURE — 63710000001 HYDROCODONE-ACETAMINOPHEN 5-325 MG TABLET: Performed by: NEUROLOGICAL SURGERY

## 2020-12-02 PROCEDURE — A9270 NON-COVERED ITEM OR SERVICE: HCPCS | Performed by: NEUROLOGICAL SURGERY

## 2020-12-02 PROCEDURE — 85027 COMPLETE CBC AUTOMATED: CPT | Performed by: NURSE PRACTITIONER

## 2020-12-02 PROCEDURE — 82962 GLUCOSE BLOOD TEST: CPT

## 2020-12-02 PROCEDURE — G0378 HOSPITAL OBSERVATION PER HR: HCPCS

## 2020-12-02 PROCEDURE — A9270 NON-COVERED ITEM OR SERVICE: HCPCS | Performed by: NURSE PRACTITIONER

## 2020-12-02 PROCEDURE — A9270 NON-COVERED ITEM OR SERVICE: HCPCS | Performed by: HOSPITALIST

## 2020-12-02 PROCEDURE — 63710000001 INSULIN GLARGINE PER 5 UNITS: Performed by: HOSPITALIST

## 2020-12-02 PROCEDURE — 63710000001 CARVEDILOL 6.25 MG TABLET: Performed by: NEUROLOGICAL SURGERY

## 2020-12-02 PROCEDURE — 99024 POSTOP FOLLOW-UP VISIT: CPT | Performed by: NURSE PRACTITIONER

## 2020-12-02 PROCEDURE — 63710000001 ALLOPURINOL 300 MG TABLET: Performed by: NEUROLOGICAL SURGERY

## 2020-12-02 PROCEDURE — 63710000001 ISOSORBIDE MONONITRATE 30 MG TABLET SUSTAINED-RELEASE 24 HOUR: Performed by: NEUROLOGICAL SURGERY

## 2020-12-02 PROCEDURE — 63710000001 CYCLOBENZAPRINE 10 MG TABLET: Performed by: NURSE PRACTITIONER

## 2020-12-02 PROCEDURE — 63710000001 LEVOTHYROXINE 50 MCG TABLET: Performed by: NEUROLOGICAL SURGERY

## 2020-12-02 PROCEDURE — 63710000001 PANTOPRAZOLE 40 MG TABLET DELAYED-RELEASE: Performed by: NEUROLOGICAL SURGERY

## 2020-12-02 PROCEDURE — 63710000001 PRAVASTATIN 40 MG TABLET: Performed by: NEUROLOGICAL SURGERY

## 2020-12-02 PROCEDURE — 97116 GAIT TRAINING THERAPY: CPT

## 2020-12-02 PROCEDURE — 63710000001 INSULIN LISPRO (HUMAN) PER 5 UNITS: Performed by: HOSPITALIST

## 2020-12-02 PROCEDURE — 63710000001 SENNOSIDES-DOCUSATE 8.6-50 MG TABLET: Performed by: NURSE PRACTITIONER

## 2020-12-02 RX ADMIN — HYDROCODONE BITARTRATE AND ACETAMINOPHEN 1 TABLET: 5; 325 TABLET ORAL at 01:14

## 2020-12-02 RX ADMIN — HYDROCODONE BITARTRATE AND ACETAMINOPHEN 1 TABLET: 5; 325 TABLET ORAL at 14:39

## 2020-12-02 RX ADMIN — INSULIN LISPRO 2 UNITS: 100 INJECTION, SOLUTION INTRAVENOUS; SUBCUTANEOUS at 16:43

## 2020-12-02 RX ADMIN — ISOSORBIDE MONONITRATE 30 MG: 30 TABLET ORAL at 20:42

## 2020-12-02 RX ADMIN — HYDROCODONE BITARTRATE AND ACETAMINOPHEN 1 TABLET: 5; 325 TABLET ORAL at 09:48

## 2020-12-02 RX ADMIN — PANTOPRAZOLE SODIUM 40 MG: 40 TABLET, DELAYED RELEASE ORAL at 08:04

## 2020-12-02 RX ADMIN — LEVOTHYROXINE SODIUM 50 MCG: 50 TABLET ORAL at 05:47

## 2020-12-02 RX ADMIN — SODIUM CHLORIDE, PRESERVATIVE FREE 3 ML: 5 INJECTION INTRAVENOUS at 20:43

## 2020-12-02 RX ADMIN — HYDROCODONE BITARTRATE AND ACETAMINOPHEN 1 TABLET: 5; 325 TABLET ORAL at 05:47

## 2020-12-02 RX ADMIN — INSULIN LISPRO 2 UNITS: 100 INJECTION, SOLUTION INTRAVENOUS; SUBCUTANEOUS at 11:45

## 2020-12-02 RX ADMIN — HYDROCODONE BITARTRATE AND ACETAMINOPHEN 1 TABLET: 5; 325 TABLET ORAL at 18:17

## 2020-12-02 RX ADMIN — CARVEDILOL 6.25 MG: 6.25 TABLET, FILM COATED ORAL at 08:04

## 2020-12-02 RX ADMIN — INSULIN GLARGINE 60 UNITS: 100 INJECTION, SOLUTION SUBCUTANEOUS at 08:04

## 2020-12-02 RX ADMIN — ALLOPURINOL 300 MG: 300 TABLET ORAL at 16:43

## 2020-12-02 RX ADMIN — PRAVASTATIN SODIUM 40 MG: 40 TABLET ORAL at 08:04

## 2020-12-02 RX ADMIN — HYDROCODONE BITARTRATE AND ACETAMINOPHEN 1 TABLET: 5; 325 TABLET ORAL at 22:50

## 2020-12-02 RX ADMIN — ISOSORBIDE MONONITRATE 30 MG: 30 TABLET ORAL at 08:04

## 2020-12-02 RX ADMIN — CYCLOBENZAPRINE 10 MG: 10 TABLET, FILM COATED ORAL at 16:43

## 2020-12-02 RX ADMIN — CYCLOBENZAPRINE 10 MG: 10 TABLET, FILM COATED ORAL at 20:42

## 2020-12-02 RX ADMIN — CYCLOBENZAPRINE 10 MG: 10 TABLET, FILM COATED ORAL at 08:04

## 2020-12-02 RX ADMIN — PRAVASTATIN SODIUM 40 MG: 40 TABLET ORAL at 20:42

## 2020-12-02 RX ADMIN — DOCUSATE SODIUM 50MG AND SENNOSIDES 8.6MG 2 TABLET: 8.6; 5 TABLET, FILM COATED ORAL at 20:42

## 2020-12-02 RX ADMIN — ASPIRIN 81 MG: 81 TABLET, COATED ORAL at 08:04

## 2020-12-02 RX ADMIN — INSULIN GLARGINE 40 UNITS: 100 INJECTION, SOLUTION SUBCUTANEOUS at 20:44

## 2020-12-02 RX ADMIN — CARVEDILOL 6.25 MG: 6.25 TABLET, FILM COATED ORAL at 19:29

## 2020-12-02 NOTE — PLAN OF CARE
Goal Outcome Evaluation:  Plan of Care Reviewed With: patient  Progress: improving  Outcome Summary: Pt continues to ambulate well and have moderate complaints of incisional neck pain. Pt completed x4 steps with B HR and no LOB. Pt did have 1 minor LOB when preparing to sit on commode- able to recover with CGA and use of UE on RW. Anticipate d/c home with assist.    Patient was intermittently wearing a face mask during this therapy encounter. Therapist used appropriate personal protective equipment including eye protection, mask, and gloves.  Mask used was standard procedure mask. Appropriate PPE was worn during the entire therapy session. Hand hygiene was completed before and after therapy session. Patient is not in enhanced droplet precautions.

## 2020-12-02 NOTE — THERAPY TREATMENT NOTE
Patient Name: Chevy Goodwin  : 1940    MRN: [id number]                              Today's Date: 2020       Admit Date: 2020    Visit Dx:     ICD-10-CM ICD-9-CM   1. DDD (degenerative disc disease), cervical  M50.30 722.4     Patient Active Problem List   Diagnosis   • Pain of metastatic malignancy   • Chronic pain   • Adhesive arachnoiditis   • Degeneration of intervertebral disc of lumbar region   • Low back pain   • Non-Hodgkin's lymphoma (CMS/HCC)   • Postlaminectomy syndrome of lumbar region   • Thoracic disc herniation T9-T10   • Type 2 diabetes mellitus with hyperglycemia, with long-term current use of insulin (CMS/HCC)   • Hypertension   • Long term (current) use of opiate analgesic   • Encounter for long-term current use of high risk medication   • Pain in lateral portion of right knee   • JULIA treated with BiPAP    • Hypersomnia due to medical condition   • Chronic right shoulder pain   • Status post reverse total arthroplasty of right shoulder   • Postlaminectomy syndrome, cervical region   • Waldenstrom macroglobulinemia (CMS/HCC)   • Weakness generalized   • Stage 3a chronic kidney disease   • Anemia   • Bradycardia   • Fall   • Neck pain   • DDD (degenerative disc disease), cervical   • DDD (degenerative disc disease), thoracic   • Obesity (BMI 30-39.9)     Past Medical History:   Diagnosis Date   • Anesthesia complication     HARD TO AWAKEN   • Cancer (CMS/HCC)     PROSTATE AND SKIN   • Cataract    • Coronary artery disease    • Diabetes (CMS/HCC)     TYPE 2   • GERD (gastroesophageal reflux disease)    • Gout    • Heart attack (CMS/HCC)     X 7   • Herniated cervical disc    • High cholesterol    • Hypersomnia    • Hypertension    • Kidney stones    • Low back pain    • MRSA (methicillin resistant staph aureus) culture positive     BILATERAL ELBOWS, 2015   • Right shoulder pain    • Sleep apnea     C pap   • Waldenstrom macroglobulinemia (CMS/HCC)     2014     Past Surgical  History:   Procedure Laterality Date   • ANGIOPLASTY      X 7   • ANTERIOR CERVICAL DISCECTOMY W/ FUSION N/A 8/5/2019    Procedure: C3-C5 anterior cervical discectomy, fusion and instrumentation;  Surgeon: John Singleton MD;  Location: [facility] OR;  Service: Neurosurgery   • BACK SURGERY      X4   • CARDIAC CATHETERIZATION     • CARPAL TUNNEL RELEASE Right    • CATARACT EXTRACTION WITH INTRAOCULAR LENS IMPLANT     • CERVICAL DISCECTOMY POSTERIOR FUSION WITH BRAIN LAB N/A 11/30/2020    Procedure: Cervical 4 5 laminectomy with a cervical 3 to cervical 6 fusion and instrumentation using O-arm;  Surgeon: John Singleton MD;  Location: [facility] OR;  Service: Neurosurgery;  Laterality: N/A;   • COLON RESECTION     • COLONOSCOPY     • ESOPHAGOSCOPY / EGD     • EXTRACORPOREAL SHOCK WAVE LITHOTRIPSY (ESWL)     • FINGER SURGERY Left    • GALLBLADDER SURGERY     • HERNIA REPAIR     • INCISION AND DRAINAGE ABSCESS      MULTIPLE   • LAMINECTOMY     • NECK SURGERY  08/05/2019   • MD REMOVAL OF ELBOW BURSA Left 9/15/2016    Procedure: LT ELBOW I&D W/ BONE CURRETTAGE;  Surgeon: Kirk Gross MD;  [place] [facility] [place] [facility]  Service: Orthopedics   • PROSTATE SURGERY     • ROTATOR CUFF REPAIR Right    • SKIN CANCER EXCISION      MULTIPLE   • TOTAL SHOULDER ARTHROPLASTY W/ DISTAL CLAVICLE EXCISION Right 1/10/2019    Procedure: RT TOTAL SHOULDER REVERSE ARTHROPLASTY;  Surgeon: Kirk Gross MD;  Location: [facility];  Service: Orthopedics   • TYMPANOPLASTY Right      General Information     Row Name 12/02/20 1301          Physical Therapy Time and Intention    Document Type  therapy note (daily note)  -CF     Mode of Treatment  physical therapy  -CF     Row Name 12/02/20 1301          General Information    Patient Profile Reviewed  yes  -CF     Existing Precautions/Restrictions  fall  -CF     Row Name 12/02/20 1301          Cognition    Orientation Status (Cognition)  oriented x 4  -CF     Row Name 12/02/20 1301           Safety Issues, Functional Mobility    Impairments Affecting Function (Mobility)  balance;endurance/activity tolerance;strength  -CF       User Key  (r) = Recorded By, (t) = Taken By, (c) = Cosigned By    Initials Name Provider Type    CF Tameka Daniel PT Physical Therapist        Mobility     Row Name 12/02/20 1304          Bed Mobility    Bed Mobility  sit-supine  -CF     Sit-Supine Soldier (Bed Mobility)  verbal cues;supervision  -CF     Assistive Device (Bed Mobility)  bed rails  -CF     Row Name 12/02/20 1301          Transfers    Comment (Transfers)  1 small LOB when transferring to commode. Pt able to self recover, with CGA and use of RW  -CF     Row Name 12/02/20 1301          Sit-Stand Transfer    Sit-Stand Soldier (Transfers)  verbal cues;contact guard;1 person assist  -CF     Assistive Device (Sit-Stand Transfers)  walker, front-wheeled  -CF     Row Name 12/02/20 1301          Gait/Stairs (Locomotion)    Soldier Level (Gait)  verbal cues;contact guard;standby assist  -CF     Assistive Device (Gait)  walker, front-wheeled  -CF     Distance in Feet (Gait)  500'  -CF     Deviations/Abnormal Patterns (Gait)  stephanie decreased;gait speed decreased  -CF     Bilateral Gait Deviations  forward flexed posture  -CF     Soldier Level (Stairs)  contact guard;verbal cues  -CF     Handrail Location (Stairs)  both sides  -CF     Number of Steps (Stairs)  3  -CF     Ascending Technique (Stairs)  step-over-step  -CF     Descending Technique (Stairs)  step-over-step  -CF       User Key  (r) = Recorded By, (t) = Taken By, (c) = Cosigned By    Initials Name Provider Type    CF Tameka Daniel PT Physical Therapist        Obj/Interventions     Row Name 12/02/20 1302          Balance    Static Standing Balance  mild impairment;supported  -CF     Dynamic Standing Balance  mild impairment;supported  -CF       User Key  (r) = Recorded By, (t) = Taken By, (c) = Cosigned By    Initials Name Provider  Type    Tameka Rivera PT Physical Therapist        Goals/Plan    No documentation.       Clinical Impression     Row Name 12/02/20 1302          Pain Scale: Numbers Pre/Post-Treatment    Pretreatment Pain Rating  6/10  -CF     Posttreatment Pain Rating  7/10  -CF     Pain Location - Orientation  incisional  -CF     Pain Location  neck  -CF     Pain Intervention(s)  Repositioned;Ambulation/increased activity;Rest  -CF     Row Name 12/02/20 1302          Plan of Care Review    Plan of Care Reviewed With  patient  -CF     Outcome Summary  Pt continues to ambulate well and have moderate complaints of incisional neck pain. Pt completed x4 steps with B HR and no LOB. Pt did have 1 minor LOB when preparing to sit on commode- able to recover with CGA and use of UE on RW. Anticipate d/c home with assist.  -CF       User Key  (r) = Recorded By, (t) = Taken By, (c) = Cosigned By    Initials Name Provider Type    Tameka Rivera PT Physical Therapist        Outcome Measures     Row Name 12/02/20 1304          How much help from another person do you currently need...    Turning from your back to your side while in flat bed without using bedrails?  4  -CF     Moving from lying on back to sitting on the side of a flat bed without bedrails?  3  -CF     Moving to and from a bed to a chair (including a wheelchair)?  3  -CF     Standing up from a chair using your arms (e.g., wheelchair, bedside chair)?  3  -CF     Climbing 3-5 steps with a railing?  3  -CF     To walk in hospital room?  3  -CF     AM-PAC 6 Clicks Score (PT)  19  -CF     Row Name 12/02/20 1304          Functional Assessment    Outcome Measure Options  AM-PAC 6 Clicks Basic Mobility (PT)  -CF       User Key  (r) = Recorded By, (t) = Taken By, (c) = Cosigned By    Initials Name Provider Type    Tameka Rivera PT Physical Therapist        Physical Therapy Education                 Title: PT OT SLP Therapies (In Progress)     Topic: Physical Therapy  (Done)     Point: Mobility training (Done)     Learning Progress Summary           Patient Acceptance, E, VU,DU,NR by CF at 12/2/2020 1304    Acceptance, E, VU,DU by CF at 12/1/2020 1629                   Point: Home exercise program (Done)     Learning Progress Summary           Patient Acceptance, E, VU,DU,NR by CF at 12/2/2020 1304    Acceptance, E, VU,DU by CF at 12/1/2020 1629                   Point: Body mechanics (Done)     Learning Progress Summary           Patient Acceptance, E, VU,DU,NR by CF at 12/2/2020 1304    Acceptance, E, VU,DU by CF at 12/1/2020 1629                   Point: Precautions (Done)     Learning Progress Summary           Patient Acceptance, E, VU,DU,NR by CF at 12/2/2020 1304    Acceptance, E, VU,DU by CF at 12/1/2020 1629                               User Key     Initials Effective Dates Name Provider Type Discipline     09/02/20 -  Tameka Daniel PT Physical Therapist PT              PT Recommendation and Plan  Planned Therapy Interventions (PT): balance training, bed mobility training, gait training, home exercise program, stair training, ROM (range of motion), strengthening, stretching, patient/family education, transfer training  Plan of Care Reviewed With: patient  Outcome Summary: Pt continues to ambulate well and have moderate complaints of incisional neck pain. Pt completed x4 steps with B HR and no LOB. Pt did have 1 minor LOB when preparing to sit on commode- able to recover with CGA and use of UE on RW. Anticipate d/c home with assist.     Time Calculation:   PT Charges     Row Name 12/02/20 1300             Time Calculation    Start Time  1035  -CF      Stop Time  1055  -      Time Calculation (min)  20 min  -      PT Received On  12/02/20  -      PT - Next Appointment  12/03/20  -        User Key  (r) = Recorded By, (t) = Taken By, (c) = Cosigned By    Initials Name Provider Type     Tameka Daniel PT Physical Therapist        Therapy Charges for  Today     Code Description Service Date Service Provider Modifiers Qty    13128432986 HC PT EVAL LOW COMPLEXITY 2 12/1/2020 Tameka Daniel, PT GP 1    53484870028 HC PT THER PROC EA 15 MIN 12/1/2020 Tameka Daniel, PT GP 1    51092464419 HC PT THERAPEUTIC ACT EA 15 MIN 12/1/2020 Tameka Daniel, PT GP 1    75172105662 HC GAIT TRAINING EA 15 MIN 12/2/2020 Tameka Daniel, PT GP 1          PT G-Codes  Outcome Measure Options: AM-PAC 6 Clicks Basic Mobility (PT)  AM-PAC 6 Clicks Score (PT): 19    Tameka Daniel, PT  12/2/2020

## 2020-12-02 NOTE — PROGRESS NOTES
Name: Chevy Goodwin ADMIT: 2020   : 1940  PCP: Anthony Gutierrez MD    MRN: [id number] LOS: 0 days   AGE/SEX: 80 y.o. male  ROOM: [facility]/     Subjective   Subjective   No new complaints.     Objective   Objective   Vital Signs  Temp:  [97.8 °F (36.6 °C)-99.1 °F (37.3 °C)] 97.8 °F (36.6 °C)  Heart Rate:  [54-84] 54  Resp:  [16] 16  BP: (126-141)/(55-64) 139/64  SpO2:  [94 %-100 %] 97 %  on   ;   Device (Oxygen Therapy): CPAP  Body mass index is 32.3 kg/m².  Physical Exam  Constitutional:       General: He is not in acute distress.     Appearance: He is not diaphoretic.   Cardiovascular:      Rate and Rhythm: Normal rate and regular rhythm.      Heart sounds: No murmur.   Pulmonary:      Effort: Pulmonary effort is normal.      Breath sounds: Normal breath sounds.   Abdominal:      General: Bowel sounds are normal. There is no distension.      Palpations: Abdomen is soft.      Tenderness: There is no abdominal tenderness.   Musculoskeletal: Normal range of motion.   Skin:     General: Skin is warm and dry.   Neurological:      Mental Status: He is alert and oriented to person, place, and time.         Results Review:       I reviewed the patient's new clinical results.  Results from last 7 days   Lab Units 20  1530   WBC 10*3/mm3 7.87 8.62 6.84   HEMOGLOBIN g/dL 8.1* 8.4* 9.8*   PLATELETS 10*3/mm3 191 188 225     Results from last 7 days   Lab Units 20  04420  1530   SODIUM mmol/L 140 142   POTASSIUM mmol/L 5.2 4.9   CHLORIDE mmol/L 108* 107   CO2 mmol/L 24.0 16.3*   BUN mg/dL 31* 40*   CREATININE mg/dL 1.24 1.44*   GLUCOSE mg/dL 163* 160*   Estimated Creatinine Clearance: 55.3 mL/min (by C-G formula based on SCr of 1.24 mg/dL).    Results from last 7 days   Lab Units 20  1530   CALCIUM mg/dL 9.1 9.9       Hemoglobin A1C   Date/Time Value Ref Range Status   2020 0449 6.80 (H) 4.80 - 5.60 % Final     Glucose   Date/Time  Value Ref Range Status   12/02/2020 0613 131 (H) 70 - 130 mg/dL Final   12/01/2020 2106 297 (H) 70 - 130 mg/dL Final   12/01/2020 1609 268 (H) 70 - 130 mg/dL Final   12/01/2020 1105 269 (H) 70 - 130 mg/dL Final   12/01/2020 0606 162 (H) 70 - 130 mg/dL Final   11/30/2020 2058 241 (H) 70 - 130 mg/dL Final   11/30/2020 1600 170 (H) 70 - 130 mg/dL Final       allopurinol, 300 mg, Oral, Q PM  aspirin, 81 mg, Oral, Daily  carvedilol, 6.25 mg, Oral, BID With Meals  cyclobenzaprine, 10 mg, Oral, TID  insulin glargine, 40 Units, Subcutaneous, Nightly  insulin glargine, 60 Units, Subcutaneous, QAM  insulin lispro, 0-9 Units, Subcutaneous, TID With Meals  isosorbide mononitrate, 30 mg, Oral, BID  levothyroxine, 50 mcg, Oral, Daily  pantoprazole, 40 mg, Oral, Daily  pravastatin, 40 mg, Oral, BID  sennosides-docusate, 2 tablet, Oral, Nightly  sodium chloride, 3 mL, Intravenous, Q12H       Diet Soft Texture; Whole Foods; Consistent Carbohydrate       Assessment/Plan     Active Hospital Problems    Diagnosis  POA   • **DDD (degenerative disc disease), cervical [M50.30]  Yes   • Obesity (BMI 30-39.9) [E66.9]  Yes   • Waldenstrom macroglobulinemia (CMS/HCC) [C88.0]  Yes   • Stage 3a chronic kidney disease [N18.31]  Yes   • JULIA treated with BiPAP 20/16 [G47.33]  Yes   • Hypertension [I10]  Yes   • Type 2 diabetes mellitus with hyperglycemia, with long-term current use of insulin (CMS/HCC) [E11.65, Z79.4]  Not Applicable   • Non-Hodgkin's lymphoma (CMS/HCC) [C85.90]  Yes      Resolved Hospital Problems   No resolved problems to display.       Mr. Goodwin is a 80 y.o. male who is s/p Cervical 4 5 laminectomy with a cervical 3 to cervical 6 fusion and instrumentation using O-arm.    · /64 this morning.  Continue current regimen.  Continue to hold amlodipine, Bumex and losartan.  Can resume all at DC.   · Lantus restarted yesterday.  BS this morning okay.  Will monitor BS today before make any additional changes.  A1c suggests good  glycemic control prior to admission.    · Hemoglobin low but relatively stable.  Was anemic prior to admission and is being followed as outpatient.      Junior Gleason MD  [facility]  12/02/20  07:34 EST

## 2020-12-02 NOTE — PLAN OF CARE
Goal Outcome Evaluation:  Plan of Care Reviewed With: patient  Progress: improving  Outcome Summary: POD#2 OF CERVICAL LAMI WITH FUSION. VSS. PO PAIN MEDICATION HELPING WITH PAIN. AMBULATING WELL WITH 1 ASSIST. VOIDING FINE PER BRP. DRESSING  AND ELIZA DRAIN  IN PLACE. EDUCATION PROVIDED ON BP AND BLOOD SUGAR MONITORING. PATIENT VERBALIZED UNDERSTANDING.

## 2020-12-02 NOTE — PLAN OF CARE
Goal Outcome Evaluation:  Plan of Care Reviewed With: patient  Progress: improving  Outcome Summary: Pt POD 2 from a C4-C5 lami with C3-C6 fusion. ELIZA drain in place and island dressing CDI. Pt pain has been well tolerated with po pain meds. Pt ambulating well with HH assist, gait belt, and cane. Blood glucose has been well treated with regular insulin and lantus. Plan is to discharge home tomorrow when drain output decreases.

## 2020-12-03 LAB
ANION GAP SERPL CALCULATED.3IONS-SCNC: 6.7 MMOL/L (ref 5–15)
BUN SERPL-MCNC: 25 MG/DL (ref 8–23)
BUN/CREAT SERPL: 23.8 (ref 7–25)
CALCIUM SPEC-SCNC: 9.1 MG/DL (ref 8.6–10.5)
CHLORIDE SERPL-SCNC: 106 MMOL/L (ref 98–107)
CO2 SERPL-SCNC: 27.3 MMOL/L (ref 22–29)
CREAT SERPL-MCNC: 1.05 MG/DL (ref 0.76–1.27)
DEPRECATED RDW RBC AUTO: 51.1 FL (ref 37–54)
ERYTHROCYTE [DISTWIDTH] IN BLOOD BY AUTOMATED COUNT: 14.4 % (ref 12.3–15.4)
GFR SERPL CREATININE-BSD FRML MDRD: 68 ML/MIN/1.73
GLUCOSE BLDC GLUCOMTR-MCNC: 143 MG/DL (ref 70–130)
GLUCOSE BLDC GLUCOMTR-MCNC: 154 MG/DL (ref 70–130)
GLUCOSE BLDC GLUCOMTR-MCNC: 155 MG/DL (ref 70–130)
GLUCOSE BLDC GLUCOMTR-MCNC: 168 MG/DL (ref 70–130)
GLUCOSE SERPL-MCNC: 130 MG/DL (ref 65–99)
HCT VFR BLD AUTO: 25.5 % (ref 37.5–51)
HGB BLD-MCNC: 8.5 G/DL (ref 13–17.7)
MCH RBC QN AUTO: 32.4 PG (ref 26.6–33)
MCHC RBC AUTO-ENTMCNC: 33.3 G/DL (ref 31.5–35.7)
MCV RBC AUTO: 97.3 FL (ref 79–97)
PLATELET # BLD AUTO: 203 10*3/MM3 (ref 140–450)
PMV BLD AUTO: 9.8 FL (ref 6–12)
POTASSIUM SERPL-SCNC: 4.7 MMOL/L (ref 3.5–5.2)
RBC # BLD AUTO: 2.62 10*6/MM3 (ref 4.14–5.8)
SODIUM SERPL-SCNC: 140 MMOL/L (ref 136–145)
WBC # BLD AUTO: 7.35 10*3/MM3 (ref 3.4–10.8)

## 2020-12-03 PROCEDURE — A9270 NON-COVERED ITEM OR SERVICE: HCPCS | Performed by: NURSE PRACTITIONER

## 2020-12-03 PROCEDURE — 63710000001 ASPIRIN 81 MG TABLET DELAYED-RELEASE: Performed by: NEUROLOGICAL SURGERY

## 2020-12-03 PROCEDURE — A9270 NON-COVERED ITEM OR SERVICE: HCPCS | Performed by: NEUROLOGICAL SURGERY

## 2020-12-03 PROCEDURE — 99024 POSTOP FOLLOW-UP VISIT: CPT | Performed by: NEUROLOGICAL SURGERY

## 2020-12-03 PROCEDURE — 63710000001 CARVEDILOL 6.25 MG TABLET: Performed by: NEUROLOGICAL SURGERY

## 2020-12-03 PROCEDURE — 85027 COMPLETE CBC AUTOMATED: CPT | Performed by: HOSPITALIST

## 2020-12-03 PROCEDURE — 63710000001 HYDROCODONE-ACETAMINOPHEN 5-325 MG TABLET: Performed by: NEUROLOGICAL SURGERY

## 2020-12-03 PROCEDURE — 82962 GLUCOSE BLOOD TEST: CPT

## 2020-12-03 PROCEDURE — 63710000001 PANTOPRAZOLE 40 MG TABLET DELAYED-RELEASE: Performed by: NEUROLOGICAL SURGERY

## 2020-12-03 PROCEDURE — G0378 HOSPITAL OBSERVATION PER HR: HCPCS

## 2020-12-03 PROCEDURE — 63710000001 INSULIN LISPRO (HUMAN) PER 5 UNITS: Performed by: HOSPITALIST

## 2020-12-03 PROCEDURE — A9270 NON-COVERED ITEM OR SERVICE: HCPCS | Performed by: HOSPITALIST

## 2020-12-03 PROCEDURE — 63710000001 LEVOTHYROXINE 50 MCG TABLET: Performed by: NEUROLOGICAL SURGERY

## 2020-12-03 PROCEDURE — 63710000001 ISOSORBIDE MONONITRATE 30 MG TABLET SUSTAINED-RELEASE 24 HOUR: Performed by: NEUROLOGICAL SURGERY

## 2020-12-03 PROCEDURE — 63710000001 SENNOSIDES-DOCUSATE 8.6-50 MG TABLET: Performed by: NURSE PRACTITIONER

## 2020-12-03 PROCEDURE — 63710000001 ALLOPURINOL 300 MG TABLET: Performed by: NEUROLOGICAL SURGERY

## 2020-12-03 PROCEDURE — 63710000001 INSULIN GLARGINE PER 5 UNITS: Performed by: HOSPITALIST

## 2020-12-03 PROCEDURE — 97110 THERAPEUTIC EXERCISES: CPT

## 2020-12-03 PROCEDURE — 63710000001 PRAVASTATIN 40 MG TABLET: Performed by: NEUROLOGICAL SURGERY

## 2020-12-03 PROCEDURE — 25010000002 MORPHINE PER 10 MG: Performed by: NEUROLOGICAL SURGERY

## 2020-12-03 PROCEDURE — 63710000001 LOSARTAN 100 MG TABLET: Performed by: HOSPITALIST

## 2020-12-03 PROCEDURE — 63710000001 CYCLOBENZAPRINE 10 MG TABLET: Performed by: NURSE PRACTITIONER

## 2020-12-03 PROCEDURE — 80048 BASIC METABOLIC PNL TOTAL CA: CPT | Performed by: HOSPITALIST

## 2020-12-03 RX ORDER — LOSARTAN POTASSIUM 100 MG/1
100 TABLET ORAL DAILY
Status: DISCONTINUED | OUTPATIENT
Start: 2020-12-03 | End: 2020-12-04 | Stop reason: HOSPADM

## 2020-12-03 RX ADMIN — ISOSORBIDE MONONITRATE 30 MG: 30 TABLET ORAL at 21:17

## 2020-12-03 RX ADMIN — INSULIN LISPRO 2 UNITS: 100 INJECTION, SOLUTION INTRAVENOUS; SUBCUTANEOUS at 10:55

## 2020-12-03 RX ADMIN — CARVEDILOL 6.25 MG: 6.25 TABLET, FILM COATED ORAL at 08:07

## 2020-12-03 RX ADMIN — LEVOTHYROXINE SODIUM 50 MCG: 50 TABLET ORAL at 06:21

## 2020-12-03 RX ADMIN — MORPHINE SULFATE 2 MG: 2 INJECTION, SOLUTION INTRAMUSCULAR; INTRAVENOUS at 00:02

## 2020-12-03 RX ADMIN — DOCUSATE SODIUM 50MG AND SENNOSIDES 8.6MG 2 TABLET: 8.6; 5 TABLET, FILM COATED ORAL at 21:16

## 2020-12-03 RX ADMIN — MORPHINE SULFATE 2 MG: 2 INJECTION, SOLUTION INTRAMUSCULAR; INTRAVENOUS at 19:55

## 2020-12-03 RX ADMIN — PRAVASTATIN SODIUM 40 MG: 40 TABLET ORAL at 08:07

## 2020-12-03 RX ADMIN — CYCLOBENZAPRINE 10 MG: 10 TABLET, FILM COATED ORAL at 08:07

## 2020-12-03 RX ADMIN — INSULIN GLARGINE 40 UNITS: 100 INJECTION, SOLUTION SUBCUTANEOUS at 21:19

## 2020-12-03 RX ADMIN — HYDROCODONE BITARTRATE AND ACETAMINOPHEN 1 TABLET: 5; 325 TABLET ORAL at 17:00

## 2020-12-03 RX ADMIN — PRAVASTATIN SODIUM 40 MG: 40 TABLET ORAL at 21:17

## 2020-12-03 RX ADMIN — INSULIN LISPRO 2 UNITS: 100 INJECTION, SOLUTION INTRAVENOUS; SUBCUTANEOUS at 17:00

## 2020-12-03 RX ADMIN — SODIUM CHLORIDE, PRESERVATIVE FREE 3 ML: 5 INJECTION INTRAVENOUS at 08:08

## 2020-12-03 RX ADMIN — ISOSORBIDE MONONITRATE 30 MG: 30 TABLET ORAL at 08:08

## 2020-12-03 RX ADMIN — CARVEDILOL 6.25 MG: 6.25 TABLET, FILM COATED ORAL at 17:00

## 2020-12-03 RX ADMIN — HYDROCODONE BITARTRATE AND ACETAMINOPHEN 1 TABLET: 5; 325 TABLET ORAL at 06:21

## 2020-12-03 RX ADMIN — HYDROCODONE BITARTRATE AND ACETAMINOPHEN 1 TABLET: 5; 325 TABLET ORAL at 02:34

## 2020-12-03 RX ADMIN — HYDROCODONE BITARTRATE AND ACETAMINOPHEN 1 TABLET: 5; 325 TABLET ORAL at 21:16

## 2020-12-03 RX ADMIN — PANTOPRAZOLE SODIUM 40 MG: 40 TABLET, DELAYED RELEASE ORAL at 08:07

## 2020-12-03 RX ADMIN — CYCLOBENZAPRINE 10 MG: 10 TABLET, FILM COATED ORAL at 16:05

## 2020-12-03 RX ADMIN — INSULIN GLARGINE 60 UNITS: 100 INJECTION, SOLUTION SUBCUTANEOUS at 08:07

## 2020-12-03 RX ADMIN — ALLOPURINOL 300 MG: 300 TABLET ORAL at 16:05

## 2020-12-03 RX ADMIN — ASPIRIN 81 MG: 81 TABLET, COATED ORAL at 08:07

## 2020-12-03 RX ADMIN — HYDROCODONE BITARTRATE AND ACETAMINOPHEN 1 TABLET: 5; 325 TABLET ORAL at 10:55

## 2020-12-03 RX ADMIN — SODIUM CHLORIDE, PRESERVATIVE FREE 3 ML: 5 INJECTION INTRAVENOUS at 21:39

## 2020-12-03 RX ADMIN — LOSARTAN POTASSIUM 100 MG: 100 TABLET, FILM COATED ORAL at 12:05

## 2020-12-03 RX ADMIN — CYCLOBENZAPRINE 10 MG: 10 TABLET, FILM COATED ORAL at 21:16

## 2020-12-03 NOTE — PLAN OF CARE
Goal Outcome Evaluation:  Plan of Care Reviewed With: patient  Progress: improving  Outcome Summary: Pt agreeable to PT treatment. COntinues to ambulate well with walker, requiring cues for upright posture and to prevent shuffle steps. Pt completed balance exercises with CGA. Will continue to follow and progress as appropriate.    Patient was intermittently wearing a face mask during this therapy encounter. Therapist used appropriate personal protective equipment including eye protection, mask, and gloves.  Mask used was standard procedure mask. Appropriate PPE was worn during the entire therapy session. Hand hygiene was completed before and after therapy session. Patient is not in enhanced droplet precautions.

## 2020-12-03 NOTE — PLAN OF CARE
Goal Outcome Evaluation:  Plan of Care Reviewed With: patient  Progress: improving  Outcome Summary: POD#3 OF C4-C5 LAMI WITH C3-C6 FUSION.  PO PAIN MEDICATION HELPING WITH PAIN. PATIENT AMBULATING WELL WITH 1 ASSIST. VOIDING PER BRP. ELIZA DRAIN STILL IN PLACE. DRESSING TO NECK  DRY/ INTACT.  IV PAIN MEDICATION X1. EDUCATION PROVIDED ON PAIN CONTROL AND BLOOD SUGAR MONITORING. PATIENT VERBALIZED UNDERSTANDING.

## 2020-12-03 NOTE — PLAN OF CARE
Goal Outcome Evaluation:  Plan of Care Reviewed With: patient  Progress: improving  Outcome Summary: Pain controlled with Po pain medication. Ambualtes with assist x1 and walker or cane. VSS. VOids without difficulty. DC pending on drain output. Educated on BG monitoring.

## 2020-12-03 NOTE — PROGRESS NOTES
Name: Chevy Goodwin ADMIT: 2020   : 1940  PCP: Anthony Gutierrez MD    MRN: [id number] LOS: 0 days   AGE/SEX: 80 y.o. male  ROOM: [facility]/     Subjective   Subjective   Doing well.  No new issues.     Objective   Objective   Vital Signs  Temp:  [97 °F (36.1 °C)-98.3 °F (36.8 °C)] 97 °F (36.1 °C)  Heart Rate:  [62-80] 62  Resp:  [18] 18  BP: (127-148)/(58-65) 148/58  SpO2:  [92 %-98 %] 94 %  on  Flow (L/min):  [0] 0;   Device (Oxygen Therapy): room air  Body mass index is 32.3 kg/m².  Physical Exam  Constitutional:       General: He is not in acute distress.     Appearance: He is not diaphoretic.   Cardiovascular:      Rate and Rhythm: Normal rate and regular rhythm.      Heart sounds: No murmur.   Pulmonary:      Effort: Pulmonary effort is normal.      Breath sounds: Normal breath sounds.   Abdominal:      General: Bowel sounds are normal. There is no distension.      Palpations: Abdomen is soft.      Tenderness: There is no abdominal tenderness.   Musculoskeletal: Normal range of motion.   Skin:     General: Skin is warm and dry.   Neurological:      Mental Status: He is alert and oriented to person, place, and time.         Results Review:       I reviewed the patient's new clinical results.  Results from last 7 days   Lab Units 206 20  044   WBC 10*3/mm3 7.35 7.87 8.62   HEMOGLOBIN g/dL 8.5* 8.1* 8.4*   PLATELETS 10*3/mm3 203 191 188     Results from last 7 days   Lab Units 2014 20  0449   SODIUM mmol/L 140 140   POTASSIUM mmol/L 4.7 5.2   CHLORIDE mmol/L 106 108*   CO2 mmol/L 27.3 24.0   BUN mg/dL 25* 31*   CREATININE mg/dL 1.05 1.24   GLUCOSE mg/dL 130* 163*   Estimated Creatinine Clearance: 65.3 mL/min (by C-G formula based on SCr of 1.05 mg/dL).    Results from last 7 days   Lab Units 2014 20  0449   CALCIUM mg/dL 9.1 9.1       Hemoglobin A1C   Date/Time Value Ref Range Status   2020 0449 6.80 (H) 4.80 - 5.60 %  Final     Glucose   Date/Time Value Ref Range Status   12/03/2020 0557 143 (H) 70 - 130 mg/dL Final   12/02/2020 2056 160 (H) 70 - 130 mg/dL Final   12/02/2020 1614 191 (H) 70 - 130 mg/dL Final   12/02/2020 1058 182 (H) 70 - 130 mg/dL Final   12/02/2020 0613 131 (H) 70 - 130 mg/dL Final   12/01/2020 2106 297 (H) 70 - 130 mg/dL Final   12/01/2020 1609 268 (H) 70 - 130 mg/dL Final       allopurinol, 300 mg, Oral, Q PM  aspirin, 81 mg, Oral, Daily  carvedilol, 6.25 mg, Oral, BID With Meals  cyclobenzaprine, 10 mg, Oral, TID  insulin glargine, 40 Units, Subcutaneous, Nightly  insulin glargine, 60 Units, Subcutaneous, QAM  insulin lispro, 0-9 Units, Subcutaneous, TID With Meals  isosorbide mononitrate, 30 mg, Oral, BID  levothyroxine, 50 mcg, Oral, Daily  pantoprazole, 40 mg, Oral, Daily  pravastatin, 40 mg, Oral, BID  sennosides-docusate, 2 tablet, Oral, Nightly  sodium chloride, 3 mL, Intravenous, Q12H       Diet Soft Texture; Whole Foods; Consistent Carbohydrate       Assessment/Plan     Active Hospital Problems    Diagnosis  POA   • **DDD (degenerative disc disease), cervical [M50.30]  Yes   • Obesity (BMI 30-39.9) [E66.9]  Yes   • Waldenstrom macroglobulinemia (CMS/HCC) [C88.0]  Yes   • Stage 3a chronic kidney disease [N18.31]  Yes   • JULIA treated with BiPAP 20/16 [G47.33]  Yes   • Hypertension [I10]  Yes   • Type 2 diabetes mellitus with hyperglycemia, with long-term current use of insulin (CMS/HCC) [E11.65, Z79.4]  Not Applicable   • Non-Hodgkin's lymphoma (CMS/HCC) [C85.90]  Yes      Resolved Hospital Problems   No resolved problems to display.       Mr. Goodwin is a 80 y.o. male who is s/p Cervical 4 5 laminectomy with a cervical 3 to cervical 6 fusion and instrumentation using O-arm.    · /54 this morning.  Will restart losartan today.  Continue to hold amlodipine and bumex.  Can resume all at DC.   · Medically stable.  Hemoglobin stabilized.  Renal function normal.  Blood sugars improved.  Medically  stable for discharge once okayed with neurosurgery.  Will be available as needed but will sign off in anticipation of discharge tomorrow.      Junior Gleason MD  [facility]  12/03/20  10:10 EST

## 2020-12-03 NOTE — THERAPY TREATMENT NOTE
Patient Name: Chevy Goodwin  : 1940    MRN: [id number]                              Today's Date: 12/3/2020       Admit Date: 2020    Visit Dx:     ICD-10-CM ICD-9-CM   1. DDD (degenerative disc disease), cervical  M50.30 722.4     Patient Active Problem List   Diagnosis   • Pain of metastatic malignancy   • Chronic pain   • Adhesive arachnoiditis   • Degeneration of intervertebral disc of lumbar region   • Low back pain   • Non-Hodgkin's lymphoma (CMS/HCC)   • Postlaminectomy syndrome of lumbar region   • Thoracic disc herniation T9-T10   • Type 2 diabetes mellitus with hyperglycemia, with long-term current use of insulin (CMS/HCC)   • Hypertension   • Long term (current) use of opiate analgesic   • Encounter for long-term current use of high risk medication   • Pain in lateral portion of right knee   • JULIA treated with BiPAP    • Hypersomnia due to medical condition   • Chronic right shoulder pain   • Status post reverse total arthroplasty of right shoulder   • Postlaminectomy syndrome, cervical region   • Waldenstrom macroglobulinemia (CMS/HCC)   • Weakness generalized   • Stage 3a chronic kidney disease   • Anemia   • Bradycardia   • Fall   • Neck pain   • DDD (degenerative disc disease), cervical   • DDD (degenerative disc disease), thoracic   • Obesity (BMI 30-39.9)     Past Medical History:   Diagnosis Date   • Anesthesia complication     HARD TO AWAKEN   • Cancer (CMS/HCC)     PROSTATE AND SKIN   • Cataract    • Coronary artery disease    • Diabetes (CMS/HCC)     TYPE 2   • GERD (gastroesophageal reflux disease)    • Gout    • Heart attack (CMS/HCC)     X 7   • Herniated cervical disc    • High cholesterol    • Hypersomnia    • Hypertension    • Kidney stones    • Low back pain    • MRSA (methicillin resistant staph aureus) culture positive     BILATERAL ELBOWS, 2015   • Right shoulder pain    • Sleep apnea     C pap   • Waldenstrom macroglobulinemia (CMS/HCC)     2014     Past Surgical  History:   Procedure Laterality Date   • ANGIOPLASTY      X 7   • ANTERIOR CERVICAL DISCECTOMY W/ FUSION N/A 8/5/2019    Procedure: C3-C5 anterior cervical discectomy, fusion and instrumentation;  Surgeon: John Singleton MD;  Location: [facility] OR;  Service: Neurosurgery   • BACK SURGERY      X4   • CARDIAC CATHETERIZATION     • CARPAL TUNNEL RELEASE Right    • CATARACT EXTRACTION WITH INTRAOCULAR LENS IMPLANT     • CERVICAL DISCECTOMY POSTERIOR FUSION WITH BRAIN LAB N/A 11/30/2020    Procedure: Cervical 4 5 laminectomy with a cervical 3 to cervical 6 fusion and instrumentation using O-arm;  Surgeon: John Singleton MD;  Location: [facility] OR;  Service: Neurosurgery;  Laterality: N/A;   • COLON RESECTION     • COLONOSCOPY     • ESOPHAGOSCOPY / EGD     • EXTRACORPOREAL SHOCK WAVE LITHOTRIPSY (ESWL)     • FINGER SURGERY Left    • GALLBLADDER SURGERY     • HERNIA REPAIR     • INCISION AND DRAINAGE ABSCESS      MULTIPLE   • LAMINECTOMY     • NECK SURGERY  08/05/2019   • TX REMOVAL OF ELBOW BURSA Left 9/15/2016    Procedure: LT ELBOW I&D W/ BONE CURRETTAGE;  Surgeon: Kirk Gross MD;  Location: [facility] OR [facility];  Service: Orthopedics   • PROSTATE SURGERY     • ROTATOR CUFF REPAIR Right    • SKIN CANCER EXCISION      MULTIPLE   • TOTAL SHOULDER ARTHROPLASTY W/ DISTAL CLAVICLE EXCISION Right 1/10/2019    Procedure: RT TOTAL SHOULDER REVERSE ARTHROPLASTY;  Surgeon: Kirk Gross MD;  Location: [facility];  Service: Orthopedics   • TYMPANOPLASTY Right      General Information     Row Name 12/03/20 1157          Physical Therapy Time and Intention    Document Type  progress note/recertification  -CF     Mode of Treatment  physical therapy  -CF     Row Name 12/03/20 1154          General Information    Patient Profile Reviewed  yes  -CF     Existing Precautions/Restrictions  fall  -CF     Row Name 12/03/20 1159          Cognition    Orientation Status (Cognition)  oriented x 4  -CF     Row Name 12/03/20 6968           Safety Issues, Functional Mobility    Impairments Affecting Function (Mobility)  balance;endurance/activity tolerance;strength  -CF       User Key  (r) = Recorded By, (t) = Taken By, (c) = Cosigned By    Initials Name Provider Type    CF Tameka Daniel PT Physical Therapist        Mobility     Row Name 12/03/20 1154          Bed Mobility    Comment (Bed Mobility)  up in chair  -CF     Row Name 12/03/20 1154          Sit-Stand Transfer    Sit-Stand [place] (Transfers)  verbal cues;contact guard;1 person assist  -CF     Assistive Device (Sit-Stand Transfers)  walker, front-wheeled  -CF     Row Name 12/03/20 1154          Gait/Stairs (Locomotion)    [place] Level (Gait)  verbal cues;contact guard;standby assist  -CF     Assistive Device (Gait)  walker, front-wheeled  -CF     Distance in Feet (Gait)  450'  -CF     Deviations/Abnormal Patterns (Gait)  stephanie decreased;gait speed decreased  -CF     Bilateral Gait Deviations  forward flexed posture  -CF     Comment (Gait/Stairs)  occasional shuffle step - cues to  feet to prevent tripping, cues to keep walker closer  -CF       User Key  (r) = Recorded By, (t) = Taken By, (c) = Cosigned By    Initials Name Provider Type     Tameka Daniel PT Physical Therapist        Obj/Interventions     Row Name 12/03/20 1155          Motor Skills    Therapeutic Exercise  other (see comments) seated LAQ, marchtyree x10; standing: mini squats, heel raises x10, tandem walking x10' CGA and RW  -CF       User Key  (r) = Recorded By, (t) = Taken By, (c) = Cosigned By    Initials Name Provider Type     Tameka Daniel PT Physical Therapist        Goals/Plan     Row Name 12/03/20 1157          Bed Mobility Goal 1 (PT)    Activity/Assistive Device (Bed Mobility Goal 1, PT)  bed mobility activities, all  -CF     [place] Level/Cues Needed (Bed Mobility Goal 1, PT)  modified independence  -CF     Time Frame (Bed Mobility Goal 1, PT)  3 days  -CF      Progress/Outcomes (Bed Mobility Goal 1, PT)  continuing progress toward goal  -CF     Row Name 12/03/20 1157          Transfer Goal 1 (PT)    Activity/Assistive Device (Transfer Goal 1, PT)  sit-to-stand/stand-to-sit;bed-to-chair/chair-to-bed;walker, rolling  -CF     [place] Level/Cues Needed (Transfer Goal 1, PT)  modified independence  -CF     Time Frame (Transfer Goal 1, PT)  3 days  -CF     Progress/Outcome (Transfer Goal 1, PT)  continuing progress toward goal  -CF     Row Name 12/03/20 1157          Gait Training Goal 1 (PT)    Activity/Assistive Device (Gait Training Goal 1, PT)  gait (walking locomotion);walker, rolling  -CF     [place] Level (Gait Training Goal 1, PT)  modified independence  -CF     Distance (Gait Training Goal 1, PT)  500'  -CF     Time Frame (Gait Training Goal 1, PT)  3 days  -CF     Progress/Outcome (Gait Training Goal 1, PT)  goal met  -CF     Row Name 12/03/20 1157          Stairs Goal 1 (PT)    Activity/Assistive Device (Stairs Goal 1, PT)  ascending stairs;descending stairs;walker, rolling  -CF     [place] Level/Cues Needed (Stairs Goal 1, PT)  standby assist  -CF     Number of Stairs (Stairs Goal 1, PT)  3  -CF     Time Frame (Stairs Goal 1, PT)  3 days  -CF     Progress/Outcome (Stairs Goal 1, PT)  continuing progress toward goal;good progress toward goal  -CF       User Key  (r) = Recorded By, (t) = Taken By, (c) = Cosigned By    Initials Name Provider Type    CF Tameka Daniel, PT Physical Therapist        Clinical Impression     Row Name 12/03/20 1151          Pain    Additional Documentation  Pain Scale: Numbers Pre/Post-Treatment (Group)  -CF     Row Name 12/03/20 1158          Pain Scale: Numbers Pre/Post-Treatment    Pretreatment Pain Rating  5/10  -CF     Posttreatment Pain Rating  5/10  -CF     Pain Location - Orientation  incisional  -CF     Pain Location  neck  -CF     Pain Intervention(s)  Repositioned;Ambulation/increased activity;Rest  -CF     Row  Name 12/03/20 1155          Plan of Care Review    Plan of Care Reviewed With  patient  -CF     Outcome Summary  Pt agreeable to PT treatment. COntinues to ambulate well with walker, requiring cues for upright posture and to prevent shuffle steps. Pt completed balance exercises with CGA. Will continue to follow and progress as appropriate.  -CF     Row Name 12/03/20 1155          Vital Signs    O2 Delivery Pre Treatment  room air  -CF     Row Name 12/03/20 1155          Positioning and Restraints    Pre-Treatment Position  sitting in chair/recliner  -CF     Post Treatment Position  chair  -CF     In Chair  sitting;call light within reach;encouraged to call for assist;exit alarm on  -CF       User Key  (r) = Recorded By, (t) = Taken By, (c) = Cosigned By    Initials Name Provider Type    Tameka Rivera PT Physical Therapist        Outcome Measures     Row Name 12/03/20 1157          How much help from another person do you currently need...    Turning from your back to your side while in flat bed without using bedrails?  4  -CF     Moving from lying on back to sitting on the side of a flat bed without bedrails?  3  -CF     Moving to and from a bed to a chair (including a wheelchair)?  3  -CF     Standing up from a chair using your arms (e.g., wheelchair, bedside chair)?  3  -CF     Climbing 3-5 steps with a railing?  3  -CF     To walk in hospital room?  3  -CF     AM-PAC 6 Clicks Score (PT)  19  -CF     Row Name 12/03/20 1157          Functional Assessment    Outcome Measure Options  AM-PAC 6 Clicks Basic Mobility (PT)  -CF       User Key  (r) = Recorded By, (t) = Taken By, (c) = Cosigned By    Initials Name Provider Type    Tameka Rivera, [place] Physical Therapist        Physical Therapy Education                 Title: PT OT SLP Therapies (In Progress)     Topic: Physical Therapy (Done)     Point: Mobility training (Done)     Learning Progress Summary           Patient Acceptance, E, VU,NR by CF at  12/3/2020 1158    Acceptance, E, VU,DU,NR by CF at 12/2/2020 1304    Acceptance, E, VU,DU by CF at 12/1/2020 1629                   Point: Home exercise program (Done)     Learning Progress Summary           Patient Acceptance, E, VU,NR by CF at 12/3/2020 1158    Acceptance, E, VU,DU,NR by CF at 12/2/2020 1304    Acceptance, E, VU,DU by CF at 12/1/2020 1629                   Point: Body mechanics (Done)     Learning Progress Summary           Patient Acceptance, E, VU,NR by CF at 12/3/2020 1158    Acceptance, E, VU,DU,NR by CF at 12/2/2020 1304    Acceptance, E, VU,DU by CF at 12/1/2020 1629                   Point: Precautions (Done)     Learning Progress Summary           Patient Acceptance, E, VU,NR by CF at 12/3/2020 1158    Acceptance, E, VU,DU,NR by CF at 12/2/2020 1304    Acceptance, E, VU,DU by CF at 12/1/2020 1629                               User Key     Initials Effective Dates Name Provider Type Discipline    CF 09/02/20 -  Tameka Daniel, PT Physical Therapist PT              PT Recommendation and Plan  Planned Therapy Interventions (PT): balance training, bed mobility training, gait training, home exercise program, stair training, ROM (range of motion), strengthening, stretching, patient/family education, transfer training  Plan of Care Reviewed With: patient  Outcome Summary: Pt agreeable to PT treatment. COntinues to ambulate well with walker, requiring cues for upright posture and to prevent shuffle steps. Pt completed balance exercises with CGA. Will continue to follow and progress as appropriate.     Time Calculation:   PT Charges     Row Name 12/03/20 1153             Time Calculation    Start Time  1134  -      Stop Time  1149  -      Time Calculation (min)  15 min  -      PT Received On  12/03/20  -      PT - Next Appointment  12/04/20  -      PT Goal Re-Cert Due Date  12/07/20  -        User Key  (r) = Recorded By, (t) = Taken By, (c) = Cosigned By    Initials Name Provider  Type    CF Tameka Daniel, PT Physical Therapist        Therapy Charges for Today     Code Description Service Date Service Provider Modifiers Qty    03206786037 HC GAIT TRAINING EA 15 MIN 12/2/2020 Tameka Daniel, PT GP 1    63551859912 HC PT THER PROC EA 15 MIN 12/3/2020 Tameka Daniel, PT GP 1          PT G-Codes  Outcome Measure Options: AM-PAC 6 Clicks Basic Mobility (PT)  AM-PAC 6 Clicks Score (PT): 19    Tameka Daniel, PT  12/3/2020

## 2020-12-03 NOTE — PROGRESS NOTES
LOS: 0 days   Patient Care Team:  Anthony Gutierrez MD as PCP - General (Internal Medicine)  Anthony Gutierrez MD as PCP - Internal Medicine  Bubba Hernandez MD as Consulting Physician (Cardiology)  Girma Olsen MD as Consulting Physician (Urology)  Nickolas Olsen MD as Consulting Physician (Dermatology)  Kirk Gross MD as Consulting Physician (Orthopedic Surgery)  José Miguel Henry MD as Consulting Physician (Nephrology)  Mulugeta Odonnell MD as Consulting Physician (Pulmonary Disease)    Chief Complaint: Postop cervical stabilization    Subjective     The patient is feeling pretty good.  He does feel that his original neck pain and arm pain that week he was having before surgery is gone.  He is just still having incisional pain from the surgery.  His incision looks okay.    Interval History:     History taken from: patient chart    Objective      The patient has good movement in all 4 extremities    Vital Signs  Temp:  [97 °F (36.1 °C)-98.3 °F (36.8 °C)] 97 °F (36.1 °C)  Heart Rate:  [62-80] 62  Resp:  [18] 18  BP: (127-148)/(58-65) 148/58       Results Review:     I reviewed the patient's new clinical results.  He is afebrile with a white count of 7.3.  His drain put out 185 cc over the last 24 hours.      Assessment/Plan       DDD (degenerative disc disease), cervical    Non-Hodgkin's lymphoma (CMS/HCC)    Type 2 diabetes mellitus with hyperglycemia, with long-term current use of insulin (CMS/HCC)    Hypertension    JULIA treated with BiPAP 20/16    Waldenstrom macroglobulinemia (CMS/HCC)    Stage 3a chronic kidney disease    Obesity (BMI 30-39.9)      I told the patient I thought we should keep in 1 more day just to be sure that his drainage decreases.  He is in agreement with that plan.      John Singleton MD  12/03/20  07:56 EST

## 2020-12-04 VITALS
WEIGHT: 212.44 LBS | HEIGHT: 68 IN | TEMPERATURE: 98 F | RESPIRATION RATE: 18 BRPM | OXYGEN SATURATION: 94 % | HEART RATE: 57 BPM | BODY MASS INDEX: 32.2 KG/M2 | DIASTOLIC BLOOD PRESSURE: 71 MMHG | SYSTOLIC BLOOD PRESSURE: 149 MMHG

## 2020-12-04 LAB
BASOPHILS # BLD AUTO: 0.03 10*3/MM3 (ref 0–0.2)
BASOPHILS NFR BLD AUTO: 0.4 % (ref 0–1.5)
DEPRECATED RDW RBC AUTO: 51.2 FL (ref 37–54)
EOSINOPHIL # BLD AUTO: 0.33 10*3/MM3 (ref 0–0.4)
EOSINOPHIL NFR BLD AUTO: 4.7 % (ref 0.3–6.2)
ERYTHROCYTE [DISTWIDTH] IN BLOOD BY AUTOMATED COUNT: 14.5 % (ref 12.3–15.4)
GLUCOSE BLDC GLUCOMTR-MCNC: 123 MG/DL (ref 70–130)
GLUCOSE BLDC GLUCOMTR-MCNC: 154 MG/DL (ref 70–130)
HCT VFR BLD AUTO: 26.2 % (ref 37.5–51)
HGB BLD-MCNC: 8.8 G/DL (ref 13–17.7)
IMM GRANULOCYTES # BLD AUTO: 0.05 10*3/MM3 (ref 0–0.05)
IMM GRANULOCYTES NFR BLD AUTO: 0.7 % (ref 0–0.5)
LYMPHOCYTES # BLD AUTO: 2.42 10*3/MM3 (ref 0.7–3.1)
LYMPHOCYTES NFR BLD AUTO: 34.7 % (ref 19.6–45.3)
MCH RBC QN AUTO: 33 PG (ref 26.6–33)
MCHC RBC AUTO-ENTMCNC: 33.6 G/DL (ref 31.5–35.7)
MCV RBC AUTO: 98.1 FL (ref 79–97)
MONOCYTES # BLD AUTO: 0.81 10*3/MM3 (ref 0.1–0.9)
MONOCYTES NFR BLD AUTO: 11.6 % (ref 5–12)
NEUTROPHILS NFR BLD AUTO: 3.33 10*3/MM3 (ref 1.7–7)
NEUTROPHILS NFR BLD AUTO: 47.9 % (ref 42.7–76)
NRBC BLD AUTO-RTO: 0.1 /100 WBC (ref 0–0.2)
PLATELET # BLD AUTO: 213 10*3/MM3 (ref 140–450)
PMV BLD AUTO: 9.8 FL (ref 6–12)
RBC # BLD AUTO: 2.67 10*6/MM3 (ref 4.14–5.8)
WBC # BLD AUTO: 6.97 10*3/MM3 (ref 3.4–10.8)

## 2020-12-04 PROCEDURE — 63710000001 LOSARTAN 100 MG TABLET: Performed by: HOSPITALIST

## 2020-12-04 PROCEDURE — 82962 GLUCOSE BLOOD TEST: CPT

## 2020-12-04 PROCEDURE — A9270 NON-COVERED ITEM OR SERVICE: HCPCS | Performed by: NEUROLOGICAL SURGERY

## 2020-12-04 PROCEDURE — 63710000001 ISOSORBIDE MONONITRATE 30 MG TABLET SUSTAINED-RELEASE 24 HOUR: Performed by: NEUROLOGICAL SURGERY

## 2020-12-04 PROCEDURE — 63710000001 ASPIRIN 81 MG TABLET DELAYED-RELEASE: Performed by: NEUROLOGICAL SURGERY

## 2020-12-04 PROCEDURE — 63710000001 CYCLOBENZAPRINE 10 MG TABLET: Performed by: NURSE PRACTITIONER

## 2020-12-04 PROCEDURE — 63710000001 HYDROCODONE-ACETAMINOPHEN 5-325 MG TABLET: Performed by: NEUROLOGICAL SURGERY

## 2020-12-04 PROCEDURE — 99024 POSTOP FOLLOW-UP VISIT: CPT | Performed by: NURSE PRACTITIONER

## 2020-12-04 PROCEDURE — A9270 NON-COVERED ITEM OR SERVICE: HCPCS | Performed by: HOSPITALIST

## 2020-12-04 PROCEDURE — A9270 NON-COVERED ITEM OR SERVICE: HCPCS | Performed by: NURSE PRACTITIONER

## 2020-12-04 PROCEDURE — 25010000002 MORPHINE PER 10 MG: Performed by: NEUROLOGICAL SURGERY

## 2020-12-04 PROCEDURE — 85025 COMPLETE CBC W/AUTO DIFF WBC: CPT | Performed by: NEUROLOGICAL SURGERY

## 2020-12-04 PROCEDURE — 63710000001 PRAVASTATIN 40 MG TABLET: Performed by: NEUROLOGICAL SURGERY

## 2020-12-04 PROCEDURE — 63710000001 LEVOTHYROXINE 50 MCG TABLET: Performed by: NEUROLOGICAL SURGERY

## 2020-12-04 PROCEDURE — 63710000001 CARVEDILOL 6.25 MG TABLET: Performed by: NEUROLOGICAL SURGERY

## 2020-12-04 PROCEDURE — 63710000001 INSULIN GLARGINE PER 5 UNITS: Performed by: HOSPITALIST

## 2020-12-04 PROCEDURE — G0378 HOSPITAL OBSERVATION PER HR: HCPCS

## 2020-12-04 PROCEDURE — 63710000001 PANTOPRAZOLE 40 MG TABLET DELAYED-RELEASE: Performed by: NEUROLOGICAL SURGERY

## 2020-12-04 RX ORDER — SULFAMETHOXAZOLE AND TRIMETHOPRIM 800; 160 MG/1; MG/1
1 TABLET ORAL 2 TIMES DAILY
Qty: 10 TABLET | Refills: 0 | Status: SHIPPED | OUTPATIENT
Start: 2020-12-04 | End: 2021-01-01

## 2020-12-04 RX ORDER — CYCLOBENZAPRINE HCL 10 MG
10 TABLET ORAL 3 TIMES DAILY PRN
Qty: 45 TABLET | Refills: 1 | Status: SHIPPED | OUTPATIENT
Start: 2020-12-04 | End: 2021-01-01

## 2020-12-04 RX ORDER — HYDROCODONE BITARTRATE AND ACETAMINOPHEN 5; 325 MG/1; MG/1
1 TABLET ORAL EVERY 4 HOURS PRN
Qty: 35 TABLET | Refills: 0 | Status: SHIPPED | OUTPATIENT
Start: 2020-12-04 | End: 2020-12-10

## 2020-12-04 RX ADMIN — HYDROCODONE BITARTRATE AND ACETAMINOPHEN 1 TABLET: 5; 325 TABLET ORAL at 10:24

## 2020-12-04 RX ADMIN — HYDROCODONE BITARTRATE AND ACETAMINOPHEN 1 TABLET: 5; 325 TABLET ORAL at 05:47

## 2020-12-04 RX ADMIN — LOSARTAN POTASSIUM 100 MG: 100 TABLET, FILM COATED ORAL at 08:03

## 2020-12-04 RX ADMIN — MORPHINE SULFATE 2 MG: 2 INJECTION, SOLUTION INTRAMUSCULAR; INTRAVENOUS at 01:14

## 2020-12-04 RX ADMIN — PANTOPRAZOLE SODIUM 40 MG: 40 TABLET, DELAYED RELEASE ORAL at 08:04

## 2020-12-04 RX ADMIN — HYDROCODONE BITARTRATE AND ACETAMINOPHEN 1 TABLET: 5; 325 TABLET ORAL at 01:55

## 2020-12-04 RX ADMIN — PRAVASTATIN SODIUM 40 MG: 40 TABLET ORAL at 08:04

## 2020-12-04 RX ADMIN — ISOSORBIDE MONONITRATE 30 MG: 30 TABLET ORAL at 08:03

## 2020-12-04 RX ADMIN — LEVOTHYROXINE SODIUM 50 MCG: 50 TABLET ORAL at 05:47

## 2020-12-04 RX ADMIN — CYCLOBENZAPRINE 10 MG: 10 TABLET, FILM COATED ORAL at 08:04

## 2020-12-04 RX ADMIN — ASPIRIN 81 MG: 81 TABLET, COATED ORAL at 08:04

## 2020-12-04 RX ADMIN — INSULIN GLARGINE 60 UNITS: 100 INJECTION, SOLUTION SUBCUTANEOUS at 08:04

## 2020-12-04 RX ADMIN — CARVEDILOL 6.25 MG: 6.25 TABLET, FILM COATED ORAL at 08:03

## 2020-12-04 NOTE — PLAN OF CARE
Goal Outcome Evaluation:  Plan of Care Reviewed With: patient  Progress: improving  Outcome Summary: Pain controlled with PO pain medication. Ambulates with assist x1 and cane. VSS. VOids without difficulty. DC home today. Educated on BG monitoring.

## 2020-12-04 NOTE — PLAN OF CARE
Goal Outcome Evaluation:  Plan of Care Reviewed With: patient  Progress: improving  Outcome Summary: POD#4. VSS. PO /IV PAIN MEDICATION HELPING WITH PAIN. INCREASE COMPALIN OF PAIN. ELIZA IN PLACE. AMBULATING WELL WITH 1 ASSIST. VOIDING FINE PER BRP. POSSIBLE D/C TODAY DEPENDING ON ELIZA OUTPUT. EDUCATION PROVIDED ON BLOOD SUGAR MONITORING AND PAIN CONTROL. PATIENT VERBALIZED UNDERSTANDING.

## 2020-12-04 NOTE — DISCHARGE SUMMARY
Chevy Goodwin  1940    Patient Care Team:  Anthony Gutierrez MD as PCP - General (Internal Medicine)  Anthony Gutierrez MD as PCP - Internal Medicine  Bubba Henrandez MD as Consulting Physician (Cardiology)  Girma Olsen MD as Consulting Physician (Urology)  Nickolas Olsen MD as Consulting Physician (Dermatology)  Kirk Gross MD as Consulting Physician (Orthopedic Surgery)  José Miguel Henry MD as Consulting Physician (Nephrology)  Mulugeta Odonnell MD as Consulting Physician (Pulmonary Disease)    Date of Admit: 11/30/2020    Date of Discharge:  12/4/2020    Discharge Diagnosis:  DDD (degenerative disc disease), cervical    Non-Hodgkin's lymphoma (CMS/HCC)    Type 2 diabetes mellitus with hyperglycemia, with long-term current use of insulin (CMS/HCC)    Hypertension    JULIA treated with BiPAP 20/16    Waldenstrom macroglobulinemia (CMS/[facility])    Stage 3a chronic kidney disease    Obesity (BMI 30-39.9)      Procedures Performed  Procedure(s):  Cervical 4 5 laminectomy with a cervical 3 to cervical 6 fusion and instrumentation using O-arm       Complications: none    Consultants:   Consults     Date and Time Order Name Status Description    11/30/2020 1448 Inpatient Internal Medicine Consult Completed           Condition on Discharge: stable    Discharge disposition: home    Pertinent Test Results: radiology: X-Ray: Cervical x-ray reveals postoperative changes with no complicating features.    Brief HPI: Patient evaluated in office for complaints of neck and bilateral shoulder pain. Imaging revealed The plain films in the lumbar spine show previous surgery at L3-4.  There is marked degenerative changes several levels of the lumbar spine but particularly marked at L2-3 and L1 to.  In the cervical spine the patient has had previous surgery at C3-4 and C4-5.  It looks like he has also had a previous fusion at C5-6.  He has a solid fusion at C5-6 but the 2 levels above which are  newer are not yet solidly fused.  On the myelogram itself the operated level looks okay in the lumbar spine.  There is some left-sided lateral recess narrowing at L4-5 and bilateral narrowing at L5-S1.  There is also fairly significant stenosis at L1-2 and L2-3.  On the standing films there is definite nerve root compression bilaterally at L5-S1 and the lateral recess stenosis at L4-5.  On the lateral film you can see severe stenosis at L1-2 and L2-3.  The thoracic spine generally looks okay and the cervical spine does not show any posterior disc bulging on the lateral film.  The nerve roots themselves are difficult to see partly because of the plate.  On the post myelographic CT scan of the cervical spine C2-3 looks okay.  C3-4 looks okay as well and I think there is a solid fusion there.  C4-5 is also open but I am not sure there is a solid fusion there.  C5-6 is widely open and is solidly fused.  C6-7 shows some bilateral foraminal stenosis with some mild central stenosis.  C7-T1 looks okay.  In the thoracic spine I do not see any evidence of significant nerve root compression until we get to T9-10 where there is a large left-sided calcified disc which is compressing the thecal sac and pushing the spinal cord to the right although it is not causing severe pressure on the spinal cord.  The levels below that look okay.  L1 to show some mild central stenosis due to a synovial cyst formation on the left side.  L2-3 shows severe central stenosis.  L3-4 is the fused level and that looks okay although the screw on the right side does cross into the spinal canal.  This is the L4 screw.  And not only breaks the cortex on the inferior aspect of the pedicle but it is somewhat medial as well.  L4-5 shows some lateral recess stenosis although not severe.  There is more soft tissue on left side than the right consistent with the myelogram.  L5-S1 mostly looks okay.  The patient has had previous surgery at both L4-5 and L5-S1  with fusion.. RBAs of treatment were discussed including the above procedure. Patient consented to above procedure.    Hospital Course: Patient admitted for above procedure. The procedure itself was without complication. The patient was transferred to [facility] following recovery.  Patient has done well with regard to surgery.  His has expected neck discomfort but states that overall his neck pain is much better than it was preop.  He is also having much less shoulder discomfort.  No radiating arm pain.  His hospitalization was prolonged due to high ELIZA output.  It has finally slowed on postop day 4 and will be removed today.  He is voiding, tolerating diet, ambulating well.  His pain is controlled with oral medications.  He feels ready for discharge today.  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  He will be discharged home with 5 days of Bactrim per Dr. Singleton request.  We reviewed postoperative restrictions and directions.    Discharge Physical Exam:    Temp:  [97.3 °F (36.3 °C)-98 °F (36.7 °C)] 98 °F (36.7 °C)  Heart Rate:  [57-86] 57  Resp:  [16-18] 18  BP: (103-153)/(55-73) 149/71    Current labs:  Lab Results (last 24 hours)     Procedure Component Value Units Date/Time    POC Glucose Once [[id number]]  (Abnormal) Collected: 12/03/20 1543    Specimen: Blood Updated: 12/03/20 1547     Glucose 155 mg/dL     POC Glucose Once [[id number]]  (Abnormal) Collected: 12/03/20 2101    Specimen: Blood Updated: 12/03/20 2104     Glucose 168 mg/dL     CBC & Differential [[id number]]  (Abnormal) Collected: 12/04/20 0442    Specimen: Blood from Arm, Right Updated: 12/04/20 0550    Narrative:      The following orders were created for panel order CBC & Differential.  Procedure                               Abnormality         Status                     ---------                               -----------         ------                     CBC Auto Differential[[id number]]        Abnormal            Final result                  Please view results for these tests on the individual orders.    CBC Auto Differential [[id number]]  (Abnormal) Collected: 12/04/20 0442    Specimen: Blood from Arm, Right Updated: 12/04/20 0550     WBC 6.97 10*3/mm3      RBC 2.67 10*6/mm3      Hemoglobin 8.8 g/dL      Hematocrit 26.2 %      MCV 98.1 fL      MCH 33.0 pg      MCHC 33.6 g/dL      RDW 14.5 %      RDW-SD 51.2 fl      MPV 9.8 fL      Platelets 213 10*3/mm3      Neutrophil % 47.9 %      Lymphocyte % 34.7 %      Monocyte % 11.6 %      Eosinophil % 4.7 %      Basophil % 0.4 %      Immature Grans % 0.7 %      Neutrophils, Absolute 3.33 10*3/mm3      Lymphocytes, Absolute 2.42 10*3/mm3      Monocytes, Absolute 0.81 10*3/mm3      Eosinophils, Absolute 0.33 10*3/mm3      Basophils, Absolute 0.03 10*3/mm3      Immature Grans, Absolute 0.05 10*3/mm3      nRBC 0.1 /100 WBC     POC Glucose Once [[id number]]  (Normal) Collected: 12/04/20 0601    Specimen: Blood Updated: 12/04/20 0603     Glucose 123 mg/dL     POC Glucose Once [[id number]]  (Abnormal) Collected: 12/04/20 1134    Specimen: Blood Updated: 12/04/20 1140     Glucose 154 mg/dL             General Appearance No acute distress   Neurological Awake, Alert, and oriented x 3   Motor Strength normal bilateral upper extremities but slightly limited with deltoids due to discomfort in shoulders and neck with strength testing, tone normal   Sensory Intact to light touch normal bilateral upper extremities   Gait and station Ambulating for 150 feet with contact-guard/standby assist   Neck Supple, diminished cervical range of motion due to stiffness.  Posterior midline incision well approximated with no redness drainage or swelling.  ELIZA to bulb suction with small amount of serosanguineous output   Extremities Moves all extremities well, no edema, no cyanosis, no redness    No calf tenderness or swelling     Discharge Medications  Shawn has been reviewed and narcotic consent is on file in the patient's chart.      Your medication list      START taking these medications      Instructions Last Dose Given Next Dose Due   cyclobenzaprine 10 MG tablet  Commonly known as: FLEXERIL      Take 1 tablet by mouth 3 (Three) Times a Day As Needed for Muscle Spasms.       sulfamethoxazole-trimethoprim 800-160 MG per tablet  Commonly known as: Bactrim DS      Take 1 tablet by mouth 2 (Two) Times a Day.          CHANGE how you take these medications      Instructions Last Dose Given Next Dose Due   HYDROcodone-acetaminophen 5-325 MG per tablet  Commonly known as: NORCO  What changed:   · when to take this  · reasons to take this  · additional instructions      Take 1 tablet by mouth Every 4 (Four) Hours As Needed for Moderate Pain  for up to 3 days.          CONTINUE taking these medications      Instructions Last Dose Given Next Dose Due   acetaminophen 500 MG tablet  Commonly known as: TYLENOL      Take 500 mg by mouth Every 6 (Six) Hours As Needed for Mild Pain .       allopurinol 300 MG tablet  Commonly known as: ZYLOPRIM      Take 300 mg by mouth every evening.       amLODIPine 10 MG tablet  Commonly known as: NORVASC      Take 10 mg by mouth every evening.       aspirin 81 MG tablet      Take 81 mg by mouth Daily.       azelastine 0.1 % nasal spray  Commonly known as: ASTELIN      2 sprays into the nostril(s) as directed by provider 2 (Two) Times a Day As Needed.       B-D UF III MINI PEN NEEDLES 31G X 5 MM misc  Generic drug: Insulin Pen Needle           bumetanide 1 MG tablet  Commonly known as: BUMEX      Daily.       carvedilol 6.25 MG tablet  Commonly known as: COREG      Take 6.25 mg by mouth 2 (Two) Times a Day With Meals.       cetirizine 10 MG tablet  Commonly known as: zyrTEC      Every 12 (Twelve) Hours.       [employer] Glucosamine-Chondroitin tablet      Take 1 tablet by mouth 2 (Two) Times a Day.       Easy Touch Alcohol Prep Medium 70 % pads      Easy Touch Alcohol Prep Pads   Apply 2 pads twice a day by topical route.        freestyle lancets           FREESTYLE LITE test strip  Generic drug: glucose blood           isosorbide mononitrate 30 MG 24 hr tablet  Commonly known as: IMDUR      30 mg 2 (two) times a day.       Lantus SoloStar 100 UNIT/ML injection pen  Generic drug: Insulin Glargine      Inject  under the skin into the appropriate area as directed 2 (Two) Times a Day. 60 units AM; 40 units PM       levothyroxine 50 MCG tablet  Commonly known as: SYNTHROID, LEVOTHROID      Take 50 mcg by mouth Daily.       losartan 100 MG tablet  Commonly known as: COZAAR      Take 100 mg by mouth Daily.       metFORMIN 500 MG tablet  Commonly known as: GLUCOPHAGE      Take 500 mg by mouth 2 (Two) Times a Day With Meals.       nitroglycerin 0.4 MG SL tablet  Commonly known as: NITROSTAT      Take 0.4 mg by mouth Every 5 (Five) Minutes As Needed.       pantoprazole 40 MG EC tablet  Commonly known as: PROTONIX      Take 40 mg by mouth Daily.       pravastatin 40 MG tablet  Commonly known as: PRAVACHOL      Take 40 mg by mouth 2 (Two) Times a Day.          STOP taking these medications    mupirocin 2 % ointment  Commonly known as: BACTROBAN              Where to Get Your Medications      These medications were sent to [facility] [place] [address]    Hours: 7:00AM-6PM Mon-Fri Phone: [phone]   · cyclobenzaprine 10 MG tablet  · HYDROcodone-acetaminophen 5-325 MG per tablet  · sulfamethoxazole-trimethoprim 800-160 MG per tablet         Discharge Diet:   Diet Instructions     Diet:      Diet Texture / Consistency: Regular    Common Modifiers: Consistent Carbohydrate    Advance as tolerated        Diet Order   Procedures   • Diet Soft Texture; [employer]; Consistent Carbohydrate       Activity at Discharge:   Activity Instructions     Discharge Activity      1) No driving until cleared by us and no longer taking narcotics.   2) Off work/school until cleared by us.  3) May shower but no submerging wound in  tub, pool, etc.  4) Do not lift / push / pull more then 5 lbs.  5.) Ice to neck 20 minutes at a time as needed  6.) No overhead activity  No impact or strenuous activity    [facility], operated by [facility]    [address], [address]    [place] [address]    Phone: [phone]    Fax: [phone]    John Singleton M.D., F.A.C.S.            INSTRUCTIONS & CARE AFTER YOUR CERVICAL LAMINECTOMY    1. No lifting anything heavier than a coffee cup or paperback book.    2. No driving until you begin physical therapy in three to four weeks. We recommend that you limit riding in a car because of the risk of an accident. If you are a passenger, wear your seatbelt.    3. You may bend over or reach over your head as long as you don’t lift anything heavy.    4. Walk as much as possible.    5. Remove the bandage on the second day after surgery and leave the incision open to air. If you notice any redness, swelling or drainage, call the office. There are steri-strips across the incision. If these have not come off by the ninth or tenth day after surgery, peel them off gently.    6. You may shower five days after surgery. Don’t let the water beat directly on the incision. Gently pat the incision dry.    7. Call as soon as possible after leaving the hospital to schedule an appointment with the Physician Assistant or Nurse Practitioner if an appointment has not already been made. Physical therapy will be arranged when you return for this visit.    8. Your prescription for pain medication may be refilled for only half the original amount prior to your return office visit. In order to have this medication refilled you must contact the office four days prior to the due date.    9. Don’t be alarmed if you experience some of your pre-operative symptoms after going home. This is not uncommon and normally goes away in a few days but may last longer. Pain, aching and stiffness in the neck, across the shoulders and between the shoulder  "blades are very common. If you have any questions or concerns, don’t hesitate to call the office.    Pelvic Rest            Call for: questions or concerns    Follow-up Appointments  Future Appointments   Date Time Provider Department Center   12/15/2020  1:00 PM Celina Rayo APRN [facility] PM [facility]   12/31/2020  8:30 AM Fanta Addison APRN [facility] NS [facility]   2/24/2021 10:30 AM Mulugeta Odnonell MD [facility] None     Follow-up Information     Anthony Gutierrez MD .    Specialty: Internal Medicine  Contact information:  [address]  [facility] [id number] [phone]             John Singleton MD Follow up in 2 week(s).    Specialty: Neurosurgery  Contact information:  [address]  [facility] [id number] [phone]                 Additional Instructions for the Follow-ups that You Need to Schedule     Notify Physician or Go To The ED For the Following Conditions   As directed      Including but not limited to fever >100.5, chills, wound concerns (redness, swelling, drainage), new symptoms of numbness, tingling, weakness; new or uncontrolled pain despite using prescribed medications    Order Comments: Including but not limited to fever >100.5, chills, wound concerns (redness, swelling, drainage), new symptoms of numbness, tingling, weakness; new or uncontrolled pain despite using prescribed medications                Test Results Pending at Discharge     None    I discussed the discharge instructions with patient    Alaina PORTILLO DANY Cortes  12/04/20  15:22 EST    25 min spent in reviewing records, discussion and examination of the patient and discussion with other members of the patient's medical team.    \"Dictated utilizing Dragon dictation\".      "

## 2020-12-05 ENCOUNTER — READMISSION MANAGEMENT (OUTPATIENT)
Dept: CALL CENTER | Facility: HOSPITAL | Age: 80
End: 2020-12-05

## 2020-12-05 NOTE — OUTREACH NOTE
Prep Survey      Responses   [facility] patient discharged from?  [place]   Is LACE score < 7 ?  No   Eligibility  Readm Mgmt   Discharge diagnosis  Cervical degenerative disc disease [s/p Cervical 4 5 laminectomy with a cervical 3 to cervical 6 fusion and instrumentation using O-arm]   Does the patient have one of the following disease processes/diagnoses(primary or secondary)?  General Surgery   Does the patient have Home health ordered?  No   Is there a DME ordered?  No   Comments regarding appointments  Per AVS   Prep survey completed?  Yes          Dominga Benites RN

## 2020-12-07 ENCOUNTER — APPOINTMENT (OUTPATIENT)
Dept: PREADMISSION TESTING | Facility: HOSPITAL | Age: 80
End: 2020-12-07

## 2020-12-09 ENCOUNTER — HOSPITAL ENCOUNTER (OUTPATIENT)
Dept: GENERAL RADIOLOGY | Facility: HOSPITAL | Age: 80
Discharge: HOME | End: 2020-12-09
Admitting: NEUROLOGICAL SURGERY

## 2020-12-09 ENCOUNTER — TELEPHONE (OUTPATIENT)
Dept: NEUROSURGERY | Facility: CLINIC | Age: 80
End: 2020-12-09

## 2020-12-09 ENCOUNTER — READMISSION MANAGEMENT (OUTPATIENT)
Dept: CALL CENTER | Facility: HOSPITAL | Age: 80
End: 2020-12-09

## 2020-12-09 DIAGNOSIS — M50.30 DDD (DEGENERATIVE DISC DISEASE), CERVICAL: Primary | ICD-10-CM

## 2020-12-09 DIAGNOSIS — M50.30 DDD (DEGENERATIVE DISC DISEASE), CERVICAL: ICD-10-CM

## 2020-12-09 PROCEDURE — 72050 X-RAY EXAM NECK SPINE 4/5VWS: CPT

## 2020-12-09 NOTE — TELEPHONE ENCOUNTER
John Singleton MD Mattingly, Kara, MA             Let him know that his x-rays look okay.  He probably pulled a muscle.  We can give him some Flexeril to help with that.      Patient is aware. He already has an rx for Flexeril Alaina gave him a couple days ago. I told him he can use ice as well. He states he was using heat and Ice alternating and I told him he cannot use heat on his incision. He asked if Dr. Singleton will give him Morphine? I told him that is not something he will give. I suggested he call his pain mgmt doctor and see if they have any further suggestions. He will call if he gets worse or needs anything else.

## 2020-12-09 NOTE — TELEPHONE ENCOUNTER
He had C4-5 laminectomy and foraminotomies with fusion from C3-C6 and instrumentation using microsurgical technique microsurgical instrumentation done on 11/30/2020.

## 2020-12-09 NOTE — TELEPHONE ENCOUNTER
Provider: Dr. Singleton  Caller: Chevy Goodwin  Relationship to Patient: self  Reason for Call: Pt was bent over checking blood sugar on Monday night and while his head was looking down at the machine and he heard a loud pop. His neck is stiff and he is having electrical shock going from his neck to his feet. On a pain scale of 1-10 his level is a 11. He is not having any b/b loss or incontinence   When was the patient last seen: 11/30/20  Follow up: post op 12/31/20  When did it start: 12/7/20  Where is it located: cervical  Timing- Is it constant or intermittent: intermittent  What makes it worse: any movement of his body  What makes it better: lying back in lazy boy seems to make it tolerable   What therapies/medications have you tried: hydrocodone and muscle relaxer and it not helping. He is tried heat and ice and no relief.     Tried to warm transfer-was instructed to put in encounter d/t Rin was with a pt.    Pt contact # [phone]  Best time to call: anytime

## 2020-12-09 NOTE — OUTREACH NOTE
General Surgery Week 1 Survey      Responses   [facility] patient discharged from?  [place]   Does the patient have one of the following disease processes/diagnoses(primary or secondary)?  General Surgery   Week 1 attempt successful?  No   Unsuccessful attempts  Attempt 1          Deborah Deleon RN

## 2020-12-10 DIAGNOSIS — M50.30 DDD (DEGENERATIVE DISC DISEASE), CERVICAL: Primary | ICD-10-CM

## 2020-12-10 RX ORDER — OXYCODONE HYDROCHLORIDE AND ACETAMINOPHEN 5; 325 MG/1; MG/1
1 TABLET ORAL EVERY 6 HOURS PRN
Qty: 25 TABLET | Refills: 0 | Status: SHIPPED | OUTPATIENT
Start: 2020-12-10 | End: 2021-01-01 | Stop reason: ALTCHOICE

## 2020-12-10 NOTE — TELEPHONE ENCOUNTER
Patient and his wife called about the patient being dizzy, and having weakness he cannot get up out of the chair. Said he overall feels terrible. He also has a burning pain in his neck. I told them to contact his PCP about the Dizziness.    Spoke with Dr. Singleton, we can give him a few Percocet if he wants to try them. He can also go to the ER for an evaluation if he wishes.      Patient would like to try the Percocet.

## 2020-12-11 ENCOUNTER — TELEPHONE (OUTPATIENT)
Dept: NEUROSURGERY | Facility: CLINIC | Age: 80
End: 2020-12-11

## 2020-12-11 NOTE — TELEPHONE ENCOUNTER
Called to check on the patient. Spoke with the patient's wife and she states he cannot tolerate the Oxycodone. As soon as he takes it he immediatly vomits. She then said she would let me speak with him to go over his symptoms.   Spoke with the patient he explained the exact same scenario from above and states his symptoms are unchanged from yesterday. He denies any current nausea or vomiting and no redness, swelling or drainage from his incision. Also no fevers. He asked what Dr. Singleton gave him when he had surgery last year and I told him it was Norco 10/325 mg and I would ask Dr. Singleton if we can change it. Dr. Singleton called while I was talking to him on the phone. I placed him on hold and spoke with Dr. Singleton. We will cancel his Percocet rx given to him yesterday and we can give him Norco 10/325 1 every 6 hours and we will see him on Monday. I picked him back up and I explained to him that I spoke with Dr. Singleton while on the phone with him and explained the situation and Dr. Singleton is going to give him Norco 10/325 1 every 6 hours prn pain #25 with no refill. His wife will need to  the rx from our office as Dr. Singleton is in surgery today. She will  the rx from us.

## 2020-12-11 NOTE — PROGRESS NOTES
"Subjective   Patient ID: Chevy Goodwin is a 80 y.o. male is here today for follow-up. Mr. Goodwin had C4-5 laminectomy and foraminotomies with fusion from C3-C6 and instrumentation using microsurgical technique microsurgical instrumentation done on 11/30/2020. He has called the office several times this week complaining of severe pain.    Today his symptoms are severe neck pain and shoulder pain. His wife reports he is sleeping 22 out of 24 hours a day, he has bed sores beginning on his back per the wife, he is sitting non-stop in the chair. He is only getting up for meals and the restroom.    Patient and his wife are both wearing a mask in our office today.      History of Present Illness     This patient returns today.  He has been having severe pain in his neck.  He felt a pop in his neck last week and the pain got a lot worse.  His wife said he is sleeping 22 of 24 hours a day and sits in a chair almost all the time.  He says when he does stand up he does not feel like he can hold his head up.    The following portions of the patient's history were reviewed and updated as appropriate: allergies, current medications, past family history, past medical history, past social history, past surgical history and problem list.    Review of Systems   Constitutional: Positive for activity change. Negative for fever.   Respiratory: Negative for chest tightness and shortness of breath.    Cardiovascular: Negative for chest pain.   Musculoskeletal: Positive for arthralgias (Shoulder pain), myalgias and neck pain.   Neurological: Negative for weakness and numbness.       I have reviewed the review of systems as documented by my MA.      Objective     Vitals:    12/14/20 1204   BP: 124/59   Pulse: 68   Temp: 98.4 °F (36.9 °C)   Weight: 96.2 kg (212 lb)   Height: 172.7 cm (68\")     Body mass index is 32.23 kg/m².      Physical Exam  Neurological:      Mental Status: He is alert and oriented to person, place, and time. "       Neurologic Exam     Mental Status   Oriented to person, place, and time.     I looked at his incision.  It looks okay.  There is some redness in the lower part on the sides but there is no evidence of infection.  There is no drainage.  Overall I think the incision is healing quite well without evidence of infection.      Assessment/Plan   Independent Review of Radiographic Studies:      I personally reviewed the images from the following studies.    I reviewed his x-rays which were done last week.  This does show an air-fluid level at the top of the incision about as expected for an incision the size.  The hardware all appears to be intact.    Medical Decision Making:      I told the patient and his wife about the imaging.  I told him that from my point of view all of the hardware looks okay.  All of the pain that he is currently having is about as expected.  We called him in a little bit stronger pain medication at the end of last week and I told him he can continue to take those for another few days but then he needs to start backing off on the narcotics.  We will start some physical therapy and check a CBC.  I will see him again in a little over a week.  I told him that he absolutely has to get up and move.  I told him to set a timer and every hour he needs to get up and walk around the house for at least 5 minutes.  I also told him to stay off the areas where he is developing decubiti and either lay on his side or on his stomach so as to let them heal.  I will call him with the results of the CBC.  I had to use a strange diagnosis in order to get the CBC covered by his insurance.    Diagnoses and all orders for this visit:    1. Follow-up examination following surgery (Primary)  -     CBC & Differential; Future  -     Ambulatory Referral to Physical Therapy    2. Other medical procedures as the cause of abnormal reaction of the patient, or of later complication, without mention of misadventure at the time  of the procedure   -     CBC & Differential; Future      Return in about 8 days (around 12/22/2020).

## 2020-12-14 ENCOUNTER — TELEPHONE (OUTPATIENT)
Dept: NEUROSURGERY | Facility: CLINIC | Age: 80
End: 2020-12-14

## 2020-12-14 ENCOUNTER — OFFICE VISIT (OUTPATIENT)
Dept: NEUROSURGERY | Facility: CLINIC | Age: 80
End: 2020-12-14

## 2020-12-14 ENCOUNTER — LAB (OUTPATIENT)
Dept: LAB | Facility: HOSPITAL | Age: 80
End: 2020-12-14

## 2020-12-14 VITALS
DIASTOLIC BLOOD PRESSURE: 59 MMHG | HEIGHT: 68 IN | BODY MASS INDEX: 32.13 KG/M2 | WEIGHT: 212 LBS | HEART RATE: 68 BPM | TEMPERATURE: 98.4 F | SYSTOLIC BLOOD PRESSURE: 124 MMHG
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
## 2020-12-14 DIAGNOSIS — Z09 FOLLOW-UP EXAMINATION FOLLOWING SURGERY: Primary | ICD-10-CM

## 2020-12-14 DIAGNOSIS — Z09 FOLLOW-UP EXAMINATION FOLLOWING SURGERY: ICD-10-CM

## 2020-12-14 LAB
BASOPHILS # BLD AUTO: 0.05 10*3/MM3 (ref 0–0.2)
BASOPHILS NFR BLD AUTO: 0.5 % (ref 0–1.5)
DEPRECATED RDW RBC AUTO: 50.5 FL (ref 37–54)
EOSINOPHIL # BLD AUTO: 0.28 10*3/MM3 (ref 0–0.4)
EOSINOPHIL NFR BLD AUTO: 2.9 % (ref 0.3–6.2)
ERYTHROCYTE [DISTWIDTH] IN BLOOD BY AUTOMATED COUNT: 14.3 % (ref 12.3–15.4)
HCT VFR BLD AUTO: 30.6 % (ref 37.5–51)
HGB BLD-MCNC: 10.3 G/DL (ref 13–17.7)
IMM GRANULOCYTES # BLD AUTO: 0.05 10*3/MM3 (ref 0–0.05)
IMM GRANULOCYTES NFR BLD AUTO: 0.5 % (ref 0–0.5)
LYMPHOCYTES # BLD AUTO: 1.98 10*3/MM3 (ref 0.7–3.1)
LYMPHOCYTES NFR BLD AUTO: 20.3 % (ref 19.6–45.3)
MCH RBC QN AUTO: 32.6 PG (ref 26.6–33)
MCHC RBC AUTO-ENTMCNC: 33.7 G/DL (ref 31.5–35.7)
MCV RBC AUTO: 96.8 FL (ref 79–97)
MONOCYTES # BLD AUTO: 0.6 10*3/MM3 (ref 0.1–0.9)
MONOCYTES NFR BLD AUTO: 6.2 % (ref 5–12)
NEUTROPHILS NFR BLD AUTO: 6.77 10*3/MM3 (ref 1.7–7)
NEUTROPHILS NFR BLD AUTO: 69.6 % (ref 42.7–76)
NRBC BLD AUTO-RTO: 0 /100 WBC (ref 0–0.2)
PLATELET # BLD AUTO: 308 10*3/MM3 (ref 140–450)
PMV BLD AUTO: 9.7 FL (ref 6–12)
RBC # BLD AUTO: 3.16 10*6/MM3 (ref 4.14–5.8)
WBC # BLD AUTO: 9.73 10*3/MM3 (ref 3.4–10.8)

## 2020-12-14 PROCEDURE — 99024 POSTOP FOLLOW-UP VISIT: CPT | Performed by: NEUROLOGICAL SURGERY

## 2020-12-14 PROCEDURE — 36415 COLL VENOUS BLD VENIPUNCTURE: CPT

## 2020-12-14 PROCEDURE — 85025 COMPLETE CBC W/AUTO DIFF WBC: CPT

## 2020-12-14 NOTE — TELEPHONE ENCOUNTER
Patient and his wife are both aware.    ----- Message from John Singleton MD sent at 12/14/2020  2:04 PM EST -----  Please let the patient know that his white count is okay and his hemoglobin is okay as well.  ----- Message -----  From: Lab, Background User  Sent: 12/14/2020   1:25 PM EST  To: John Singleton MD

## 2020-12-15 ENCOUNTER — OFFICE VISIT (OUTPATIENT)
Dept: PAIN MEDICINE | Facility: CLINIC | Age: 80
End: 2020-12-15

## 2020-12-15 VITALS
TEMPERATURE: 97.3 F | SYSTOLIC BLOOD PRESSURE: 122 MMHG | BODY MASS INDEX: 31.37 KG/M2 | WEIGHT: 207 LBS | DIASTOLIC BLOOD PRESSURE: 60 MMHG | OXYGEN SATURATION: 98 % | RESPIRATION RATE: 20 BRPM | HEART RATE: 65 BPM | HEIGHT: 68 IN

## 2020-12-15 DIAGNOSIS — M51.36 DEGENERATION OF INTERVERTEBRAL DISC OF LUMBAR REGION: ICD-10-CM

## 2020-12-15 DIAGNOSIS — Z79.899 ENCOUNTER FOR LONG-TERM CURRENT USE OF HIGH RISK MEDICATION: ICD-10-CM

## 2020-12-15 DIAGNOSIS — M96.1 POSTLAMINECTOMY SYNDROME OF LUMBAR REGION: ICD-10-CM

## 2020-12-15 DIAGNOSIS — G89.29 OTHER CHRONIC PAIN: Primary | ICD-10-CM

## 2020-12-15 DIAGNOSIS — M96.1 POSTLAMINECTOMY SYNDROME, CERVICAL REGION: ICD-10-CM

## 2020-12-15 PROCEDURE — 99214 OFFICE O/P EST MOD 30 MIN: CPT | Performed by: NURSE PRACTITIONER

## 2020-12-15 NOTE — PROGRESS NOTES
CHIEF COMPLAINT  F/u back and neck pain.     Subjective   Chevy Goodwin is a 80 y.o. male  who presents for follow-up.  He has a history of neck and back surgery.    C3-C6 fusion with Dr. Singleton 11/30/2020. Had f/u visit with Dr. Singleton yesterday. Patient is still in a great deal of pain.  Has been taking Norco 10/325 5/day since surgery.      Complains of pain in his low back, neck and shoulders.  neck is the most severe.  Today his pain is 8/10VAS.  Continues with Hydrocodone 5/325 2-3/day (prior to surgery). Denies any side effects from the regimen. Helps moderately.  No constipation or somnolence.      Patient remained masked during entire encounter. No cough present. I donned a mask and eye protection throughout entire visit. Prior to donning mask and eye protection, hand hygiene was performed, as well as when it was doffed.  I was closer than 6 feet, but not for an extended period of time. No obvious exposure to any bodily fluids.    Back Pain  This is a chronic (history of lumbar fusion with Dr. Yuan at [facility], was done in 2005) problem. The current episode started more than 1 year ago. The problem occurs constantly. The problem has been waxing and waning (reports as unchanged since last office visit) since onset. The pain is present in the lumbar spine and thoracic spine. The quality of the pain is described as aching and burning. The pain radiates to the right foot and left foot. The pain is at a severity of 7/10. The pain is moderate. The pain is worse during the day. The symptoms are aggravated by bending and twisting (walking, physical activity). Stiffness is present in the morning. Pertinent negatives include no bladder incontinence, bowel incontinence, chest pain, dysuria, fever, headaches, numbness, tingling or weakness. Risk factors include lack of exercise. He has tried NSAIDs (Hydrocodone) for the symptoms. The treatment provided moderate relief.   Neck Pain   This is a chronic (surgery  8/2019 Dr. Singleton) problem. The problem occurs daily. The problem has been gradually worsening. The pain is at a severity of 6/10. The pain is moderate. Pertinent negatives include no chest pain, fever, headaches, numbness, tingling or weakness. He has tried NSAIDs and oral narcotics for the symptoms. The treatment provided moderate relief.     PEG Assessment   What number best describes your pain on average in the past week?8  What number best describes how, during the past week, pain has interfered with your enjoyment of life?8  What number best describes how, during the past week, pain has interfered with your general activity?  8    The following portions of the patient's history were reviewed and updated as appropriate: allergies, current medications, past family history, past medical history, past social history, past surgical history and problem list.    Review of Systems   Constitutional: Positive for activity change and fatigue. Negative for fever and unexpected weight change.   HENT: Negative for congestion.    Eyes: Negative for visual disturbance.   Respiratory: Negative for cough and shortness of breath.    Cardiovascular: Negative for chest pain.   Gastrointestinal: Positive for constipation. Negative for bowel incontinence, diarrhea, nausea and vomiting.   Genitourinary: Negative for bladder incontinence, difficulty urinating and dysuria.   Musculoskeletal: Positive for back pain, neck pain and neck stiffness.   Neurological: Positive for dizziness and light-headedness. Negative for tingling, weakness, numbness and headaches.   Psychiatric/Behavioral: Positive for sleep disturbance. Negative for agitation and suicidal ideas. The patient is not nervous/anxious.      I have reviewed and confirmed the accuracy of the ROS as documented by the MA/LPN/RN DANY Vo    Vitals:    12/15/20 1301   BP: 122/60   Pulse: 65   Resp: 20   Temp: 97.3 °F (36.3 °C)   SpO2: 98%   Weight: 93.9 kg (207 lb)  "  Height: 172.7 cm (68\")   PainSc:   8   PainLoc: Back  Comment: and neck     Objective   Physical Exam  Vitals signs and nursing note reviewed.   Constitutional:       General: He is not in acute distress.     Appearance: Normal appearance. He is not ill-appearing.   HENT:      Head: Atraumatic.   Eyes:      Conjunctiva/sclera: Conjunctivae normal.   Cardiovascular:      Rate and Rhythm: Normal rate.   Pulmonary:      Effort: Pulmonary effort is normal. No respiratory distress.   Musculoskeletal:      Cervical back: He exhibits tenderness and bony tenderness.      Lumbar back: He exhibits tenderness and bony tenderness.   Skin:     General: Skin is warm and dry.   Neurological:      Mental Status: He is alert and oriented to person, place, and time.      Motor: Motor function is intact. No weakness.      Gait: Gait abnormal (ambulates with cane).   Psychiatric:         Mood and Affect: Mood normal.         Behavior: Behavior normal.       Assessment/Plan   Diagnoses and all orders for this visit:    1. Other chronic pain (Primary)    2. Degeneration of intervertebral disc of lumbar region    3. Postlaminectomy syndrome, cervical region    4. Postlaminectomy syndrome of lumbar region    5. Encounter for long-term current use of high risk medication      --- Ok for continued post op pain management per Dr. Singleton.   --- Will continue prescribing Hydrocodone once out of acute post op period. encouraged patient to begin reducing dose as tolerated.    --- Routine ODT in office today as part of monitoring requirements for controlled substances.  The specimen was viewed and the immunoassay result reviewed and is (POC not available, oral).  This specimen will be sent to [facility] laboratory for confirmation.     --- Follow-up 1 months/sooner if needed          MARIOLA REPORT  As part of the patient's treatment plan, I am prescribing controlled substances. The patient has been made aware of appropriate use of such " medications, including potential risk of somnolence, limited ability to drive and/or work safely, and the potential for dependence or overdose. It has also bee made clear that these medications are for use by this patient only, without concomitant use of alcohol or other substances unless prescribed.     Patient has completed prescribing agreement detailing terms of continued prescribing of controlled substances, including monitoring MARIOLA reports, urine drug screening, and pill counts if necessary. The patient is aware that inappropriate use will results in cessation of prescribing such medications.    MARIOLA report has been reviewed and scanned into the patient's chart.    As the clinician, I personally reviewed the MARIOLA from 12/15/2020 while the patient was in the office today.    History and physical exam exhibit continued safe and appropriate use of controlled substances.    EMR Dragon/Transcription disclaimer:   Much of this encounter note is an electronic transcription/translation of spoken language to printed text. The electronic translation of spoken language may permit erroneous, or at times, nonsensical words or phrases to be inadvertently transcribed; Although I have reviewed the note for such errors, some may still exist.

## 2020-12-18 NOTE — PROGRESS NOTES
Subjective   Patient ID: Chevy Goodwin is a 80 y.o. male is here today via Telephone Visit for follow-up.  Mr. Goodwin had C4-5 laminectomy and foraminotomies with fusion from C3-C6 and instrumentation using microsurgical technique microsurgical instrumentation done on 11/30/2020.     You have chosen to receive care through a telephone visit. Do you consent to use a telephone visit for your medical care today? Yes    We had a telephone visit today.  The patient was at home and I was in the office.  His wife was also on the phone and we talked for 5 minutes.    History of Present Illness    The patient is beginning to feel a little bit better than he did last week when he was here.  He still has quite a bit of neck pain however.    The following portions of the patient's history were reviewed and updated as appropriate: allergies, current medications, past family history, past medical history, past social history, past surgical history and problem list.    Review of Systems   Constitutional: Positive for fatigue.   Respiratory: Negative for chest tightness and shortness of breath.    Cardiovascular: Negative for chest pain.   Musculoskeletal: Positive for arthralgias (Shoulder pain), myalgias, neck pain and neck stiffness.   Neurological: Negative.        I have reviewed the review of systems as documented by my MA.      Objective         Physical Exam  Neurological:      Mental Status: He is oriented to person, place, and time.       Neurologic Exam     Mental Status   Oriented to person, place, and time.           Assessment/Plan   Independent Review of Radiographic Studies:      I personally reviewed the images from the following studies.    There are no new images to review    Medical Decision Making:      I told the patient that we would see him again in 2 weeks with another x-ray.  He has never heard from the physical therapist we will go ahead and send him to an outside physical therapy unit.    Diagnoses and  all orders for this visit:    1. Follow-up examination following surgery (Primary)  -     XR Spine Cervical 2 or 3 View; Future      Return in about 2 weeks (around 1/5/2021).

## 2020-12-21 ENCOUNTER — READMISSION MANAGEMENT (OUTPATIENT)
Dept: CALL CENTER | Facility: HOSPITAL | Age: 80
End: 2020-12-21

## 2020-12-22 ENCOUNTER — OFFICE VISIT (OUTPATIENT)
Dept: NEUROSURGERY | Facility: CLINIC | Age: 80
End: 2020-12-22

## 2020-12-22 DIAGNOSIS — Z09 FOLLOW-UP EXAMINATION FOLLOWING SURGERY: Primary | ICD-10-CM

## 2020-12-22 PROCEDURE — 99024 POSTOP FOLLOW-UP VISIT: CPT | Performed by: NEUROLOGICAL SURGERY

## 2020-12-28 ENCOUNTER — READMISSION MANAGEMENT (OUTPATIENT)
Dept: CALL CENTER | Facility: HOSPITAL | Age: 80
End: 2020-12-28

## 2020-12-28 NOTE — OUTREACH NOTE
General Surgery Week 3 Survey      Responses   [facility] patient discharged from?  [place]   Does the patient have one of the following disease processes/diagnoses(primary or secondary)?  General Surgery   Week 3 attempt successful?  Yes   Call start time  1718   Call end time  1720   Discharge diagnosis  Cervical degenerative disc disease   Meds reviewed with patient/caregiver?  Yes   Is the patient having any side effects they believe may be caused by any medication additions or changes?  No   Does the patient have all medications related to this admission filled (includes all antibiotics, pain medications, etc.)  Yes   Is the patient taking all medications as directed (includes completed medication regime)?  Yes   Does the patient have a follow up appointment scheduled with their surgeon?  Yes   Has the patient kept scheduled appointments due by today?  Yes   Has home health visited the patient within 72 hours of discharge?  N/A   Psychosocial issues?  No   Did the patient receive a copy of their discharge instructions?  Yes   Nursing interventions  Reviewed instructions with patient   What is the patient's perception of their health status since discharge?  Improving   Nursing interventions  Nurse provided patient education   Is the patient /caregiver able to teach back basic post-op care?  Continue use of incentive spirometry at least 1 week post discharge, Lifting as instructed by MD in discharge instructions, No tub bath, swimming, or hot tub until instructed by MD   Is the patient/caregiver able to teach back signs and symptoms of incisional infection?  Increased redness, swelling or pain at the incisonal site, Incisional warmth, Fever, Increased drainage or bleeding, Pus or odor from incision   Is the patient/caregiver able to teach back steps to recovery at home?  Set small, achievable goals for return to baseline health, Rest and rebuild strength, gradually increase activity   If the patient is  a current smoker, are they able to teach back resources for cessation?  [id number]   Is the patient/caregiver able to teach back the hierarchy of who to call/visit for symptoms/problems? PCP, Specialist, Home health nurse, Urgent Care, ED, 911  Yes   Week 3 call completed?  Yes   Revoked  No further contact(revokes)-requires comment          Justin Cage RN

## 2021-01-01 ENCOUNTER — OFFICE VISIT (OUTPATIENT)
Dept: INTERNAL MEDICINE | Facility: CLINIC | Age: 81
End: 2021-01-01

## 2021-01-01 ENCOUNTER — OFFICE VISIT (OUTPATIENT)
Dept: PAIN MEDICINE | Facility: CLINIC | Age: 81
End: 2021-01-01

## 2021-01-01 ENCOUNTER — TELEPHONE (OUTPATIENT)
Dept: INTERNAL MEDICINE | Facility: CLINIC | Age: 81
End: 2021-01-01

## 2021-01-01 ENCOUNTER — OFFICE VISIT (OUTPATIENT)
Dept: NEUROSURGERY | Facility: CLINIC | Age: 81
End: 2021-01-01

## 2021-01-01 ENCOUNTER — TRANSCRIBE ORDERS (OUTPATIENT)
Dept: INFUSION THERAPY | Facility: HOSPITAL | Age: 81
End: 2021-01-01

## 2021-01-01 ENCOUNTER — TELEPHONE (OUTPATIENT)
Dept: PAIN MEDICINE | Facility: CLINIC | Age: 81
End: 2021-01-01

## 2021-01-01 ENCOUNTER — TELEPHONE (OUTPATIENT)
Dept: NEUROSURGERY | Facility: CLINIC | Age: 81
End: 2021-01-01

## 2021-01-01 ENCOUNTER — HOSPITAL ENCOUNTER (OUTPATIENT)
Dept: GENERAL RADIOLOGY | Facility: HOSPITAL | Age: 81
Discharge: HOME | End: 2021-05-17
Admitting: NEUROLOGICAL SURGERY

## 2021-01-01 ENCOUNTER — HOSPITAL ENCOUNTER (OUTPATIENT)
Dept: INFUSION THERAPY | Facility: HOSPITAL | Age: 81
Discharge: HOME | End: 2021-12-19

## 2021-01-01 ENCOUNTER — OFFICE VISIT (OUTPATIENT)
Dept: SLEEP MEDICINE | Facility: HOSPITAL | Age: 81
End: 2021-01-01

## 2021-01-01 ENCOUNTER — HOSPITAL ENCOUNTER (OUTPATIENT)
Dept: GENERAL RADIOLOGY | Facility: HOSPITAL | Age: 81
Discharge: HOME | End: 2021-02-16
Admitting: NEUROLOGICAL SURGERY

## 2021-01-01 VITALS
HEIGHT: 68 IN | SYSTOLIC BLOOD PRESSURE: 134 MMHG | WEIGHT: 215.4 LBS | DIASTOLIC BLOOD PRESSURE: 68 MMHG | BODY MASS INDEX: 32.64 KG/M2 | OXYGEN SATURATION: 98 % | HEART RATE: 69 BPM | TEMPERATURE: 96.9 F

## 2021-01-01 VITALS
HEART RATE: 67 BPM | DIASTOLIC BLOOD PRESSURE: 69 MMHG | RESPIRATION RATE: 16 BRPM | HEIGHT: 68 IN | OXYGEN SATURATION: 95 % | SYSTOLIC BLOOD PRESSURE: 151 MMHG | TEMPERATURE: 97.5 F | BODY MASS INDEX: 31.95 KG/M2 | WEIGHT: 210.8 LBS

## 2021-01-01 VITALS
SYSTOLIC BLOOD PRESSURE: 157 MMHG | TEMPERATURE: 97.3 F | HEART RATE: 55 BPM | BODY MASS INDEX: 32.64 KG/M2 | WEIGHT: 215.4 LBS | DIASTOLIC BLOOD PRESSURE: 67 MMHG | HEIGHT: 68 IN

## 2021-01-01 VITALS
BODY MASS INDEX: 32.89 KG/M2 | OXYGEN SATURATION: 95 % | HEIGHT: 68 IN | WEIGHT: 217 LBS | HEART RATE: 55 BPM | SYSTOLIC BLOOD PRESSURE: 118 MMHG | TEMPERATURE: 97.3 F | DIASTOLIC BLOOD PRESSURE: 60 MMHG

## 2021-01-01 VITALS
SYSTOLIC BLOOD PRESSURE: 122 MMHG | BODY MASS INDEX: 32.51 KG/M2 | DIASTOLIC BLOOD PRESSURE: 68 MMHG | WEIGHT: 213.8 LBS | OXYGEN SATURATION: 97 % | HEART RATE: 90 BPM

## 2021-01-01 VITALS
BODY MASS INDEX: 33.04 KG/M2 | WEIGHT: 218 LBS | HEART RATE: 80 BPM | DIASTOLIC BLOOD PRESSURE: 70 MMHG | SYSTOLIC BLOOD PRESSURE: 125 MMHG | HEIGHT: 68 IN | TEMPERATURE: 98 F

## 2021-01-01 VITALS
SYSTOLIC BLOOD PRESSURE: 146 MMHG | TEMPERATURE: 97.3 F | OXYGEN SATURATION: 96 % | DIASTOLIC BLOOD PRESSURE: 60 MMHG | HEART RATE: 76 BPM | HEIGHT: 68 IN | BODY MASS INDEX: 30.98 KG/M2 | WEIGHT: 204.4 LBS

## 2021-01-01 VITALS
DIASTOLIC BLOOD PRESSURE: 60 MMHG | BODY MASS INDEX: 31.92 KG/M2 | OXYGEN SATURATION: 96 % | HEART RATE: 96 BPM | TEMPERATURE: 96.9 F | HEIGHT: 68 IN | WEIGHT: 210.6 LBS | SYSTOLIC BLOOD PRESSURE: 126 MMHG | RESPIRATION RATE: 18 BRPM

## 2021-01-01 VITALS
WEIGHT: 220 LBS | DIASTOLIC BLOOD PRESSURE: 62 MMHG | OXYGEN SATURATION: 96 % | BODY MASS INDEX: 33.34 KG/M2 | SYSTOLIC BLOOD PRESSURE: 140 MMHG | TEMPERATURE: 98.2 F | WEIGHT: 218.9 LBS | HEIGHT: 68 IN | BODY MASS INDEX: 33.18 KG/M2 | HEART RATE: 68 BPM | HEIGHT: 68 IN | RESPIRATION RATE: 18 BRPM

## 2021-01-01 VITALS
WEIGHT: 220 LBS | OXYGEN SATURATION: 95 % | SYSTOLIC BLOOD PRESSURE: 163 MMHG | TEMPERATURE: 97.7 F | BODY MASS INDEX: 33.34 KG/M2 | HEIGHT: 68 IN | DIASTOLIC BLOOD PRESSURE: 74 MMHG | HEART RATE: 59 BPM | RESPIRATION RATE: 18 BRPM

## 2021-01-01 VITALS
BODY MASS INDEX: 32.22 KG/M2 | OXYGEN SATURATION: 94 % | DIASTOLIC BLOOD PRESSURE: 65 MMHG | HEART RATE: 56 BPM | HEIGHT: 68 IN | WEIGHT: 212.6 LBS | SYSTOLIC BLOOD PRESSURE: 128 MMHG | TEMPERATURE: 96.9 F | RESPIRATION RATE: 16 BRPM

## 2021-01-01 VITALS
BODY MASS INDEX: 30.62 KG/M2 | SYSTOLIC BLOOD PRESSURE: 161 MMHG | HEART RATE: 85 BPM | TEMPERATURE: 97.3 F | WEIGHT: 202 LBS | HEIGHT: 68 IN | OXYGEN SATURATION: 95 % | DIASTOLIC BLOOD PRESSURE: 64 MMHG

## 2021-01-01 VITALS
OXYGEN SATURATION: 96 % | BODY MASS INDEX: 33.04 KG/M2 | HEIGHT: 68 IN | DIASTOLIC BLOOD PRESSURE: 68 MMHG | WEIGHT: 218 LBS | HEART RATE: 56 BPM | SYSTOLIC BLOOD PRESSURE: 130 MMHG | TEMPERATURE: 98 F

## 2021-01-01 VITALS
TEMPERATURE: 98.1 F | OXYGEN SATURATION: 96 % | BODY MASS INDEX: 30.92 KG/M2 | HEART RATE: 75 BPM | WEIGHT: 204 LBS | HEIGHT: 68 IN | DIASTOLIC BLOOD PRESSURE: 74 MMHG | SYSTOLIC BLOOD PRESSURE: 130 MMHG

## 2021-01-01 DIAGNOSIS — M50.30 DDD (DEGENERATIVE DISC DISEASE), CERVICAL: Primary | ICD-10-CM

## 2021-01-01 DIAGNOSIS — M96.1 POSTLAMINECTOMY SYNDROME OF LUMBAR REGION: ICD-10-CM

## 2021-01-01 DIAGNOSIS — D50.0 IRON DEFICIENCY ANEMIA DUE TO CHRONIC BLOOD LOSS: ICD-10-CM

## 2021-01-01 DIAGNOSIS — M85.80 OSTEOPENIA, UNSPECIFIED LOCATION: ICD-10-CM

## 2021-01-01 DIAGNOSIS — M50.30 DDD (DEGENERATIVE DISC DISEASE), CERVICAL: ICD-10-CM

## 2021-01-01 DIAGNOSIS — M96.1 FAILED BACK SYNDROME: ICD-10-CM

## 2021-01-01 DIAGNOSIS — R09.81 NASAL CONGESTION: ICD-10-CM

## 2021-01-01 DIAGNOSIS — G47.14 HYPERSOMNIA DUE TO MEDICAL CONDITION: ICD-10-CM

## 2021-01-01 DIAGNOSIS — Z79.4 TYPE 2 DIABETES MELLITUS WITH HYPERGLYCEMIA, WITH LONG-TERM CURRENT USE OF INSULIN (HCC): ICD-10-CM

## 2021-01-01 DIAGNOSIS — N18.32 STAGE 3B CHRONIC KIDNEY DISEASE (HCC): ICD-10-CM

## 2021-01-01 DIAGNOSIS — E11.65 TYPE 2 DIABETES MELLITUS WITH HYPERGLYCEMIA, WITH LONG-TERM CURRENT USE OF INSULIN (HCC): ICD-10-CM

## 2021-01-01 DIAGNOSIS — M51.36 DEGENERATION OF INTERVERTEBRAL DISC OF LUMBAR REGION: ICD-10-CM

## 2021-01-01 DIAGNOSIS — R53.0 NEOPLASTIC MALIGNANT RELATED FATIGUE: ICD-10-CM

## 2021-01-01 DIAGNOSIS — Z23 NEED FOR IMMUNIZATION AGAINST INFLUENZA: ICD-10-CM

## 2021-01-01 DIAGNOSIS — E78.5 HYPERLIPIDEMIA, UNSPECIFIED HYPERLIPIDEMIA TYPE: Primary | ICD-10-CM

## 2021-01-01 DIAGNOSIS — E79.0 HYPERURICEMIA: ICD-10-CM

## 2021-01-01 DIAGNOSIS — M51.34 DDD (DEGENERATIVE DISC DISEASE), THORACIC: ICD-10-CM

## 2021-01-01 DIAGNOSIS — Z79.899 HIGH RISK MEDICATION USE: ICD-10-CM

## 2021-01-01 DIAGNOSIS — G89.29 OTHER CHRONIC PAIN: Primary | ICD-10-CM

## 2021-01-01 DIAGNOSIS — Z96.611 STATUS POST REVERSE TOTAL ARTHROPLASTY OF RIGHT SHOULDER: ICD-10-CM

## 2021-01-01 DIAGNOSIS — T38.0X5A STEROID-INDUCED HYPERGLYCEMIA: ICD-10-CM

## 2021-01-01 DIAGNOSIS — K21.9 GASTROESOPHAGEAL REFLUX DISEASE WITHOUT ESOPHAGITIS: ICD-10-CM

## 2021-01-01 DIAGNOSIS — G89.3 CANCER ASSOCIATED PAIN: ICD-10-CM

## 2021-01-01 DIAGNOSIS — E11.65 TYPE 2 DIABETES MELLITUS WITH HYPERGLYCEMIA, WITH LONG-TERM CURRENT USE OF INSULIN (HCC): Primary | ICD-10-CM

## 2021-01-01 DIAGNOSIS — G47.33 OSA TREATED WITH BIPAP: ICD-10-CM

## 2021-01-01 DIAGNOSIS — J11.1 FLU: Primary | ICD-10-CM

## 2021-01-01 DIAGNOSIS — C88.0 WALDENSTROM MACROGLOBULINEMIA (HCC): Primary | ICD-10-CM

## 2021-01-01 DIAGNOSIS — I10 ESSENTIAL HYPERTENSION: ICD-10-CM

## 2021-01-01 DIAGNOSIS — E03.9 HYPOTHYROIDISM, UNSPECIFIED TYPE: ICD-10-CM

## 2021-01-01 DIAGNOSIS — Z79.899 ENCOUNTER FOR LONG-TERM CURRENT USE OF HIGH RISK MEDICATION: ICD-10-CM

## 2021-01-01 DIAGNOSIS — R53.0 NEOPLASTIC (MALIGNANT) RELATED FATIGUE: ICD-10-CM

## 2021-01-01 DIAGNOSIS — R52 DIFFUSE PAIN: ICD-10-CM

## 2021-01-01 DIAGNOSIS — M96.1 POSTLAMINECTOMY SYNDROME, CERVICAL REGION: ICD-10-CM

## 2021-01-01 DIAGNOSIS — G47.33 OSA TREATED WITH BIPAP: Primary | ICD-10-CM

## 2021-01-01 DIAGNOSIS — Z79.891 LONG TERM (CURRENT) USE OF OPIATE ANALGESIC: ICD-10-CM

## 2021-01-01 DIAGNOSIS — D47.2 MGUS (MONOCLONAL GAMMOPATHY OF UNKNOWN SIGNIFICANCE): ICD-10-CM

## 2021-01-01 DIAGNOSIS — U07.1 COVID-19 VIRUS INFECTION: ICD-10-CM

## 2021-01-01 DIAGNOSIS — C88.0 WALDENSTROM MACROGLOBULINEMIA (HCC): ICD-10-CM

## 2021-01-01 DIAGNOSIS — Z23 IMMUNIZATION DUE: ICD-10-CM

## 2021-01-01 DIAGNOSIS — I25.10 CORONARY ARTERY DISEASE INVOLVING NATIVE HEART WITHOUT ANGINA PECTORIS, UNSPECIFIED VESSEL OR LESION TYPE: ICD-10-CM

## 2021-01-01 DIAGNOSIS — Z90.79 H/O PROSTATECTOMY: ICD-10-CM

## 2021-01-01 DIAGNOSIS — R05.9 COUGH: ICD-10-CM

## 2021-01-01 DIAGNOSIS — Z71.89 GOALS OF CARE, COUNSELING/DISCUSSION: Primary | ICD-10-CM

## 2021-01-01 DIAGNOSIS — E78.5 HYPERLIPIDEMIA, UNSPECIFIED HYPERLIPIDEMIA TYPE: ICD-10-CM

## 2021-01-01 DIAGNOSIS — Z00.00 MEDICARE ANNUAL WELLNESS VISIT, SUBSEQUENT: Primary | ICD-10-CM

## 2021-01-01 DIAGNOSIS — R11.2 NAUSEA AND VOMITING, INTRACTABILITY OF VOMITING NOT SPECIFIED, UNSPECIFIED VOMITING TYPE: ICD-10-CM

## 2021-01-01 DIAGNOSIS — Z79.4 TYPE 2 DIABETES MELLITUS WITH HYPERGLYCEMIA, WITH LONG-TERM CURRENT USE OF INSULIN (HCC): Primary | ICD-10-CM

## 2021-01-01 DIAGNOSIS — E55.9 VITAMIN D DEFICIENCY: ICD-10-CM

## 2021-01-01 DIAGNOSIS — I10 ESSENTIAL HYPERTENSION: Primary | ICD-10-CM

## 2021-01-01 DIAGNOSIS — E66.9 OBESITY (BMI 30-39.9): ICD-10-CM

## 2021-01-01 DIAGNOSIS — U07.1 CLINICAL DIAGNOSIS OF COVID-19: Primary | ICD-10-CM

## 2021-01-01 DIAGNOSIS — R53.0 NEOPLASTIC (MALIGNANT) RELATED FATIGUE: Primary | ICD-10-CM

## 2021-01-01 DIAGNOSIS — R73.9 STEROID-INDUCED HYPERGLYCEMIA: ICD-10-CM

## 2021-01-01 DIAGNOSIS — Z23 NEED FOR 23-POLYVALENT PNEUMOCOCCAL POLYSACCHARIDE VACCINE: ICD-10-CM

## 2021-01-01 LAB
ALBUMIN SERPL-MCNC: 4.2 G/DL (ref 3.7–4.7)
ALBUMIN/GLOB SERPL: 1.2 {RATIO} (ref 1.2–2.2)
ALP SERPL-CCNC: 146 IU/L (ref 39–117)
ALT SERPL-CCNC: 25 IU/L (ref 0–44)
AST SERPL-CCNC: 24 IU/L (ref 0–40)
BASOPHILS # BLD AUTO: 0.1 X10E3/UL (ref 0–0.2)
BASOPHILS NFR BLD AUTO: 1 %
BILIRUB SERPL-MCNC: 0.4 MG/DL (ref 0–1.2)
BUN SERPL-MCNC: 56 MG/DL (ref 8–27)
BUN/CREAT SERPL: 33 (ref 10–24)
CALCIUM SERPL-MCNC: 9.5 MG/DL (ref 8.6–10.2)
CHLORIDE SERPL-SCNC: 100 MMOL/L (ref 96–106)
CHOLEST SERPL-MCNC: 132 MG/DL (ref 100–199)
CO2 SERPL-SCNC: 24 MMOL/L (ref 20–29)
CREAT SERPL-MCNC: 1.72 MG/DL (ref 0.76–1.27)
EOSINOPHIL # BLD AUTO: 0.5 X10E3/UL (ref 0–0.4)
EOSINOPHIL NFR BLD AUTO: 7 %
ERYTHROCYTE [DISTWIDTH] IN BLOOD BY AUTOMATED COUNT: 14.4 % (ref 11.6–15.4)
EXPIRATION DATE: ABNORMAL
FLUAV AG NPH QL: POSITIVE
FLUBV AG NPH QL: POSITIVE
FT4I SERPL CALC-MCNC: 1.8 (ref 1.2–4.9)
FT4I SERPL CALC-MCNC: 1.8 (ref 1.2–4.9)
GLOBULIN SER CALC-MCNC: 3.4 G/DL (ref 1.5–4.5)
GLUCOSE SERPL-MCNC: 253 MG/DL (ref 65–99)
HBA1C MFR BLD: 6.1 % (ref 4.8–5.6)
HBA1C MFR BLD: 6.3 %
HBA1C MFR BLD: 7.2 % (ref 4.8–5.6)
HCT VFR BLD AUTO: 31.6 % (ref 37.5–51)
HDLC SERPL-MCNC: 44 MG/DL
HGB BLD-MCNC: 10.8 G/DL (ref 13–17.7)
IMM GRANULOCYTES # BLD AUTO: 0 X10E3/UL (ref 0–0.1)
IMM GRANULOCYTES NFR BLD AUTO: 0 %
INTERNAL CONTROL: ABNORMAL
LABCORP SARS-COV-2, NAA 2 DAY TAT: NORMAL
LDLC SERPL CALC-MCNC: 55 MG/DL (ref 0–99)
LYMPHOCYTES # BLD AUTO: 2.3 X10E3/UL (ref 0.7–3.1)
LYMPHOCYTES NFR BLD AUTO: 34 %
Lab: 2653
MCH RBC QN AUTO: 33.3 PG (ref 26.6–33)
MCHC RBC AUTO-ENTMCNC: 34.2 G/DL (ref 31.5–35.7)
MCV RBC AUTO: 98 FL (ref 79–97)
MONOCYTES # BLD AUTO: 0.6 X10E3/UL (ref 0.1–0.9)
MONOCYTES NFR BLD AUTO: 9 %
NEUTROPHILS # BLD AUTO: 3.3 X10E3/UL (ref 1.4–7)
NEUTROPHILS NFR BLD AUTO: 49 %
PLATELET # BLD AUTO: 216 X10E3/UL (ref 150–450)
POC AMPHETAMINES: NEGATIVE
POC BARBITURATES: NEGATIVE
POC BENZODIAZEPHINES: NEGATIVE
POC COCAINE: NEGATIVE
POC METHADONE: NEGATIVE
POC METHAMPHETAMINE SCREEN URINE: NEGATIVE
POC OPIATES: POSITIVE
POC OXYCODONE: NEGATIVE
POC PHENCYCLIDINE: NEGATIVE
POC PROPOXYPHENE: NEGATIVE
POC THC: NEGATIVE
POC TRICYCLIC ANTIDEPRESSANTS: NEGATIVE
POTASSIUM SERPL-SCNC: 4.8 MMOL/L (ref 3.5–5.2)
PROT SERPL-MCNC: 7.6 G/DL (ref 6–8.5)
RBC # BLD AUTO: 3.24 X10E6/UL (ref 4.14–5.8)
SARS-COV-2 RNA RESP QL NAA+PROBE: DETECTED
SODIUM SERPL-SCNC: 140 MMOL/L (ref 134–144)
T3RU NFR SERPL: 31 % (ref 24–39)
T3RU NFR SERPL: 31 % (ref 24–39)
T4 SERPL-MCNC: 5.8 UG/DL (ref 4.5–12)
T4 SERPL-MCNC: 5.9 UG/DL (ref 4.5–12)
TRIGL SERPL-MCNC: 203 MG/DL (ref 0–149)
TSH SERPL DL<=0.005 MIU/L-ACNC: 1.52 UIU/ML (ref 0.45–4.5)
TSH SERPL DL<=0.005 MIU/L-ACNC: 3.89 UIU/ML (ref 0.45–4.5)
URATE SERPL-MCNC: 5.4 MG/DL (ref 3.8–8.4)
VLDLC SERPL CALC-MCNC: 33 MG/DL (ref 5–40)
WBC # BLD AUTO: 6.7 X10E3/UL (ref 3.4–10.8)

## 2021-01-01 PROCEDURE — 99214 OFFICE O/P EST MOD 30 MIN: CPT | Performed by: NURSE PRACTITIONER

## 2021-01-01 PROCEDURE — 99213 OFFICE O/P EST LOW 20 MIN: CPT | Performed by: NEUROLOGICAL SURGERY

## 2021-01-01 PROCEDURE — 99214 OFFICE O/P EST MOD 30 MIN: CPT | Performed by: FAMILY MEDICINE

## 2021-01-01 PROCEDURE — G0008 ADMIN INFLUENZA VIRUS VAC: HCPCS | Performed by: FAMILY MEDICINE

## 2021-01-01 PROCEDURE — 36416 COLLJ CAPILLARY BLOOD SPEC: CPT | Performed by: FAMILY MEDICINE

## 2021-01-01 PROCEDURE — 99213 OFFICE O/P EST LOW 20 MIN: CPT | Performed by: INTERNAL MEDICINE

## 2021-01-01 PROCEDURE — 72052 X-RAY EXAM NECK SPINE 6/>VWS: CPT

## 2021-01-01 PROCEDURE — 99213 OFFICE O/P EST LOW 20 MIN: CPT | Performed by: NURSE PRACTITIONER

## 2021-01-01 PROCEDURE — 99215 OFFICE O/P EST HI 40 MIN: CPT | Performed by: FAMILY MEDICINE

## 2021-01-01 PROCEDURE — 90662 IIV NO PRSV INCREASED AG IM: CPT | Performed by: FAMILY MEDICINE

## 2021-01-01 PROCEDURE — G0009 ADMIN PNEUMOCOCCAL VACCINE: HCPCS | Performed by: FAMILY MEDICINE

## 2021-01-01 PROCEDURE — 99024 POSTOP FOLLOW-UP VISIT: CPT | Performed by: NEUROLOGICAL SURGERY

## 2021-01-01 PROCEDURE — G0463 HOSPITAL OUTPT CLINIC VISIT: HCPCS

## 2021-01-01 PROCEDURE — 80305 DRUG TEST PRSMV DIR OPT OBS: CPT | Performed by: NURSE PRACTITIONER

## 2021-01-01 PROCEDURE — G0439 PPPS, SUBSEQ VISIT: HCPCS | Performed by: FAMILY MEDICINE

## 2021-01-01 PROCEDURE — 72040 X-RAY EXAM NECK SPINE 2-3 VW: CPT

## 2021-01-01 PROCEDURE — 87804 INFLUENZA ASSAY W/OPTIC: CPT | Performed by: NURSE PRACTITIONER

## 2021-01-01 PROCEDURE — 99442 PR PHYS/QHP TELEPHONE EVALUATION 11-20 MIN: CPT | Performed by: NURSE PRACTITIONER

## 2021-01-01 PROCEDURE — 90732 PPSV23 VACC 2 YRS+ SUBQ/IM: CPT | Performed by: FAMILY MEDICINE

## 2021-01-01 RX ORDER — LANCETS 28 GAUGE
EACH MISCELLANEOUS
Qty: 100 EACH | Refills: 3 | Status: CANCELLED | OUTPATIENT
Start: 2021-01-01

## 2021-01-01 RX ORDER — BUMETANIDE 1 MG/1
1 TABLET ORAL DAILY
Qty: 90 TABLET | Refills: 3 | Status: SHIPPED | OUTPATIENT
Start: 2021-01-01 | End: 2021-01-01 | Stop reason: SDUPTHER

## 2021-01-01 RX ORDER — MULTIVIT WITH MINERALS/LUTEIN
250 TABLET ORAL DAILY
COMMUNITY

## 2021-01-01 RX ORDER — MELATONIN
Qty: 90 TABLET | Refills: 3 | Status: SHIPPED | OUTPATIENT
Start: 2021-01-01

## 2021-01-01 RX ORDER — HYDROCODONE BITARTRATE AND ACETAMINOPHEN 7.5; 325 MG/1; MG/1
TABLET ORAL
Qty: 150 TABLET | Refills: 0 | Status: SHIPPED | OUTPATIENT
Start: 2021-01-01 | End: 2021-01-01 | Stop reason: SDUPTHER

## 2021-01-01 RX ORDER — CALCIUM CARBONATE/VITAMIN D2 500 MG-125
1 TABLET ORAL 2 TIMES DAILY
Qty: 180 EACH | Refills: 3 | Status: SHIPPED | OUTPATIENT
Start: 2021-01-01

## 2021-01-01 RX ORDER — CARVEDILOL 12.5 MG/1
12.5 TABLET ORAL 2 TIMES DAILY WITH MEALS
Qty: 180 TABLET | Refills: 3 | Status: SHIPPED | OUTPATIENT
Start: 2021-01-01 | End: 2021-01-01

## 2021-01-01 RX ORDER — ALLOPURINOL 300 MG/1
300 TABLET ORAL EVERY EVENING
Qty: 90 TABLET | Refills: 3 | Status: SHIPPED | OUTPATIENT
Start: 2021-01-01

## 2021-01-01 RX ORDER — HYDRALAZINE HYDROCHLORIDE 10 MG/1
TABLET, FILM COATED ORAL
Qty: 60 TABLET | Refills: 1 | Status: SHIPPED | OUTPATIENT
Start: 2021-01-01 | End: 2021-01-01

## 2021-01-01 RX ORDER — INSULIN GLARGINE 100 [IU]/ML
INJECTION, SOLUTION SUBCUTANEOUS
Qty: 29 PEN | Refills: 3 | Status: SHIPPED | OUTPATIENT
Start: 2021-01-01 | End: 2021-01-01 | Stop reason: SDUPTHER

## 2021-01-01 RX ORDER — SODIUM CHLORIDE 9 MG/ML
30 INJECTION, SOLUTION INTRAVENOUS ONCE
Status: CANCELLED | OUTPATIENT
Start: 2021-01-01

## 2021-01-01 RX ORDER — DEXTROAMPHETAMINE SACCHARATE, AMPHETAMINE ASPARTATE, DEXTROAMPHETAMINE SULFATE AND AMPHETAMINE SULFATE 1.25; 1.25; 1.25; 1.25 MG/1; MG/1; MG/1; MG/1
5 TABLET ORAL 2 TIMES DAILY
Qty: 60 TABLET | Refills: 0 | Status: SHIPPED | OUTPATIENT
Start: 2021-01-01 | End: 2021-01-01

## 2021-01-01 RX ORDER — TOBRAMYCIN AND DEXAMETHASONE 3; 1 MG/ML; MG/ML
SUSPENSION/ DROPS OPHTHALMIC
COMMUNITY
Start: 2021-01-11 | End: 2022-01-01

## 2021-01-01 RX ORDER — CALCIUM CARBONATE 200(500)MG
1 TABLET,CHEWABLE ORAL 2 TIMES DAILY
Qty: 180 TABLET | Refills: 3 | Status: SHIPPED | OUTPATIENT
Start: 2021-01-01

## 2021-01-01 RX ORDER — TERAZOSIN 2 MG/1
1 CAPSULE ORAL NIGHTLY
COMMUNITY
Start: 2021-01-01 | End: 2021-01-01

## 2021-01-01 RX ORDER — SPIRONOLACTONE 25 MG/1
25 TABLET ORAL DAILY
Qty: 90 TABLET | Refills: 3 | Status: CANCELLED | OUTPATIENT
Start: 2021-01-01 | End: 2022-03-30

## 2021-01-01 RX ORDER — METHYLPREDNISOLONE SODIUM SUCCINATE 125 MG/2ML
125 INJECTION, POWDER, LYOPHILIZED, FOR SOLUTION INTRAMUSCULAR; INTRAVENOUS AS NEEDED
Status: CANCELLED | OUTPATIENT
Start: 2021-01-01

## 2021-01-01 RX ORDER — TERAZOSIN 2 MG/1
2 CAPSULE ORAL NIGHTLY
COMMUNITY
End: 2021-01-01 | Stop reason: SDUPTHER

## 2021-01-01 RX ORDER — SPIRONOLACTONE 25 MG/1
25 TABLET ORAL DAILY
Qty: 90 TABLET | Refills: 3 | Status: SHIPPED | OUTPATIENT
Start: 2021-01-01 | End: 2022-06-21

## 2021-01-01 RX ORDER — DIPHENHYDRAMINE HCL 50 MG
50 CAPSULE ORAL ONCE AS NEEDED
Status: CANCELLED | OUTPATIENT
Start: 2021-01-01

## 2021-01-01 RX ORDER — INSULIN GLARGINE 100 [IU]/ML
INJECTION, SOLUTION SUBCUTANEOUS
Qty: 32 PEN | Refills: 3 | Status: SHIPPED | OUTPATIENT
Start: 2021-01-01 | End: 2021-01-01

## 2021-01-01 RX ORDER — TERAZOSIN 1 MG/1
2 CAPSULE ORAL NIGHTLY
Qty: 90 CAPSULE | Refills: 3 | Status: SHIPPED | OUTPATIENT
Start: 2021-01-01 | End: 2021-01-01

## 2021-01-01 RX ORDER — TACROLIMUS 1 MG/G
OINTMENT TOPICAL 2 TIMES DAILY
COMMUNITY

## 2021-01-01 RX ORDER — LOSARTAN POTASSIUM 100 MG/1
100 TABLET ORAL DAILY
COMMUNITY
Start: 2021-01-01 | End: 2021-01-01 | Stop reason: SDUPTHER

## 2021-01-01 RX ORDER — LANCETS 28 GAUGE
EACH MISCELLANEOUS
Qty: 100 EACH | Refills: 3 | Status: SHIPPED | OUTPATIENT
Start: 2021-01-01 | End: 2021-01-01 | Stop reason: SDUPTHER

## 2021-01-01 RX ORDER — BENZONATATE 100 MG/1
100 CAPSULE ORAL 3 TIMES DAILY PRN
COMMUNITY

## 2021-01-01 RX ORDER — SPIRONOLACTONE 25 MG/1
25 TABLET ORAL DAILY
Qty: 90 TABLET | Refills: 3 | Status: SHIPPED | OUTPATIENT
Start: 2021-01-01 | End: 2021-01-01 | Stop reason: SDUPTHER

## 2021-01-01 RX ORDER — HYDROCODONE BITARTRATE AND ACETAMINOPHEN 5; 325 MG/1; MG/1
1 TABLET ORAL EVERY 6 HOURS PRN
Qty: 120 TABLET | Refills: 0 | Status: SHIPPED | OUTPATIENT
Start: 2021-01-01 | End: 2021-01-01

## 2021-01-01 RX ORDER — ISOSORBIDE MONONITRATE 30 MG/1
30 TABLET, EXTENDED RELEASE ORAL DAILY
Qty: 90 TABLET | Refills: 3 | Status: SHIPPED | OUTPATIENT
Start: 2021-01-01

## 2021-01-01 RX ORDER — AMLODIPINE BESYLATE 10 MG/1
10 TABLET ORAL EVERY EVENING
Qty: 90 TABLET | Refills: 3 | Status: SHIPPED | OUTPATIENT
Start: 2021-01-01

## 2021-01-01 RX ORDER — CARVEDILOL 6.25 MG/1
12.5 TABLET ORAL
COMMUNITY

## 2021-01-01 RX ORDER — DEXTROAMPHETAMINE SACCHARATE, AMPHETAMINE ASPARTATE, DEXTROAMPHETAMINE SULFATE AND AMPHETAMINE SULFATE 3.125; 3.125; 3.125; 3.125 MG/1; MG/1; MG/1; MG/1
12.5 TABLET ORAL 2 TIMES DAILY
Qty: 60 TABLET | Refills: 0 | Status: SHIPPED | OUTPATIENT
Start: 2021-01-01 | End: 2021-01-01

## 2021-01-01 RX ORDER — ONDANSETRON HYDROCHLORIDE 8 MG/1
8 TABLET, FILM COATED ORAL
COMMUNITY
Start: 2021-01-01 | End: 2021-01-01

## 2021-01-01 RX ORDER — AZITHROMYCIN 250 MG/1
TABLET, FILM COATED ORAL
Qty: 6 TABLET | Refills: 0 | Status: SHIPPED | OUTPATIENT
Start: 2021-01-01

## 2021-01-01 RX ORDER — INSULIN GLARGINE 100 [IU]/ML
INJECTION, SOLUTION SUBCUTANEOUS 2 TIMES DAILY
Status: SHIPPED | COMMUNITY
Start: 2021-01-01 | End: 2021-01-01 | Stop reason: SDUPTHER

## 2021-01-01 RX ORDER — HYDROCODONE BITARTRATE AND ACETAMINOPHEN 5; 325 MG/1; MG/1
1 TABLET ORAL EVERY 6 HOURS PRN
Qty: 120 TABLET | Refills: 0 | Status: SHIPPED | OUTPATIENT
Start: 2021-01-01 | End: 2021-01-01 | Stop reason: SDUPTHER

## 2021-01-01 RX ORDER — FLURBIPROFEN SODIUM 0.3 MG/ML
SOLUTION/ DROPS OPHTHALMIC
Qty: 200 EACH | Refills: 3 | Status: SHIPPED | OUTPATIENT
Start: 2021-01-01

## 2021-01-01 RX ORDER — DIPHENHYDRAMINE HCL 25 MG
50 TABLET ORAL ONCE AS NEEDED
Status: CANCELLED | OUTPATIENT
Start: 2021-01-01

## 2021-01-01 RX ORDER — INSULIN GLARGINE 100 [IU]/ML
INJECTION, SOLUTION SUBCUTANEOUS
Qty: 32 PEN | Refills: 3 | Status: SHIPPED | OUTPATIENT
Start: 2021-01-01

## 2021-01-01 RX ORDER — LOSARTAN POTASSIUM 100 MG/1
100 TABLET ORAL DAILY
Qty: 90 TABLET | Refills: 3 | Status: SHIPPED | OUTPATIENT
Start: 2021-01-01 | End: 2021-01-01

## 2021-01-01 RX ORDER — HYDROCODONE BITARTRATE AND ACETAMINOPHEN 7.5; 325 MG/1; MG/1
1 TABLET ORAL EVERY 6 HOURS PRN
Qty: 120 TABLET | Refills: 0 | Status: SHIPPED | OUTPATIENT
Start: 2021-01-01 | End: 2021-01-01 | Stop reason: SDUPTHER

## 2021-01-01 RX ORDER — DIPHENHYDRAMINE HYDROCHLORIDE 50 MG/ML
50 INJECTION INTRAMUSCULAR; INTRAVENOUS ONCE AS NEEDED
Status: CANCELLED | OUTPATIENT
Start: 2021-01-01

## 2021-01-01 RX ORDER — EPINEPHRINE 1 MG/ML
0.3 INJECTION, SOLUTION, CONCENTRATE INTRAVENOUS AS NEEDED
Status: CANCELLED | OUTPATIENT
Start: 2021-01-01

## 2021-01-01 RX ORDER — MAGNESIUM 30 MG
30 TABLET ORAL 2 TIMES DAILY
COMMUNITY

## 2021-01-01 RX ORDER — PROMETHAZINE HYDROCHLORIDE 25 MG/1
12.5-25 TABLET ORAL
COMMUNITY
Start: 2021-01-01 | End: 2021-01-01

## 2021-01-01 RX ORDER — ONDANSETRON HYDROCHLORIDE 8 MG/1
8 TABLET, FILM COATED ORAL DAILY PRN
COMMUNITY
Start: 2021-01-01

## 2021-01-01 RX ORDER — BUMETANIDE 1 MG/1
1 TABLET ORAL DAILY
Qty: 90 TABLET | Refills: 3 | Status: SHIPPED | OUTPATIENT
Start: 2021-01-01

## 2021-01-01 RX ORDER — BUMETANIDE 1 MG/1
1 TABLET ORAL DAILY
Qty: 90 TABLET | Refills: 3 | Status: CANCELLED | OUTPATIENT
Start: 2021-01-01

## 2021-01-01 RX ORDER — CETIRIZINE HYDROCHLORIDE 10 MG/1
5 TABLET ORAL DAILY
Qty: 45 TABLET | Refills: 3 | Status: SHIPPED | OUTPATIENT
Start: 2021-01-01

## 2021-01-01 RX ORDER — LEVOTHYROXINE SODIUM 0.05 MG/1
50 TABLET ORAL DAILY
Qty: 90 TABLET | Refills: 3 | Status: SHIPPED | OUTPATIENT
Start: 2021-01-01

## 2021-01-01 RX ORDER — SPIRONOLACTONE 25 MG/1
25 TABLET ORAL DAILY
COMMUNITY
Start: 2021-01-01 | End: 2021-01-01 | Stop reason: SDUPTHER

## 2021-01-01 RX ORDER — LANCETS 28 GAUGE
EACH MISCELLANEOUS
Qty: 100 EACH | Refills: 3 | Status: SHIPPED | OUTPATIENT
Start: 2021-01-01

## 2021-01-01 RX ORDER — LANCETS 28 GAUGE
EACH MISCELLANEOUS
Qty: 100 EACH | Refills: 3 | Status: SHIPPED | OUTPATIENT
Start: 2021-01-01 | End: 2021-01-01

## 2021-01-01 RX ORDER — BLOOD-GLUCOSE METER
KIT MISCELLANEOUS
Qty: 400 EACH | Refills: 3 | Status: CANCELLED | OUTPATIENT
Start: 2021-01-01

## 2021-01-01 RX ORDER — HYDRALAZINE HYDROCHLORIDE 10 MG/1
10 TABLET, FILM COATED ORAL 3 TIMES DAILY
COMMUNITY

## 2021-01-01 RX ORDER — BLOOD-GLUCOSE METER
KIT MISCELLANEOUS
Qty: 400 EACH | Refills: 3 | Status: SHIPPED | OUTPATIENT
Start: 2021-01-01 | End: 2021-01-01 | Stop reason: SDUPTHER

## 2021-01-01 RX ORDER — HYDROCODONE BITARTRATE AND ACETAMINOPHEN 7.5; 325 MG/1; MG/1
TABLET ORAL
Qty: 100 TABLET | Refills: 0 | Status: SHIPPED | OUTPATIENT
Start: 2021-01-01

## 2021-01-01 RX ORDER — UBIDECARENONE 75 MG
50 CAPSULE ORAL DAILY
COMMUNITY

## 2021-01-01 RX ORDER — PANTOPRAZOLE SODIUM 40 MG/1
40 TABLET, DELAYED RELEASE ORAL DAILY
Qty: 90 TABLET | Refills: 3 | Status: SHIPPED | OUTPATIENT
Start: 2021-01-01

## 2021-01-01 RX ORDER — BLOOD-GLUCOSE METER
KIT MISCELLANEOUS
Qty: 400 EACH | Refills: 3 | Status: SHIPPED | OUTPATIENT
Start: 2021-01-01

## 2021-01-05 ENCOUNTER — HOSPITAL ENCOUNTER (OUTPATIENT)
Dept: GENERAL RADIOLOGY | Facility: HOSPITAL | Age: 81
Discharge: HOME | End: 2021-01-05
Admitting: NEUROLOGICAL SURGERY

## 2021-01-05 DIAGNOSIS — Z09 FOLLOW-UP EXAMINATION FOLLOWING SURGERY: ICD-10-CM

## 2021-01-05 PROCEDURE — 72040 X-RAY EXAM NECK SPINE 2-3 VW: CPT

## 2021-01-07 ENCOUNTER — TELEPHONE (OUTPATIENT)
Dept: NEUROSURGERY | Facility: CLINIC | Age: 81
End: 2021-01-07

## 2021-01-07 ENCOUNTER — OFFICE VISIT (OUTPATIENT)
Dept: NEUROSURGERY | Facility: CLINIC | Age: 81
End: 2021-01-07

## 2021-01-07 VITALS — HEART RATE: 88 BPM | DIASTOLIC BLOOD PRESSURE: 75 MMHG | SYSTOLIC BLOOD PRESSURE: 146 MMHG | TEMPERATURE: 98.6 F

## 2021-01-07 DIAGNOSIS — M50.30 DDD (DEGENERATIVE DISC DISEASE), CERVICAL: Primary | ICD-10-CM

## 2021-01-07 PROCEDURE — 99024 POSTOP FOLLOW-UP VISIT: CPT | Performed by: NEUROLOGICAL SURGERY

## 2021-01-12 NOTE — ANESTHESIA PROCEDURE NOTES
Patient Care Team:  Mitul Robles MD as PCP - General (Internal Medicine)  Mitul Robles MD as PCP - Internal Medicine (Internal Medicine)    Chief Complaint::   Chief Complaint   Patient presents with   • Gynecologic Exam        Subjective     HPI  Varsha is a 64 year old female who presents for gynecological exam.  She went through menopause approximately 10 years ago.  She is sexually active and denies any pain with intercourse.  She denies vaginal discharge or odor.  She is current on her mammograms, with her last one completed November 2020 BI-RADS 1.    She does perform regular breast exams.  She has history of dense tissue.           The following portions of the patient's history were reviewed and updated as appropriate: active problem list, medication list, allergies, family history, social history    Review of Systems:   Review of Systems   Constitutional: Negative for activity change, appetite change, chills, diaphoresis, fatigue, fever, unexpected weight gain and unexpected weight loss.   HENT: Negative for congestion, ear pain, hearing loss and sinus pressure.    Eyes: Negative for visual disturbance.   Respiratory: Negative for cough, chest tightness, shortness of breath and wheezing.    Cardiovascular: Negative for chest pain, palpitations and leg swelling.   Gastrointestinal: Negative for abdominal pain, blood in stool, constipation, GERD and indigestion.   Endocrine: Negative for cold intolerance and heat intolerance.   Genitourinary: Negative for breast discharge, breast lump, breast pain, dyspareunia, dysuria, hematuria, menstrual problem and pelvic pain.   Musculoskeletal: Negative for arthralgias and myalgias.   Skin: Negative for color change and skin lesions.   Allergic/Immunologic: Negative for environmental allergies.   Neurological: Negative for dizziness, tremors, seizures, syncope, speech difficulty, weakness, headache, memory problem and confusion.   Hematological: Does not  Peripheral Block      Patient reassessed immediately prior to procedure    Patient location during procedure: pre-op  Start time: 1/10/2019 11:27 AM  Stop time: 1/10/2019 11:31 AM  Reason for block: at surgeon's request and post-op pain management  Performed by  Anesthesiologist: Herve Burkett MD  Preanesthetic Checklist  Completed: patient identified, site marked, surgical consent, pre-op evaluation, timeout performed, IV checked, risks and benefits discussed and monitors and equipment checked  Prep:  Sterile barriers:cap, gloves and mask  Prep: ChloraPrep  Patient monitoring: blood pressure monitoring, continuous pulse oximetry and EKG  Procedure  Sedation:yes    Guidance:ultrasound guided  ULTRASOUND INTERPRETATION.  Using ultrasound guidance a 21 G gauge needle was placed in close proximity to the brachial plexus nerve, at which point, under ultrasound guidance anesthetic was injected in the area of the nerve and spread of the anesthesia was seen on ultrasound in close proximity thereto.  There were no abnormalities seen on ultrasound; a digital image was taken; and the patient tolerated the procedure with no complications. Images:still images not obtained (ultrasound printer malfunction prevented images from being included in record)    Laterality:right  Block Type:interscalene  Injection Technique:single-shotNeedle Gauge:21 G    Medications Used: ropivacaine (NAROPIN) 0.5 % injection, 30 mL  Med admintered at 1/10/2019 11:31 AM  Post Assessment  Injection Assessment: negative aspiration for heme, no paresthesia on injection and incremental injection  Patient Tolerance:comfortable throughout block  Complications:no             "bruise/bleed easily.   Psychiatric/Behavioral: Negative for decreased concentration, sleep disturbance and depressed mood. The patient is not nervous/anxious.        Vital Signs  Vitals:    01/12/21 1015   BP: 130/78   BP Location: Left arm   Patient Position: Sitting   Cuff Size: Adult   Pulse: 71   Temp: 97.1 °F (36.2 °C)   TempSrc: Temporal   SpO2: 95%   Weight: Comment: refused   Height: 157.5 cm (62.01\")   PainSc: 0-No pain     Body mass index is 30.17 kg/m².    Labs  Office Visit on 11/16/2020   Component Date Value Ref Range Status   • Glucose 11/16/2020 105* 65 - 99 mg/dL Final   • BUN 11/16/2020 14  8 - 23 mg/dL Final   • Creatinine 11/16/2020 0.75  0.57 - 1.00 mg/dL Final   • Sodium 11/16/2020 141  136 - 145 mmol/L Final   • Potassium 11/16/2020 4.4  3.5 - 5.2 mmol/L Final   • Chloride 11/16/2020 103  98 - 107 mmol/L Final   • CO2 11/16/2020 30.6* 22.0 - 29.0 mmol/L Final   • Calcium 11/16/2020 9.5  8.6 - 10.5 mg/dL Final   • eGFR Non African Amer 11/16/2020 78  >60 mL/min/1.73 Final   • BUN/Creatinine Ratio 11/16/2020 18.7  7.0 - 25.0 Final   • Anion Gap 11/16/2020 7.4  5.0 - 15.0 mmol/L Final   Lab on 11/03/2020   Component Date Value Ref Range Status   • Sed Rate 11/03/2020 1  0 - 30 mm/hr Final   • C-Reactive Protein 11/03/2020 0.11  0.00 - 0.50 mg/dL Final   • WBC 11/03/2020 6.10  3.40 - 10.80 10*3/mm3 Final   • RBC 11/03/2020 5.09  3.77 - 5.28 10*6/mm3 Final   • Hemoglobin 11/03/2020 16.8* 12.0 - 15.9 g/dL Final   • Hematocrit 11/03/2020 49.2* 34.0 - 46.6 % Final   • MCV 11/03/2020 96.7  79.0 - 97.0 fL Final   • MCH 11/03/2020 33.0  26.6 - 33.0 pg Final   • MCHC 11/03/2020 34.1  31.5 - 35.7 g/dL Final   • RDW 11/03/2020 11.9* 12.3 - 15.4 % Final   • RDW-SD 11/03/2020 42.7  37.0 - 54.0 fl Final   • MPV 11/03/2020 11.9  6.0 - 12.0 fL Final   • Platelets 11/03/2020 232  140 - 450 10*3/mm3 Final   • Neutrophil % 11/03/2020 46.0  42.7 - 76.0 % Final   • Lymphocyte % 11/03/2020 37.4  19.6 - 45.3 % Final "   • Monocyte % 11/03/2020 7.9  5.0 - 12.0 % Final   • Eosinophil % 11/03/2020 7.7* 0.3 - 6.2 % Final   • Basophil % 11/03/2020 0.7  0.0 - 1.5 % Final   • Immature Grans % 11/03/2020 0.3  0.0 - 0.5 % Final   • Neutrophils, Absolute 11/03/2020 2.81  1.70 - 7.00 10*3/mm3 Final   • Lymphocytes, Absolute 11/03/2020 2.28  0.70 - 3.10 10*3/mm3 Final   • Monocytes, Absolute 11/03/2020 0.48  0.10 - 0.90 10*3/mm3 Final   • Eosinophils, Absolute 11/03/2020 0.47* 0.00 - 0.40 10*3/mm3 Final   • Basophils, Absolute 11/03/2020 0.04  0.00 - 0.20 10*3/mm3 Final   • Immature Grans, Absolute 11/03/2020 0.02  0.00 - 0.05 10*3/mm3 Final   • nRBC 11/03/2020 0.0  0.0 - 0.2 /100 WBC Final       Imaging  Mri Foot Left Without Contrast    Result Date: 1/4/2021  1. Extensive tear of the flexor hallucis longus tendon from just beyond the level of the sustentaculum to just proximal to its distal insertion. 2. Lateral sesamoiditis or healing sesamoid trauma. 3. No evidence of significant tendinopathy, acute or healing trauma or other evidence of internal derangement elsewhere. Minor degenerative changes as noted.  D:  01/03/2021 E:  01/04/2021  This report was finalized on 1/4/2021 9:36 PM by Dr. Vicente Love MD.          Current Outpatient Medications:   •  gabapentin (NEURONTIN) 100 MG capsule, One tab in am and afternoon and 3 tabs in the evening for headache (Patient taking differently: 100 mg 3 (Three) Times a Day. One tab in am and afternoon and 3 tabs in the evening for headache  Takes 1-2 in late evening), Disp: 180 capsule, Rfl: 1    Physical Exam:    Physical Exam  Vitals signs reviewed. Exam conducted with a chaperone present.   Constitutional:       Appearance: Normal appearance. She is well-developed.   HENT:      Head: Normocephalic and atraumatic.      Right Ear: Hearing, tympanic membrane, ear canal and external ear normal.      Left Ear: Hearing, tympanic membrane, ear canal and external ear normal.      Mouth/Throat:       Pharynx: Uvula midline.   Eyes:      General: Lids are normal.      Conjunctiva/sclera: Conjunctivae normal.      Pupils: Pupils are equal, round, and reactive to light.   Neck:      Musculoskeletal: Full passive range of motion without pain, normal range of motion and neck supple.   Cardiovascular:      Rate and Rhythm: Normal rate and regular rhythm.      Heart sounds: Normal heart sounds.   Pulmonary:      Effort: Pulmonary effort is normal.      Breath sounds: Normal breath sounds.   Chest:      Breasts:         Right: Normal. No tenderness.         Left: Normal. No tenderness.   Abdominal:      General: Bowel sounds are normal.      Palpations: Abdomen is soft.   Genitourinary:     General: Normal vulva.      Vagina: Normal.      Cervix: Normal.      Uterus: Normal.       Adnexa: Right adnexa normal.   Musculoskeletal: Normal range of motion.   Skin:     General: Skin is warm and dry.   Neurological:      Mental Status: She is alert and oriented to person, place, and time.      Deep Tendon Reflexes: Reflexes are normal and symmetric.   Psychiatric:         Speech: Speech normal.         Behavior: Behavior normal.         Thought Content: Thought content normal.         Judgment: Judgment normal.         Procedures        Assessment/Plan   Problem List Items Addressed This Visit        Genitourinary and Reproductive     Post-menopausal - Primary    Relevant Orders    DEXA Bone Density Axial       Health Encounters    Routine gynecological examination    Overview     Breast exam normal.  She is current on mammograms.  Bone density ordered today.  Pap smear obtained.         Relevant Orders    Liquid-based Pap Smear, Screening        Patient Wellness Counseling:   Plan of care reviewed with patient at the conclusion of today's visit. Education was provided in regards to diagnosis, diet and exercise, cervical cancer screening, self breast exams, breast cancer screening, and the importance of yearly mammograms.    Nutrition, family planning/contraception, physical activity, healthy weight,ways to reduce stress, adequate sleep, injury prevention, misuse of tobacco, alcohol and drugs, sexual behavior and STD's, dental health, mental health, and immunizations.    Management and any prescribed or recommended OTC medications.  Patient verbalizes understanding of and agreement with management plan.    Return for Next scheduled follow up.    Plan of care reviewed with patient at the conclusion of today's visit. Education was provided regarding diagnosis, management, and any prescribed or recommended OTC medications.Patient verbalizes understanding of and agreement with management plan.       Pamela Cartagena PA-C    Please note that portions of this note were completed with a voice recognition program. Efforts were made to edit the dictations, but occasionally words are mistranscribed.

## 2021-01-18 NOTE — PROGRESS NOTES
TELEPHONE VISIT  You have chosen to receive care through a telephone visit. Do you consent to use a telephone visit for your medical care today? Yes    CHIEF COMPLAINT  Neck and back pain    Subjective   Chevy Goodwin is a 80 y.o. male  who presents for a telephonic follow-up.He has a history of neck and back pain.    C3-C6 fusion with Dr. Singleton 11/30/2020. Had f/u visit with Dr. Singleton 1/7/2021. Pain improving but still quite bothersome.  Has been in PT for the past two weeks. Pain is improving.    Given last prescription of hydrocodone 10/325 two weeks ago (filled at [facility], not on [facility]).  Says he has reduced dose some and has been doing well with 5/325 4/day.       Complains of pain in his low back, neck and shoulders.  neck is the most severe.  Today his pain is 8/10VAS.  Continues with Hydrocodone 5/325 2-3/day (prior to surgery). Denies any side effects from the regimen. Helps moderately.  No constipation or somnolence.      Back Pain  This is a chronic (history of lumbar fusion with Dr. Yuan at [facility], was done in 2005) problem. The current episode started more than 1 year ago. The problem occurs constantly. The problem has been waxing and waning (reports as unchanged since last office visit) since onset. The pain is present in the lumbar spine and thoracic spine. The quality of the pain is described as aching and burning. The pain radiates to the right foot and left foot. The pain is moderate. The pain is worse during the day. The symptoms are aggravated by bending and twisting (walking, physical activity). Stiffness is present in the morning. Pertinent negatives include no bladder incontinence, bowel incontinence, chest pain, dysuria, fever, headaches, numbness, tingling or weakness. Risk factors include lack of exercise. He has tried NSAIDs (Hydrocodone) for the symptoms. The treatment provided moderate relief.   Neck Pain   This is a chronic (surgery 8/2019 Dr. Singleton) problem. The problem  occurs daily. The problem has been gradually improving. The pain is at a severity of 6/10. The pain is moderate. Pertinent negatives include no chest pain, fever, headaches, numbness, tingling or weakness. He has tried NSAIDs and oral narcotics for the symptoms. The treatment provided moderate relief.      The following portions of the patient's history were reviewed and updated as appropriate: allergies, current medications, past family history, past medical history, past social history, past surgical history and problem list.      Review of Systems   Constitutional: Negative for fever.   Cardiovascular: Negative for chest pain.   Gastrointestinal: Negative for bowel incontinence.   Genitourinary: Negative for bladder incontinence and dysuria.   Musculoskeletal: Positive for back pain and neck pain.   Neurological: Negative for tingling, weakness, numbness and headaches.     Vitals:    01/18/21 1407   PainSc:   6   PainLoc: Neck       Objective   Physical Exam  As this is a telephone check-in, the ability to perform a routine physical exam is extremely limited. The patient seems alert and is oriented appropriately.   On this phone call there is not any evidence of respiratory distress.   The patient seems of normal mood.   The remainder of a routine physical exam is deferred.      Assessment/Plan   Diagnoses and all orders for this visit:    1. Other chronic pain (Primary)    2. DDD (degenerative disc disease), cervical    3. DDD (degenerative disc disease), thoracic    4. Degeneration of intervertebral disc of lumbar region    5. Encounter for long-term current use of high risk medication    Other orders  -     HYDROcodone-acetaminophen (NORCO) 5-325 MG per tablet; Take 1 tablet by mouth Every 6 (Six) Hours As Needed for Severe Pain .  Dispense: 120 tablet; Refill: 0      ----------------  Our practice is offering alternative &/or electronic methods to continue to follow our patients while at the same time further  the efforts toward social distancing, in accordance with our organizational policies, professional societies' guidance, and [facility] mandates.  I support the Healthy at Home campaign and in this visit I have counseled the patient on our needs to limit in-person office visits and physical encounters with medical facilities whenever possible.  I have also educated the patient on the medical necessities of maintaining social distancing while we continue to function during this crisis period.      The patient was counseled on the need to consider telehealth options. The patient was offered the opportunity for a Video Visit using [employer]. The patient had obstacles which preclude consideration for a Video Visit. The patient agreed to a Telephone Check-In. The patient was counseled on the need for a check-in visit. The patient was educated about our efforts to comply with monitoring standards when prescribing potent medications.    ----------------  This visit has been rescheduled as a phone visit to comply with patient safety concerns in accordance with CDC recommendations. Total time of discussion was 11 minutes.  --- Refill Hydrocodone (slight increase from previous regimen while in PT/post op, goal will be to wean back down over the next 1-2 months). Patient appears stable with current regimen. No adverse effects. Regarding continuation of opioids, there is no evidence of aberrant behavior or any red flags.  The patient continues with appropriate response to opioid therapy. ADL's remain intact by self.   --- The urine drug screen confirmation from 12/15/2020 has been reviewed and the result is appropriate based on patient history and [facility] report  --- The patient signed an updated copy of the controlled substance agreement on 10/16/2020  --- Follow-up 2 months        [facility] REPORT  As part of the patient's treatment plan, I am prescribing controlled substances. The patient has been made aware of appropriate use  of such medications, including potential risk of somnolence, limited ability to drive and/or work safely, and the potential for dependence or overdose. It has also bee made clear that these medications are for use by this patient only, without concomitant use of alcohol or other substances unless prescribed.     Patient has completed prescribing agreement detailing terms of continued prescribing of controlled substances, including monitoring [facility] reports, urine drug screening, and pill counts if necessary. The patient is aware that inappropriate use will results in cessation of prescribing such medications.    [facility] report has been reviewed and scanned into the patient's chart.    As the clinician, I personally reviewed the [facility] from 1/18/2021 while the patient was in the office today.    History and physical exam exhibit continued safe and appropriate use of controlled substances.  -------  EMR Dragon/Transcription disclaimer:   Much of this encounter note is an electronic transcription/translation of spoken language to printed text. The electronic translation of spoken language may permit erroneous, or at times, nonsensical words or phrases to be inadvertently transcribed; Although I have reviewed the note for such errors, some may still exist.

## 2021-01-19 PROBLEM — E79.0 HYPERURICEMIA: Status: ACTIVE | Noted: 2021-01-01

## 2021-01-19 PROBLEM — H60.60 CHRONIC OTITIS EXTERNA: Status: ACTIVE | Noted: 2021-01-01

## 2021-01-19 PROBLEM — K21.9 GASTROESOPHAGEAL REFLUX DISEASE WITHOUT ESOPHAGITIS: Status: ACTIVE | Noted: 2021-01-01

## 2021-01-19 PROBLEM — Z79.899 ENCOUNTER FOR LONG-TERM CURRENT USE OF HIGH RISK MEDICATION: Status: RESOLVED | Noted: 2017-01-24 | Resolved: 2021-01-01

## 2021-01-19 PROBLEM — K31.819 AVM (ARTERIOVENOUS MALFORMATION) OF DUODENUM, ACQUIRED: Status: ACTIVE | Noted: 2021-01-01

## 2021-01-19 PROBLEM — M25.561 PAIN IN LATERAL PORTION OF RIGHT KNEE: Status: RESOLVED | Noted: 2017-03-22 | Resolved: 2021-01-01

## 2021-01-19 PROBLEM — M96.1 FAILED BACK SYNDROME: Status: ACTIVE | Noted: 2021-01-01

## 2021-01-19 PROBLEM — D50.0 IRON DEFICIENCY ANEMIA DUE TO CHRONIC BLOOD LOSS: Status: ACTIVE | Noted: 2019-08-02

## 2021-01-19 PROBLEM — E78.5 HYPERLIPIDEMIA: Status: ACTIVE | Noted: 2021-01-01
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
## 2021-01-19 PROBLEM — Z85.72 HISTORY OF NON-HODGKIN'S LYMPHOMA: Status: ACTIVE | Noted: 2021-01-01

## 2021-01-19 PROBLEM — M54.2 NECK PAIN: Status: RESOLVED | Noted: 2019-08-02 | Resolved: 2021-01-01

## 2021-01-19 PROBLEM — N18.32 STAGE 3B CHRONIC KIDNEY DISEASE (HCC): Status: ACTIVE | Noted: 2019-08-02

## 2021-01-19 PROBLEM — E03.9 HYPOTHYROIDISM: Status: ACTIVE | Noted: 2021-01-01

## 2021-01-19 PROBLEM — M96.1 POSTLAMINECTOMY SYNDROME, CERVICAL REGION: Status: RESOLVED | Noted: 2019-05-29 | Resolved: 2021-01-01

## 2021-01-19 PROBLEM — I25.10 CORONARY ARTERY DISEASE INVOLVING NATIVE HEART WITHOUT ANGINA PECTORIS: Status: ACTIVE | Noted: 2021-01-01

## 2021-01-19 PROBLEM — R09.89 CAROTID BRUIT: Status: ACTIVE | Noted: 2021-01-01

## 2021-01-19 PROBLEM — R53.1 WEAKNESS GENERALIZED: Status: RESOLVED | Noted: 2019-08-02 | Resolved: 2021-01-01

## 2021-01-19 PROBLEM — M85.80 OSTEOPENIA: Status: ACTIVE | Noted: 2021-01-01

## 2021-01-19 PROBLEM — Z90.79 H/O PROSTATECTOMY: Status: ACTIVE | Noted: 2021-01-01

## 2021-01-19 PROBLEM — R00.1 BRADYCARDIA: Status: RESOLVED | Noted: 2019-08-02 | Resolved: 2021-01-01

## 2021-01-19 PROBLEM — G47.14 HYPERSOMNIA DUE TO MEDICAL CONDITION: Status: RESOLVED | Noted: 2018-02-24 | Resolved: 2021-01-01

## 2021-01-19 PROBLEM — Z87.891 FORMER HEAVY TOBACCO SMOKER: Status: ACTIVE | Noted: 2021-01-01

## 2021-01-19 PROBLEM — Z87.74 HISTORY OF ARTERIOVENOUS MALFORMATION (AVM): Status: ACTIVE | Noted: 2021-01-01

## 2021-01-19 NOTE — PROGRESS NOTES
Date of Encounter: 2021  Patient: Chevy Goodwin,  1940    Subjective   History of Presenting Illness  Chief complaint: Establish care, multiple problems    Please see assessment and plan for specifics about every problem.    I have reviewed recent lab work, notes from his prior PCP and from his oncologist.    Lives with wife.  3 children, 2 live nearby.  Former 60-pack-year smoker quit in .  Does not drink alcohol.  Does not exercise.  Average diet.  Retired , [employer] .  Has a living will not currently on file.  He reports he is up-to-date on vaccinations including influenza, pneumococcal, Shingrix, and Td.    Review of Systems:  Negative for fever, congestion, chest pain upon exertion, shortness of breath, vision changes, vomiting, dysuria, lymphadenopathy, muscle weakness, numbness, mood changes, rashes.    The following portions of the patient's history were reviewed and updated as appropriate: allergies, current medications, past family history, past medical history, past social history, past surgical history and problem list.    Patient Active Problem List   Diagnosis   • Degeneration of intervertebral disc of lumbar region   • MGUS (monoclonal gammopathy of unknown significance)   • Thoracic disc herniation T9-T10   • Type 2 diabetes mellitus with hyperglycemia, with long-term current use of insulin (CMS/HCC)   • Hypertension   • Long term (current) use of opiate analgesic   • JULIA treated with BiPAP    • Chronic right shoulder pain   • Status post reverse total arthroplasty of right shoulder   • Waldenstrom macroglobulinemia (CMS/HCC)   • Stage 3b chronic kidney disease   • Iron deficiency anemia due to chronic blood loss   • DDD (degenerative disc disease), cervical   • DDD (degenerative disc disease), thoracic   • Obesity (BMI 30-39.9)   • Osteopenia   • History of arteriovenous malformation (AVM)   • Failed back syndrome   • History of non-Hodgkin's  lymphoma   • Hypothyroidism   • Hyperuricemia   • Gastroesophageal reflux disease without esophagitis   • Hyperlipidemia   • Coronary artery disease involving native heart without angina pectoris   • H/O prostatectomy   • Carotid bruit, but normal US   • Chronic otitis externa     Past Medical History:   Diagnosis Date   • 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   • Anesthesia complication     HARD TO AWAKEN   • Cancer (CMS/HCC)     PROSTATE AND SKIN   • Cataract    • Chronic pain 3/29/2016   • Coronary artery disease    • Diabetes (CMS/HCC)     TYPE 2   • GERD (gastroesophageal reflux disease)    • Gout    • Heart attack (CMS/HCC)     X 7   • Herniated cervical disc    • High cholesterol    • Hypersomnia    • Hypersomnia due to medical condition 2/24/2018   • Hypertension    • Kidney stones    • Low back pain    • MRSA (methicillin resistant staph aureus) culture positive     BILATERAL ELBOWS, 2015   • Right shoulder pain    • Sleep apnea     C pap   • Waldenstrom macroglobulinemia (CMS/HCC)     2014     Past Surgical History:   Procedure Laterality Date   • ANGIOPLASTY      X 7   • ANTERIOR CERVICAL DISCECTOMY W/ FUSION N/A 8/5/2019    Procedure: C3-C5 anterior cervical discectomy, fusion and instrumentation;  Surgeon: John Singleton MD;  Location: [facility];  Service: Neurosurgery   • BACK SURGERY      X4   • CARDIAC CATHETERIZATION     • CARPAL TUNNEL RELEASE Right    • CATARACT EXTRACTION WITH INTRAOCULAR LENS IMPLANT     • CERVICAL DISCECTOMY POSTERIOR FUSION WITH BRAIN LAB N/A 11/30/2020    Procedure: Cervical 4 5 laminectomy with a cervical 3 to cervical 6 fusion and instrumentation using O-arm;  Surgeon: John Singleton MD;  Location: [facility];  Service: Neurosurgery;  Laterality: N/A;   • COLON RESECTION     • COLONOSCOPY     • ESOPHAGOSCOPY / EGD     • EXTRACORPOREAL SHOCK WAVE LITHOTRIPSY (ESWL)     • FINGER SURGERY Left    • GALLBLADDER SURGERY     • HERNIA REPAIR     • INCISION AND DRAINAGE  ABSCESS      MULTIPLE   • LAMINECTOMY     • NECK SURGERY  08/05/2019   • RI REMOVAL OF ELBOW BURSA Left 9/15/2016    Procedure: LT ELBOW I&D W/ BONE CURRETTAGE;  Surgeon: Kirk Gross MD;  [place] [facility] [place] [facility] [place]  Service: Orthopedics   • PROSTATE SURGERY     • ROTATOR CUFF REPAIR Right    • SKIN CANCER EXCISION      MULTIPLE   • TOTAL SHOULDER ARTHROPLASTY W/ DISTAL CLAVICLE EXCISION Right 1/10/2019    Procedure: RT TOTAL SHOULDER REVERSE ARTHROPLASTY;  Surgeon: Kirk Gross MD;  Location: [facility] [place] [facility] [place];  Service: Orthopedics   • TYMPANOPLASTY Right      Family History   Problem Relation Age of Onset   • Malig Hyperthermia Neg Hx        Current Outpatient Medications:   •  acetaminophen (TYLENOL) 500 MG tablet, Take 500 mg by mouth Every 6 (Six) Hours As Needed for Mild Pain ., Disp: , Rfl:   •  Alcohol Swabs (EASY TOUCH ALCOHOL PREP MEDIUM) 70 % pads, Easy Touch Alcohol Prep Pads  Apply 2 pads twice a day by topical route., Disp: , Rfl:   •  allopurinol (ZYLOPRIM) 300 MG tablet, Take 300 mg by mouth every evening., Disp: , Rfl:   •  amLODIPine (NORVASC) 10 MG tablet, Take 10 mg by mouth every evening., Disp: , Rfl:   •  aspirin 81 MG tablet, Take 81 mg by mouth Daily., Disp: , Rfl:   •  azelastine (ASTELIN) 0.1 % nasal spray, 2 sprays into the nostril(s) as directed by provider 2 (Two) Times a Day As Needed., Disp: , Rfl:   •  B-D UF III MINI PEN NEEDLES 31G X 5 MM misc, , Disp: , Rfl:   •  bumetanide (BUMEX) 1 MG tablet, Daily., Disp: , Rfl:   •  carvedilol (COREG) 6.25 MG tablet, Take 6.25 mg by mouth 2 (Two) Times a Day With Meals., Disp: , Rfl:   •  cetirizine (zyrTEC) 10 MG tablet, Every 12 (Twelve) Hours., Disp: , Rfl:   •  cyclobenzaprine (FLEXERIL) 10 MG tablet, Take 1 tablet by mouth 3 (Three) Times a Day As Needed for Muscle Spasms., Disp: 45 tablet, Rfl: 1  •  FREESTYLE LITE test strip, , Disp: , Rfl:   •  Glucosamine-Chondroit-Vit C-Mn ([employer] GLUCOSAMINE-CHONDROITIN) tablet, Take 1 tablet by  "mouth 2 (Two) Times a Day., Disp: , Rfl:   •  HYDROcodone-acetaminophen (NORCO) 5-325 MG per tablet, Take 1 tablet by mouth Every 6 (Six) Hours As Needed for Severe Pain ., Disp: 120 tablet, Rfl: 0  •  Insulin Glargine (LANTUS SOLOSTAR) 100 UNIT/ML injection pen, Inject  under the skin into the appropriate area as directed 2 (Two) Times a Day. 60 units AM; 40 units PM, Disp: , Rfl:   •  isosorbide mononitrate (IMDUR) 30 MG 24 hr tablet, 30 mg 2 (two) times a day., Disp: , Rfl:   •  Lancets (FREESTYLE) lancets, , Disp: , Rfl:   •  levothyroxine (SYNTHROID, LEVOTHROID) 50 MCG tablet, Take 50 mcg by mouth Daily., Disp: , Rfl: 0  •  losartan (COZAAR) 100 MG tablet, Take 100 mg by mouth Daily., Disp: , Rfl:   •  metFORMIN (GLUCOPHAGE) 500 MG tablet, Take 500 mg by mouth 2 (Two) Times a Day With Meals., Disp: , Rfl:   •  nitroglycerin (NITROSTAT) 0.4 MG SL tablet, Take 0.4 mg by mouth Every 5 (Five) Minutes As Needed., Disp: , Rfl:   •  pantoprazole (PROTONIX) 40 MG EC tablet, Take 40 mg by mouth Daily., Disp: , Rfl:   •  pravastatin (PRAVACHOL) 40 MG tablet, Take 40 mg by mouth 2 (Two) Times a Day., Disp: , Rfl:   •  sulfamethoxazole-trimethoprim (Bactrim DS) 800-160 MG per tablet, Take 1 tablet by mouth 2 (Two) Times a Day., Disp: 10 tablet, Rfl: 0  Allergies   Allergen Reactions   • Contrast Dye Hives   • Oxycodone Nausea And Vomiting   • Adhesive Tape Rash     Social History     Tobacco Use   • Smoking status: Current Every Day Smoker     Years: 60.00     Types: Cigars   • Smokeless tobacco: Never Used   • Tobacco comment: QUIT 1999 CIGARETTES/STILL SMOKES CIGARS   Substance Use Topics   • Alcohol use: Yes     Comment: VERY RARE   • Drug use: Never          Objective   Physical Exam  Vitals:    01/19/21 1107   BP: 134/68   Pulse: 69   Temp: 96.9 °F (36.1 °C)   TempSrc: Temporal   SpO2: 98%   Weight: 97.7 kg (215 lb 6.4 oz)   Height: 172.7 cm (68\")     Body mass index is 32.75 kg/m².    Constitutional: NAD.  Eyes: " EOMI. PERRLA. Normal conjunctiva.  Ear, nose, mouth, throat: No tonsillar exudates or erythema.   Normal nasal mucosa.  Hearing aids in place.  Cardiovascular: RRR.  2/6 systolic murmur.  1+ pitting edema of the lower legs bilaterally. Radial pulses 2+ bilaterally.  Pulmonary: CTA b/l. Good effort.  Integumentary: No rashes or wounds on face or upper extremities.  Lymphatic: No anterior cervical lymphadenopathy.  Endocrine: No thyromegaly or palpable thyroid nodules.  Psychiatric: Normal affect. Normal thought content.  Gastrointestinal: Limited due to habitus.  Mildly distended.  No hepatosplenomegaly. No focal tenderness to palpation. Normal bowel sounds.       Assessment/Plan   Assessment and Plan  Pleasant 80-year-old male with complicated past medical history including greater than 60-pack-year former smoker, WaldenStrom macroglobulinemia, MGUS, type 2 diabetes, CAD, hypertension, hyperlipidemia, CKD 3B, hypothyroidism, hyperuricemia, failed back syndrome, history of prostate cancer and non-Hodgkin's lymphoma, GERD, osteopenia, JULIA, among other problems, who presents with the following:    Diagnoses and all orders for this visit:    1. Waldenstrom macroglobulinemia (CMS/HCC) (Primary): I reviewed recent evaluation by oncology.  Problem currently appears stable, being monitored with no evidence of metastases on recent bone scan, continue to follow with oncology  -     CBC & Differential; Future  2. MGUS (monoclonal gammopathy of unknown significance)    3. Type 2 diabetes mellitus with hyperglycemia, with long-term current use of insulin (CMS/HCC): Stable on insulin glargine and Metformin with a recent A1c of 7.1%.  He does not have any hypoglycemic episodes.  Sees his eye doctor annually  -     Comprehensive Metabolic Panel; Future  -     Hemoglobin A1c; Future  -     Thyroid Panel With TSH; Future    4. Coronary artery disease involving native heart without angina pectoris, unspecified vessel or lesion type:  No angina.  Follows with cardiology.  Uses Bumex for swelling which has been stable. Tolerating statin, ASA.    5. Essential hypertension: Controlled on losartan, isosorbide, amlodipine, carvedilol.    6. Hyperlipidemia, unspecified hyperlipidemia type: Has been within goal per last labs    7. Stage 3b chronic kidney disease: EGFR low 30s, follows with nephrology regularly. Currently stable with no evidence of renal insufficiency.    8. Failed back syndrome: Multilevel degenerative disc disease with osteoarthritis, has had multiple surgeries, currently sees pain management for opioid analgesia.  Most recent surgical intervention 11/2020.  He is trying to taper off of opioids.  9. Long term (current) use of opiate analgesic    10. JULIA treated with BiPAP 20/16    11. Hypothyroidism, unspecified type: Stable    12. Hyperuricemia: Stable  -     Uric Acid; Future    13. Gastroesophageal reflux disease without esophagitis: Stable    14. H/O prostatectomy: Continues to follow with urology    15. Osteopenia, unspecified location: In context of his comorbidities and age, I do not believe he would benefit from intervention with respect to his bone density if he has progressed to osteoporosis.  No further DEXA scans at this time    Follow-up in 3 months, lab work ahead of time.    Total visit time = 40 minutes; > 50% spent counseling/coordinating care    Gavin Peacock MD  Family Medicine  O: [phone]  C: [phone]    Disclaimer: Parts of this note were dictated by speech recognition. Minor errors in transcription may be present. Please call if questions.

## 2021-02-09 NOTE — TELEPHONE ENCOUNTER
Patient has a lot of reasons he may be fatigued  We have bloodwork ordered for him, can we have him in early to get it drawn

## 2021-02-09 NOTE — TELEPHONE ENCOUNTER
Daiana at [facility] PT called in regards to patient.  She is reporting increased fatigue and declined activity tollerance,  No night pain/sweats.  She is concerned if patient is anemic or something related.  Please advise.

## 2021-02-11 NOTE — TELEPHONE ENCOUNTER
Called Daiana at [facility] and advised of Dr. Peacock's recommendations.  Pt was at [facility] during call and she will inform patient to call and schedule labs.

## 2021-02-15 NOTE — PROGRESS NOTES
After several attempts to call patient without success, I left a voice mail discussing the results as previously agreed.    Reduced GFR, not outside of previous ranges but recommend increased fluid intake, reduce salt consumption, and follow-up for lab work here or at his nephrologist within the next 1 to 2 months.

## 2021-02-17 NOTE — PROGRESS NOTES
"Subjective   Patient ID: Chevy Goodwin is a 80 y.o. male is here today for 2 month POST-OP with a new XR Cervical. Patient had C4/5 laminectomy with a C3-C6 fusion and instrumentation using O-arm on 11.30.2020.    Today patient is having neck pain, bilateral leg weakness, and bilateral shoulder soreness. Patient denies N/T    Patient, Provider, and MA are wearing a mask in our office today.     History of Present Illness     This patient returns today.  He is  finally starting to get a little bit better.  His severe pain has improved although he does still have a fair amount of neck pain.    The following portions of the patient's history were reviewed and updated as appropriate: allergies, current medications, past family history, past medical history, past social history, past surgical history and problem list.    Review of Systems   Constitutional: Negative for chills and fever.   HENT: Positive for congestion.    Cardiovascular: Negative for chest pain.   Gastrointestinal: Negative for abdominal pain.   Genitourinary: Negative for dysuria.   Musculoskeletal: Positive for back pain, neck pain and neck stiffness.   Neurological: Positive for weakness. Negative for numbness and headaches.       I have reviewed the review of systems as documented by my MA.      Objective     Vitals:    02/18/21 1029   BP: 157/67   Cuff Size: Adult   Pulse: 55   Temp: 97.3 °F (36.3 °C)   Weight: 97.7 kg (215 lb 6.4 oz)   Height: 172.7 cm (68\")     Body mass index is 32.75 kg/m².      Physical Exam  Neurological:      Mental Status: He is alert and oriented to person, place, and time.       Neurologic Exam     Mental Status   Oriented to person, place, and time.           Assessment/Plan   Independent Review of Radiographic Studies:      I personally reviewed the images from the following studies.    I reviewed his x-rays which were done 2 days ago.  This shows a solid fusion at C3-4 and C4-5.    Medical Decision Making:      I told " the patient that I thought his x-rays look great.  I told him to continue with his therapy as long as he thinks it is benefiting him.  I will plan to see him back in about 3 or 4 months with 1 more x-ray.    Diagnoses and all orders for this visit:    1. DDD (degenerative disc disease), cervical (Primary)  -     XR Spine Cervical Complete With Flex Ext; Future      Return in about 3 months (around 5/18/2021).         Answers for HPI/ROS submitted by the patient on 2/12/2021   Back pain  What is the primary reason for your visit?: Back Pain

## 2021-02-23 NOTE — TELEPHONE ENCOUNTER
Medication Refill Request    Date of phone call: 21    Medication being requested: norco 5/325mg si tab po q 6 hours prn   Qty: 120    Date of last visit: 21    Date of last refill:     MARIOLA up to date?:     Next Follow up?: 3/19/21    Any new pertinent information? (i.e, new medication allergies, new use of medications, change in patient's health or condition, non-compliance or inconsistency with prescribing agreement?):

## 2021-02-24 NOTE — PROGRESS NOTES
"Follow Up [facility] Note     Chief Complaint:  JULIA     Primary Care Physician: Gavin Peacock MD    Interval History:   The patient is a 80 y.o. male  who I last saw 9/2/2020 and that note was reviewed.  At that time, the patient received a new auto BiPAP.  The patient is here today for follow-up.  He states he is worse because he did have neck surgery 11/30/2020.  He is slowly getting better.  The patient goes to bed at 10:30 PM and awakens at 11 AM.  He will use the restroom.  [place] Sleepiness Scale is abnormal at 14.    Review of Systems:    A complete review of systems was done and all were negative with the exception of the above    Social History:    Social History     Socioeconomic History   • Marital status:      Spouse name: Not on file   • Number of children: Not on file   • Years of education: Not on file   • Highest education level: Not on file   Tobacco Use   • Smoking status: Current Every Day Smoker     Years: 60.00     Types: Cigars   • Smokeless tobacco: Never Used   • Tobacco comment: QUIT 1999 CIGARETTES/STILL SMOKES CIGARS   Substance and Sexual Activity   • Alcohol use: Yes     Comment: VERY RARE   • Drug use: Never   • Sexual activity: Defer       Allergies:  Contrast dye, Oxycodone, and Adhesive tape     Medication Review:  Reviewed.      Vital Signs:    Vitals:    02/24/21 1042   Weight: 99.8 kg (220 lb)   Height: 172.7 cm (68\")     Body mass index is 33.45 kg/m².    Physical Exam:    Constitutional:  Well developed 80 y.o. male that appears in no apparent distress.  Awake & oriented times 3.  Normal mood with normal recent and remote memory and normal judgement.  Eyes:  Conjunctivae normal.  Oropharynx: Previously, moist mucous membranes without exudate and a large tongue and class III-four Mallampati airway, patient is wearing a facemask.     Downloaded PAP Data Reviewed For Compliance:  DME is [facility] and he uses a nasal interface.  Downloads between 11/25 and " 2/22/2021 compliance 100%.  Average usage is 5 hours and 47 minutes.  Average AHI is normal at five with a leak of 18 minutes.  Average AutoBiPAP pressure is IPAP of 16.5 and EPAP of 13.3 and his auto BiPAP settings: Max IPAP 25 minimum EPAP 12 maximum pressure support six and minimum pressure support of two.    I have reviewed the above results and compared them with previous results and reviewed with the patient.    Impression:   Obstructive sleep apnea adequately treated with auto BiPAP with good compliance and usage and some persistent complaints of hypersomnolence.    Plan:  Good sleep hygiene measures should be maintained.  Weight loss would be beneficial in this patient who is obese by BMI.      After evaluating the patient and assessing results available, the patient is benefiting from the treatment being provided.     The patient will continue auto BiPAP.  As he recovers from neck surgery, I hope his JULIA symptoms will improve.  A new prescription will be sent to his DME.  A new SD card requested    I answered all of the patient's questions.  The patient will call for any problems and will follow up in 1 year.      Muulgeta Odonnell MD  Sleep Medicine  02/24/21  10:51 EST

## 2021-03-19 PROBLEM — Z79.899 HIGH RISK MEDICATION USE: Status: ACTIVE | Noted: 2021-01-01

## 2021-03-19 NOTE — PROGRESS NOTES
CHIEF COMPLAINT  Back and neck pain has increased since last visit    Subjective   Cheyv Goodwin is a 80 y.o. male  who presents for follow-up.  He has a history of back and neck pain.    C3-C6 fusion with Dr. Singleton 11/30/2020. Had f/u visit with Dr. Singleton 1/7/2021. Pain improving but still quite bothersome.  Has been in PT twice a week for a few months.  Saw Dr. Singleton about a month ago, was doing well from a surgical standpoint.        Complains of pain in his low back, neck and shoulders.  neck is the most severe.  Today his pain is 7/10VAS (today is a bad day, usually closer to a 5 or 6).  Continues with Hydrocodone 5/325 3-4/day. Denies any side effects from the regimen. Helps moderately.  No constipation or somnolence.      Patient remained masked during entire encounter. No cough present. I donned a mask and eye protection throughout entire visit. Prior to donning mask and eye protection, hand hygiene was performed, as well as when it was doffed.  I was closer than 6 feet, but not for an extended period of time. No obvious exposure to any bodily fluids.    Back Pain  This is a chronic (history of lumbar fusion with Dr. Yuan at [facility], was done in 2005) problem. The current episode started more than 1 year ago. The problem occurs constantly. The problem has been waxing and waning (reports as unchanged since last office visit) since onset. The pain is present in the lumbar spine and thoracic spine. The quality of the pain is described as aching and burning. The pain radiates to the right foot and left foot. The pain is moderate. The pain is worse during the day. The symptoms are aggravated by bending and twisting (walking, physical activity). Stiffness is present in the morning. Pertinent negatives include no bladder incontinence, bowel incontinence, chest pain, dysuria, fever, headaches, numbness, tingling or weakness. Risk factors include lack of exercise. He has tried NSAIDs (Hydrocodone) for  "the symptoms. The treatment provided moderate relief.   Neck Pain   This is a chronic (surgery 8/2019 Dr. Singleton) problem. The problem occurs daily. The problem has been gradually improving. The pain is at a severity of 7/10. The pain is moderate. Pertinent negatives include no chest pain, fever, headaches, numbness, tingling or weakness. He has tried NSAIDs and oral narcotics for the symptoms. The treatment provided moderate relief.     PEG Assessment   What number best describes your pain on average in the past week?6  What number best describes how, during the past week, pain has interfered with your enjoyment of life?5  What number best describes how, during the past week, pain has interfered with your general activity?  5    The following portions of the patient's history were reviewed and updated as appropriate: allergies, current medications, past family history, past medical history, past social history, past surgical history and problem list.    Review of Systems   Constitutional: Negative for chills and fever.   Respiratory: Negative for shortness of breath.    Cardiovascular: Negative for chest pain.   Gastrointestinal: Negative for bowel incontinence, constipation, diarrhea, nausea and vomiting.   Genitourinary: Negative for bladder incontinence, difficulty urinating, dysuria and enuresis.   Musculoskeletal: Positive for back pain and neck pain.   Neurological: Negative for dizziness, tingling, weakness, light-headedness, numbness and headaches.   Psychiatric/Behavioral: Negative for confusion, hallucinations, self-injury, sleep disturbance and suicidal ideas. The patient is not nervous/anxious.    All other systems reviewed and are negative.    I have reviewed and confirmed the accuracy of the ROS as documented by the MA/SHAYNA/RN DANY Vo    Vitals:    03/19/21 1309   BP: 163/74   Pulse: 59   Resp: 18   Temp: 97.7 °F (36.5 °C)   SpO2: 95%   Weight: 99.8 kg (220 lb)   Height: 172.7 cm (68\") "   PainSc:   7   PainLoc: Back     Objective   Physical Exam  Vitals and nursing note reviewed.   Constitutional:       General: He is not in acute distress.     Appearance: Normal appearance. He is not ill-appearing.   Pulmonary:      Effort: Pulmonary effort is normal. No respiratory distress.   Musculoskeletal:      Cervical back: Tenderness and bony tenderness present.      Lumbar back: Tenderness and bony tenderness present.   Skin:     General: Skin is warm and dry.   Neurological:      Mental Status: He is alert and oriented to person, place, and time.      Motor: Motor function is intact. No weakness.      Gait: Gait abnormal (ambulates with cane).   Psychiatric:         Mood and Affect: Mood normal.         Behavior: Behavior normal.       Assessment/Plan   Diagnoses and all orders for this visit:    1. Other chronic pain (Primary)    2. DDD (degenerative disc disease), cervical    3. Degeneration of intervertebral disc of lumbar region    4. Failed back syndrome    5. High risk medication use      --- Refill Hydrocodone (keep at current dose for now, will plan to return to #90 next visit). Patient appears stable with current regimen. No adverse effects. Regarding continuation of opioids, there is no evidence of aberrant behavior or any red flags.  The patient continues with appropriate response to opioid therapy. ADL's remain intact by self.   --- The urine drug screen confirmation from 12/15/2020 has been reviewed and the result is appropriate based on patient history and MARIOLA report  --- The patient signed an updated copy of the controlled substance agreement on 10/16/2020  --- Follow-up 2 months        MARIOLA REPORT  As part of the patient's treatment plan, I am prescribing controlled substances. The patient has been made aware of appropriate use of such medications, including potential risk of somnolence, limited ability to drive and/or work safely, and the potential for dependence or overdose. It has  also bee made clear that these medications are for use by this patient only, without concomitant use of alcohol or other substances unless prescribed.     Patient has completed prescribing agreement detailing terms of continued prescribing of controlled substances, including monitoring MARIOLA reports, urine drug screening, and pill counts if necessary. The patient is aware that inappropriate use will results in cessation of prescribing such medications.    MARIOLA report has been reviewed and scanned into the patient's chart.    As the clinician, I personally reviewed the MARIOLA from 3/19/2021 while the patient was in the office today.    History and physical exam exhibit continued safe and appropriate use of controlled substances.    EMR Dragon/Transcription disclaimer:   Much of this encounter note is an electronic transcription/translation of spoken language to printed text. The electronic translation of spoken language may permit erroneous, or at times, nonsensical words or phrases to be inadvertently transcribed; Although I have reviewed the note for such errors, some may still exist.

## 2021-03-29 NOTE — TELEPHONE ENCOUNTER
We cannot send Rx's electronically to pharmacy in [place], [place].    These will have to be printed, signed and picked up by the patient.

## 2021-03-29 NOTE — TELEPHONE ENCOUNTER
The refill request the patient called in for on 3/23 telephone message was sent to [facility] on [address] instead of The pharmacy in [place] that he requested.

## 2021-03-30 NOTE — TELEPHONE ENCOUNTER
PATIENT CALLED TO PROVIDE FAX NUMBER TO [facility]. PATIENT STATED THEY WERE SENT TO [facility] BUT THAT IS NOT THE CORRECT PHARMACY. PLEASE LET PATIENT KNOW ASAP WHEN THIS HAS BEEN SENT . PATIENT STATED HE IS LOCAL THERE TODAY AND WOULD LIKE THOSE TO BE SENT TO PICK THEM UP.PLEASE ADVISE IF NEEDED.       FAX NUMBER - [phone]  PHARMACY NCPDP ID #[id number]

## 2021-04-19 PROBLEM — R53.0 NEOPLASTIC MALIGNANT RELATED FATIGUE: Status: ACTIVE | Noted: 2021-01-01

## 2021-04-19 NOTE — PROGRESS NOTES
Date of Encounter: 2021  Patient: Chevy Goodwin,  1940    Subjective   History of Presenting Illness  Chief complaint: Follow-up    Hypertension: Home blood pressure systolic 140s-150s, occasionally 160s consistently.  He does not feel well when his blood pressures are this elevated, symptoms usually consistent with lightheadedness, headache.  Does not have follow-up with nephrology until August.    Waldenstrom macroglobulinemia with recent bone marrow biopsy, hematology follow-up pending.    Type 2 diabetes: FBS 110s-120s, there is a 70 two weeks ago.  At bedtime 150s-200s although there is a 300 related to a large meal.  Patient admits to recent dietary worsening, eating a lot of sugared snacks.    He has had his COVID-19 vaccination.    Review of Systems:  Negative for fever, cough, shortness of breath    Positive for fatigue    The following portions of the patient's history were reviewed and updated as appropriate: allergies, current medications, past family history, past medical history, past social history, past surgical history and problem list.    Patient Active Problem List   Diagnosis   • Degeneration of intervertebral disc of lumbar region   • MGUS (monoclonal gammopathy of unknown significance)   • Thoracic disc herniation T9-T10   • Type 2 diabetes mellitus with hyperglycemia, with long-term current use of insulin (CMS/HCC)   • Hypertension   • Long term (current) use of opiate analgesic   • JULIA treated with autoBiPAP   • Hypersomnia due to medical condition   • Chronic right shoulder pain   • Status post reverse total arthroplasty of right shoulder   • Waldenstrom macroglobulinemia (CMS/HCC)   • Stage 3b chronic kidney disease   • Iron deficiency anemia due to chronic blood loss   • DDD (degenerative disc disease), cervical   • DDD (degenerative disc disease), thoracic   • Obesity (BMI 30-39.9)   • Osteopenia   • History of arteriovenous malformation (AVM)   • Failed back syndrome   •  History of non-Hodgkin's lymphoma   • Hypothyroidism   • Hyperuricemia   • Gastroesophageal reflux disease without esophagitis   • Hyperlipidemia   • Coronary artery disease involving native heart without angina pectoris   • H/O prostatectomy   • Carotid bruit, but normal US   • Chronic otitis externa   • Former heavy tobacco smoker   • High risk medication use   • Neoplastic malignant related fatigue     Past Medical History:   Diagnosis Date   • 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   • Anesthesia complication     HARD TO AWAKEN   • Cancer (CMS/HCC)     PROSTATE AND SKIN   • Cataract    • Chronic pain 3/29/2016   • Coronary artery disease    • Diabetes (CMS/HCC)     TYPE 2   • GERD (gastroesophageal reflux disease)    • Gout    • Heart attack (CMS/HCC)     X 7   • Herniated cervical disc    • High cholesterol    • Hypersomnia    • Hypersomnia due to medical condition 2/24/2018   • Hypertension    • Kidney stones    • Low back pain    • MRSA (methicillin resistant staph aureus) culture positive     BILATERAL ELBOWS, 2015   • Right shoulder pain    • Sleep apnea     C pap   • Waldenstrom macroglobulinemia (CMS/HCC)     2014     Past Surgical History:   Procedure Laterality Date   • ANGIOPLASTY      X 7   • ANTERIOR CERVICAL DISCECTOMY W/ FUSION N/A 8/5/2019    Procedure: C3-C5 anterior cervical discectomy, fusion and instrumentation;  Surgeon: John Singleton MD;  Location: [facility]  Service: Neurosurgery   • BACK SURGERY      X4   • CARDIAC CATHETERIZATION     • CARPAL TUNNEL RELEASE Right    • CATARACT EXTRACTION WITH INTRAOCULAR LENS IMPLANT     • CERVICAL DISCECTOMY POSTERIOR FUSION WITH BRAIN LAB N/A 11/30/2020    Procedure: Cervical 4 5 laminectomy with a cervical 3 to cervical 6 fusion and instrumentation using O-arm;  Surgeon: John Singleton MD;  Location: [facility]  Service: Neurosurgery;  Laterality: N/A;   • COLON RESECTION     • COLONOSCOPY     • ESOPHAGOSCOPY / EGD     • EXTRACORPOREAL  SHOCK WAVE LITHOTRIPSY (ESWL)     • FINGER SURGERY Left    • GALLBLADDER SURGERY     • HERNIA REPAIR     • INCISION AND DRAINAGE ABSCESS      MULTIPLE   • LAMINECTOMY     • NECK SURGERY  08/05/2019   • NV REMOVAL OF ELBOW BURSA Left 9/15/2016    Procedure: LT ELBOW I&D W/ BONE CURRETTAGE;  Surgeon: Kirk Gross MD;  Location: [facility];  Service: Orthopedics   • PROSTATE SURGERY     • ROTATOR CUFF REPAIR Right    • SKIN CANCER EXCISION      MULTIPLE   • TOTAL SHOULDER ARTHROPLASTY W/ DISTAL CLAVICLE EXCISION Right 1/10/2019    Procedure: RT TOTAL SHOULDER REVERSE ARTHROPLASTY;  Surgeon: Kirk Gross MD;  Location: [facility] OR [facility];  Service: Orthopedics   • TYMPANOPLASTY Right      Family History   Problem Relation Age of Onset   • Malig Hyperthermia Neg Hx        Current Outpatient Medications:   •  acetaminophen (TYLENOL) 500 MG tablet, Take 500 mg by mouth Every 6 (Six) Hours As Needed for Mild Pain ., Disp: , Rfl:   •  Alcohol Swabs (EASY TOUCH ALCOHOL PREP MEDIUM) 70 % pads, Easy Touch Alcohol Prep Pads  Apply 2 pads twice a day by topical route., Disp: , Rfl:   •  allopurinol (ZYLOPRIM) 300 MG tablet, Take 300 mg by mouth every evening., Disp: , Rfl:   •  amLODIPine (NORVASC) 10 MG tablet, Take 10 mg by mouth every evening., Disp: , Rfl:   •  aspirin 81 MG tablet, Take 81 mg by mouth Daily., Disp: , Rfl:   •  azelastine (ASTELIN) 0.1 % nasal spray, 2 sprays into the nostril(s) as directed by provider 2 (Two) Times a Day As Needed., Disp: , Rfl:   •  B-D UF III MINI PEN NEEDLES 31G X 5 MM misc, , Disp: , Rfl:   •  bumetanide (BUMEX) 1 MG tablet, Take 1 tablet by mouth Daily., Disp: 90 tablet, Rfl: 3  •  carvedilol (COREG) 6.25 MG tablet, Take 6.25 mg by mouth 2 (Two) Times a Day With Meals., Disp: , Rfl:   •  cetirizine (zyrTEC) 10 MG tablet, Every 12 (Twelve) Hours., Disp: , Rfl:   •  cyclobenzaprine (FLEXERIL) 10 MG tablet, Take 1 tablet by mouth 3 (Three) Times a Day As Needed for Muscle Spasms.,  Disp: 45 tablet, Rfl: 1  •  FREESTYLE LITE test strip, Use to check blood sugar up to four times daily, Disp: 400 each, Rfl: 3  •  Glucosamine-Chondroit-Vit C-Mn ([employer] GLUCOSAMINE-CHONDROITIN) tablet, Take 1 tablet by mouth 2 (Two) Times a Day., Disp: , Rfl:   •  HYDROcodone-acetaminophen (NORCO) 5-325 MG per tablet, Take 1 tablet by mouth Every 6 (Six) Hours As Needed for Severe Pain . dnf 3/23/2021, Disp: 120 tablet, Rfl: 0  •  Insulin Glargine (LANTUS SOLOSTAR) 100 UNIT/ML injection pen, Inject  under the skin into the appropriate area as directed 2 (Two) Times a Day. 60 units AM; 40 units PM, Disp: , Rfl:   •  isosorbide mononitrate (IMDUR) 30 MG 24 hr tablet, 30 mg 2 (two) times a day., Disp: , Rfl:   •  Lancets (freestyle) lancets, Use to check blood sugar up to four times daily, Disp: 100 each, Rfl: 3  •  levothyroxine (SYNTHROID, LEVOTHROID) 50 MCG tablet, Take 50 mcg by mouth Daily., Disp: , Rfl: 0  •  losartan (COZAAR) 100 MG tablet, Take 100 mg by mouth Daily., Disp: , Rfl:   •  metFORMIN (GLUCOPHAGE) 500 MG tablet, Take 500 mg by mouth 2 (Two) Times a Day With Meals., Disp: , Rfl:   •  nitroglycerin (NITROSTAT) 0.4 MG SL tablet, Take 0.4 mg by mouth Every 5 (Five) Minutes As Needed., Disp: , Rfl:   •  pantoprazole (PROTONIX) 40 MG EC tablet, Take 40 mg by mouth Daily., Disp: , Rfl:   •  pravastatin (PRAVACHOL) 40 MG tablet, Take 40 mg by mouth 2 (Two) Times a Day., Disp: , Rfl:   •  spironolactone (ALDACTONE) 25 MG tablet, Take 1 tablet by mouth Daily., Disp: 90 tablet, Rfl: 3  •  terazosin (HYTRIN) 2 MG capsule, Take 2 mg by mouth Every Night., Disp: , Rfl:   •  hydrALAZINE (APRESOLINE) 10 MG tablet, Take 1 tab PO BID for blood pressure, Disp: 60 tablet, Rfl: 1  Allergies   Allergen Reactions   • Contrast Dye Hives   • Oxycodone Nausea And Vomiting   • Adhesive Tape Rash     Social History     Tobacco Use   • Smoking status: Current Every Day Smoker     Years: 60.00     Types: Cigars   • Smokeless  "tobacco: Never Used   • Tobacco comment: QUIT 1999 CIGARETTES/STILL SMOKES CIGARS   Substance Use Topics   • Alcohol use: Yes     Comment: VERY RARE   • Drug use: Never          Objective   Physical Exam  Vitals:    04/19/21 1042   BP: 130/68   Pulse: 56   Temp: 98 °F (36.7 °C)   TempSrc: Temporal   SpO2: 96%   Weight: 98.9 kg (218 lb)   Height: 172.7 cm (68\")     Body mass index is 33.15 kg/m².    Constitutional: NAD.  Eyes: EOMI. PERRLA. Normal conjunctiva.  Cardiovascular: RRR. No murmurs. No LE edema b/l. Radial pulses 2+ bilaterally.  Pulmonary: CTA b/l. Good effort.  Integumentary: No rashes or wounds on face or upper extremities.  Lymphatic: No anterior cervical lymphadenopathy.  Endocrine: No thyromegaly or palpable thyroid nodules.  Psychiatric: Normal affect. Normal thought content.     Assessment/Plan   Assessment and Plan  Pleasant 80-year-old male with complicated past medical history including greater than 60-pack-year former smoker, WaldenStrom macroglobulinemia, MGUS, type 2 diabetes, CAD, hypertension, hyperlipidemia, CKD 3B, hypothyroidism, hyperuricemia, failed back syndrome, history of prostate cancer and non-Hodgkin's lymphoma, GERD, osteopenia, JULIA, among other problems, who presents with the following:    Diagnoses and all orders for this visit:    1. Essential hypertension (Primary): He has tolerated hydralazine in the past, cautioned him on side effects and will start low-dose.  He will follow up in 2 months for his annual and bring his ambulatory blood pressure logs with him.  I want to avoid increasing his spironolactone due to CKD.  -     hydrALAZINE (APRESOLINE) 10 MG tablet; Take 1 tab PO BID for blood pressure  Dispense: 60 tablet; Refill: 1    2. Waldenstrom macroglobulinemia (CMS/HCC): Continue to follow with hematology    3. Stage 3b chronic kidney disease  -     CBC & Differential; Future  -     Urinalysis With Microscopic - Urine, Clean Catch; Future  -     Uric Acid; Future    4. " Vitamin D deficiency  -     Vitamin D 25 Hydroxy; Future    5. Type 2 diabetes mellitus with hyperglycemia, with long-term current use of insulin (CMS/[facility]): Stable per measurements but recommend he improve his diet by avoiding sweets, will follow up A1c before next appointment  -     Hemoglobin A1c; Future  -     Lipid Panel; Future  -     Comprehensive Metabolic Panel; Future  -     Thyroid Panel With TSH; Future    6. Neoplastic malignant related fatigue: Not a great candidate for stimulants due to uncontrolled hypertension but will continue to follow and consider if worsening    Follow-up for annual in 2 to 3 months with blood work ahead of time    Gavin Peacock MD  Family Medicine  O: [phone]  C: [phone]    Disclaimer: Parts of this note were dictated by speech recognition. Minor errors in transcription may be present. Please call if questions.

## 2021-04-30 NOTE — TELEPHONE ENCOUNTER
PATIENT CALLED AND STATES HE STARTED CHEMO YESTERDAY. HE HAS TWO MORE ROUNDS TODAY. HE IS ON STEROIDS WHICH IS MAKING HIS BLOOD GLUCOSE AND BLOOD PRESSURE INCREASE. HIS BLOOD GLUCOSE THIS MORNING 250. YESTERDAY IT  AT 12 PM  LAST NIGHT. HE INCREASED HIS INSULIN FROM 40 UNITS TO 50 UNITS. THIS MORNING HE INCREASED FROM 60 UNITS TO 70 UNITS.  BLOOD PRESSURE THIS MORNING /54 PULSE 102 AT HOSPITAL. THIS MORNING AT HOME /67.LAST NIGHT BLOOD PRESSURE 128/65 PULSE 115.     PLEASE CALL AND ADVISE [phone]    HE WANTS TO MAKE SURE HE IS DOING THE CORRECT DOSAGE FOR HIS INSULIN.

## 2021-04-30 NOTE — TELEPHONE ENCOUNTER
Medication Refill Request    Date of phone call: 21    Medication being requested: norco 5/325mg si tab po q 6 hours prn   Qty: 120    Date of last visit: 3/19/21    Date of last refill:     MARIOLA up to date?:     Next Follow up?: 21    Any new pertinent information? (i.e, new medication allergies, new use of medications, change in patient's health or condition, non-compliance or inconsistency with prescribing agreement?): pt left voicemail with refill msg stg he started chemo yesterday. Please advise. Thank you.

## 2021-04-30 NOTE — TELEPHONE ENCOUNTER
Discussed with patient.  He is at risk for stacking of his long-acting insulin and subsequent hypoglycemia.  He has few short acting insulin before with sliding scale.  Since he is on his last day of prednisone I will have him return to his previous dose of Lantus and call him in a pain over 2 of short acting insulin with instructions for medium to high sliding scale 3 times daily.  I discussed this with him, also the risk of hypoglycemia, also when to call if not improving.  His blood pressure is normotensive most recently if still elevated will call.

## 2021-05-19 NOTE — PROGRESS NOTES
"CHIEF COMPLAINT  F/u BACK AND NECK PAIN- PATIENT STATES THAT HIS PAIN HAS WORSENED SINCE HIS LAST VISIT.   Subjective   Chevy Goodwin is a 80 y.o. male  who presents for follow-up.  He has a history of chronic neck and back pain.  Reports worsening diffuse pain, \"bone pain\".      C3-C6 fusion with Dr. Singleton 11/30/2020. Had f/u visit with Dr. Singleton yesterday. doing well from a surgical standpoint.  Resume normal activity.      Complains of pain.  neck is the most severe.  Today his pain is 7/10VAS (today is a bad day, usually closer to a 5 or 6).  Continues with Hydrocodone 5/325 4/day. Denies any side effects from the regimen. Helps some, less than before.  No constipation or somnolence.      Worsening problems with fatigue, dizziness, diffuse pain. Follows with oncology (Dr. Olivo [facility]) for anemia as well as Waldenstrom macroglobulinemia which has recently come out of remission.  Has been started on systemic chemoimmunotherapy infusions, 4-6 cycles and then repeat bone marrow biopsy to assess response.        Patient remained masked during entire encounter. No cough present. I donned a mask and eye protection throughout entire visit. Prior to donning mask and eye protection, hand hygiene was performed, as well as when it was doffed.  I was closer than 6 feet, but not for an extended period of time. No obvious exposure to any bodily fluids.    Back Pain  This is a chronic (history of lumbar fusion with Dr. Yuan at [facility], was done in 2005) problem. The current episode started more than 1 year ago. The problem occurs constantly. The problem has been waxing and waning (reports as unchanged since last office visit) since onset. The pain is present in the lumbar spine and thoracic spine. The quality of the pain is described as aching and burning. The pain radiates to the right foot and left foot. The pain is at a severity of 7/10. The pain is moderate. The pain is worse during the day. The " symptoms are aggravated by bending and twisting (walking, physical activity). Stiffness is present in the morning. Associated symptoms include numbness and weakness. Pertinent negatives include no abdominal pain, bladder incontinence, bowel incontinence, chest pain, dysuria, fever, headaches or tingling. Risk factors include lack of exercise. He has tried NSAIDs (Hydrocodone) for the symptoms. The treatment provided moderate relief.   Neck Pain   This is a chronic (surgery 8/2019 Dr. Singleton) problem. The problem occurs daily. The problem has been gradually improving. The pain is at a severity of 7/10. The pain is moderate. Associated symptoms include numbness and weakness. Pertinent negatives include no chest pain, fever, headaches or tingling. He has tried NSAIDs and oral narcotics for the symptoms. The treatment provided moderate relief.      PEG Assessment   What number best describes your pain on average in the past week?8  What number best describes how, during the past week, pain has interfered with your enjoyment of life?8  What number best describes how, during the past week, pain has interfered with your general activity?  8    The following portions of the patient's history were reviewed and updated as appropriate: allergies, current medications, past family history, past medical history, past social history, past surgical history and problem list.    Review of Systems   Constitutional: Positive for activity change (DECREASED) and fatigue. Negative for chills and fever.   HENT: Negative for congestion.    Eyes: Negative for visual disturbance.   Respiratory: Negative for chest tightness and shortness of breath.    Cardiovascular: Negative for chest pain.   Gastrointestinal: Positive for constipation and diarrhea. Negative for abdominal pain and bowel incontinence.   Genitourinary: Negative for bladder incontinence, difficulty urinating and dysuria.   Musculoskeletal: Positive for back pain and neck pain.  "  Neurological: Positive for dizziness, weakness, light-headedness and numbness. Negative for tingling and headaches.   Psychiatric/Behavioral: Positive for sleep disturbance. Negative for agitation. The patient is nervous/anxious.      I have reviewed and confirmed the accuracy of the ROS as documented by the MA/LPN/RN DANY Vo    Vitals:    05/19/21 1311   BP: 151/69   Pulse: 67   Resp: 16   Temp: 97.5 °F (36.4 °C)   SpO2: 95%   Weight: 95.6 kg (210 lb 12.8 oz)   Height: 172.7 cm (68\")   PainSc:   7   PainLoc: Back     Objective   Physical Exam  Vitals and nursing note reviewed.   Constitutional:       General: He is not in acute distress.     Appearance: Normal appearance. He is not ill-appearing.   Pulmonary:      Effort: Pulmonary effort is normal. No respiratory distress.   Musculoskeletal:      Cervical back: Tenderness and bony tenderness present.      Lumbar back: Tenderness and bony tenderness present.   Skin:     General: Skin is warm and dry.   Neurological:      Mental Status: He is alert and oriented to person, place, and time.      Motor: Motor function is intact. No weakness.      Gait: Gait abnormal (ambulates with walker).   Psychiatric:         Mood and Affect: Mood normal.         Behavior: Behavior normal.       Assessment/Plan   Diagnoses and all orders for this visit:    1. Other chronic pain (Primary)    2. Postlaminectomy syndrome of lumbar region    3. Postlaminectomy syndrome, cervical region    4. High risk medication use    5. Diffuse pain    6. Cancer associated pain      --- Increase Hydrocodone 7.5/325 for now, acute increase due to current chemo/immunotherapy treatment and cancer/lymphoma associated diffuse pain.  Patient appears stable with current regimen. No adverse effects. Regarding continuation of opioids, there is no evidence of aberrant behavior or any red flags.  The patient continues with appropriate response to opioid therapy. ADL's remain intact by self. "   --- The urine drug screen confirmation from 12/15/2020 has been reviewed and the result is appropriate based on patient history and MARIOLA report  --- Routine UDS in office today as part of monitoring requirements for controlled substances.  The specimen was viewed and the immunoassay result reviewed and is +OPI.  This specimen will be sent to [facility] laboratory for confirmation.     --- The patient signed an updated copy of the controlled substance agreement on 10/6/2021  --- Follow-up 1 month            MARIOLA REPORT  As part of the patient's treatment plan, I am prescribing controlled substances. The patient has been made aware of appropriate use of such medications, including potential risk of somnolence, limited ability to drive and/or work safely, and the potential for dependence or overdose. It has also bee made clear that these medications are for use by this patient only, without concomitant use of alcohol or other substances unless prescribed.     Patient has completed prescribing agreement detailing terms of continued prescribing of controlled substances, including monitoring MARIOLA reports, urine drug screening, and pill counts if necessary. The patient is aware that inappropriate use will results in cessation of prescribing such medications.    MARIOLA report has been reviewed and scanned into the patient's chart.    As the clinician, I personally reviewed the MARIOLA from 5/19/2021 while the patient was in the office today.    History and physical exam exhibit continued safe and appropriate use of controlled substances.      EMR Dragon/Transcription disclaimer:   Much of this encounter note is an electronic transcription/translation of spoken language to printed text. The electronic translation of spoken language may permit erroneous, or at times, nonsensical words or phrases to be inadvertently transcribed; Although I have reviewed the note for such errors, some may still exist.

## 2021-06-03 NOTE — TELEPHONE ENCOUNTER
Medication Refill Request    Date of phone call: 6/3/21    Medication being requested: norco 5/325mg si tab po q 6 hours prn   Qty: 120    Date of last visit: 21    Date of last refill:     MARIOLA up to date?:     Next Follow up?: 6/10/21    Any new pertinent information? (i.e, new medication allergies, new use of medications, change in patient's health or condition, non-compliance or inconsistency with prescribing agreement?):

## 2021-06-04 NOTE — TELEPHONE ENCOUNTER
Called and informed pt of above message. He verbalized understanding. He will call in advance for next refill for norco 7.5/325mg.

## 2021-06-04 NOTE — TELEPHONE ENCOUNTER
Pt just called left  regarding norco Rx. He sts he just picked up norco at pharmacy but it is the wrong dose. He picked up norco 5/325mg when during last ov, it was discussed increasing to 7.5mg due to current chemo/immunotherapy treatment and cancer/lymphoma associated diffuse pain. I reviewed your last ov note which sts increase norco to 7.5/325mg for now. since pt already picked up Rx how would you like to proceed? Please advise. Thank you.

## 2021-06-04 NOTE — TELEPHONE ENCOUNTER
He can take 1.5 tabs at a time since he has already picked up and let us know when he needs a refill.

## 2021-06-10 NOTE — PROGRESS NOTES
CHIEF COMPLAINT  Follow-up for back pain.    Subjective   hCevy Goodwin is a 80 y.o. male  who presents for follow-up.  He has a history of chronic neck and back pain.    C3-C6 fusion with Dr. Singleton 11/30/2020. Had f/u visit with Dr. Singleton recently. doing well from a surgical standpoint.  Resume normal activity.       Complains of pain.  neck is the most severe.  Today his pain is 7/10VAS.  Continues with Hydrocodone 7.5/325 4/day (increased last visit due to acute increase of pain related to his cancer treatment). Denies any side effects from the regimen. Helps some, less than before.  No constipation or somnolence.       Worsening problems with fatigue, dizziness, diffuse pain. Follows with oncology (Dr. Olivo [facility]) for anemia as well as Waldenstrom macroglobulinemia which has recently come out of remission.  Has been started on systemic chemoimmunotherapy infusions, 4-6 cycles and then repeat bone marrow biopsy to assess response.        Patient remained masked during entire encounter. No cough present. I donned a mask and eye protection throughout entire visit. Prior to donning mask and eye protection, hand hygiene was performed, as well as when it was doffed.  I was closer than 6 feet, but not for an extended period of time. No obvious exposure to any bodily fluids.    Back Pain  This is a chronic (history of lumbar fusion with Dr. Yuan at [facility], was done in 2005) problem. The current episode started more than 1 year ago. The problem occurs constantly. The problem has been waxing and waning since onset. The pain is present in the lumbar spine and thoracic spine. The quality of the pain is described as aching and burning. The pain radiates to the right foot and left foot. The pain is at a severity of 7/10. The pain is moderate. The pain is worse during the day. The symptoms are aggravated by bending and twisting (walking, physical activity). Stiffness is present in the morning.  Associated symptoms include numbness (bilateral legs) and weakness (bilateral legs). Pertinent negatives include no abdominal pain, bladder incontinence, bowel incontinence, chest pain, dysuria, fever, headaches or tingling. Risk factors include lack of exercise. He has tried NSAIDs (Hydrocodone) for the symptoms. The treatment provided moderate relief.   Neck Pain   This is a chronic (surgery 8/2019 Dr. Singleton) problem. The problem occurs daily. The problem has been gradually improving. The pain is at a severity of 7/10. The pain is moderate. Associated symptoms include numbness (bilateral legs) and weakness (bilateral legs). Pertinent negatives include no chest pain, fever, headaches or tingling. He has tried NSAIDs and oral narcotics for the symptoms. The treatment provided moderate relief.     PEG Assessment   What number best describes your pain on average in the past week?6  What number best describes how, during the past week, pain has interfered with your enjoyment of life?7  What number best describes how, during the past week, pain has interfered with your general activity?  7    The following portions of the patient's history were reviewed and updated as appropriate: allergies, current medications, past family history, past medical history, past social history, past surgical history and problem list.    Review of Systems   Constitutional: Positive for fatigue. Negative for fever.   HENT: Negative for congestion.    Eyes: Negative for visual disturbance.   Respiratory: Negative for cough, shortness of breath and wheezing.    Cardiovascular: Negative.  Negative for chest pain.   Gastrointestinal: Negative for abdominal pain, bowel incontinence, constipation and diarrhea.   Genitourinary: Negative for bladder incontinence, difficulty urinating and dysuria.   Musculoskeletal: Positive for back pain and neck pain.   Neurological: Positive for weakness (bilateral legs) and numbness (bilateral legs). Negative for  "tingling and headaches.   Psychiatric/Behavioral: Positive for sleep disturbance. Negative for suicidal ideas. The patient is not nervous/anxious.      I have reviewed and confirmed the accuracy of the ROS as documented by the MA/LPN/RN DANY Vo    Vitals:    06/10/21 1311   BP: 126/60   Pulse: 96   Resp: 18   Temp: 96.9 °F (36.1 °C)   SpO2: 96%   Weight: 95.5 kg (210 lb 9.6 oz)   Height: 172.7 cm (68\")   PainSc:   7   PainLoc: Back     Objective   Physical Exam  Vitals and nursing note reviewed.   Constitutional:       General: He is not in acute distress.     Appearance: Normal appearance. He is not ill-appearing.   Pulmonary:      Effort: Pulmonary effort is normal. No respiratory distress.   Musculoskeletal:      Cervical back: Tenderness and bony tenderness present.      Lumbar back: Tenderness and bony tenderness present.   Skin:     General: Skin is warm and dry.   Neurological:      Mental Status: He is alert and oriented to person, place, and time.      Motor: Motor function is intact. No weakness.      Gait: Gait abnormal (ambulates with walker).   Psychiatric:         Mood and Affect: Mood normal.         Behavior: Behavior normal.       Assessment/Plan   Diagnoses and all orders for this visit:    1. Other chronic pain (Primary)    2. Postlaminectomy syndrome of lumbar region    3. Postlaminectomy syndrome, cervical region    4. High risk medication use    5. Cancer associated pain    Other orders  -     Discontinue: HYDROcodone-acetaminophen (NORCO) 7.5-325 MG per tablet; Take 1 tablet by mouth Every 6 (Six) Hours As Needed for Moderate Pain . dnf 6/22/2021  Dispense: 120 tablet; Refill: 0  -     HYDROcodone-acetaminophen (NORCO) 7.5-325 MG per tablet; Take 1 tablet by mouth Every 6 (Six) Hours As Needed for Moderate Pain . dnf 6/22/2021  Dispense: 120 tablet; Refill: 0      --- Refill Hydrocodone (changing strength as previously discussed while he continues with his cancer treatment). " Patient appears stable with current regimen. No adverse effects. Regarding continuation of opioids, there is no evidence of aberrant behavior or any red flags.  The patient continues with appropriate response to opioid therapy. ADL's remain intact by self.   --- The urine drug screen confirmation from 5/19/2021 has been reviewed and the result is appropriate based on patient history and MARIOLA report  --- The patient signed an updated copy of the controlled substance agreement on 10/6/2021  --- Follow-up 6 weeks          MARIOLA REPORT  As part of the patient's treatment plan, I am prescribing controlled substances. The patient has been made aware of appropriate use of such medications, including potential risk of somnolence, limited ability to drive and/or work safely, and the potential for dependence or overdose. It has also bee made clear that these medications are for use by this patient only, without concomitant use of alcohol or other substances unless prescribed.     Patient has completed prescribing agreement detailing terms of continued prescribing of controlled substances, including monitoring MARIOLA reports, urine drug screening, and pill counts if necessary. The patient is aware that inappropriate use will results in cessation of prescribing such medications.    As the clinician, I personally reviewed the MARIOLA from 6/10/2021 while the patient was in the office today.    History and physical exam exhibit continued safe and appropriate use of controlled substances.    EMR Dragon/Transcription disclaimer:   Much of this encounter note is an electronic transcription/translation of spoken language to printed text. The electronic translation of spoken language may permit erroneous, or at times, nonsensical words or phrases to be inadvertently transcribed; Although I have reviewed the note for such errors, some may still exist.

## 2021-06-14 PROBLEM — Z51.12 ENCOUNTER FOR ANTINEOPLASTIC CHEMOTHERAPY AND IMMUNOTHERAPY: Status: ACTIVE | Noted: 2017-01-24

## 2021-06-14 PROBLEM — Z51.11 ENCOUNTER FOR ANTINEOPLASTIC CHEMOTHERAPY AND IMMUNOTHERAPY: Status: ACTIVE | Noted: 2017-01-24

## 2021-06-21 PROBLEM — G89.29 CHRONIC RIGHT SHOULDER PAIN: Status: RESOLVED | Noted: 2018-11-14 | Resolved: 2021-01-01

## 2021-06-21 PROBLEM — Z51.12 ENCOUNTER FOR ANTINEOPLASTIC CHEMOTHERAPY AND IMMUNOTHERAPY: Status: RESOLVED | Noted: 2017-01-24 | Resolved: 2021-01-01

## 2021-06-21 PROBLEM — Z51.11 ENCOUNTER FOR ANTINEOPLASTIC CHEMOTHERAPY AND IMMUNOTHERAPY: Status: RESOLVED | Noted: 2017-01-24 | Resolved: 2021-01-01

## 2021-06-21 PROBLEM — M25.511 CHRONIC RIGHT SHOULDER PAIN: Status: RESOLVED | Noted: 2018-11-14 | Resolved: 2021-01-01

## 2021-06-21 NOTE — PROGRESS NOTES
Date of Encounter: 2021  Patient: Chevy Goodwin,  1940    SUBSEQUENT MEDICARE WELLNESS VISIT  Subjective   History of Presenting Illness  Chief complaint: Medicare wellness exam    Type 2 diabetes, FBS 60s-120s, 3-4 morning hypoglycemic episodes per week.  At bedtime 150s-250s with recent increase after chemotherapy.    CAD, no chest pain.    Hypertension, has noticed elevated heart rate into the low 100s on current regimen.  This is symptomatic.    Hypothyroidism demonstrating recent control.    Hyperuricemia with no recent episodes of gout.    Waldenstrom macroglobulinemia, followed by heme-onc receiving Rituxan and Bendeka    Significant neoplastic related fatigue, sleeping about 20 hours/day, not enjoying quality of life because of this.    Stage IIIb kidney disease with recent blood pressure medication changes by nephrology.    Iron deficiency anemia followed by heme-onc    Status post right shoulder arthroplasty with no current functional limitations    Chronic lower back pain seeing pain management    Adherent to CPAP    Ambulates with a cane with no recent falls    Lives with wife.  3 children, 2 live nearby.  Former 60-pack-year smoker quit in .  Does not drink alcohol.  Does not exercise.  Average diet.  Retired , [employer] .  Has a living will on file.  He reports he is up-to-date on vaccinations including influenza, pneumococcal, Shingrix, COVID-19, and Td, although not all of these are on file in our system.    Pain  In the past 7 days, how much pain have you felt? 5/10    Review of Systems:  Negative for fever, congestion, chest pain upon exertion, shortness of breath, vision changes, vomiting, dysuria, lymphadenopathy, muscle weakness, numbness, mood changes, rashes.    Health Risk Assessment:    Recent Hospitalizations:  Recently treated at the following:  Other: [facility], oncology    Current Medical Providers:  Patient Care Team:  Gavin Peacock,  MD as PCP - General (Family Medicine)  Bubba Hernandez MD as Consulting Physician (Cardiology)  Girma Olsen MD as Consulting Physician (Urology)  Nickolas Olsen MD as Consulting Physician (Dermatology)  Kirk Gross MD as Consulting Physician (Orthopedic Surgery)  José Miguel Henry MD as Consulting Physician (Nephrology)  Mulugeta Odonnell MD as Consulting Physician (Pulmonary Disease)    Smoking Status:  Social History     Tobacco Use   Smoking Status Former Smoker   • Years: 60.00   • Types: Cigars   Smokeless Tobacco Never Used   Tobacco Comment    QUIT 1999 CIGARETTES/STILL SMOKES CIGARS       Alcohol Consumption:  Social History     Substance and Sexual Activity   Alcohol Use Yes    Comment: VERY RARE       Depression Screen:   PHQ-2/PHQ-9 Depression Screening 6/21/2021   Little interest or pleasure in doing things 0   Feeling down, depressed, or hopeless 0   Total Score 0     I spent more than 16 minutes asking patient questions, counseling and documenting in the chart    Fall Risk Screen:  LALO Fall Risk Assessment was completed, and patient is at MODERATE risk for falls. Assessment completed on:6/21/2021    Health Habits and Functional and Cognitive Screening:  Functional & Cognitive Status 6/21/2021   Do you have difficulty preparing food and eating? No   Do you have difficulty bathing yourself, getting dressed or grooming yourself? No   Do you have difficulty using the toilet? No   Do you have difficulty moving around from place to place? No   Do you have trouble with steps or getting out of a bed or a chair? No   Do you need help using the phone?  No   Are you deaf or do you have serious difficulty hearing?  Yes   Do you need help with transportation? No   Do you need help shopping? No   Do you need help preparing meals?  No   Do you need help with housework?  No   Do you need help with laundry? No   Do you need help taking your medications? No   Do you need help managing money?  No   Do you ever drive or ride in a car without wearing a seat belt? No   Do you have difficulty concentrating, remembering or making decisions? No       Does the patient have evidence of cognitive impairment? No    Aspirin use counseling:Does not need ASA (and currently is not on it)    Recent Lab Results:  Lab Results   Component Value Date     (H) 03/24/2021        Age-appropriate Screening Schedule:  Refer to the list below for future screening recommendations based on patient's age, sex and/or medical conditions. Orders for these recommended tests are listed in the plan section. The patient has been provided with a written plan.    Health Maintenance   Topic Date Due   • INFLUENZA VACCINE  08/01/2021   • HEMOGLOBIN A1C  08/11/2021   • DIABETIC EYE EXAM  01/01/2022   • TDAP/TD VACCINES (4 - Td or Tdap) 05/12/2030   • LIPID PANEL  Discontinued   • URINE MICROALBUMIN  Discontinued   • DXA SCAN  Discontinued   • ZOSTER VACCINE  Discontinued       Compared to one year ago, the patient feels his physical health is worse.  Compared to one year ago, the patient feels his mental health is worse.    Reviewed chart for potential of high risk medication in the elderly: yes  Reviewed chart for potential of harmful drug interactions in the elderly:yes    Advanced Care Planning:  Advance Care Planning   ACP discussion was declined by the patient. Patient has an advance directive in EMR which is still valid.     The following portions of the patient's history were reviewed and updated as appropriate: allergies, current medications, past family history, past medical history, past social history, past surgical history and problem list.    Patient Active Problem List   Diagnosis   • Degeneration of intervertebral disc of lumbar region   • Thoracic disc herniation T9-T10   • Type 2 diabetes mellitus with hyperglycemia, with long-term current use of insulin (CMS/[facility])   • Hypertension   • Long term (current) use of opiate  analgesic   • JULIA treated with autoBiPAP   • Hypersomnia due to medical condition   • Status post reverse total arthroplasty of right shoulder   • Waldenstrom macroglobulinemia (CMS/HCC)   • Stage 3b chronic kidney disease   • Iron deficiency anemia due to chronic blood loss   • DDD (degenerative disc disease), cervical   • DDD (degenerative disc disease), thoracic   • Obesity (BMI 30-39.9)   • Osteopenia   • History of arteriovenous malformation (AVM)   • Failed back syndrome   • History of non-Hodgkin's lymphoma   • Hypothyroidism   • Hyperuricemia   • Gastroesophageal reflux disease without esophagitis   • Hyperlipidemia   • Coronary artery disease involving native heart without angina pectoris   • H/O prostatectomy   • Carotid bruit, but normal US   • Chronic otitis externa   • Former heavy tobacco smoker   • High risk medication use   • Neoplastic malignant related fatigue     Past Medical History:   Diagnosis Date   • 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   • Anesthesia complication     HARD TO AWAKEN   • Cancer (CMS/HCC)     PROSTATE AND SKIN   • Cataract    • Chronic pain 3/29/2016   • Coronary artery disease    • Diabetes (CMS/HCC)     TYPE 2   • GERD (gastroesophageal reflux disease)    • Gout    • Heart attack (CMS/HCC)     X 7   • Herniated cervical disc    • High cholesterol    • Hypersomnia    • Hypersomnia due to medical condition 2/24/2018   • Hypertension    • Kidney stones    • Low back pain    • Macroglobulinemia (CMS/HCC)    • MRSA (methicillin resistant staph aureus) culture positive     BILATERAL ELBOWS, 2015   • Right shoulder pain    • Sleep apnea     C pap   • Waldenstrom macroglobulinemia (CMS/HCC)     2014     Past Surgical History:   Procedure Laterality Date   • ANGIOPLASTY      X 7   • ANTERIOR CERVICAL DISCECTOMY W/ FUSION N/A 8/5/2019    Procedure: C3-C5 anterior cervical discectomy, fusion and instrumentation;  Surgeon: John Singleton MD;  Location: [facility] OR;  Service:  Neurosurgery   • BACK SURGERY      X4   • CARDIAC CATHETERIZATION     • CARPAL TUNNEL RELEASE Right    • CATARACT EXTRACTION WITH INTRAOCULAR LENS IMPLANT     • CERVICAL DISCECTOMY POSTERIOR FUSION WITH BRAIN LAB N/A 11/30/2020    Procedure: Cervical 4 5 laminectomy with a cervical 3 to cervical 6 fusion and instrumentation using O-arm;  Surgeon: John Singleton MD;  Location: [facility] OR;  Service: Neurosurgery;  Laterality: N/A;   • COLON RESECTION     • COLONOSCOPY     • ESOPHAGOSCOPY / EGD     • EXTRACORPOREAL SHOCK WAVE LITHOTRIPSY (ESWL)     • FINGER SURGERY Left    • GALLBLADDER SURGERY     • HERNIA REPAIR     • INCISION AND DRAINAGE ABSCESS      MULTIPLE   • LAMINECTOMY     • NECK SURGERY  08/05/2019   • KS REMOVAL OF ELBOW BURSA Left 9/15/2016    Procedure: LT ELBOW I&D W/ BONE CURRETTAGE;  Surgeon: Kirk Gross MD;  [place] [facility] [place] [facility]  Service: Orthopedics   • PROSTATE SURGERY     • ROTATOR CUFF REPAIR Right    • SKIN CANCER EXCISION      MULTIPLE   • TOTAL SHOULDER ARTHROPLASTY W/ DISTAL CLAVICLE EXCISION Right 1/10/2019    Procedure: RT TOTAL SHOULDER REVERSE ARTHROPLASTY;  Surgeon: Kirk Gross MD;  [place] [facility] [place] [facility];  Service: Orthopedics   • TYMPANOPLASTY Right      Family History   Problem Relation Age of Onset   • Malig Hyperthermia Neg Hx        Current Outpatient Medications:   •  acetaminophen (TYLENOL) 500 MG tablet, Take 500 mg by mouth Every 6 (Six) Hours As Needed for Mild Pain ., Disp: , Rfl:   •  Alcohol Swabs (EASY TOUCH ALCOHOL PREP MEDIUM) 70 % pads, Easy Touch Alcohol Prep Pads  Apply 2 pads twice a day by topical route., Disp: , Rfl:   •  allopurinol (ZYLOPRIM) 300 MG tablet, Take 300 mg by mouth every evening., Disp: , Rfl:   •  amLODIPine (NORVASC) 10 MG tablet, Take 10 mg by mouth every evening., Disp: , Rfl:   •  aspirin 81 MG tablet, Take 81 mg by mouth Daily., Disp: , Rfl:   •  azelastine (ASTELIN) 0.1 % nasal spray, 2 sprays into the nostril(s) as  directed by provider 2 (Two) Times a Day As Needed., Disp: , Rfl:   •  B-D UF III MINI PEN NEEDLES 31G X 5 MM misc, , Disp: , Rfl:   •  bumetanide (BUMEX) 1 MG tablet, Take 1 tablet by mouth Daily., Disp: 90 tablet, Rfl: 3  •  carvedilol (COREG) 12.5 MG tablet, Take 1 tablet by mouth 2 (Two) Times a Day With Meals., Disp: 180 tablet, Rfl: 3  •  cetirizine (zyrTEC) 10 MG tablet, Every 12 (Twelve) Hours., Disp: , Rfl:   •  FREESTYLE LITE test strip, Use to check blood sugar up to four times daily, Disp: 400 each, Rfl: 3  •  Glucosamine-Chondroit-Vit C-Mn ([employer] GLUCOSAMINE-CHONDROITIN) tablet, Take 1 tablet by mouth 2 (Two) Times a Day., Disp: , Rfl:   •  HYDROcodone-acetaminophen (NORCO) 7.5-325 MG per tablet, Take 1 tablet by mouth Every 6 (Six) Hours As Needed for Moderate Pain . dnf 6/22/2021, Disp: 120 tablet, Rfl: 0  •  Insulin Glargine (Lantus SoloStar) 100 UNIT/ML injection pen, 65 units AM; 30 units PM, Disp: 29 pen, Rfl: 3  •  insulin lispro (humaLOG) 100 UNIT/ML injection, Sliding scale before meals TID, 4 units if 150-200, 8 units if 201-250, 12 units if 251-300, 14 units if 301-350, 16 units if 351-400., Disp: 600 mL, Rfl: 3  •  isosorbide mononitrate (IMDUR) 30 MG 24 hr tablet, 30 mg 2 (two) times a day., Disp: , Rfl:   •  Lancets (freestyle) lancets, Use to check blood sugar up to four times daily, Disp: 100 each, Rfl: 3  •  levothyroxine (SYNTHROID, LEVOTHROID) 50 MCG tablet, Take 50 mcg by mouth Daily., Disp: , Rfl: 0  •  metFORMIN (GLUCOPHAGE) 500 MG tablet, Take 500 mg by mouth 2 (Two) Times a Day With Meals., Disp: , Rfl:   •  nitroglycerin (NITROSTAT) 0.4 MG SL tablet, Take 0.4 mg by mouth Every 5 (Five) Minutes As Needed., Disp: , Rfl:   •  pantoprazole (PROTONIX) 40 MG EC tablet, Take 40 mg by mouth Daily., Disp: , Rfl:   •  pravastatin (PRAVACHOL) 40 MG tablet, Take 40 mg by mouth 2 (Two) Times a Day., Disp: , Rfl:   •  spironolactone (ALDACTONE) 25 MG tablet, Take 1 tablet by mouth Daily., Disp:  90 tablet, Rfl: 3  •  terazosin (HYTRIN) 1 MG capsule, Take 2 capsules by mouth Every Night., Disp: 90 capsule, Rfl: 3  •  tobramycin-dexamethasone (TOBRADEX) 0.3-0.1 % ophthalmic suspension, Apply  to eye(s) as directed by provider., Disp: , Rfl:   •  amphetamine-dextroamphetamine (Adderall) 5 MG tablet, Take 1 tablet by mouth 2 (Two) Times a Day., Disp: 60 tablet, Rfl: 0  Allergies   Allergen Reactions   • Contrast Dye Hives   • Oxycodone Nausea And Vomiting   • Adhesive Tape Rash     Social History     Tobacco Use   • Smoking status: Former Smoker     Years: 60.00     Types: Cigars   • Smokeless tobacco: Never Used   • Tobacco comment: QUIT 1999 CIGARETTES/STILL SMOKES CIGARS   Substance Use Topics   • Alcohol use: Yes     Comment: VERY RARE   • Drug use: Never          Objective   Physical Exam  Vitals:    06/21/21 1553   BP: 122/68   Pulse: 90   SpO2: 97%   Weight: 97 kg (213 lb 12.8 oz)     Body mass index is 32.51 kg/m².  Discussed the patient's BMI with him. The BMI is above average; no BMI management plan is appropriate..    Constitutional: NAD.  Eyes: EOMI. PERRLA. Normal conjunctiva.  Ear, nose, mouth, throat: No tonsillar exudates or erythema.   Normal nasal mucosa. Normal external ear canals and TMs bilaterally.  Cardiovascular: RRR. No murmurs. No LE edema b/l. Radial pulses 2+ bilaterally.  Pulmonary: CTA b/l. Good effort.  Integumentary: No lacerations or wounds on face or upper extremities.  Lymphatic: No anterior cervical lymphadenopathy.  Endocrine: No thyromegaly or palpable thyroid nodules.  Psychiatric: Normal affect. Normal thought content.       Assessment/Plan   Assessment and Plan  Pleasant 80-year-old male with complicated past medical history including greater than 60-pack-year former smoker, Waldenstrom macroglobulinemia, MGUS, type 2 diabetes, CAD, hypertension, hyperlipidemia, CKD 3B, hypothyroidism, hyperuricemia, failed back syndrome, history of prostate cancer and non-Hodgkin's  lymphoma, GERD, osteopenia, JULIA, among other problems, who presents with the following:    Advance Directive Discussion  Depression/Dysphoria  Fall Risk    The above risks/problems have been discussed with the patient.  Pertinent information has been shared with the patient in the After Visit Summary.  Other plans as below:    Diagnoses and all orders for this visit:    1. Medicare annual wellness visit, subsequent (Primary)    2. Type 2 diabetes mellitus with hyperglycemia, with long-term current use of insulin (CMS/[facility]): Increase morning Lantus to 65 units, decrease evening Lantus to 30 units, if still having morning hypoglycemia will consider further glargine reduction and the addition of a mealtime rapid acting insulin.  In context of his current health and prognosis, in accordance with ADA guidelines, I feel his diabetes is well controlled with a goal A1c of less than 8% and I do not recommend more aggressive titration.  Recommend he get an A1c with next oncology blood work.  -     insulin lispro (humaLOG) 100 UNIT/ML injection; Sliding scale before meals TID, 4 units if 150-200, 8 units if 201-250, 12 units if 251-300, 14 units if 301-350, 16 units if 351-400.  Dispense: 600 mL; Refill: 3  -     Insulin Glargine (Lantus SoloStar) 100 UNIT/ML injection pen; 65 units AM; 30 units PM  Dispense: 29 pen; Refill: 3    3. Coronary artery disease involving native heart without angina pectoris, unspecified vessel or lesion type: No chest pain  -     spironolactone (ALDACTONE) 25 MG tablet; Take 1 tablet by mouth Daily.  Dispense: 90 tablet; Refill: 3    4. Essential hypertension: Intermittent symptomatic tachycardia, decreased Terazosin by 1 mg, increase carvedilol to 12.5 mg twice daily.  May need to adjust further based on blood pressure at follow-up  -     terazosin (HYTRIN) 1 MG capsule; Take 2 capsules by mouth Every Night.  Dispense: 90 capsule; Refill: 3  -     carvedilol (COREG) 12.5 MG tablet; Take 1 tablet by  mouth 2 (Two) Times a Day With Meals.  Dispense: 180 tablet; Refill: 3    5. Hypothyroidism, unspecified type: Stable per recent TSH    6. Hyperuricemia: Uric acid 4.4 mg/dL at recent check    7. Waldenstrom macroglobulinemia (CMS/HCC)  8. Neoplastic (malignant) related fatigue: Discussed the risks and benefits of stimulant treatment for neoplastic related fatigue.  At this point I think the benefits to his quality of life would be significant if he tolerated it well.  Fortunately his blood pressure is well controlled and he has no angina but I did discuss these risks regardless.  We will start very low-dose and titrate up with follow-up.  -     amphetamine-dextroamphetamine (Adderall) 5 MG tablet; Take 1 tablet by mouth 2 (Two) Times a Day.  Dispense: 60 tablet; Refill: 0    9. Stage 3b chronic kidney disease: Stable    10. Iron deficiency anemia due to chronic blood loss: Continue to follow with heme-onc    11. Status post reverse total arthroplasty of right shoulder: No current discomfort    12. Failed back syndrome: Pain adequately controlled on current regimen through pain management    13. JULIA treated with autoBiPAP: Adherent    Follow Up:  Follow-up in 1 to 2 months for cancer-related fatigue, blood pressure, diabetes    An After Visit Summary and PPPS were given to the patient.    Gavin Peacock MD  Family Medicine  O: [phone]  C: [phone]    Disclaimer: Parts of this note were dictated by speech recognition. Minor errors in transcription may be present. Please call if questions.

## 2021-07-23 NOTE — PROGRESS NOTES
CHIEF COMPLAINT  Follow-up for back pain.    Subjective   Chevy Goodwin is a 80 y.o. male  who presents for follow-up.  He has a history of chronic neck and back pain, also actively undergoing chemotherapy for Waldenstrom macroglobulinemia which has caused his pain to worsen (generalized/joints).    C3-C6 fusion with Dr. Singleton 11/30/2020. Had f/u visit with Dr. Singleton recently. doing well from a surgical standpoint.  Resume normal activity.       Complains of pain.  neck is the most severe.  Today his pain is 7/10VAS.  Continues with Hydrocodone 7.5/325 4/day (increased last visit due to acute increase of pain related to his cancer treatment). Denies any side effects from the regimen. Helps some, less than before.  No constipation or somnolence.       Worsening problems with fatigue, dizziness, diffuse pain. Follows with oncology (Dr. Olivo [facility]) for anemia as well as Waldenstrom macroglobulinemia which has recently come out of remission.  Has been started on systemic chemoimmunotherapy infusions, has completed 4 of 6 cycles and then will repeat bone marrow biopsy to assess response.        Patient remained masked during entire encounter. No cough present. I donned a mask and eye protection throughout entire visit. Prior to donning mask and eye protection, hand hygiene was performed, as well as when it was doffed.  I was closer than 6 feet, but not for an extended period of time. No obvious exposure to any bodily fluids.    Back Pain  This is a chronic (history of lumbar fusion with Dr. Yuan at [facility], was done in 2005) problem. The current episode started more than 1 year ago. The problem occurs constantly. The problem has been waxing and waning since onset. The pain is present in the lumbar spine and thoracic spine. The quality of the pain is described as aching and burning. The pain radiates to the right foot and left foot. The pain is at a severity of 7/10. The pain is moderate. The pain  is worse during the day. The symptoms are aggravated by bending and twisting (walking, physical activity). Stiffness is present in the morning. Associated symptoms include numbness. Pertinent negatives include no abdominal pain, bladder incontinence, bowel incontinence, chest pain, dysuria, fever, headaches, tingling or weakness. Risk factors include lack of exercise. He has tried NSAIDs (Hydrocodone) for the symptoms. The treatment provided moderate relief.   Neck Pain   This is a chronic (surgery 8/2019 Dr. Singleton) problem. The problem occurs daily. The problem has been gradually improving. The pain is at a severity of 7/10. The pain is moderate. Associated symptoms include numbness. Pertinent negatives include no chest pain, fever, headaches, tingling or weakness. He has tried NSAIDs and oral narcotics for the symptoms. The treatment provided moderate relief.     PEG Assessment   What number best describes your pain on average in the past week?7  What number best describes how, during the past week, pain has interfered with your enjoyment of life?8  What number best describes how, during the past week, pain has interfered with your general activity?  8    The following portions of the patient's history were reviewed and updated as appropriate: allergies, current medications, past family history, past medical history, past social history, past surgical history and problem list.    Review of Systems   Constitutional: Positive for fatigue. Negative for fever.   HENT: Negative for congestion.    Eyes: Negative for visual disturbance.   Respiratory: Positive for shortness of breath. Negative for cough and wheezing.    Cardiovascular: Positive for leg swelling. Negative for chest pain and palpitations.   Gastrointestinal: Positive for diarrhea. Negative for abdominal pain, bowel incontinence and constipation.   Genitourinary: Negative for bladder incontinence, difficulty urinating and dysuria.   Musculoskeletal:  "Positive for back pain and neck pain.   Neurological: Positive for numbness. Negative for tingling, weakness and headaches.   Psychiatric/Behavioral: Positive for sleep disturbance. Negative for suicidal ideas. The patient is not nervous/anxious.      I have reviewed and confirmed the accuracy of the ROS as documented by the MA/LPN/RN DANY Vo    Vitals:    07/23/21 1018   BP: 140/62   Pulse: 68   Resp: 18   Temp: 98.2 °F (36.8 °C)   SpO2: 96%   Weight: 99.3 kg (218 lb 14.4 oz)   Height: 172.7 cm (68\")   PainSc:   7   PainLoc: Back     Objective   Physical Exam  Vitals and nursing note reviewed.   Constitutional:       General: He is not in acute distress.     Appearance: Normal appearance. He is not ill-appearing.   Pulmonary:      Effort: Pulmonary effort is normal. No respiratory distress.   Musculoskeletal:      Cervical back: Tenderness and bony tenderness present.      Lumbar back: Tenderness and bony tenderness present.   Skin:     General: Skin is warm and dry.   Neurological:      Mental Status: He is alert and oriented to person, place, and time.      Motor: Motor function is intact. No weakness.      Gait: Gait abnormal (ambulates with walker).   Psychiatric:         Mood and Affect: Mood normal.         Behavior: Behavior normal.       Assessment/Plan   Diagnoses and all orders for this visit:    1. Other chronic pain (Primary)    2. Postlaminectomy syndrome of lumbar region    3. Postlaminectomy syndrome, cervical region    4. Cancer associated pain    5. Encounter for long-term current use of high risk medication    Other orders  -     HYDROcodone-acetaminophen (NORCO) 7.5-325 MG per tablet; Take 1 po every 4-6 hours prn pain. Max 5/day.  Dispense: 150 tablet; Refill: 0      --- Refill Hydrocodone.  Increase quantity to #150.  Patient appears stable with current regimen. No adverse effects. Regarding continuation of opioids, there is no evidence of aberrant behavior or any red flags.  " The patient continues with appropriate response to opioid therapy. ADL's remain intact by self.   --- The urine drug screen confirmation from 5/19/2021 has been reviewed and the result is appropriate based on patient history and MARIOLA report  --- The patient signed an updated copy of the controlled substance agreement on 10/6/2021  --- Follow-up 7/23/2021       MARIOLA REPORT  As part of the patient's treatment plan, I am prescribing controlled substances. The patient has been made aware of appropriate use of such medications, including potential risk of somnolence, limited ability to drive and/or work safely, and the potential for dependence or overdose. It has also bee made clear that these medications are for use by this patient only, without concomitant use of alcohol or other substances unless prescribed.     Patient has completed prescribing agreement detailing terms of continued prescribing of controlled substances, including monitoring MARIOLA reports, urine drug screening, and pill counts if necessary. The patient is aware that inappropriate use will results in cessation of prescribing such medications.    As the clinician, I personally reviewed the MARIOLA from 7/23/2021 while the patient was in the office today.    History and physical exam exhibit continued safe and appropriate use of controlled substances.     Dictated utilizing Dragon dictation.

## 2021-08-18 NOTE — PROGRESS NOTES
CHIEF COMPLAINT  F/u BACK AND NECK PAIN- patient states that his pain has worsened since his last visit.     Subjective   Chevy Goodwin is a 81 y.o. male  who presents for follow-up.  He has a history of chronic back and neck pain as well as cancer associated pain.  Actively undergoing chemotherapy for Waldenstrom macroglobulinemia which has caused his pain to worsen (generalized/joints).     C3-C6 fusion with Dr. Singleton 11/30/2020. Had f/u visit with Dr. Singleton recently. doing well from a surgical standpoint.  Resume normal activity.       Complains of pain.  neck is the most severe.  Today his pain is 7/10VAS.  Continues with Hydrocodone 7.5/325 5/day (increased last visit due to acute increase of pain related to his cancer treatment). Denies any side effects from the regimen. Helps reduce his pain moderately.  No constipation or somnolence.       Worsening problems with fatigue, dizziness, diffuse pain. Follows with oncology (Dr. Olivo [facility]) for anemia as well as Waldenstrom macroglobulinemia which has recently come out of remission.  Has been started on systemic chemoimmunotherapy infusions, has completed 4 of 6 cycles and then will repeat bone marrow biopsy to assess response.        Patient remained masked during entire encounter. No cough present. I donned a mask and eye protection throughout entire visit. Prior to donning mask and eye protection, hand hygiene was performed, as well as when it was doffed.  I was closer than 6 feet, but not for an extended period of time. No obvious exposure to any bodily fluids.    Back Pain  This is a chronic (history of lumbar fusion with Dr. Yuan at [facility], was done in 2005) problem. The current episode started more than 1 year ago. The problem occurs constantly. The problem has been waxing and waning since onset. The pain is present in the lumbar spine and thoracic spine. The quality of the pain is described as aching and burning. The pain radiates  to the right foot and left foot. The pain is at a severity of 7/10. The pain is moderate. The pain is worse during the day. The symptoms are aggravated by bending and twisting (walking, physical activity). Stiffness is present in the morning. Associated symptoms include abdominal pain, numbness (janna feet) and weakness (gen). Pertinent negatives include no bladder incontinence, bowel incontinence, chest pain, dysuria, fever, headaches or tingling. Risk factors include lack of exercise. He has tried NSAIDs (Hydrocodone) for the symptoms. The treatment provided moderate relief.   Neck Pain   This is a chronic (surgery 8/2019 Dr. Singleton) problem. The problem occurs daily. The problem has been gradually improving. The pain is at a severity of 7/10. The pain is moderate. Associated symptoms include numbness (janna feet) and weakness (gen). Pertinent negatives include no chest pain, fever, headaches or tingling. He has tried NSAIDs and oral narcotics for the symptoms. The treatment provided moderate relief.      PEG Assessment   What number best describes your pain on average in the past week?6  What number best describes how, during the past week, pain has interfered with your enjoyment of life?8  What number best describes how, during the past week, pain has interfered with your general activity?  8    The following portions of the patient's history were reviewed and updated as appropriate: allergies, current medications, past family history, past medical history, past social history, past surgical history and problem list.    Review of Systems   Constitutional: Positive for activity change (decreased) and fatigue. Negative for chills and fever.   HENT: Negative for congestion.    Eyes: Negative for visual disturbance.   Respiratory: Negative for chest tightness and shortness of breath.    Cardiovascular: Negative for chest pain.   Gastrointestinal: Positive for abdominal pain. Negative for bowel incontinence, constipation  "and diarrhea.   Genitourinary: Negative for bladder incontinence, difficulty urinating and dysuria.   Musculoskeletal: Positive for back pain and neck pain.   Neurological: Positive for weakness (gen) and numbness (janna feet). Negative for dizziness, tingling, light-headedness and headaches.   Psychiatric/Behavioral: Positive for sleep disturbance. Negative for agitation. The patient is not nervous/anxious.      I have reviewed and confirmed the accuracy of the ROS as documented by the MA/LPN/RN DANY Vo    Vitals:    08/18/21 1348   BP: 128/65   Pulse: 56   Resp: 16   Temp: 96.9 °F (36.1 °C)   SpO2: 94%   Weight: 96.4 kg (212 lb 9.6 oz)   Height: 172.7 cm (68\")   PainSc:   7   PainLoc: Back     Objective   Physical Exam  Vitals and nursing note reviewed.   Constitutional:       General: He is not in acute distress.     Appearance: Normal appearance. He is not ill-appearing.   Pulmonary:      Effort: Pulmonary effort is normal. No respiratory distress.   Musculoskeletal:      Cervical back: Tenderness and bony tenderness present.      Lumbar back: Tenderness and bony tenderness present.   Skin:     General: Skin is warm and dry.   Neurological:      Mental Status: He is alert and oriented to person, place, and time.      Motor: Motor function is intact. No weakness.      Gait: Gait abnormal (ambulates with walker).   Psychiatric:         Mood and Affect: Mood normal.         Behavior: Behavior normal.         Assessment/Plan   Diagnoses and all orders for this visit:    1. Other chronic pain (Primary)    2. Postlaminectomy syndrome of lumbar region    3. Postlaminectomy syndrome, cervical region    4. Diffuse pain    5. Cancer associated pain    6. High risk medication use      --- Refill Hydrocodone (dated appropriately). Patient appears stable with current regimen. No adverse effects. Regarding continuation of opioids, there is no evidence of aberrant behavior or any red flags.  The patient continues " with appropriate response to opioid therapy. ADL's remain intact by self.   --- The urine drug screen confirmation from 5/19/2021 has been reviewed and the result is appropriate based on patient history and MARIOLA report  --- The patient signed an updated copy of the controlled substance agreement on 10/6/2021  --- Follow-up 2 months        MARIOLA REPORT  As part of the patient's treatment plan, I am prescribing controlled substances. The patient has been made aware of appropriate use of such medications, including potential risk of somnolence, limited ability to drive and/or work safely, and the potential for dependence or overdose. It has also bee made clear that these medications are for use by this patient only, without concomitant use of alcohol or other substances unless prescribed.     Patient has completed prescribing agreement detailing terms of continued prescribing of controlled substances, including monitoring MARIOLA reports, urine drug screening, and pill counts if necessary. The patient is aware that inappropriate use will results in cessation of prescribing such medications.    As the clinician, I personally reviewed the MARIOLA from 8/18/2021 while the patient was in the office today.    History and physical exam exhibit continued safe and appropriate use of controlled substances.     Dictated utilizing Dragon dictation.

## 2021-09-23 NOTE — TELEPHONE ENCOUNTER
Pt came by office to drop off list of medications that need to be filled for pt.    Pt will be headed to [place] [facility] to [facility] this Tuesday 9/28/21. Pt hoping to come to office on Monday 9/27/21 to pick of scripts.    List in Dr. Gavin Peacock's inbox

## 2021-09-29 PROBLEM — D70.1 CHEMOTHERAPY INDUCED NEUTROPENIA (HCC): Status: ACTIVE | Noted: 2021-01-01

## 2021-09-29 PROBLEM — T45.1X5A CHEMOTHERAPY INDUCED NEUTROPENIA (HCC): Status: ACTIVE | Noted: 2021-01-01

## 2021-10-01 NOTE — PROGRESS NOTES
Date of Encounter: 10/01/2021  Patient: Chevy Goodwin,  1940    Subjective   History of Presenting Illness  Chief complaint: Fatigue     Patient presents for follow-up of fatigue.  Previously we prescribed Adderall 5 mg to be taken twice daily cautiously, patient did not know any side effects or benefits from this medication.  He reports that his fatigue has worsened with recent chemotherapy and anemia, hemoglobin 8.0 on , CBC pending today.  After completing 6 cycles plan is for bone marrow biopsy to assess response.    Type 2 diabetes, hypertension.  Patient has brought in a thorough glucose of blood pressure log. Excellent blood pressure control systolics generally  120-140, diastolics 60s-70s.  FBS 80s-110s on average, at bedtime 180s-240s.  Rare glucose greater than 300.    Amenable to influenza and pneumococcal vaccination today.    Review of Systems:  Negative for fever, chills, cough, shortness of breath    The following portions of the patient's history were reviewed and updated as appropriate: allergies, current medications, past family history, past medical history, past social history, past surgical history and problem list.    Patient Active Problem List   Diagnosis   • Degeneration of intervertebral disc of lumbar region   • Thoracic disc herniation T9-T10   • Type 2 diabetes mellitus with hyperglycemia, with long-term current use of insulin (HCC)   • Hypertension   • Long term (current) use of opiate analgesic   • JULIA treated with autoBiPAP   • Hypersomnia due to medical condition   • Status post reverse total arthroplasty of right shoulder   • Waldenstrom macroglobulinemia (HCC)   • Stage 3b chronic kidney disease   • Iron deficiency anemia due to chronic blood loss   • DDD (degenerative disc disease), cervical   • DDD (degenerative disc disease), thoracic   • Obesity (BMI 30-39.9)   • Osteopenia   • History of arteriovenous malformation (AVM)   • Failed back syndrome   • History of  non-Hodgkin's lymphoma   • Hypothyroidism   • Hyperuricemia   • Gastroesophageal reflux disease without esophagitis   • Hyperlipidemia   • Coronary artery disease involving native heart without angina pectoris   • H/O prostatectomy   • Carotid bruit, but normal US   • Chronic otitis externa   • Former heavy tobacco smoker   • High risk medication use   • Neoplastic malignant related fatigue   • Chemotherapy induced neutropenia (HCC)     Past Medical History:   Diagnosis Date   • 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   • Anesthesia complication     HARD TO AWAKEN   • Cancer (HCC)     PROSTATE AND SKIN   • Cataract    • Chronic pain 3/29/2016   • Coronary artery disease    • Diabetes (HCC)     TYPE 2   • GERD (gastroesophageal reflux disease)    • Gout    • Heart attack (HCC)     X 7   • Herniated cervical disc    • High cholesterol    • Hypersomnia    • Hypersomnia due to medical condition 2/24/2018   • Hypertension    • Kidney stones    • Low back pain    • Macroglobulinemia (HCC)    • MRSA (methicillin resistant staph aureus) culture positive     BILATERAL ELBOWS, 2015   • Right shoulder pain    • Sleep apnea     C pap   • Waldenstrom macroglobulinemia (HCC)     2014     Past Surgical History:   Procedure Laterality Date   • ANGIOPLASTY      X 7   • ANTERIOR CERVICAL DISCECTOMY W/ FUSION N/A 8/5/2019    Procedure: C3-C5 anterior cervical discectomy, fusion and instrumentation;  Surgeon: John Singleton MD;  Location: [facility];  Service: Neurosurgery   • BACK SURGERY      X4   • CARDIAC CATHETERIZATION     • CARPAL TUNNEL RELEASE Right    • CATARACT EXTRACTION WITH INTRAOCULAR LENS IMPLANT     • CERVICAL DISCECTOMY POSTERIOR FUSION WITH BRAIN LAB N/A 11/30/2020    Procedure: Cervical 4 5 laminectomy with a cervical 3 to cervical 6 fusion and instrumentation using O-arm;  Surgeon: John Singleton MD;  Location: [facility];  Service: Neurosurgery;  Laterality: N/A;   • COLON RESECTION     • COLONOSCOPY     •  ESOPHAGOSCOPY / EGD     • EXTRACORPOREAL SHOCK WAVE LITHOTRIPSY (ESWL)     • FINGER SURGERY Left    • GALLBLADDER SURGERY     • HERNIA REPAIR     • INCISION AND DRAINAGE ABSCESS      MULTIPLE   • LAMINECTOMY     • NECK SURGERY  08/05/2019   • OK REMOVAL OF ELBOW BURSA Left 9/15/2016    Procedure: LT ELBOW I&D W/ BONE CURRETTAGE;  Surgeon: Kirk Gross MD;  Location: [facility] [place];  Service: Orthopedics   • PROSTATE SURGERY     • ROTATOR CUFF REPAIR Right    • SKIN CANCER EXCISION      MULTIPLE   • TOTAL SHOULDER ARTHROPLASTY W/ DISTAL CLAVICLE EXCISION Right 1/10/2019    Procedure: RT TOTAL SHOULDER REVERSE ARTHROPLASTY;  Surgeon: Kirk Gross MD;  Location: [facility] OR [facility] [place];  Service: Orthopedics   • TYMPANOPLASTY Right    Will move  Family History   Problem Relation Age of Onset   • Malig Hyperthermia Neg Hx        Current Outpatient Medications:   •  Alcohol Swabs (EASY TOUCH ALCOHOL PREP MEDIUM) 70 % pads, Easy Touch Alcohol Prep Pads  Apply 2 pads twice a day by topical route., Disp: , Rfl:   •  allopurinol (ZYLOPRIM) 300 MG tablet, Take 1 tablet by mouth Every Evening., Disp: 90 tablet, Rfl: 3  •  amLODIPine (NORVASC) 10 MG tablet, Take 1 tablet by mouth Every Evening., Disp: 90 tablet, Rfl: 3  •  aspirin 81 MG tablet, Take 81 mg by mouth Daily., Disp: , Rfl:   •  azelastine (ASTELIN) 0.1 % nasal spray, 2 sprays into the nostril(s) as directed by provider 2 (Two) Times a Day As Needed., Disp: , Rfl:   •  B-D UF III MINI PEN NEEDLES 31G X 5 MM misc, Use with insulin pen BID as directed, Disp: 200 each, Rfl: 3  •  bumetanide (BUMEX) 1 MG tablet, Take 1 tablet by mouth Daily., Disp: 90 tablet, Rfl: 3  •  calcium carbonate (Tums) 500 MG chewable tablet, Chew 1 tablet 2 (Two) Times a Day., Disp: 180 tablet, Rfl: 3  •  CALCIUM CITRATE PO, Take  by mouth., Disp: , Rfl:   •  carvedilol (COREG) 6.25 MG tablet, Take 12.5 mg by mouth., Disp: , Rfl:   •  cetirizine (zyrTEC) 10 MG tablet, Take 0.5 tablets by mouth  Daily., Disp: 45 tablet, Rfl: 3  •  cholecalciferol (Vitamin D) 25 MCG (1000 UT) tablet, Take 1 tab by mouth daily for low vitamin D levels, Disp: 90 tablet, Rfl: 3  •  DIFLORASONE DIACETATE EX, Apply  topically., Disp: , Rfl:   •  FREESTYLE LITE test strip, Use to check blood sugar up to four times daily, Disp: 400 each, Rfl: 3  •  Glucosamine-Chondroit-Vit C-Mn ([facility] Glucosamine-Chondroitin) tablet, Take 1 tablet by mouth 2 (Two) Times a Day., Disp: 180 each, Rfl: 3  •  hydrALAZINE (APRESOLINE) 10 MG tablet, Take 10 mg by mouth 3 (Three) Times a Day., Disp: , Rfl:   •  HYDROcodone-acetaminophen (NORCO) 7.5-325 MG per tablet, Take 1 po every 4-6 hours prn pain. Max 5/day., Disp: 150 tablet, Rfl: 0  •  Insulin Glargine (Lantus SoloStar) 100 UNIT/ML injection pen, 65 units AM; 30 units PM, Disp: 32 pen, Rfl: 3  •  insulin lispro (humaLOG) 100 UNIT/ML injection, Sliding scale before meals TID, 4 units if 150-200, 8 units if 201-250, 12 units if 251-300, 14 units if 301-350, 16 units if 351-400., Disp: 600 mL, Rfl: 3  •  isosorbide mononitrate (IMDUR) 30 MG 24 hr tablet, Take 1 tablet by mouth Daily., Disp: 90 tablet, Rfl: 3  •  Lancets (freestyle) lancets, Use to check blood sugar up to four times daily, Disp: 100 each, Rfl: 3  •  levothyroxine (SYNTHROID, LEVOTHROID) 50 MCG tablet, Take 1 tablet by mouth Daily., Disp: 90 tablet, Rfl: 3  •  magnesium 30 MG tablet, Take 30 mg by mouth 2 (Two) Times a Day., Disp: , Rfl:   •  metFORMIN (GLUCOPHAGE) 500 MG tablet, Take 1 tablet by mouth 2 (Two) Times a Day With Meals., Disp: 180 tablet, Rfl: 3  •  mupirocin (BACTROBAN) 2 % ointment, Apply  topically to the appropriate area as directed 3 (Three) Times a Day., Disp: , Rfl:   •  nitroglycerin (NITROSTAT) 0.4 MG SL tablet, Take 0.4 mg by mouth Every 5 (Five) Minutes As Needed., Disp: , Rfl:   •  ondansetron (ZOFRAN) 8 MG tablet, Take 8 mg by mouth Daily As Needed., Disp: , Rfl:   •  pantoprazole (PROTONIX) 40 MG EC tablet,  "Take 1 tablet by mouth Daily., Disp: 90 tablet, Rfl: 3  •  pravastatin (PRAVACHOL) 40 MG tablet, Take 40 mg by mouth 2 (Two) Times a Day., Disp: , Rfl:   •  spironolactone (ALDACTONE) 25 MG tablet, Take 1 tablet by mouth Daily., Disp: 90 tablet, Rfl: 3  •  tacrolimus (PROTOPIC) 0.1 % ointment, Apply  topically to the appropriate area as directed 2 (Two) Times a Day., Disp: , Rfl:   •  terazosin (HYTRIN) 2 MG capsule, Take 1 mg by mouth Every Night., Disp: , Rfl:   •  tobramycin-dexamethasone (TOBRADEX) 0.3-0.1 % ophthalmic suspension, Apply  to eye(s) as directed by provider., Disp: , Rfl:   •  vitamin B-12 (CYANOCOBALAMIN) 100 MCG tablet, Take 50 mcg by mouth Daily., Disp: , Rfl:   •  vitamin C (ASCORBIC ACID) 250 MG tablet, Take 250 mg by mouth Daily., Disp: , Rfl:   •  acetaminophen (TYLENOL) 500 MG tablet, Take 500 mg by mouth Every 6 (Six) Hours As Needed for Mild Pain ., Disp: , Rfl:   •  amphetamine-dextroamphetamine (Adderall) 12.5 MG tablet, Take 1 tablet by mouth 2 (Two) Times a Day., Disp: 60 tablet, Rfl: 0  •  losartan (COZAAR) 100 MG tablet, Take 1 tablet by mouth Daily., Disp: 90 tablet, Rfl: 3  Allergies   Allergen Reactions   • Contrast Dye Hives   • Oxycodone Nausea And Vomiting   • Adhesive Tape Rash     Social History     Tobacco Use   • Smoking status: Former Smoker     Years: 60.00     Types: Cigars   • Smokeless tobacco: Never Used   • Tobacco comment: QUIT 1999 CIGARETTES/STILL SMOKES CIGARS   Substance Use Topics   • Alcohol use: Yes     Comment: VERY RARE   • Drug use: Never          Objective   Physical Exam  Vitals:    10/01/21 1404   BP: 118/60   Pulse: 55   Temp: 97.3 °F (36.3 °C)   TempSrc: Temporal   SpO2: 95%   Weight: 98.4 kg (217 lb)   Height: 172.7 cm (68\")     Body mass index is 32.99 kg/m².    Constitutional: NAD.  Cardiovascular: RRR. No murmurs. No LE edema b/l. Radial pulses 2+ bilaterally.  Pulmonary: CTA b/l. Good effort.  Integumentary: No rashes or wounds on face or upper " extremities.  Lymphatic: No anterior cervical lymphadenopathy.  Endocrine: No thyromegaly or palpable thyroid nodules.  Psychiatric: Normal affect. Normal thought content.     Assessment/Plan   Assessment and Plan  Pleasant 81-year-old male with complicated past medical history including greater than 60-pack-year former smoker, Waldenstrom macroglobulinemia, MGUS, type 2 diabetes, CAD, hypertension, hyperlipidemia, CKD 3B, hypothyroidism, hyperuricemia, failed back syndrome, history of prostate cancer and non-Hodgkin's lymphoma, GERD, osteopenia, JULIA, among other problems, who presents with the following:    Diagnoses and all orders for this visit:    1. Neoplastic (malignant) related fatigue (Primary): We will try increased dose to 12.5 mg twice daily.  Discussed potential side effects.  -     amphetamine-dextroamphetamine (Adderall) 12.5 MG tablet; Take 1 tablet by mouth 2 (Two) Times a Day.  Dispense: 60 tablet; Refill: 0    2. Type 2 diabetes mellitus with hyperglycemia, with long-term current use of insulin (HCC): Controlled based on measurements in point-of-care A1c even though likely not accurate due to anemia  -     Insulin Glargine (Lantus SoloStar) 100 UNIT/ML injection pen; 65 units AM; 30 units PM  Dispense: 32 pen; Refill: 3  -     POC Glycosylated Hemoglobin (Hb A1C)    3. Need for immunization against influenza  -     Fluzone High-Dose 65+yrs ([id number])    4. Need for 23-polyvalent pneumococcal polysaccharide vaccine  -     Pneumococcal Polysaccharide Vaccine 23-Valent Greater Than or Equal To 3yo Subcutaneous / IM    Gavin Peacock MD  Family Medicine  O: [phone]  C: [phone]    Disclaimer: Parts of this note were dictated by speech recognition. Minor errors in transcription may be present. Please call if questions.

## 2021-10-01 NOTE — TELEPHONE ENCOUNTER
Medication Refill Request    Date of phone call: 10/1/21    Medication being requested: norco 7.5/325mg si po q 4-6 hours prn   Qty: 150    Date of last visit: 21    Date of last refill:     MARIOLA up to date?:     Next Follow up?: 10/11/21    Any new pertinent information? (i.e, new medication allergies, new use of medications, change in patient's health or condition, non-compliance or inconsistency with prescribing agreement?):

## 2021-10-02 NOTE — PROGRESS NOTES
Although hemoglobin A1c is not a great marker in context of your anemia, this alongside your measurements you brought in suggest excellent control and I do not recommend any changes to your regimen

## 2021-10-11 NOTE — PATIENT INSTRUCTIONS
Rotator Cuff Tear Rehab After Surgery  Ask your health care provider which exercises are safe for you. Do exercises exactly as told by your health care provider and adjust them as directed. It is normal to feel mild stretching, pulling, tightness, or discomfort as you do these exercises. Stop right away if you feel sudden pain or your pain gets worse. Do not begin these exercises until told by your health care provider.  Stretching and range-of-motion exercises  These exercises warm up your muscles and joints and improve the movement and flexibility of your shoulder. These exercises also help to relieve pain.  Shoulder pendulum  In this exercise, you let the injured arm dangle toward the floor and then swing it like a clock pendulum.  1. Stand near a table or counter that you can hold onto for balance.  2. Bend forward at the waist and let your left / right arm hang straight down. Use your other arm to support you and help you stay balanced.  3. Relax your left / right arm and shoulder muscles, and move your hips and your trunk so your left / right arm swings freely. Your arm should swing because of the motion of your body, not because you are using your arm or shoulder muscles.  4. Keep moving your hips and trunk so your arm swings in the following directions, as told by your health care provider:  ? Side to side.  ? Forward and backward.  ? In clockwise and counterclockwise circles.  5. Slowly return to the starting position.  Repeat __________ times, or for __________ seconds per direction. Complete this exercise __________ times a day.  Shoulder flexion, seated  This exercise is sometimes called table slides. In this exercise, you raise your arm in front of your body until you feel a stretch in your injured shoulder.  1. Sit in a stable chair so your left / right forearm can rest on a flat surface. Your elbow should rest at a height that keeps your upper arm next to your body.  2. Keeping your left / right  shoulder relaxed, lean forward at the waist and let your hand slide forward (flexion). Stop when you feel a stretch in your shoulder, or when you reach the angle that is recommended by your health care provider.  3. Hold for __________ seconds.  4. Slowly return to the starting position.  Repeat __________ times. Complete this exercise __________ times a day.  Shoulder flexion, standing  In this exercise, you raise your arm in front of your body (flexion) until you feel a stretch in your injured shoulder.  1. Stand and hold a broomstick, a cane, or a similar object. Place your hands a little more than shoulder-width apart on the object. Your left / right hand should be palm-up, and your other hand should be palm-down.  2. Keep your elbow straight and your shoulder muscles relaxed. Push the stick up with your healthy arm to raise your left / right arm in front of your body, and then over your head until you feel a stretch in your shoulder.  ? Avoid shrugging your shoulder while you raise your arm. Keep your shoulder blade tucked down toward the middle of your back.  ? Keep your left / right shoulder muscles relaxed.  3. Hold for __________ seconds.  4. Slowly return to the starting position.  Repeat __________ times. Complete this exercise __________ times a day.  Shoulder abduction, active-assisted  You will need a stick, broom handle, or similar object to help you (assist) in doing this exercise.  1. Lie on your back. This is the supine position. Hold a broomstick, a cane, or a similar object.  2. Place your hands a little more than shoulder-width apart on the object. Your left / right hand should be palm-up, and your other hand should be palm-down.  3. Keeping your shoulder relaxed, push the stick to raise your left / right arm out to your side (abduction) and then over your head. Use your other hand to help move the stick. Stop when you feel a stretch in your shoulder, or when you reach the angle that is  recommended by your health care provider.  ? Avoid shrugging your shoulder while you raise your arm. Keep your shoulder blade tucked down toward the middle of your back.  4. Hold for __________ seconds.  5. Slowly return to the starting position.  Repeat __________ times. Complete this exercise __________ times a day.  Shoulder flexion, active-assisted    1. Lie on your back. You may bend your knees for comfort.  2. Hold a broomstick, a cane, or a similar object so that your hands are about shoulder-width apart. Your palms should face toward your feet.  3. Raise your left / right arm over your head, then behind your head toward the floor (flexion). Use your other hand to help you do this (active-assisted). Stop when you feel a gentle stretch in your shoulder, or when you reach the angle that is recommended by your health care provider.  4. Hold for __________ seconds.  5. Use the stick and your other arm to help you return your left / right arm to the starting position.  Repeat __________ times. Complete this exercise __________ times a day.  External rotation    1. Sit in a stable chair without armrests, or stand up.  2. Tuck a soft object, such as a folded towel or a small ball, under your left / right upper arm.  3. Hold a broomstick, a cane, or a similar object with your palms face-down, toward the floor. Bend your elbows to a 90-degree angle (right angle), and keep your hands about shoulder-width apart.  4. Straighten your healthy arm and push the stick across your body, toward your left / right side. Keep your left / right arm bent. This will rotate your left / right forearm away from your body (external rotation).  5. Hold for __________ seconds.  6. Slowly return to the starting position.  Repeat __________ times. Complete this exercise __________ times a day.  Strengthening exercises  These exercises build strength and endurance in your shoulder. Endurance is the ability to use your muscles for a long time,  even after they get tired. Do not start doing these exercises until your health care provider approves.  Shoulder flexion, isometric  1. Stand or sit in a doorway, facing the door frame.  2. Keep your left / right arm straight and make a gentle fist with your hand. Place your fist against the door frame. Only your fist should be touching the frame. Keep your upper arm at your side.  3. Gently press your fist against the door frame, as if you are trying to raise your arm above your head (isometric shoulder flexion).  ? Avoid shrugging your shoulder while you press your hand into the door frame. Keep your shoulder blade tucked down toward the middle of your back.  4. Hold for __________ seconds.  5. Slowly release the tension, and relax your muscles completely before you repeat the exercise.  Repeat __________ times. Complete this exercise __________ times a day.  Shoulder abduction, isometric    1. Stand or sit in a doorway. Your left / right arm should be closest to the door frame.  2. Keep your left / right arm straight, and place the back of your hand against the door frame. Only your hand should be touching the frame. Keep the rest of your arm close to your side.  3. Gently press the back of your hand against the door frame, as if you are trying to raise your arm out to the side (isometric shoulder abduction).  ? Avoid shrugging your shoulder while you press your hand into the door frame. Keep your shoulder blade tucked down toward the middle of your back.  4. Hold for __________ seconds.  5. Slowly release the tension, and relax your muscles completely before you repeat the exercise.  Repeat __________ times. Complete this exercise __________ times a day.  Internal rotation, isometric  This is an exercise in which you press your palm against a door frame without moving your shoulder joint (isometric).  1. Stand or sit in a doorway, facing the door frame.  2. Bend your left / right elbow, and place the palm of  your hand against the door frame. Only your palm should be touching the frame. Keep your upper arm at your side.  3. Gently press your hand against the door frame, as if you are trying to push your arm toward your abdomen (internal rotation). Gradually increase the pressure until you are pressing as hard as you can. Stop increasing the pressure if you feel shoulder pain.  ? Avoid shrugging your shoulder while you press your hand into the door frame. Keep your shoulder blade tucked down toward the middle of your back.  4. Hold for __________ seconds.  5. Slowly release the tension, and relax your muscles completely before you repeat the exercise.  Repeat __________ times. Complete this exercise __________ times a day.  External rotation, isometric  This is an exercise in which you press the back of your wrist against a door frame without moving your shoulder joint (isometric).  1. Stand or sit in a doorway, facing the door frame.  2. Bend your left / right elbow and place the back of your wrist against the door frame. Only the back of your wrist should be touching the frame. Keep your upper arm at your side.  3. Gently press your wrist against the door frame, as if you are trying to push your arm away from your abdomen (external rotation). Gradually increase the pressure until you are pressing as hard as you can. Stop increasing the pressure if you feel pain.  ? Avoid shrugging your shoulder while you press your wrist into the door frame. Keep your shoulder blade tucked down toward the middle of your back.  4. Hold for __________ seconds.  5. Slowly release the tension, and relax your muscles completely before you repeat the exercise.  Repeat __________ times. Complete this exercise __________ times a day.  This information is not intended to replace advice given to you by your health care provider. Make sure you discuss any questions you have with your health care provider.  Document Revised: 06/17/2021 Document  Reviewed: 06/17/2021  [employer] Patient Education © 2021 [employer]

## 2021-10-11 NOTE — PROGRESS NOTES
CHIEF COMPLAINT  F/U back pain- patient states that his pain has worsened since his last visit. He is c/o right shoulder pain.     Subjective   Chevy Goodwin is a 81 y.o. male  who presents for follow-up.  He has a history of chronic back pain, neck pain.Actively undergoing chemotherapy for Waldenstrom macroglobulinemia which has caused his pain to worsen (generalized/joints). Had to postpone last treatment due to worsening neutropenia and neuropathy. Issues with nausea/loss of appetite/weight loss.  Has been going in for fluids intermittently over the past few weeks.      C3-C6 fusion with Dr. Singleton 11/30/2020. Had f/u visit with Dr. Singleton recently. doing well from a surgical standpoint.  Resume normal activity.       Complains of pain.  neck is the most severe.  Today his pain is 8/10VAS (right shoulder causing the most pain today).  Continues with Hydrocodone 7.5/325 4-5/day (increased a few months ago due to acute increase of pain related to his cancer treatment). Denies any side effects from the regimen. Helps reduce his pain moderately.  No constipation or somnolence.       Worsening problems with fatigue, dizziness, diffuse pain. Follows with oncology (Dr. Olivo - [facility]) for anemia as well as Waldenstrom macroglobulinemia which has recently come out of remission.  Has been started on systemic chemoimmunotherapy infusions, just completed course. Scheduled for bone marrow biopsy in two weeks.      Patient remained masked during entire encounter. No cough present. I donned a mask and eye protection throughout entire visit. Prior to donning mask and eye protection, hand hygiene was performed, as well as when it was doffed.  I was closer than 6 feet, but not for an extended period of time. No obvious exposure to any bodily fluids.    Back Pain  This is a chronic (history of lumbar fusion with Dr. Yuan at [facility], was done in 2005) problem. The current episode started more than 1 year ago. The  problem occurs constantly. The problem has been waxing and waning since onset. The pain is present in the lumbar spine and thoracic spine. The quality of the pain is described as aching and burning. The pain radiates to the right foot and left foot. The pain is at a severity of 8/10. The pain is moderate. The pain is worse during the day. The symptoms are aggravated by bending and twisting (walking, physical activity). Stiffness is present in the morning. Associated symptoms include numbness (janna feet) and weakness (gen). Pertinent negatives include no abdominal pain, bladder incontinence, bowel incontinence, chest pain, dysuria, fever, headaches or tingling. Risk factors include lack of exercise. He has tried NSAIDs (Hydrocodone) for the symptoms. The treatment provided moderate relief.   Neck Pain   This is a chronic (surgery 8/2019 Dr. Singleton) problem. The problem occurs daily. The problem has been gradually improving. The pain is at a severity of 8/10. The pain is moderate. Associated symptoms include numbness (janna feet) and weakness (gen). Pertinent negatives include no chest pain, fever, headaches or tingling. He has tried NSAIDs and oral narcotics for the symptoms. The treatment provided moderate relief.      PEG Assessment   What number best describes your pain on average in the past week?8  What number best describes how, during the past week, pain has interfered with your enjoyment of life?8  What number best describes how, during the past week, pain has interfered with your general activity?  9    The following portions of the patient's history were reviewed and updated as appropriate: allergies, current medications, past family history, past medical history, past social history, past surgical history and problem list.    Review of Systems   Constitutional: Positive for activity change (decreased) and fatigue. Negative for chills and fever.   HENT: Negative for congestion.    Eyes: Negative for visual  "disturbance.   Respiratory: Negative for chest tightness and shortness of breath.    Cardiovascular: Negative for chest pain.   Gastrointestinal: Positive for diarrhea. Negative for abdominal pain, bowel incontinence and constipation.   Genitourinary: Negative for bladder incontinence, difficulty urinating and dysuria.   Musculoskeletal: Positive for back pain and neck pain.   Neurological: Positive for weakness (gen) and numbness (janna feet). Negative for dizziness, tingling, light-headedness and headaches.   Psychiatric/Behavioral: Positive for sleep disturbance. Negative for agitation, self-injury and suicidal ideas. The patient is not nervous/anxious.      I have reviewed and confirmed the accuracy of the ROS as documented by the MA/LPN/RN DANY Vo    Vitals:    10/11/21 1513   BP: 161/64   Pulse: 85   Temp: 97.3 °F (36.3 °C)   SpO2: 95%   Weight: 91.6 kg (202 lb)   Height: 172.7 cm (68\")   PainSc:   8   PainLoc: Shoulder  Comment: right         Objective   Physical Exam  Vitals and nursing note reviewed.   Constitutional:       General: He is not in acute distress.     Appearance: Normal appearance. He is not ill-appearing.   Pulmonary:      Effort: Pulmonary effort is normal. No respiratory distress.   Musculoskeletal:      Cervical back: Tenderness and bony tenderness present.      Lumbar back: Tenderness and bony tenderness present.   Skin:     General: Skin is warm and dry.   Neurological:      Mental Status: He is alert and oriented to person, place, and time.      Motor: Motor function is intact. No weakness.      Gait: Gait abnormal (ambulates with walker).   Psychiatric:         Mood and Affect: Mood normal.         Behavior: Behavior normal.             Assessment/Plan   Diagnoses and all orders for this visit:    1. Other chronic pain (Primary)    2. Postlaminectomy syndrome of lumbar region    3. Postlaminectomy syndrome, cervical region    4. Cancer associated pain    5. Diffuse " pain    6. High risk medication use      --- Continue Hydrocodone. Patient appears stable with current regimen. No adverse effects. Regarding continuation of opioids, there is no evidence of aberrant behavior or any red flags.  The patient continues with appropriate response to opioid therapy. ADL's remain intact by self.   --- The urine drug screen confirmation from 5/19/2021 has been reviewed and the result is appropriate based on patient history and MARIOLA report  --- The patient signed an updated copy of the controlled substance agreement on 10/6/2020  --- Follow-up 2 months/sooner if needed          MARIOLA REPORT  As part of the patient's treatment plan, I am prescribing controlled substances. The patient has been made aware of appropriate use of such medications, including potential risk of somnolence, limited ability to drive and/or work safely, and the potential for dependence or overdose. It has also bee made clear that these medications are for use by this patient only, without concomitant use of alcohol or other substances unless prescribed.     Patient has completed prescribing agreement detailing terms of continued prescribing of controlled substances, including monitoring MARIOLA reports, urine drug screening, and pill counts if necessary. The patient is aware that inappropriate use will results in cessation of prescribing such medications.    As the clinician, I personally reviewed the MARIOLA from 10/11/2021 while the patient was in the office today.    History and physical exam exhibit continued safe and appropriate use of controlled substances.     Dictated utilizing Dragon dictation.

## 2021-11-29 NOTE — TELEPHONE ENCOUNTER
Medication Refill Request    Date of phone call: 11/29/2021    Medication being requested: hydrocodone 7.5-325 mg sig: Take 1 po every 4-6 hours prn pain.   Qty: 150    Date of last visit: 10/11/2021    Date of last refill:     MARIOLA up to date?:     Next Follow up?: 12/22/2021    Any new pertinent information? (i.e, new medication allergies, new use of medications, change in patient's health or condition, non-compliance or inconsistency with prescribing agreement?):

## 2021-11-29 NOTE — TELEPHONE ENCOUNTER
Reviewed mireya domínguez last office visit on 10-11-21. UDS and MARIOLA reviewed and are appropriate. Due to mireya domínguez ([facility]) being out of office, will refill appropriately.

## 2021-11-29 NOTE — TELEPHONE ENCOUNTER
bonnie-- please let patient know I am only giving a partial refill until his appointment. He needs to bring ALL of his pain medication with him to next appt. Thanks. LAURA

## 2021-12-04 NOTE — PROGRESS NOTES
Date of Encounter: 2021  Patient: Chevy Goodwin,  1940    Subjective   History of Presenting Illness  Chief complaint: Follow-up    Patient presents for general follow-up    We recently started him on Adderall back in October to see if it helped with his chronic fatigue related to iron deficiency anemia due to chronic blood loss with hypocellular bone marrow, currently undergoing chemotherapy for Rylie Leonardo macroglobulinemia.  He did have a favorable response with recent chemotherapy, plasma cells were decreased to about 5% on recent bone marrow biopsy.  Unfortunately his hemoglobin has remained around 8 or below and he has not received recent transfusion since October.  He was told by his oncologist that they would like to hold on the transfusion with hopes that the primary  of anemia was bone marrow suppression due to chemotherapy and that it would improve with watchful waiting.  His cardiologist Dr. Hernandez had recommended a higher hemoglobin threshold.  Patient is sleeping over 20 hours/day, feels very poorly and is not able to enjoy his usual hobbies or activities.  He is upset that he will feel this poorly for the holiday season as he is worried that this may be his last Carla.    He does have a living will on file but has not discussed end-of-life care.      Review of Systems:  Negative for fever, congestion, chest pain upon exertion, shortness of breath, vision changes, vomiting, dysuria, lymphadenopathy, muscle weakness, numbness, mood changes, rashes.    The following portions of the patient's history were reviewed and updated as appropriate: allergies, current medications, past family history, past medical history, past social history, past surgical history and problem list.    Patient Active Problem List   Diagnosis   • Degeneration of intervertebral disc of lumbar region   • Thoracic disc herniation T9-T10   • Type 2 diabetes mellitus with hyperglycemia, with long-term current  use of insulin (HCC)   • Hypertension   • Long term (current) use of opiate analgesic   • JULIA treated with autoBiPAP   • Hypersomnia due to medical condition   • Status post reverse total arthroplasty of right shoulder   • Waldenstrom macroglobulinemia (HCC)   • Stage 3b chronic kidney disease   • Iron deficiency anemia due to chronic blood loss   • DDD (degenerative disc disease), cervical   • DDD (degenerative disc disease), thoracic   • Obesity (BMI 30-39.9)   • Osteopenia   • History of arteriovenous malformation (AVM)   • Failed back syndrome   • History of non-Hodgkin's lymphoma   • Hypothyroidism   • Hyperuricemia   • Gastroesophageal reflux disease without esophagitis   • Hyperlipidemia   • Coronary artery disease involving native heart without angina pectoris   • H/O prostatectomy   • Carotid bruit, but normal US   • Chronic otitis externa   • Former heavy tobacco smoker   • High risk medication use   • Neoplastic malignant related fatigue   • Chemotherapy induced neutropenia (HCC)     Past Medical History:   Diagnosis Date   • 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   • Anesthesia complication     HARD TO AWAKEN   • Cancer (HCC)     PROSTATE AND SKIN   • Cataract    • Chronic pain 3/29/2016   • Coronary artery disease    • Diabetes (HCC)     TYPE 2   • GERD (gastroesophageal reflux disease)    • Gout    • Heart attack (HCC)     X 7   • Herniated cervical disc    • High cholesterol    • Hypersomnia    • Hypersomnia due to medical condition 2/24/2018   • Hypertension    • Kidney stones    • Low back pain    • Macroglobulinemia (HCC)    • MRSA (methicillin resistant staph aureus) culture positive     BILATERAL ELBOWS, 2015   • Right shoulder pain    • Sleep apnea     C pap   • Waldenstrom macroglobulinemia (HCC)     2014     Past Surgical History:   Procedure Laterality Date   • ANGIOPLASTY      X 7   • ANTERIOR CERVICAL DISCECTOMY W/ FUSION N/A 8/5/2019    Procedure: C3-C5 anterior cervical discectomy, fusion and  instrumentation;  Surgeon: John Singleton MD;  Location: [facility] MAIN OR;  Service: Neurosurgery   • BACK SURGERY      X4   • CARDIAC CATHETERIZATION     • CARPAL TUNNEL RELEASE Right    • CATARACT EXTRACTION WITH INTRAOCULAR LENS IMPLANT     • CERVICAL DISCECTOMY POSTERIOR FUSION WITH BRAIN LAB N/A 11/30/2020    Procedure: Cervical 4 5 laminectomy with a cervical 3 to cervical 6 fusion and instrumentation using O-arm;  Surgeon: John Singleton MD;  Location: [facility] MAIN OR;  Service: Neurosurgery;  Laterality: N/A;   • COLON RESECTION     • COLONOSCOPY     • ESOPHAGOSCOPY / EGD     • EXTRACORPOREAL SHOCK WAVE LITHOTRIPSY (ESWL)     • FINGER SURGERY Left    • GALLBLADDER SURGERY     • HERNIA REPAIR     • INCISION AND DRAINAGE ABSCESS      MULTIPLE   • JOINT REPLACEMENT     • LAMINECTOMY     • NECK SURGERY  08/05/2019   • MA REMOVAL OF ELBOW BURSA Left 9/15/2016    Procedure: LT ELBOW I&D W/ BONE CURRETTAGE;  Surgeon: Kirk Gross MD;  Location: [facility] OR [facility];  Service: Orthopedics   • PROSTATE SURGERY     • ROTATOR CUFF REPAIR Right    • SKIN CANCER EXCISION      MULTIPLE   • SPINE SURGERY     • TOTAL SHOULDER ARTHROPLASTY W/ DISTAL CLAVICLE EXCISION Right 1/10/2019    Procedure: RT TOTAL SHOULDER REVERSE ARTHROPLASTY;  Surgeon: Kirk Gross MD;  Location: [facility] OR [facility];  Service: Orthopedics   • TYMPANOPLASTY Right      Family History   Problem Relation Age of Onset   • Early death Father         Age 56, Pylonephritis   • COPD Sister    • Malig Hyperthermia Neg Hx        Current Outpatient Medications:   •  acetaminophen (TYLENOL) 500 MG tablet, Take 500 mg by mouth Every 6 (Six) Hours As Needed for Mild Pain ., Disp: , Rfl:   •  Alcohol Swabs (EASY TOUCH ALCOHOL PREP MEDIUM) 70 % pads, Easy Touch Alcohol Prep Pads  Apply 2 pads twice a day by topical route., Disp: , Rfl:   •  allopurinol (ZYLOPRIM) 300 MG tablet, Take 1 tablet by mouth Every Evening., Disp: 90 tablet, Rfl: 3  •  aspirin 81 MG tablet,  Take 81 mg by mouth Daily., Disp: , Rfl:   •  azelastine (ASTELIN) 0.1 % nasal spray, 2 sprays into the nostril(s) as directed by provider 2 (Two) Times a Day As Needed., Disp: , Rfl:   •  B-D UF III MINI PEN NEEDLES 31G X 5 MM misc, Use with insulin pen BID as directed, Disp: 200 each, Rfl: 3  •  bumetanide (BUMEX) 1 MG tablet, Take 1 tablet by mouth Daily., Disp: 90 tablet, Rfl: 3  •  CALCIUM CITRATE PO, Take  by mouth., Disp: , Rfl:   •  carvedilol (COREG) 6.25 MG tablet, Take 12.5 mg by mouth., Disp: , Rfl:   •  cetirizine (zyrTEC) 10 MG tablet, Take 0.5 tablets by mouth Daily., Disp: 45 tablet, Rfl: 3  •  cholecalciferol (Vitamin D) 25 MCG (1000 UT) tablet, Take 1 tab by mouth daily for low vitamin D levels, Disp: 90 tablet, Rfl: 3  •  DIFLORASONE DIACETATE EX, Apply  topically., Disp: , Rfl:   •  FREESTYLE LITE test strip, Use to check blood sugar up to four times daily, Disp: 400 each, Rfl: 3  •  Glucosamine-Chondroit-Vit C-Mn ([employer] Glucosamine-Chondroitin) tablet, Take 1 tablet by mouth 2 (Two) Times a Day., Disp: 180 each, Rfl: 3  •  hydrALAZINE (APRESOLINE) 10 MG tablet, Take 10 mg by mouth 3 (Three) Times a Day., Disp: , Rfl:   •  HYDROcodone-acetaminophen (NORCO) 7.5-325 MG per tablet, Take 1 po every 4-6 hours prn pain. Max 5/day., Disp: 100 tablet, Rfl: 0  •  Insulin Glargine (Lantus SoloStar) 100 UNIT/ML injection pen, 35 units AM; 30 units PM, Disp: 32 pen, Rfl: 3  •  insulin lispro (humaLOG) 100 UNIT/ML injection, Sliding scale before meals TID, 4 units if 150-200, 8 units if 201-250, 12 units if 251-300, 14 units if 301-350, 16 units if 351-400., Disp: 600 mL, Rfl: 3  •  isosorbide mononitrate (IMDUR) 30 MG 24 hr tablet, Take 1 tablet by mouth Daily., Disp: 90 tablet, Rfl: 3  •  Lancets (freestyle) lancets, Use to check blood sugar up to four times daily, Disp: 100 each, Rfl: 3  •  levothyroxine (SYNTHROID, LEVOTHROID) 50 MCG tablet, Take 1 tablet by mouth Daily., Disp: 90 tablet, Rfl: 3  •   metFORMIN (GLUCOPHAGE) 500 MG tablet, Take 1 tablet by mouth 2 (Two) Times a Day With Meals., Disp: 180 tablet, Rfl: 3  •  mupirocin (BACTROBAN) 2 % ointment, Apply  topically to the appropriate area as directed 3 (Three) Times a Day., Disp: , Rfl:   •  nitroglycerin (NITROSTAT) 0.4 MG SL tablet, Take 0.4 mg by mouth Every 5 (Five) Minutes As Needed., Disp: , Rfl:   •  ondansetron (ZOFRAN) 8 MG tablet, Take 8 mg by mouth Daily As Needed., Disp: , Rfl:   •  pantoprazole (PROTONIX) 40 MG EC tablet, Take 1 tablet by mouth Daily., Disp: 90 tablet, Rfl: 3  •  spironolactone (ALDACTONE) 25 MG tablet, Take 1 tablet by mouth Daily., Disp: 90 tablet, Rfl: 3  •  tacrolimus (PROTOPIC) 0.1 % ointment, Apply  topically to the appropriate area as directed 2 (Two) Times a Day., Disp: , Rfl:   •  tobramycin-dexamethasone (TOBRADEX) 0.3-0.1 % ophthalmic suspension, Apply  to eye(s) as directed by provider., Disp: , Rfl:   •  amLODIPine (NORVASC) 10 MG tablet, Take 1 tablet by mouth Every Evening., Disp: 90 tablet, Rfl: 3  •  calcium carbonate (Tums) 500 MG chewable tablet, Chew 1 tablet 2 (Two) Times a Day., Disp: 180 tablet, Rfl: 3  •  magnesium 30 MG tablet, Take 30 mg by mouth 2 (Two) Times a Day., Disp: , Rfl:   •  vitamin B-12 (CYANOCOBALAMIN) 100 MCG tablet, Take 50 mcg by mouth Daily., Disp: , Rfl:   •  vitamin C (ASCORBIC ACID) 250 MG tablet, Take 250 mg by mouth Daily., Disp: , Rfl:   Allergies   Allergen Reactions   • Contrast Dye Hives   • Oxycodone Nausea And Vomiting   • Adhesive Tape Rash     Social History     Tobacco Use   • Smoking status: Former Smoker     Packs/day: 1.00     Years: 15.00     Pack years: 15.00     Types: Cigars     Start date: 1962     Quit date: 11/15/1920     Years since quittin.1   • Smokeless tobacco: Never Used   • Tobacco comment: QUIT  CIGARETTES/STILL SMOKES CIGARS   Substance Use Topics   • Alcohol use: Never     Comment: VERY RARE   • Drug use: Never          Objective  "  Physical Exam  Vitals:    12/03/21 1514   BP: 146/60   BP Location: Left arm   Patient Position: Sitting   Cuff Size: Large Adult   Pulse: 76   Temp: 97.3 °F (36.3 °C)   TempSrc: Temporal   SpO2: 96%   Weight: 92.7 kg (204 lb 6.4 oz)   Height: 172.7 cm (68\")     Body mass index is 31.08 kg/m².       Assessment/Plan   Assessment and Plan  Pleasant 81-year-old male with complicated past medical history including greater than 60-pack-year former smoker, Waldenstrom macroglobulinemia, iron deficiency anemia, MGUS, type 2 diabetes, CAD, hypertension, hyperlipidemia, CKD 3B, hypothyroidism, hyperuricemia, failed back syndrome, history of prostate cancer and non-Hodgkin's lymphoma, GERD, osteopenia, JULIA, among other problems, who presents with the following:    Diagnoses and all orders for this visit:    1. Goals of care, counseling/discussion (Primary): He had poor experiences with his family members and hospice.  We did discuss hospice as an option if his goals of care were to change. At this point he would like to be resuscitated, even after we discussed the general outcomes in his context.  But is something he wants to think further about.    2. Waldenstrom macroglobulinemia (HCC)  3. Iron deficiency anemia due to chronic blood loss  4. Neoplastic malignant related fatigue  Fatigue is worsened, very apparent even from across the room today in context of our previous visits.  He is not benefiting from Adderall so we will stop it.  I also have him stop his statin just in case that is contributing, this medication does not make sense acutely we can always revisit restarting it in the future due to his CAD.  He has also not noticed any benefit from Terazosin, he is status post prostatectomy, so we will hold doses now but he will watch his blood pressure and for return in urinary symptoms.    The main  of his fatigue is his anemia though. I am going to reach out to his oncologist and request that the patient's " behalf that he be considered for therapeutic transfusions.  His quality of life is poor with his current hemoglobin target and a transfusion to a higher hemoglobin target would be palliative if not arguably therapeutic in context of his comorbidities.    5. Type 2 diabetes mellitus with hyperglycemia, with long-term current use of insulin (HCC)  -     Insulin Glargine (Lantus SoloStar) 100 UNIT/ML injection pen; 35 units AM; 30 units PM  Dispense: 32 pen; Refill: 3    Patient brings his diabetes logs, he has had several morning low blood sugars on his current regimen, lowest being 58 but he does have several 70s scattered throughout.  I reduce his total daily insulin dose from 45 AM/30 p.m. to 35 AM/30 p.m. Tight glycemic control does not make sense due to risk of hypoglycemia in context of limited long-term prognosis.    Want to see him back in 1 month    Gavin Peacock MD  Family Medicine  O: [phone]  C: [phone]    Disclaimer: Parts of this note were dictated by speech recognition. Minor errors in transcription may be present. Please call if questions.

## 2021-12-07 NOTE — TELEPHONE ENCOUNTER
Provider: DR LIVINGSTON  Caller: WAYNE BROWN  Relationship to Patient: PATIENT   Phone Number: [phone]  Reason for Call: PATIENT WANTED DR LIVINGSTON TO BE AWARE THAT HE WILL BE RECEIVING A UNIT OF WHOLE BLOOD CELLS ON 12/8/21 AT 9:15

## 2021-12-13 NOTE — PROGRESS NOTES
Date of Encounter: 2021  Patient: Chevy Goodwin,  1940    Subjective     Chief complaint: Fever, vomiting, cough    HPI   Patient states he started having symptoms on Friday.  Patient states that he has been having fever, vomiting as well as cough and congestion.  Patient states he sometimes vomits 3 times a day when trying to eat meals.  Has not taken anything over-the-counter for symptoms.    Review of Systems:  Negative for chest pain upon exertion, shortness of breath, vision changes, dysuria, lymphadenopathy, muscle weakness, numbness, mood changes, rashes.  Positive for fever, vomiting, cough.    The following portions of the patient's history were reviewed and updated as appropriate: allergies, current medications, past family history, past medical history, past social history, past surgical history and problem list.    Patient Active Problem List   Diagnosis   • Degeneration of intervertebral disc of lumbar region   • Thoracic disc herniation T9-T10   • Type 2 diabetes mellitus with hyperglycemia, with long-term current use of insulin (HCC)   • Hypertension   • Long term (current) use of opiate analgesic   • JULIA treated with autoBiPAP   • Hypersomnia due to medical condition   • Status post reverse total arthroplasty of right shoulder   • Waldenstrom macroglobulinemia (HCC)   • Stage 3b chronic kidney disease   • Iron deficiency anemia due to chronic blood loss   • DDD (degenerative disc disease), cervical   • DDD (degenerative disc disease), thoracic   • Obesity (BMI 30-39.9)   • Osteopenia   • History of arteriovenous malformation (AVM)   • Failed back syndrome   • History of non-Hodgkin's lymphoma   • Hypothyroidism   • Hyperuricemia   • Gastroesophageal reflux disease without esophagitis   • Hyperlipidemia   • Coronary artery disease involving native heart without angina pectoris   • H/O prostatectomy   • Carotid bruit, but normal US   • Chronic otitis externa   • Former heavy tobacco  smoker   • High risk medication use   • Neoplastic malignant related fatigue   • Chemotherapy induced neutropenia (HCC)     Past Medical History:   Diagnosis Date   • 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   • Anesthesia complication     HARD TO AWAKEN   • Cancer (HCC)     PROSTATE AND SKIN   • Cataract    • Chronic pain 3/29/2016   • Coronary artery disease    • Diabetes (HCC)     TYPE 2   • GERD (gastroesophageal reflux disease)    • Gout    • Heart attack (HCC)     X 7   • Herniated cervical disc    • High cholesterol    • Hypersomnia    • Hypersomnia due to medical condition 2/24/2018   • Hypertension    • Kidney stones    • Low back pain    • Macroglobulinemia (HCC)    • MRSA (methicillin resistant staph aureus) culture positive     BILATERAL ELBOWS, 2015   • Right shoulder pain    • Sleep apnea     C pap   • Waldenstrom macroglobulinemia (HCC)     2014     Past Surgical History:   Procedure Laterality Date   • ANGIOPLASTY      X 7   • ANTERIOR CERVICAL DISCECTOMY W/ FUSION N/A 8/5/2019    Procedure: C3-C5 anterior cervical discectomy, fusion and instrumentation;  Surgeon: John Singleton MD;  Location: [facility];  Service: Neurosurgery   • BACK SURGERY      X4   • CARDIAC CATHETERIZATION     • CARPAL TUNNEL RELEASE Right    • CATARACT EXTRACTION WITH INTRAOCULAR LENS IMPLANT     • CERVICAL DISCECTOMY POSTERIOR FUSION WITH BRAIN LAB N/A 11/30/2020    Procedure: Cervical 4 5 laminectomy with a cervical 3 to cervical 6 fusion and instrumentation using O-arm;  Surgeon: John Singleton MD;  Location: [facility];  Service: Neurosurgery;  Laterality: N/A;   • COLON RESECTION     • COLONOSCOPY     • ESOPHAGOSCOPY / EGD     • EXTRACORPOREAL SHOCK WAVE LITHOTRIPSY (ESWL)     • FINGER SURGERY Left    • GALLBLADDER SURGERY     • HERNIA REPAIR     • INCISION AND DRAINAGE ABSCESS      MULTIPLE   • JOINT REPLACEMENT     • LAMINECTOMY     • NECK SURGERY  08/05/2019   • DC REMOVAL OF ELBOW BURSA Left 9/15/2016     Procedure: LT ELBOW I&D W/ BONE CURRETTAGE;  Surgeon: Kirk Gross MD;  [place] [facility] [place];  Service: Orthopedics   • PROSTATE SURGERY     • ROTATOR CUFF REPAIR Right    • SKIN CANCER EXCISION      MULTIPLE   • SPINE SURGERY     • TOTAL SHOULDER ARTHROPLASTY W/ DISTAL CLAVICLE EXCISION Right 1/10/2019    Procedure: RT TOTAL SHOULDER REVERSE ARTHROPLASTY;  Surgeon: Kirk Gross MD;  [place] [facility] [place];  Service: Orthopedics   • TYMPANOPLASTY Right      Family History   Problem Relation Age of Onset   • Early death Father         Age 56, Pylonephritis   • COPD Sister    • Malig Hyperthermia Neg Hx        Current Outpatient Medications:   •  Alcohol Swabs (EASY TOUCH ALCOHOL PREP MEDIUM) 70 % pads, Easy Touch Alcohol Prep Pads  Apply 2 pads twice a day by topical route., Disp: , Rfl:   •  amLODIPine (NORVASC) 10 MG tablet, Take 1 tablet by mouth Every Evening., Disp: 90 tablet, Rfl: 3  •  aspirin 81 MG tablet, Take 81 mg by mouth Daily., Disp: , Rfl:   •  azelastine (ASTELIN) 0.1 % nasal spray, 2 sprays into the nostril(s) as directed by provider 2 (Two) Times a Day As Needed., Disp: , Rfl:   •  B-D UF III MINI PEN NEEDLES 31G X 5 MM misc, Use with insulin pen BID as directed, Disp: 200 each, Rfl: 3  •  bumetanide (BUMEX) 1 MG tablet, Take 1 tablet by mouth Daily., Disp: 90 tablet, Rfl: 3  •  carvedilol (COREG) 6.25 MG tablet, Take 12.5 mg by mouth., Disp: , Rfl:   •  cetirizine (zyrTEC) 10 MG tablet, Take 0.5 tablets by mouth Daily., Disp: 45 tablet, Rfl: 3  •  FREESTYLE LITE test strip, Use to check blood sugar up to four times daily, Disp: 400 each, Rfl: 3  •  Glucosamine-Chondroit-Vit C-Mn ([facility] Glucosamine-Chondroitin) tablet, Take 1 tablet by mouth 2 (Two) Times a Day., Disp: 180 each, Rfl: 3  •  hydrALAZINE (APRESOLINE) 10 MG tablet, Take 10 mg by mouth 3 (Three) Times a Day., Disp: , Rfl:   •  HYDROcodone-acetaminophen (NORCO) 7.5-325 MG per tablet, Take 1 po every 4-6 hours prn pain. Max  5/day., Disp: 100 tablet, Rfl: 0  •  Insulin Glargine (Lantus SoloStar) 100 UNIT/ML injection pen, 35 units AM; 30 units PM, Disp: 32 pen, Rfl: 3  •  insulin lispro (humaLOG) 100 UNIT/ML injection, Sliding scale before meals TID, 4 units if 150-200, 8 units if 201-250, 12 units if 251-300, 14 units if 301-350, 16 units if 351-400., Disp: 600 mL, Rfl: 3  •  isosorbide mononitrate (IMDUR) 30 MG 24 hr tablet, Take 1 tablet by mouth Daily., Disp: 90 tablet, Rfl: 3  •  Lancets (freestyle) lancets, Use to check blood sugar up to four times daily, Disp: 100 each, Rfl: 3  •  levothyroxine (SYNTHROID, LEVOTHROID) 50 MCG tablet, Take 1 tablet by mouth Daily., Disp: 90 tablet, Rfl: 3  •  magnesium 30 MG tablet, Take 30 mg by mouth 2 (Two) Times a Day., Disp: , Rfl:   •  metFORMIN (GLUCOPHAGE) 500 MG tablet, Take 1 tablet by mouth 2 (Two) Times a Day With Meals., Disp: 180 tablet, Rfl: 3  •  mupirocin (BACTROBAN) 2 % ointment, Apply  topically to the appropriate area as directed 3 (Three) Times a Day., Disp: , Rfl:   •  nitroglycerin (NITROSTAT) 0.4 MG SL tablet, Take 0.4 mg by mouth Every 5 (Five) Minutes As Needed., Disp: , Rfl:   •  ondansetron (ZOFRAN) 8 MG tablet, Take 8 mg by mouth Daily As Needed., Disp: , Rfl:   •  pantoprazole (PROTONIX) 40 MG EC tablet, Take 1 tablet by mouth Daily., Disp: 90 tablet, Rfl: 3  •  spironolactone (ALDACTONE) 25 MG tablet, Take 1 tablet by mouth Daily., Disp: 90 tablet, Rfl: 3  •  tacrolimus (PROTOPIC) 0.1 % ointment, Apply  topically to the appropriate area as directed 2 (Two) Times a Day., Disp: , Rfl:   •  tobramycin-dexamethasone (TOBRADEX) 0.3-0.1 % ophthalmic suspension, Apply  to eye(s) as directed by provider., Disp: , Rfl:   •  vitamin B-12 (CYANOCOBALAMIN) 100 MCG tablet, Take 50 mcg by mouth Daily., Disp: , Rfl:   •  vitamin C (ASCORBIC ACID) 250 MG tablet, Take 250 mg by mouth Daily., Disp: , Rfl:   •  acetaminophen (TYLENOL) 500 MG tablet, Take 500 mg by mouth Every 6 (Six)  "Hours As Needed for Mild Pain ., Disp: , Rfl:   •  allopurinol (ZYLOPRIM) 300 MG tablet, Take 1 tablet by mouth Every Evening., Disp: 90 tablet, Rfl: 3  •  azithromycin (Zithromax) 250 MG tablet, Take as directed on package, Disp: 6 tablet, Rfl: 0  •  calcium carbonate (Tums) 500 MG chewable tablet, Chew 1 tablet 2 (Two) Times a Day., Disp: 180 tablet, Rfl: 3  •  CALCIUM CITRATE PO, Take  by mouth., Disp: , Rfl:   •  cholecalciferol (Vitamin D) 25 MCG (1000 UT) tablet, Take 1 tab by mouth daily for low vitamin D levels, Disp: 90 tablet, Rfl: 3  •  DIFLORASONE DIACETATE EX, Apply  topically., Disp: , Rfl:   Allergies   Allergen Reactions   • Contrast Dye Hives   • Oxycodone Nausea And Vomiting   • Adhesive Tape Rash     Social History     Tobacco Use   • Smoking status: Former Smoker     Packs/day: 1.00     Years: 15.00     Pack years: 15.00     Types: Cigars     Start date: 1962     Quit date: 11/15/1920     Years since quittin.1   • Smokeless tobacco: Never Used   • Tobacco comment: QUIT  CIGARETTES/STILL SMOKES CIGARS   Substance Use Topics   • Alcohol use: Never     Comment: VERY RARE   • Drug use: Never          Objective   Physical Exam   Vitals:    21 1335   BP: 130/74   Pulse: 75   Temp: 98.1 °F (36.7 °C)   TempSrc: Oral   SpO2: 96%   Weight: 92.5 kg (204 lb)   Height: 172.7 cm (68\")     Body mass index is 31.02 kg/m².    Constitutional: NAD.  Eyes: EOMI. PERRLA. Normal conjunctiva.  Ear, nose, mouth, throat: Positive for tonsillar erythema.  Positive for inflamed and red nasal mucosa.  Normal external ear canals and TMs bilaterally.  Cardiovascular: RRR. No murmurs.   Pulmonary: CTA b/l. Good effort.  Integumentary: No rashes or wounds on face or upper extremities.  Lymphatic: Positive anterior cervical lymphadenopathy.  Psychiatric: Normal affect. Normal thought content.       Assessment/Plan   Assessment and Plan  Pleasant 81-year-old male with history of hyperuricemia, DDD, JULIA, " neoplastic malignant related fatigue, hypertension, type 2 diabetes, history of non-Hodgkin's lymphoma and others here today for sick visit.    Diagnoses and all orders for this visit:    1. Flu (Primary)-discussed about diagnoses and answered some questions from patient and wife.    2. Nasal congestion  -     POCT Influenza A/B    3. Cough  -     COVID-19,[facility] ROUTINE, NP/OP SWAB IN TRANSPORT MEDIA OR ESWAB 72 HR TAT - Swab, Nasopharynx  Patient states he has Tessalon Perles at home to use for cough.    4. Nausea and vomiting, intractability of vomiting not specified, unspecified vomiting type   Had a long discussion with patient about preventing dehydration.  Patient has infusion scheduled on Wednesday for blood and fluids.  Patient also has Zofran at home.  Encourage patient to take Zofran when he gets home and replace some of his fluids with Gatorade.  Discussed the importance of staying well-hydrated.  Patient is not able to stop vomiting discussed about going to the ER.    Other orders  -     azithromycin (Zithromax) 250 MG tablet; Take as directed on package  Dispense: 6 tablet; Refill: 0       Discussed with patient about holding on antibiotics at this time.  Patient is prone to sinus infections that turned into bronchitis he states.  Encourage patient to wait 2-3 more days.  Patient and wife verbalized understanding of and agreed to plan of care.    Return if symptoms worsen or fail to improve.     Debbie Basurto DNP, FNP-C  [facility]  O: [phone]      Please note that portions of this note were completed with a voice recognition program. Please call if questions.

## 2021-12-16 PROBLEM — U07.1 COVID-19 VIRUS INFECTION: Status: ACTIVE | Noted: 2021-01-01

## 2021-12-16 NOTE — TELEPHONE ENCOUNTER
----- Message from DANY Mccurdy sent at 12/15/2021  7:48 AM EST -----  Please call patient to let him know of results and sign him up for antibodies if he wishes.

## 2021-12-16 NOTE — TELEPHONE ENCOUNTER
That's fine. Best to be safe for everyone. He should have plenty of medication to last until appointment. Can use this until then. Bring any medication to next appt. Thanks. LAURA

## 2021-12-16 NOTE — TELEPHONE ENCOUNTER
Provider: EDUAR REAVES    Caller: PATIENT    Relationship to Patient: SELF      Phone Number:  [phone]    Reason for Call: PT. CALLED TO STATE THAT HE TESTED POSITIVE FOR COVID ON 12/13/21.   HE CXL APPT. ON 12/22/21 AND MOVED IT TO 12/30/21-WHICH WILL BE PAST HIS QUARANTINE.

## 2021-12-16 NOTE — TELEPHONE ENCOUNTER
PATIENT SPOKE WITH SOMEONE THIS MORNING ABOUT GETTING THE ANTIBODY INFUSION. HE STATED THEY STILL HAVE NOT RECEIVED IT AND THE CORRECT FAX NUMBER IS  [phone]    CALLBACK [phone]

## 2023-09-26 NOTE — CONSULTS
Patient Care Team:  Anthony Gutierrez MD as PCP - General (Internal Medicine)  Anthony Gutierrez MD as PCP - Internal Medicine  Charisse Winters APRN as PCP - Claims Attributed  Bubba Hernandez MD as Consulting Physician (Cardiology)  Girma Olsen MD as Consulting Physician (Urology)  Nickolas Olsen MD as Consulting Physician (Dermatology)  Kirk Gross MD as Consulting Physician (Orthopedic Surgery)    Chief complaint generalized weakness and neck pain    Subjective .     History of present illness: This patient has a long history of trouble with his spine and chronic pain syndrome.  He has had 4 surgeries on his lower back all done by the [facility] and he is also had at least one neck surgery done by Dr. Hampton about 15 years ago.  He has recently been having increasing pain in his neck with some radiation into his left shoulder but not a lot of radiation down his arms.  He has had difficulty using his right hand and has been dropping things from his right hand.  [facility] and apparently passed out while he was there.  He was transported to the [facility] emergency room where he was admitted for lightheadedness.  He continues with the pain in his neck however.  He has a history of significant cardiac disease as well as hypertension and obstructive sleep apnea as well as chronic kidney disease.    Review of Systems  Pertinent items are noted in HPI    History  Past Medical History:   Diagnosis Date   • Cancer (CMS/HCC)     PROSTATE AND SKIN   • Cataract    • Coronary artery disease    • Diabetes (CMS/HCC)     TYPE 2   • GERD (gastroesophageal reflux disease)    • Gout    • Heart attack (CMS/HCC)     X 7   • High cholesterol    • Hypersomnia    • Hypertension    • Kidney stones    • Low back pain    • MRSA (methicillin resistant staph aureus) culture positive     BILATERAL ELBOWS, 2015   • Right shoulder pain    • Sleep  Pt was trying to leave; EMS transport was over an hour away; son felt safe sending pt by cab; writer called pt's house to verify he made it in; Pt answered and reports no concerns; Renae, son was updated that pt made it home ok   apnea     C pap   • Waldenstrom macroglobulinemia (CMS/HCC)     2014   ,   Past Surgical History:   Procedure Laterality Date   • ANGIOPLASTY      X 7   • BACK SURGERY      X4   • CARDIAC CATHETERIZATION     • CARPAL TUNNEL RELEASE Right    • CATARACT EXTRACTION WITH INTRAOCULAR LENS IMPLANT     • COLON RESECTION     • COLONOSCOPY     • ESOPHAGOSCOPY / EGD     • EXTRACORPOREAL SHOCK WAVE LITHOTRIPSY (ESWL)     • FINGER SURGERY Left    • GALLBLADDER SURGERY     • HERNIA REPAIR     • INCISION AND DRAINAGE ABSCESS      MULTIPLE   • LAMINECTOMY     • KS REMOVAL OF ELBOW BURSA Left 9/15/2016    Procedure: LT ELBOW I&D W/ BONE CURRETTAGE;  Surgeon: Kirk Gross MD;  [place] [facility] [place] [facility] [place]  Service: Orthopedics   • PROSTATE SURGERY     • ROTATOR CUFF REPAIR Right    • SKIN CANCER EXCISION      MULTIPLE   • TOTAL SHOULDER ARTHROPLASTY W/ DISTAL CLAVICLE EXCISION Right 1/10/2019    Procedure: RT TOTAL SHOULDER REVERSE ARTHROPLASTY;  Surgeon: Kirk Gross MD;  [place] [facility] [place] [facility] [place]  Service: Orthopedics   • TYMPANOPLASTY Right    , History reviewed. No pertinent family history.,   Social History     Tobacco Use   • Smoking status: Current Every Day Smoker     Years: 60.00     Types: Cigars   • Smokeless tobacco: Never Used   • Tobacco comment: QUIT 1999 CIGARETTES/STILL SMOKES CIGARS   Substance Use Topics   • Alcohol use: Yes     Comment: VERY RARE   • Drug use: Not on file   ,   Medications Prior to Admission   Medication Sig Dispense Refill Last Dose   • allopurinol (ZYLOPRIM) 300 MG tablet Take 300 mg by mouth every evening.   8/1/2019 at Unknown time   • amLODIPine (NORVASC) 10 MG tablet Take 10 mg by mouth every evening.   8/1/2019 at Unknown time   • aspirin 81 MG tablet Take 81 mg by mouth Daily.   8/1/2019 at Unknown time   • azelastine (ASTELIN) 0.1 % nasal spray 2 sprays into the nostril(s) as directed by provider 2 (Two) Times a Day As Needed.   8/1/2019 at Unknown time   • baclofen (LIORESAL) 10 MG  tablet Take 10 mg by mouth 4 (Four) Times a Day.   8/1/2019 at Unknown time   • bumetanide (BUMEX) 1 MG tablet Take 1 mg by mouth Daily (Monday-Friday).   8/1/2019 at Unknown time   • calcium carbonate-cholecalciferol 500-400 MG-UNIT tablet tablet Take 1 tablet by mouth Daily.   8/1/2019 at Unknown time   • carvedilol (COREG) 6.25 MG tablet Take 6.25 mg by mouth 2 (Two) Times a Day With Meals.   8/1/2019 at Unknown time   • celecoxib (CeleBREX) 200 MG capsule Take 200 mg by mouth Daily. OK TO TAKE PER MD   8/1/2019 at Unknown time   • cetirizine (zyrTEC) 10 MG tablet Take 10 mg by mouth Daily.   8/1/2019 at Unknown time   • Cholecalciferol (VITAMIN D3) 2000 UNITS capsule Take 2,000 Units by mouth Daily.   8/1/2019 at Unknown time   • docusate sodium (COLACE) 100 MG capsule Take 100 mg by mouth Daily.   8/1/2019 at Unknown time   • Ergocalciferol (VITAMIN D2) 2000 units tablet Take 2,000 Units by mouth.   8/1/2019 at Unknown time   • Glucosamine-Chondroit-Vit C-Mn ([facility] GLUCOSAMINE-CHONDROITIN) tablet Take 1 tablet by mouth Every Morning.   8/1/2019 at Unknown time   • hydrochlorothiazide (HYDRODIURIL) 25 MG tablet Take 25 mg by mouth 2 (Two) Times a Week. Saturday and Sunday 8/1/2019 at Unknown time   • HYDROcodone-acetaminophen (NORCO)  MG per tablet Take 1 tablet by mouth Every 4 (Four) Hours As Needed for Severe Pain . NTE-5/day; DNF until 7-5-19 150 tablet 0 8/1/2019 at Unknown time   • isosorbide mononitrate (IMDUR) 30 MG 24 hr tablet 30 mg every evening.   8/1/2019 at Unknown time   • LANTUS SOLOSTAR 100 UNIT/ML injection pen Inject 40 Units under the skin into the appropriate area as directed Every Morning. Inject 40 units in Am and 30 units at night   8/1/2019 at Unknown time   • levothyroxine (SYNTHROID, LEVOTHROID) 25 MCG tablet Take  by mouth Daily.  0 8/1/2019 at Unknown time   • losartan (COZAAR) 100 MG tablet Take 100 mg by mouth Daily.   8/1/2019 at Unknown time   • melatonin 5 MG tablet tablet  Take 10 mg by mouth every night.   8/1/2019 at Unknown time   • metFORMIN (GLUCOPHAGE) 500 MG tablet    8/1/2019 at Unknown time   • methylPREDNISolone (MEDROL, [place]RA[place],) 4 MG tablet Take as directed on package instructions. 21 tablet 0 8/1/2019 at Unknown time   • nitroglycerin (NITROSTAT) 0.4 MG SL tablet Take 0.4 mg by mouth Every 5 (Five) Minutes As Needed.   8/1/2019 at Unknown time   • pantoprazole (PROTONIX) 40 MG EC tablet    8/1/2019 at Unknown time   • pravastatin (PRAVACHOL) 40 MG tablet    8/1/2019 at Unknown time   • vitamin B-12 (CYANOCOBALAMIN) 100 MCG tablet Take 150 mcg by mouth Daily.   8/1/2019 at Unknown time    and Allergies:  Contrast dye; Oxycodone; and Adhesive tape    Objective     Vital Signs   Temp:  [97.3 °F (36.3 °C)-97.9 °F (36.6 °C)] 97.4 °F (36.3 °C)  Heart Rate:  [40-80] 80  Resp:  [12-18] 16  BP: (126-175)/(56-74) 130/56    Physical Exam:   Physical Exam   Constitutional: He is oriented to person, place, and time. He appears well-developed and well-nourished.   HENT:   Head: Normocephalic and atraumatic.   Eyes: Conjunctivae and EOM are normal. Pupils are equal, round, and reactive to light.   Fundoscopic exam:       The right eye shows no papilledema. The right eye shows venous pulsations.        The left eye shows no papilledema. The left eye shows venous pulsations.   Neck: Carotid bruit is not present.   Neurological: He is oriented to person, place, and time.   Reflex Scores:       Tricep reflexes are 2+ on the right side and 2+ on the left side.       Bicep reflexes are 2+ on the right side and 2+ on the left side.       Brachioradialis reflexes are 2+ on the right side and 2+ on the left side.       Patellar reflexes are 2+ on the right side and 2+ on the left side.       Achilles reflexes are 2+ on the right side and 2+ on the left side.  Psychiatric: His speech is normal.        Neurologic Exam     Mental Status   Oriented to person, place, and time.   Registration of memory:  Good recent and remote memory.   Attention: normal. Concentration: normal.   Speech: speech is normal   Level of consciousness: alert  Knowledge: consistent with education.     Cranial Nerves     CN II   Visual fields full to confrontation.   Visual acuity: normal    CN III, IV, VI   Pupils are equal, round, and reactive to light.  Extraocular motions are normal.     CN V   Facial sensation intact.   Right corneal reflex: normal  Left corneal reflex: normal    CN VII   Facial expression full, symmetric.   Right facial weakness: none  Left facial weakness: none    CN VIII   Hearing: intact    CN IX, X   Palate: symmetric    CN XI   Right sternocleidomastoid strength: normal  Left sternocleidomastoid strength: normal    CN XII   Tongue: not atrophic  Tongue deviation: none    Motor Exam   Muscle bulk: normal  Right arm tone: normal  Left arm tone: normal  Right leg tone: normal  Left leg tone: normal    Strength   Strength 5/5 except as noted.     Sensory Exam   Light touch normal.     Gait, Coordination, and Reflexes     Reflexes   Right brachioradialis: 2+  Left brachioradialis: 2+  Right biceps: 2+  Left biceps: 2+  Right triceps: 2+  Left triceps: 2+  Right patellar: 2+  Left patellar: 2+  Right achilles: 2+  Left achilles: 2+  Right : 2+  Left : 2+      Results Review:   I reviewed the patient's new clinical results.  I reviewed his CT scan of the cervical spine.  This does seem to show several areas of significant stenosis but it is a little difficult to read and get an idea of how much compression there is on the neural elements.      Assessment/Plan       Bradycardia    Chronic pain    Non-Hodgkin's lymphoma (CMS/HCC)    Diabetes (CMS/HCC)    Hypertension    Long term (current) use of opiate analgesic    JULIA treated with BiPAP 20/16    Waldenstrom macroglobulinemia (CMS/HCC)    Weakness generalized    CKD (chronic kidney disease) stage 3, GFR 30-59 ml/min (CMS/HCC)    Anemia    Fall    Neck pain    DDD  (degenerative disc disease), cervical      I told the patient and his wife about the imaging.  I recommended to them that we do an MRI of his cervical spine to get a better idea of what we are dealing with.  They agree.  He has seen Dr. Morgan in the past and would very much like to see him again.  I explained I am on call this weekend but we can turn his care over to Dr. Morgan on Monday if he is still here or as an outpatient.    I discussed the patients findings and my recommendations with patient and family    John Singleotn MD  08/03/19  12:30 PM

## (undated) DEVICE — NEEDLE, QUINCKE, 20GX3.5": Brand: MEDLINE

## (undated) DEVICE — SUT ETHIB 2 CV V37 MS/4 30IN MX69G

## (undated) DEVICE — Device

## (undated) DEVICE — GLV SURG BIOGEL LTX PF 8 1/2

## (undated) DEVICE — 1 ML TUBERCULIN SYRINGE REGULAR TIP: Brand: MONOJECT

## (undated) DEVICE — SUT SILK 2/0 FS BLK 18IN 685G

## (undated) DEVICE — DRILL NAVNS3606010 2.4MM NAV NONSTERILE: Brand: NAVIGATED INFINITY ™ INSTRUMENTS

## (undated) DEVICE — GLV SURG SENSICARE W/ALOE PF LF 7.5 STRL

## (undated) DEVICE — DRP STRL STERILEZ ZFLD

## (undated) DEVICE — TRAP FLD MINIVAC MEGADYNE 100ML

## (undated) DEVICE — DRN WND JP RND W TROC SIL 15F 3/16IN

## (undated) DEVICE — TOWEL,OR,DSP,ST,BLUE,STD,4/PK,20PK/CS: Brand: MEDLINE

## (undated) DEVICE — COVER,TABLE,HEAVY DUTY,79"X110",STRL: Brand: MEDLINE

## (undated) DEVICE — CONN TBG Y 5 IN 1 LF STRL

## (undated) DEVICE — DISPOSABLE IRRIGATION BIPOLAR CORD, M1000 TYPE: Brand: KIRWAN

## (undated) DEVICE — ELECTRD BLD EDGE/INSUL1P 2.4X5.1MM STRL

## (undated) DEVICE — JACKSON-PRATT 100CC BULB RESERVOIR: Brand: CARDINAL HEALTH

## (undated) DEVICE — GLV SURG BIOGEL LTX PF 7

## (undated) DEVICE — SHEET, DRAPE, SPLIT, STERILE: Brand: MEDLINE

## (undated) DEVICE — 6.0MM PRECISION ROUND

## (undated) DEVICE — DRSNG WND GZ PAD BORDERED 4X8IN STRL

## (undated) DEVICE — GAUZE,SPONGE,4"X4",16PLY,XRAY,STRL,LF: Brand: MEDLINE

## (undated) DEVICE — PENCL E/S ULTRAVAC TELESCP NOSE HOLSTR 10FT

## (undated) DEVICE — ADHS SKIN DERMABOND TOP ADVANCED

## (undated) DEVICE — DRSNG TELFA PAD NONADH STR 1S 3X8IN

## (undated) DEVICE — GLV SURG BIOGEL LTX PF 6 1/2

## (undated) DEVICE — SUT SILK 2/0 TIES 18IN A185H

## (undated) DEVICE — SMOKE EVACUATION TUBING WITH 7/8 IN TO 1/4 IN REDUCER: Brand: BUFFALO FILTER

## (undated) DEVICE — DRP MICROSCOPE 4 BINOCULAR CV 54X150IN

## (undated) DEVICE — GLV SURG BIOGEL LTX PF 8

## (undated) DEVICE — 2108 SERIES SAGITTAL BLADE (19.5 X 1.27 X 81.0MM)

## (undated) DEVICE — PK SHLDR OPN 40

## (undated) DEVICE — TOTAL TRAY, 16FR 10ML SIL FOLEY, URN: Brand: MEDLINE

## (undated) DEVICE — 3.0MM NEURO (MATCH HEAD) LESS AGGRESSIVE

## (undated) DEVICE — APPL CHLORAPREP W/TINT 10.5ML PERC STRL

## (undated) DEVICE — MAT FLR ABSORBENT LG 4FT 10 2.5FT

## (undated) DEVICE — DRSNG GZ PETROLTM XEROFORM CURAD 1X8IN STRL

## (undated) DEVICE — SKIN PREP TRAY W/CHG: Brand: MEDLINE INDUSTRIES, INC.

## (undated) DEVICE — ANTIBACTERIAL UNDYED BRAIDED (POLYGLACTIN 910), SYNTHETIC ABSORBABLE SUTURE: Brand: COATED VICRYL

## (undated) DEVICE — ARM SLING: Brand: DEROYAL

## (undated) DEVICE — STPLR SKIN VISISTAT WD 35CT

## (undated) DEVICE — SUT VIC 0 CT1 36IN J946H

## (undated) DEVICE — SYR CONTRL LUERLOK 10CC

## (undated) DEVICE — 1010 S-DRAPE TOWEL DRAPE 10/BX: Brand: STERI-DRAPE™

## (undated) DEVICE — POOLE SUCTION HANDLE: Brand: CARDINAL HEALTH

## (undated) DEVICE — HANDPIECE SET WITH COAXIAL HIGH FLOW TIP AND SUCTION TUBE: Brand: INTERPULSE

## (undated) DEVICE — BIT DRL EQUINOXE 2 AND 3.2MM

## (undated) DEVICE — SUT VIC 2/0 X1 27IN J459H

## (undated) DEVICE — DRN WND JP RND W TROC SIL 10F 1/8IN

## (undated) DEVICE — CODMAN® SURGICAL PATTIES 3/4" X 3/4" (1.91CM X 1.91CM): Brand: CODMAN®

## (undated) DEVICE — SPHR MARKR STEALTH STATION

## (undated) DEVICE — COLR CERV MED/DENS NRW LNG

## (undated) DEVICE — PK NEURO SPINE 40

## (undated) DEVICE — 2.0MM DIAMOND NEURO (MATCH HEAD)

## (undated) DEVICE — INTENDED FOR TISSUE SEPARATION, AND OTHER PROCEDURES THAT REQUIRE A SHARP SURGICAL BLADE TO PUNCTURE OR CUT.: Brand: BARD-PARKER ® STAINLESS STEEL BLADES

## (undated) DEVICE — TUBING, SUCTION, 1/4" X 20', STRAIGHT: Brand: MEDLINE INDUSTRIES, INC.

## (undated) DEVICE — NDL SPINE 18G 31/2IN PNK

## (undated) DEVICE — NDL HYPO PRECISIONGLIDE REG 20G 1 1/2